# Patient Record
Sex: MALE | Race: WHITE | NOT HISPANIC OR LATINO | Employment: FULL TIME | URBAN - METROPOLITAN AREA
[De-identification: names, ages, dates, MRNs, and addresses within clinical notes are randomized per-mention and may not be internally consistent; named-entity substitution may affect disease eponyms.]

---

## 2017-03-17 ENCOUNTER — HOSPITAL ENCOUNTER (EMERGENCY)
Facility: CLINIC | Age: 42
Discharge: HOME | End: 2017-03-17
Attending: PHYSICIAN ASSISTANT | Admitting: PHYSICIAN ASSISTANT
Payer: MEDICAID

## 2017-03-17 ENCOUNTER — APPOINTMENT (OUTPATIENT)
Dept: GENERAL RADIOLOGY | Facility: CLINIC | Age: 42
End: 2017-03-17
Attending: PHYSICIAN ASSISTANT
Payer: MEDICAID

## 2017-03-17 ENCOUNTER — ALLIED HEALTH/NURSE VISIT (OUTPATIENT)
Dept: FAMILY MEDICINE | Facility: OTHER | Age: 42
End: 2017-03-17

## 2017-03-17 ENCOUNTER — APPOINTMENT (OUTPATIENT)
Dept: GENERAL RADIOLOGY | Facility: CLINIC | Age: 42
End: 2017-03-17
Attending: FAMILY MEDICINE
Payer: MEDICAID

## 2017-03-17 VITALS
TEMPERATURE: 97.6 F | HEART RATE: 78 BPM | DIASTOLIC BLOOD PRESSURE: 105 MMHG | SYSTOLIC BLOOD PRESSURE: 129 MMHG | BODY MASS INDEX: 19.33 KG/M2 | OXYGEN SATURATION: 100 % | HEIGHT: 70 IN | WEIGHT: 135 LBS | RESPIRATION RATE: 16 BRPM

## 2017-03-17 DIAGNOSIS — S90.31XA CONTUSION OF RIGHT FOOT, INITIAL ENCOUNTER: ICD-10-CM

## 2017-03-17 DIAGNOSIS — S93.401A SPRAIN OF RIGHT ANKLE, UNSPECIFIED LIGAMENT, INITIAL ENCOUNTER: ICD-10-CM

## 2017-03-17 DIAGNOSIS — S99.919A ANKLE INJURY: Primary | ICD-10-CM

## 2017-03-17 PROCEDURE — 99284 EMERGENCY DEPT VISIT MOD MDM: CPT

## 2017-03-17 PROCEDURE — 73630 X-RAY EXAM OF FOOT: CPT | Mod: TC,RT

## 2017-03-17 PROCEDURE — 99283 EMERGENCY DEPT VISIT LOW MDM: CPT | Performed by: PHYSICIAN ASSISTANT

## 2017-03-17 PROCEDURE — 99207 ZZC NO CHARGE NURSE ONLY: CPT

## 2017-03-17 PROCEDURE — 73610 X-RAY EXAM OF ANKLE: CPT | Mod: TC,RT

## 2017-03-17 ASSESSMENT — ENCOUNTER SYMPTOMS
COLOR CHANGE: 1
NUMBNESS: 0
WEAKNESS: 0

## 2017-03-17 NOTE — ED AVS SNAPSHOT
[facility] Emergency Department    [address] [place] [address]    Phone:  [phone]    Fax:  [phone]                                       Deshawn Flood   MRN: [id number]    Department:  [facility] Emergency Department   Date of Visit:  3/17/2017           Patient Information     Date Of Birth          1975        Your diagnoses for this visit were:     Sprain of right ankle, unspecified ligament, initial encounter     Contusion of right foot, initial encounter        You were seen by Sophia Clarke PA-C.      Follow-up Information     Follow up with [facility] In 1 week.    Specialty:  Orthopedics    Why:  As needed if symptoms persist    Contact information:    [address] [address] [phone]    Additional information:    From [address]: Exit at [employer] on south side [facility] [address] [facility].  Turn right   on [address].  Turn left at stoplight on [address].  [facility] will be in view two blocks ahead.        Follow up with [facility] Emergency Department.    Specialty:  EMERGENCY MEDICINE    Why:  If symptoms worsen    Contact information:    [address]   [place] [id number] [phone]    Additional information:    From [address]: Exit at [employer] on south [facility]. Turn right on [address]. Turn left at stoplight on [address]. [facility] will be in view two blocks ahead        Discharge Instructions         Ice, elevate, and rest your foot at home. Wear the air cast and use the crutches for support. Use tylenol and/or ibuprofen for pain. If no improvement in a week, follow up in the clinic with one of our orthopedists. Return to the emergency department as discussed for worsening symptoms.    Thank you for choosing [facility]'s Emergency Department. It was a pleasure taking care of you today. If you have any questions, please  "call [phone].    Sophia Clarke PA-C      Discharge References/Attachments     FOOT CONTUSION (ENGLISH)    SPRAIN, ANKLE (ADULT) (ENGLISH)      24 Hour Appointment Hotline       To make an appointment at any [facility] clinic, call [phone] ([phone]). If you don't have a family doctor or clinic, we will help you find one. [facility] clinics are conveniently located to serve the needs of you and your family.          ED Discharge Orders     Aircast           Crutches       Use gait belt during crutch training.                     Review of your medicines      Our records show that you are taking the medicines listed below. If these are incorrect, please call your family doctor or clinic.        Dose / Directions Last dose taken    albuterol 108 (90 BASE) MCG/ACT Inhaler   Quantity:  1   Generic drug:  albuterol        inhale as directed   Refills:  3                Procedures and tests performed during your visit     X-ray rt Foot G/E 3 vws*    XR Ankle Right G/E 3 Views      Orders Needing Specimen Collection     None      Pending Results     No orders found from 3/15/2017 to 3/18/2017.            Pending Culture Results     No orders found from 3/15/2017 to 3/18/2017.            Thank you for choosing [facility]       Thank you for choosing [facility] for your care. Our goal is always to provide you with excellent care. Hearing back from our patients is one way we can continue to improve our services. Please take a few minutes to complete the written survey that you may receive in the mail after you visit with us. Thank you!        [employer] Information     [employer]ZE lets you send messages to your doctor, view your test results, renew your prescriptions, schedule appointments and more. To sign up, go to [email or web address] . Click on \"Log in\" on the left side of the screen, which will take you to the Welcome page. Then click on \"Sign up Now\" on the right side of the page.     You will be asked to enter " the access code listed below, as well as some personal information. Please follow the directions to create your username and password.     Your access code is: [email or web address]  Expires: 6/15/2017 11:25 AM     Your access code will  in 90 days. If you need help or a new code, please call your [facility] clinic or [phone].        Care EveryWhere ID     This is your Care EveryWhere ID. This could be used by other organizations to access your [facility] medical records  [id number]        After Visit Summary       This is your record. Keep this with you and show to your community pharmacist(s) and doctor(s) at your next visit.

## 2017-03-17 NOTE — PROGRESS NOTES
Patient presented to the clinic today with an ankle injury. He said about 10:15 this morning he was walking out of his house when he slipped on a step and somehow hurt his ankle. Patient said he is unsure if there is swelling. He has not taken off his boot yet. Patient said he has a lot of pain and is unable to bear any weight on his foot or ankle. Patient said he can still move his toes but it is severely painful.     RN advised patient since he can bear any weight, he needs to be seen today. No openings at the [facility]. RN advised patient he should be seen in the ER. Patient is here with another male that will drive him to the ER. Patient said he will head to the ER now.     RECOMMENDED DISPOSITION:  To ED, another person to drive - Patient agreed to head to the ER now  Will comply with recommendation: YES   If further questions/concerns or if Sx do not improve, worsen or new Sx develop, call your PCP or [facility] as soon as possible.    NOTES:  Disposition was determined by the first positive assessment question, therefore all previous assessment questions were negative.  Informed to check provider manual or call insurance company to assure coverage.    Guideline used: Ankle Injury  Telephone Triage Protocols for Nurses, Fifth Edition, Bibi Lima RN

## 2017-03-17 NOTE — ED PROVIDER NOTES
"  History     Chief Complaint   Patient presents with     Ankle Pain     HPI  Deshawn Flood is a 41 year old male who presents to the emergency department complaining of right ankle and foot pain.  Earlier this morning he was walking down his outside porch steps when he slipped and hit his foot/ankle on a 4 x 4 post.  He has not been able to bear weight on the foot since then.  He complains of lateral ankle pain and lateral foot pain.  Denies numbness or tingling.  He is able to move his toes.  Denies any other injuries from falling.    I have reviewed the Medications, Allergies, Past Medical and Surgical History, and Social History in the Epic system.    Review of Systems   Musculoskeletal: Positive for gait problem.        Positive for right foot and ankle pain   Skin: Positive for color change (bruising, right ankle and foot).   Neurological: Negative for weakness and numbness.       Physical Exam   BP: (!) 129/105  Pulse: 79  Temp: 97.6  F (36.4  C)  Resp: 16  Height: 177.8 cm (5' 10\")  Weight: 61.2 kg (135 lb)  SpO2: 100 %  Physical Exam   Constitutional: He is oriented to person, place, and time. He appears well-developed and well-nourished. No distress.   HENT:   Head: Normocephalic and atraumatic.   Eyes: Conjunctivae are normal.   Neck: Normal range of motion.   Cardiovascular: Normal rate, regular rhythm and intact distal pulses.    Pulmonary/Chest: Effort normal. No respiratory distress.   Musculoskeletal:        Right ankle: He exhibits decreased range of motion, swelling and ecchymosis (mild). Tenderness. Lateral malleolus tenderness found.        Right foot: There is decreased range of motion, bony tenderness (5th metatarsal), swelling (over 5th metatarsal, ecchymosis) and decreased capillary refill. There is no crepitus and no deformity.   Neurological: He is alert and oriented to person, place, and time.   Skin: Skin is warm and dry. He is not diaphoretic.   Psychiatric: He has a normal mood and " affect.   Nursing note and vitals reviewed.      ED Course     ED Course     Procedures  None     Results for orders placed or performed during the hospital encounter of 03/17/17 (from the past 24 hour(s))   XR Ankle Right G/E 3 Views    Narrative    XR ANKLE RT G/E 3 VW 3/17/2017 12:40 PM    HISTORY: Trauma.    COMPARISON: None.    FINDINGS: Mortise joint is congruent. No acute fracture or  malalignment. Soft tissue swelling about the ankle, lateral greater  than medial.      Impression    IMPRESSION: No acute osseous abnormality.    ÁNGELA FLORES MD   X-ray rt Foot G/E 3 vws*    Narrative    XR FOOT RT G/E 3 VW 3/17/2017 1:25 PM    HISTORY: Trauma.    COMPARISON: None.    FINDINGS: No fracture or malalignment. Osseous structures are within  normal limits.      Impression    IMPRESSION: No acute osseous abnormality.    ÁNGELA FLORES MD             Assessments & Plan (with Medical Decision Making)  Deshawn Flood is a 41 year old male who presented to the emergency department complaining of right ankle and foot pain after a fall.  He has been unable to bear weight on the foot since the fall that occurred earlier this morning, denies any other injuries. Complains of pain in the lateral ankle and lateral foot. Exam notable for swelling and ecchymosis to these areas.  X-rays obtained and I reviewed them.  They showed no acute fracture or dislocation.  I suspect patient sustained an ankle sprain as well as a contusion of his foot.  I recommended rest, ice, and elevating foot when not in use. He can use tylenol or ibuprofen for pain. He was placed in an Aircast and given a set of crutches for mobilization.  If he sees no in improvement in the next week he can follow-up in the clinic with orthopedics.  He is given instructions on to return to the emergency department.  Patient expressed understanding and was agreeable to plan.       I have reviewed the nursing notes.    I have reviewed the findings, diagnosis, plan and  need for follow up with the patient.    Discharge Medication List as of 3/17/2017  1:54 PM          Final diagnoses:   Sprain of right ankle, unspecified ligament, initial encounter   Contusion of right foot, initial encounter       3/17/2017   [facility] EMERGENCY DEPARTMENT     Sophia Clarke PA-C  03/17/17 141

## 2017-03-17 NOTE — DISCHARGE INSTRUCTIONS
Ice, elevate, and rest your foot at home. Wear the air cast and use the crutches for support. Use tylenol and/or ibuprofen for pain. If no improvement in a week, follow up in the clinic with one of our orthopedists. Return to the emergency department as discussed for worsening symptoms.    Thank you for choosing [facility]'s Emergency Department. It was a pleasure taking care of you today. If you have any questions, please call [phone].    Sophia Clarke PA-C

## 2017-03-17 NOTE — MR AVS SNAPSHOT
"              After Visit Summary   3/17/2017    Deshawn Flood    MRN: [id number]           Patient Information     Date Of Birth          1975        Visit Information        Provider Department      3/17/2017 10:30 AM NL RN [facility]        Today's Diagnoses     Ankle injury    -  1       Follow-ups after your visit        Who to contact     If you have questions or need follow up information about today's clinic visit or your schedule please contact [facility] directly at [phone].  Normal or non-critical lab and imaging results will be communicated to you by MyChart, letter or phone within 4 business days after the clinic has received the results. If you do not hear from us within 7 days, please contact the clinic through [employer] or phone. If you have a critical or abnormal lab result, we will notify you by phone as soon as possible.  Submit refill requests through [employer] or call your pharmacy and they will forward the refill request to us. Please allow 3 business days for your refill to be completed.          Additional Information About Your Visit        MyChart Information     [employer] lets you send messages to your doctor, view your test results, renew your prescriptions, schedule appointments and more. To sign up, go to [email or web address] . Click on \"Log in\" on the left side of the screen, which will take you to the Welcome page. Then click on \"Sign up Now\" on the right side of the page.     You will be asked to enter the access code listed below, as well as some personal information. Please follow the directions to create your username and password.     Your access code is: [email or web address]  Expires: 6/15/2017 11:25 AM     Your access code will  in 90 days. If you need help or a new code, please call your [facility] or [phone].        Care EveryWhere ID     This is your Care EveryWhere ID. This could be used by other organizations to access your " [facility] medical records  [id number]         Blood Pressure from Last 3 Encounters:   10/12/12 110/78   08/31/12 110/80   08/17/12 110/70    Weight from Last 3 Encounters:   08/03/12 142 lb 14.4 oz (64.8 kg)   08/02/12 142 lb 14.4 oz (64.8 kg)   07/30/12 145 lb (65.8 kg)              Today, you had the following     No orders found for display       Primary Care Provider Office Phone # Fax #    Kalia Foy PA-C [phone]       [facility] [address] [facility] [address]        Thank you!     Thank you for choosing [facility]  for your care. Our goal is always to provide you with excellent care. Hearing back from our patients is one way we can continue to improve our services. Please take a few minutes to complete the written survey that you may receive in the mail after your visit with us. Thank you!             Your Updated Medication List - Protect others around you: Learn how to safely use, store and throw away your medicines at www.disposemymeds.org.          This list is accurate as of: 3/17/17 11:25 AM.  Always use your most recent med list.                   Brand Name Dispense Instructions for use    albuterol 108 (90 BASE) MCG/ACT Inhaler   Generic drug:  albuterol     1    inhale as directed

## 2017-03-27 ENCOUNTER — OFFICE VISIT (OUTPATIENT)
Dept: PODIATRY | Facility: CLINIC | Age: 42
End: 2017-03-27
Payer: MEDICAID

## 2017-03-27 VITALS — HEIGHT: 70 IN | BODY MASS INDEX: 19.33 KG/M2 | TEMPERATURE: 98 F | WEIGHT: 135 LBS

## 2017-03-27 DIAGNOSIS — S93.602A FOOT SPRAIN, LEFT, INITIAL ENCOUNTER: Primary | ICD-10-CM

## 2017-03-27 DIAGNOSIS — S93.402A SPRAIN OF LEFT ANKLE, UNSPECIFIED LIGAMENT, INITIAL ENCOUNTER: ICD-10-CM

## 2017-03-27 PROCEDURE — 99203 OFFICE O/P NEW LOW 30 MIN: CPT | Performed by: PODIATRIST

## 2017-03-27 ASSESSMENT — PAIN SCALES - GENERAL: PAINLEVEL: MODERATE PAIN (5)

## 2017-03-27 NOTE — LETTER
3/27/2017       RE: Deshawn Flood  [address]  [place] [address]           Dear Colleague,    Thank you for referring your patient, Deshawn Flood, to the [facility]. Please see a copy of my visit note below.    HPI:  Deshawn Flood is a 41 year old male who is seen in consultation at the request of Sophia Clarke PA-C.    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    ROS:  10 point ROS neg other than the symptoms noted above in the HPI.    PAST MEDICAL HISTORY:   Past Medical History:   Diagnosis Date     Other spontaneous pneumothorax      SPONTANEOUS PNEUMOTHORAX NEC 3/25/2005     Unspecified asthma(493.90)         PAST SURGICAL HISTORY:   Past Surgical History:   Procedure Laterality Date     ARTHROSCOPY KNEE  8/3/2012    Procedure: ARTHROSCOPY KNEE;  Left knee Arthroscopy with removal of loose bodies;  Surgeon: Bibi Roberts MD;  Location: [facility] OR     HC REPAIR UMBILICAL GODFREY,<4Y/O,REDUC  1978     TUBE THORACOSTOMY,ABSC/EMPYEMA/HEMO  3/20/2005    Small chest tube with Heimlich valve.        MEDICATIONS:   Current Outpatient Prescriptions:      IBUPROFEN PO, , Disp: , Rfl:      PROAIR  (90 BASE) MCG/ACT IN AERS, inhale as directed, Disp: 1, Rfl: 3     ALLERGIES:    Allergies   Allergen Reactions     Bee Venom      swells up     No Known Drug Allergies         SOCIAL HISTORY:   Social History     Social History     Marital status: Single     Spouse name: N/A     Number of children: N/A     Years of education: N/A     Occupational  "History     Not on file.     Social History Main Topics     Smoking status: Current Some Day Smoker     Packs/day: 0.10     Years: 15.00     Types: Cigarettes     Smokeless tobacco: Current User      Comment: hopes to quit smoking --hasn't had any since lung went down 1/14/05.     Alcohol use 10.5 oz/week     21 Cans of beer per week     Drug use: No     Sexual activity: Not on file     Other Topics Concern     Not on file     Social History Narrative        FAMILY HISTORY: History reviewed. No pertinent family history.     EXAM:Vitals: Temp 98  F (36.7  C) (Temporal)  Ht 5' 10\" (1.778 m)  Wt 135 lb (61.2 kg)  BMI 19.37 kg/m2  BMI= Body mass index is 19.37 kg/(m^2).    General appearance: Patient is alert and fully cooperative with history & exam.  No sign of distress is noted during the visit.     Psychiatric: Affect is pleasant & appropriate.  Patient appears motivated to improve health.      Respiratory: Breathing is regular & unlabored while sitting.     HEENT: Hearing is intact to spoken word.  Speech is clear.  No gross evidence of visual impairment that would impact ambulation.     Vascular: DP & PT pulses are intact & regular bilaterally.  No significant edema or varicosities noted.  CFT and skin temperature is normal to both lower extremities.     Neurologic: Lower extremity sensation is intact to light touch.  No evidence of weakness or contracture in the lower extremities.  No evidence of neuropathy.    Dermatologic: Skin is intact to both lower extremities with adequate texture, turgor and tone about the integument.  No paronychia or evidence of soft tissue infection is noted.     Musculoskeletal: Patient is ambulatory with fracture boot but no crutches. He has exquisite pain throughout any palpation of the dorsal forefoot and plantar metatarsal heads as well as lateral calcaneus and cuboid lateral and medial malleolus. No palpable void within the peroneal tendons posterior tibial or Achilles " tendon.    Radiographs: 3 views of the foot and 3 views of the ankle demonstrate no acute fracture or joint dislocation. Osteophyte noted about the dorsal talus however this appears to be chronic.     ASSESSMENT:       ICD-10-CM    1. Foot sprain, left, initial encounter S93.602A    2. Sprain of left ankle, unspecified ligament, initial encounter S93.402A         PLAN:  Reviewed patient's chart in [facility].      3/27/2017   Interpreted xrays  No obvious fractures or ruptures but pain in many other locations, calc, med lat mall, and forefoot and peroneals.  Work release for light duty work in fracture boot.  Stay in the fracture boot even for sleep to keep the ankle at 90  and reduce tension upon tendons that cross the ankle  Follow up in 2 weeks  If no improvement then consider MR imaging       Red Flores DPM      Again, thank you for allowing me to participate in the care of your patient.        Sincerely,    Red Flores DPM

## 2017-03-27 NOTE — PROGRESS NOTES
HPI:  Deshawn Flood is a 41 year old male who is seen in consultation at the request of Sophia Clarke PA-C.    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    ROS:  10 point ROS neg other than the symptoms noted above in the HPI.    PAST MEDICAL HISTORY:   Past Medical History:   Diagnosis Date     Other spontaneous pneumothorax      SPONTANEOUS PNEUMOTHORAX NEC 3/25/2005     Unspecified asthma(493.90)         PAST SURGICAL HISTORY:   Past Surgical History:   Procedure Laterality Date     ARTHROSCOPY KNEE  8/3/2012    Procedure: ARTHROSCOPY KNEE;  Left knee Arthroscopy with removal of loose bodies;  Surgeon: Bibi Roberts MD;  Location: [facility] OR     HC REPAIR UMBILICAL GODFREY,<4Y/O,REDUC  1978     TUBE THORACOSTOMY,ABSC/EMPYEMA/HEMO  3/20/2005    Small chest tube with Heimlich valve.        MEDICATIONS:   Current Outpatient Prescriptions:      IBUPROFEN PO, , Disp: , Rfl:      PROAIR  (90 BASE) MCG/ACT IN AERS, inhale as directed, Disp: 1, Rfl: 3     ALLERGIES:    Allergies   Allergen Reactions     Bee Venom      swells up     No Known Drug Allergies         SOCIAL HISTORY:   Social History     Social History     Marital status: Single     Spouse name: N/A     Number of children: N/A     Years of education: N/A     Occupational History     Not on file.     Social History Main Topics     Smoking status: Current Some Day Smoker     Packs/day: 0.10     Years: 15.00     Types: Cigarettes     Smokeless tobacco: Current User      Comment: hopes to quit smoking  "--hasn't had any since lung went down 1/14/05.     Alcohol use 10.5 oz/week     21 Cans of beer per week     Drug use: No     Sexual activity: Not on file     Other Topics Concern     Not on file     Social History Narrative        FAMILY HISTORY: History reviewed. No pertinent family history.     EXAM:Vitals: Temp 98  F (36.7  C) (Temporal)  Ht 5' 10\" (1.778 m)  Wt 135 lb (61.2 kg)  BMI 19.37 kg/m2  BMI= Body mass index is 19.37 kg/(m^2).    General appearance: Patient is alert and fully cooperative with history & exam.  No sign of distress is noted during the visit.     Psychiatric: Affect is pleasant & appropriate.  Patient appears motivated to improve health.      Respiratory: Breathing is regular & unlabored while sitting.     HEENT: Hearing is intact to spoken word.  Speech is clear.  No gross evidence of visual impairment that would impact ambulation.     Vascular: DP & PT pulses are intact & regular bilaterally.  No significant edema or varicosities noted.  CFT and skin temperature is normal to both lower extremities.     Neurologic: Lower extremity sensation is intact to light touch.  No evidence of weakness or contracture in the lower extremities.  No evidence of neuropathy.    Dermatologic: Skin is intact to both lower extremities with adequate texture, turgor and tone about the integument.  No paronychia or evidence of soft tissue infection is noted.     Musculoskeletal: Patient is ambulatory with fracture boot but no crutches. He has exquisite pain throughout any palpation of the dorsal forefoot and plantar metatarsal heads as well as lateral calcaneus and cuboid lateral and medial malleolus. No palpable void within the peroneal tendons posterior tibial or Achilles tendon.    Radiographs: 3 views of the foot and 3 views of the ankle demonstrate no acute fracture or joint dislocation. Osteophyte noted about the dorsal talus however this appears to be chronic.     ASSESSMENT:       ICD-10-CM    1. Foot " sprain, left, initial encounter S93.602A    2. Sprain of left ankle, unspecified ligament, initial encounter S93.402A         PLAN:  Reviewed patient's chart in [facility].      3/27/2017   Interpreted xrays  No obvious fractures or ruptures but pain in many other locations, calc, med lat mall, and forefoot and peroneals.  Work release for light duty work in fracture boot.  Stay in the fracture boot even for sleep to keep the ankle at 90  and reduce tension upon tendons that cross the ankle  Follow up in 2 weeks  If no improvement then consider MR imaging       Red Flores DPM

## 2017-03-27 NOTE — NURSING NOTE
"Chief Complaint   Patient presents with     Consult     Right foot sprain, DOI 3/17/2017; new patient       Initial Temp 98  F (36.7  C) (Temporal)  Ht 5' 10\" (1.778 m)  Wt 135 lb (61.2 kg)  BMI 19.37 kg/m2 Estimated body mass index is 19.37 kg/(m^2) as calculated from the following:    Height as of this encounter: 5' 10\" (1.778 m).    Weight as of this encounter: 135 lb (61.2 kg).  BP completed using cuff size: NA (Not Taken)  Medication Reconciliation: complete    Patricia James CMA, March 27, 2017    "

## 2017-03-27 NOTE — LETTER
[address] [address]  [phone]    3/27/2017      RE:  Deshawn Flood  : 1975      To whom it may concern:    This patient must be released to light duty seated work only at this time.  Follow up in two weeks for reevaluation.     Sincerely,          Red Flores DPM

## 2017-03-27 NOTE — PATIENT INSTRUCTIONS
Sustained a fracture boot activities as tolerated begin weightbearing without crutches in the fracture boot  Standard fracture boot even for sleep

## 2017-03-27 NOTE — MR AVS SNAPSHOT
"              After Visit Summary   3/27/2017    Deshawn Flood    MRN: [id number]           Patient Information     Date Of Birth          1975        Visit Information        Provider Department      3/27/2017 1:00 PM Red Flores DPM [facility]        Today's Diagnoses     Foot sprain, left, initial encounter    -  1    Sprain of left ankle, unspecified ligament, initial encounter          Care Instructions    Sustained a fracture boot activities as tolerated begin weightbearing without crutches in the fracture boot  Standard fracture boot even for sleep        Follow-ups after your visit        Your next 10 appointments already scheduled     Apr 10, 2017  3:45 PM CDT   Return Visit with Red Flores DPM   [facility]    [address] [facility] [address] [phone]              Who to contact     If you have questions or need follow up information about today's clinic visit or your schedule please contact [facility] directly at [phone].  Normal or non-critical lab and imaging results will be communicated to you by [employer], letter or phone within 4 business days after the clinic has received the results. If you do not hear from us within 7 days, please contact the clinic through [employer] or phone. If you have a critical or abnormal lab result, we will notify you by phone as soon as possible.  Submit refill requests through [employer] or call your pharmacy and they will forward the refill request to us. Please allow 3 business days for your refill to be completed.          Additional Information About Your Visit        [employer] Information     [employer] lets you send messages to your doctor, view your test results, renew your prescriptions, schedule appointments and more. To sign up, go to [email or web address] . Click on \"Log in\" on the left side of the screen, which will take you to the Welcome page. " "Then click on \"Sign up Now\" on the right side of the page.     You will be asked to enter the access code listed below, as well as some personal information. Please follow the directions to create your username and password.     Your access code is: [email or web address]  Expires: 6/15/2017 11:25 AM     Your access code will  in 90 days. If you need help or a new code, please call your [place] clinic or [phone].        Care EveryWhere ID     This is your Care EveryWhere ID. This could be used by other organizations to access your [place] medical records  [id number]        Your Vitals Were     Temperature Height BMI (Body Mass Index)             98  F (36.7  C) (Temporal) 5' 10\" (1.778 m) 19.37 kg/m2          Blood Pressure from Last 3 Encounters:   17 (!) 129/105   10/12/12 110/78   12 110/80    Weight from Last 3 Encounters:   17 135 lb (61.2 kg)   17 135 lb (61.2 kg)   12 142 lb 14.4 oz (64.8 kg)              Today, you had the following     No orders found for display       Primary Care Provider Office Phone # Fax #    Kalia Foy PA-C [phone]       [facility] [address] [facility] [address]        Thank you!     Thank you for choosing [facility]  for your care. Our goal is always to provide you with excellent care. Hearing back from our patients is one way we can continue to improve our services. Please take a few minutes to complete the written survey that you may receive in the mail after your visit with us. Thank you!             Your Updated Medication List - Protect others around you: Learn how to safely use, store and throw away your medicines at www.disposemymeds.org.          This list is accurate as of: 3/27/17  1:38 PM.  Always use your most recent med list.                   Brand Name Dispense Instructions for use    albuterol 108 (90 BASE) MCG/ACT Inhaler   Generic drug:  albuterol     1    inhale as directed       " IBUPROFEN PO

## 2017-04-10 ENCOUNTER — OFFICE VISIT (OUTPATIENT)
Dept: PODIATRY | Facility: CLINIC | Age: 42
End: 2017-04-10
Payer: MEDICAID

## 2017-04-10 VITALS — TEMPERATURE: 98.2 F | WEIGHT: 135 LBS | HEIGHT: 70 IN | BODY MASS INDEX: 19.33 KG/M2

## 2017-04-10 DIAGNOSIS — S93.601A SPRAIN OF FOOT, RIGHT, INITIAL ENCOUNTER: Primary | ICD-10-CM

## 2017-04-10 DIAGNOSIS — S93.401A SPRAIN OF RIGHT ANKLE, UNSPECIFIED LIGAMENT, INITIAL ENCOUNTER: ICD-10-CM

## 2017-04-10 PROCEDURE — 99213 OFFICE O/P EST LOW 20 MIN: CPT | Performed by: PODIATRIST

## 2017-04-10 ASSESSMENT — PAIN SCALES - GENERAL: PAINLEVEL: NO PAIN (0)

## 2017-04-10 NOTE — NURSING NOTE
"Chief Complaint   Patient presents with     RECHECK     improvement, D/C fx boot for 3 days, stiff - Right foot sprain, DOI 3/17/2017; LOV 3/27/2017       Initial Temp 98.2  F (36.8  C) (Temporal)  Ht 5' 10\" (1.778 m)  Wt 135 lb (61.2 kg)  BMI 19.37 kg/m2 Estimated body mass index is 19.37 kg/(m^2) as calculated from the following:    Height as of this encounter: 5' 10\" (1.778 m).    Weight as of this encounter: 135 lb (61.2 kg).  BP completed using cuff size: NA (Not Taken)  Medication Reconciliation: complete    Patricia James CMA, April 10, 2017    "

## 2017-04-10 NOTE — PATIENT INSTRUCTIONS
Reliable shoe stores: To maximize your experience and provide the best possible fit.  Be sure to show them your foot concerns and tell them Dr. Flores sent you.      Stores listed in bold have only athletic shoes, and stores that are not bold are mostly casual or variety of shoes    [employer]  [address] [place] [address]  [phone]    [employer] [place]  [address] [place] [address]  [phone]     [employer] [place]  [address] [place] [address]  [phone]    [employer]  [address] [facility]  [facility] [address]  [phone]    [employer]  [address] [place], [place] [address] [phone]    [employer]  [address] [place] [address]  [phone]                         [employer] Locations:     [address] [place] [address]   [phone]     [address] [place] [address] [address] [place] [address]   [phone]     [address] [place] [address]   [phone]     [address] [facility] [place] [address]   [phone]     [address] [place] [address]   [phone]     [address] [place] [address]   [phone]     [address] [facility] [address]   [place] [address]   [phone]     [address] [facility]   [place] [address]   [phone]             How to find reasonable shoes          The correct width    Correct Fitting    Correct Length      Foot Distortion    Posture Distortion                          Torsional Rigidity      Grasp behind the heel and underneath the foot and twist      Bad    Excessive torsion/twist in midfoot     Less torsion/twist in midfoot is better                   Heel Counter Rigidity      Grasp just above   midsole and squeeze      Bad    Soft heel counter      Good    Rigid Heel Counter      Flexion Rigidity      Grasp shoe and bend from forefoot to rearfoot

## 2017-04-10 NOTE — MR AVS SNAPSHOT
After Visit Summary   4/10/2017    Deshawn Flood    MRN: [id number]           Patient Information     Date Of Birth          1975        Visit Information        Provider Department      4/10/2017 3:45 PM Red Flores, [facility] Diagnoses     Foot sprain, left, initial encounter    -  1    Sprain of left ankle, unspecified ligament, initial encounter          Care Instructions      Reliable shoe stores: To maximize your experience and provide the best possible fit.  Be sure to show them your foot concerns and tell them Dr. Flores sent you.      Stores listed in bold have only athletic shoes, and stores that are not bold are mostly casual or variety of shoes    [employer]  [address] [place] [address]  [phone]    [employer]  [address] [place] [address]  [phone]    [employer] [facility] [place]  [address] [place] [address]  [phone]    [facility]  [address] [facility] [address]  [phone]    [employer]  [address] [place] [address]  [phone]    [employer]  [address] [place] [address]  [phone]                         [employer] Locations:     [address] [place] [address]   [phone]     [address] [place] [address] [address] [place] [address]   [phone]     [address] [place] [address]   [phone]     [address] [place] [address]   [phone]     [address] [place] [address]   [phone]     [address] [place] [address]   [phone]     [address]  [facility] [address]   [place] [address]   [phone]     [address]   [place], [place] [address] [phone]             How to find reasonable shoes          The correct width    Correct Fitting    Correct Length      Foot Distortion    Posture Distortion                       "    Torsional Rigidity      Grasp behind the heel and underneath the foot and twist      Bad    Excessive torsion/twist in midfoot     Less torsion/twist in midfoot is better                   Heel Counter Rigidity      Grasp just above   midsole and squeeze      Bad    Soft heel counter      Good    Rigid Heel Counter      Flexion Rigidity      Grasp shoe and bend from forefoot to rearfoot                      Follow-ups after your visit        Who to contact     If you have questions or need follow up information about today's clinic visit or your schedule please contact [facility] directly at [phone].  Normal or non-critical lab and imaging results will be communicated to you by [employer], letter or phone within 4 business days after the clinic has received the results. If you do not hear from us within 7 days, please contact the clinic through [employer] or phone. If you have a critical or abnormal lab result, we will notify you by phone as soon as possible.  Submit refill requests through [employer] or call your pharmacy and they will forward the refill request to us. Please allow 3 business days for your refill to be completed.          Additional Information About Your Visit        [employer] Information     [employer] lets you send messages to your doctor, view your test results, renew your prescriptions, schedule appointments and more. To sign up, go to [email or web address] . Click on \"Log in\" on the left side of the screen, which will take you to the Welcome page. Then click on \"Sign up Now\" on the right side of the page.     You will be asked to enter the access code listed below, as well as some personal information. Please follow the directions to create your username and password.     Your access code is: [email or web address]  Expires: 6/15/2017 11:25 AM     Your access code will  in 90 days. If you need help or a new code, please call your [place] clinic or [phone].        Care " "EveryWhere ID     This is your Care EveryWhere ID. This could be used by other organizations to access your [place] medical records  [id number]        Your Vitals Were     Temperature Height BMI (Body Mass Index)             98.2  F (36.8  C) (Temporal) 5' 10\" (1.778 m) 19.37 kg/m2          Blood Pressure from Last 3 Encounters:   03/17/17 (!) 129/105   10/12/12 110/78   08/31/12 110/80    Weight from Last 3 Encounters:   04/10/17 135 lb (61.2 kg)   03/27/17 135 lb (61.2 kg)   03/17/17 135 lb (61.2 kg)              Today, you had the following     No orders found for display       Primary Care Provider Office Phone # Fax #    Kalia Foy PA-C [phone]       [facility] [address] [place] [address]        Thank you!     Thank you for choosing [facility]  for your care. Our goal is always to provide you with excellent care. Hearing back from our patients is one way we can continue to improve our services. Please take a few minutes to complete the written survey that you may receive in the mail after your visit with us. Thank you!             Your Updated Medication List - Protect others around you: Learn how to safely use, store and throw away your medicines at www.disposemymeds.org.          This list is accurate as of: 4/10/17  4:21 PM.  Always use your most recent med list.                   Brand Name Dispense Instructions for use    albuterol 108 (90 BASE) MCG/ACT Inhaler   Generic drug:  albuterol     1    inhale as directed       IBUPROFEN PO            "

## 2017-04-10 NOTE — LETTER
[address] [address]  [phone]    4/10/2017      RE:  Deshawn Flood  : 1975      To whom it may concern:    This patient may return to work without restrictions 4/10/17.     Sincerely,          Red Flores DPM

## 2017-04-10 NOTE — PROGRESS NOTES
"Chief Complaint   Patient presents with     RECHECK     improvement, D/C fx boot for 3 days, stiff - Right foot sprain, DOI 3/17/2017; LOV 3/27/2017     BMI is normal.    HPI:  Deshawn Flood is a 41 year old male who is seen in consultation at the request of Sophia Clarke PA-C.    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    EXAM:Vitals: Temp 98.2  F (36.8  C) (Temporal)  Ht 5' 10\" (1.778 m)  Wt 135 lb (61.2 kg)  BMI 19.37 kg/m2  BMI= Body mass index is 19.37 kg/(m^2).    General appearance: Patient is alert and fully cooperative with history & exam.  No sign of distress is noted during the visit.     Psychiatric: Affect is pleasant & appropriate.  Patient appears motivated to improve health.      Respiratory: Breathing is regular & unlabored while sitting.     HEENT: Hearing is intact to spoken word.  Speech is clear.  No gross evidence of visual impairment that would impact ambulation.     Vascular: DP & PT pulses are intact & regular bilaterally.  No significant edema or varicosities noted.  CFT and skin temperature is normal to both lower extremities.     Neurologic: Lower extremity sensation is intact to light touch.  No evidence of weakness or contracture in the lower extremities.  No evidence of neuropathy.    Dermatologic: Skin is intact to both lower extremities with adequate texture, turgor and tone about the integument.  No paronychia or evidence of soft tissue infection is noted.     Musculoskeletal: Patient is ambulatory with fracture boot but no " "crutches. Minimal induration noted about the lateral fibula and lateral sinus tarsi right ankle. No pain with palpation. He is able to perform a unilateral toe raise on the right foot however balance is slightly more challenged on the right than the left.  No pain with firm palpation about the forefoot.     ASSESSMENT:       ICD-10-CM    1. Sprain of foot, right, initial encounter S93.601A    2. Sprain of right ankle, unspecified ligament, initial encounter S93.401A         PLAN:  Reviewed patient's chart in [facility].      3/27/2017   Interpreted xrays  No obvious fractures or ruptures but pain in many other locations, calc, med lat mall, and forefoot and peroneals.  Work release for light duty work in fracture boot.  Stay in the fracture boot even for sleep to keep the ankle at 90  and reduce tension upon tendons that cross the ankle  Follow up in 2 weeks  If no improvement then consider MR imaging     4/10/2017    Not a work related injury   Has come out of boot on his own and feels \"80% better\"  Letter for work without restrictions.   Follow-up in clinic with any functional limitations weakness or reinjury if he is unable to tolerate moving forward 100% without restrictions.      Red Flores DPM      "

## 2019-02-01 ENCOUNTER — OFFICE VISIT (OUTPATIENT)
Dept: FAMILY MEDICINE | Facility: OTHER | Age: 44
End: 2019-02-01
Payer: COMMERCIAL

## 2019-02-01 VITALS
WEIGHT: 157.7 LBS | HEART RATE: 72 BPM | OXYGEN SATURATION: 99 % | TEMPERATURE: 97.1 F | SYSTOLIC BLOOD PRESSURE: 124 MMHG | BODY MASS INDEX: 23.36 KG/M2 | RESPIRATION RATE: 16 BRPM | HEIGHT: 69 IN | DIASTOLIC BLOOD PRESSURE: 74 MMHG

## 2019-02-01 DIAGNOSIS — D17.0 LIPOMA OF FACE: Primary | ICD-10-CM

## 2019-02-01 PROCEDURE — 99213 OFFICE O/P EST LOW 20 MIN: CPT | Performed by: PHYSICIAN ASSISTANT

## 2019-02-01 ASSESSMENT — MIFFLIN-ST. JEOR: SCORE: 1599.66

## 2019-02-01 ASSESSMENT — PAIN SCALES - GENERAL: PAINLEVEL: NO PAIN (0)

## 2019-02-01 NOTE — PROGRESS NOTES
"  SUBJECTIVE:   Deshawn Flood is a 43 year old male who presents to clinic today for the following health issues:      Concern - check lump on his face  Onset: 1 year    Description:   lump    Intensity: mild    Progression of Symptoms:  same    Accompanying Signs & Symptoms:  none    Previous history of similar problem:   no    Precipitating factors:   Worsened by: nothing    Alleviating factors:  Improved by: nothing    Therapies Tried and outcome: nothing    Patient is a 43 year old male who presents today to discuss persistent lump on his right cheek. He said that the lump has been present for about 1 year and has not changed in size over that time. He is in today as several of his peers have commented on it and he would like it removed. Patient denies excessive sun exposure, history of similar lesions or skin cancer in self or family, constitutional symptoms. He has not tried any treatments for the mass and denies drainage, discharge, pain or redness. Patient has a history of smoking and currently uses chewing tobacco.      Problem list and histories reviewed & adjusted, as indicated.  Additional history: as documented    Reviewed and updated as needed this visit by clinical staff  Tobacco  Allergies  Meds  Med Hx  Surg Hx  Fam Hx  Soc Hx      Reviewed and updated as needed this visit by Provider         ROS:  CONSTITUTIONAL: NEGATIVE for fever, chills, change in weight  INTEGUMENTARY/SKIN: See HPI  ENT/MOUTH: NEGATIVE for ear, nose. Patient reports poor dentition and several missing teeth  RESP: NEGATIVE for significant cough or SOB  CV: NEGATIVE for chest pain, palpitations or peripheral edema    OBJECTIVE:     /74 (BP Location: Right arm, Patient Position: Chair, Cuff Size: Adult Regular)   Pulse 72   Temp 97.1  F (36.2  C) (Oral)   Resp 16   Ht 1.759 m (5' 9.25\")   Wt 71.5 kg (157 lb 11.2 oz)   SpO2 99%   BMI 23.12 kg/m    Body mass index is 23.12 kg/m .  GENERAL: healthy, alert and no " distress  HENT: ear canals and TM's normal, nose and mouth mouth with very poor dentition and several missing teeth  RESP: lungs clear to auscultation - no rales, rhonchi or wheezes  CV: regular rate and rhythm, normal S1 S2, no S3 or S4, no murmur, click or rub, no peripheral edema and peripheral pulses strong  SKIN: [id number] soft, mass over right cheek, small darkened [place] present over inferior portion of mass. Nontender, cool to touch, no central umbilication.       ASSESSMENT/PLAN:     1. Lipoma of face  Discussed with patient suspicion of lipoma, but recommended that he have it removed due to darkened/discoloration. Patient will see general surgery to have mass excised and sent to for pathology.   - GENERAL SURG ADULT REFERRAL    Follow up with clinic pending results or sooner if conditions change, worsen or fail to improve as expected.      Quintin Bhagat PA-C  [facility]

## 2019-02-01 NOTE — PATIENT INSTRUCTIONS
Patient Education     Recognizing Skin Cancer  Doing monthly skin checkups is the best way to find new marks or skin changes. During your skin checkups, be sure to follow the ABCDEs of skin checks. This means checking moles or other growths for Asymmetry, Border, Color, Diameter, and Evolving (changing). Note, too, any new growths, or, if any of your growths bleed, itch, look different, or are painful.  The ABCDEs of skin checks  Check your moles or growths for signs of melanoma using ABCDE:    Asymmetry: the sides of the mole or growth don t match    Border: the edges are ragged, notched, or blurred    Color: the color within the mole or growth varies    Diameter: the mole or growth is larger than 6 mm (size of a pencil eraser)    Evolving: the size, shape, or color of the mole or growth is changing (evolving is not shown below)       In addition to the ABCDEs, other warning signs of skin cancer include:    A spot or mole that looks different from all other marks on your skin    Changes in how an area feels, such as itching, tenderness, or pain    Changes in the skin's surface, such as oozing, bleeding, or scaliness    A sore that does not heal    New swelling or redness beyond the border of a mole  Who s at risk?  Anyone can get skin cancer. But you are at greater risk if you have:    Fair skin, light-colored hair, or light-colored eyes    Many moles or abnormal moles on your skin    A history of sunburns from sunlight or tanning beds    A family history of skin cancer    A history of exposure to radiation or chemicals    A weakened immune system  Also, a personal history of skin cancer puts you at risk for recurring skin cancer.  How to check your skin  Do your monthly skin checkups in front of a full-length mirror. Check all parts of your body, including your:    Head (ears, face, neck, and scalp)    Torso (front, back, and sides)    Arms (tops, undersides, upper, and lower armpits)    Hands (palms, backs, and  fingers, including under the nails)    Buttocks and genitals    Legs (front, back, and sides)    Feet (tops, soles, toes, including under the nails, and between toes)  If you have a lot of moles, take digital photos of them each month. Make sure to take photos both up close and from a distance. These can help you see if any moles change over time.  When to seek medical treatment  Most skin changes are not cancer. But if you see any changes in your skin, call your doctor right away. Only he or she can diagnose a problem. If you have skin cancer, seeing your doctor can be the first step toward getting the treatment that could save your life.   Date Last Reviewed: 1/1/2017 2000-2018 The [employer]. [address] [place] [address], [place] [address]. All rights reserved. This information is not intended as a substitute for professional medical care. Always follow your healthcare professional's instructions.           Patient Education     Understanding a Lipoma    A lipoma is a benign lump under the skin that s made of fat. It s not cancer. It feels soft like rubber when you press it, and in most cases it doesn t hurt. Some people have more than one. A lipoma grows slowly over time and doesn t cause many problems. Lipomas occur most often in adults from ages 40 to 60, and more often in men.  How to say it  [id number]   What causes a lipoma?  The cause is not yet known. Experts are still learning more. It may be partly caused by a problem in a gene. They can run in families. Familial multiple lipomatosis is when 2 or more family members have many lipomas.  Symptoms of a lipoma  The main symptom of a lipoma is a soft lump under the skin that doesn t hurt unless it is pressing on a nerve. It may be small, around 1/4 inch across. Or it may be larger, up to 4 inches across or more.  There are different kinds of lipomas. The most common kind occurs under the skin of the shoulders, chest, back, belly, or under the arms.  In some cases, a lipoma can occur on the legs. In rare cases, one may occur deeper in the body or in a muscle.  Treatment for a lipoma  In most cases, a lipoma doesn t need treatment. Your healthcare provider may look at it during regular checkups to see if it changes.  But if the lipoma is painful or you want it removed for cosmetic reasons, it can be removed with surgery. The surgery is called excision. The lipoma will most likely not grow back after surgery. During surgery, the area around the lipoma is numbed. If you have a deep lipoma, you may need medicine called regional anesthesia to numb a larger area. Or you may need medicine called general anesthesia to put you to sleep during the procedure. Then the doctor makes a cut over the area of the lipoma. He or she removes the lump of fat. The cut is then closed with stitches.  Possible complications of a lipoma  A large lipoma inside the body can press on organs, nerves, or other tissues and cause problems. For example, it can cause problems with breathing or digestion.  Living with a lipoma  Your healthcare provider may look at the lipoma during regular checkups to see if it changes or is causing problems.  When to call your healthcare provider  Call your healthcare provider right away if you have any of these:    Lipoma that grows quickly, causes pain, or feels hard    Growth of new lipomas   Date Last Reviewed: 5/1/2016 2000-2018 [employer]. [address] [place] [id number]. All rights reserved. This information is not intended as a substitute for professional medical care. Always follow your healthcare professional's instructions.

## 2019-02-01 NOTE — NURSING NOTE
Health Maintenance Due   Topic Date Due     PHQ-2 Q1 YR  04/24/1986     HIV SCREEN (SYSTEM ASSIGNED)  04/24/1992     LIPID SCREEN Q5 YR MALE (SYSTEM ASSIGNED)  08/02/2017     INFLUENZA VACCINE (1) 09/01/2018     Rosy ROBLEDO LPN

## 2019-02-07 ENCOUNTER — OFFICE VISIT (OUTPATIENT)
Dept: SURGERY | Facility: OTHER | Age: 44
End: 2019-02-07
Payer: COMMERCIAL

## 2019-02-07 VITALS
TEMPERATURE: 99.5 F | BODY MASS INDEX: 23.25 KG/M2 | WEIGHT: 157 LBS | HEIGHT: 69 IN | SYSTOLIC BLOOD PRESSURE: 110 MMHG | DIASTOLIC BLOOD PRESSURE: 64 MMHG

## 2019-02-07 DIAGNOSIS — L72.0 EPIDERMAL INCLUSION CYST: Primary | ICD-10-CM

## 2019-02-07 PROCEDURE — 11442 EXC FACE-MM B9+MARG 1.1-2 CM: CPT | Mod: 51 | Performed by: SURGERY

## 2019-02-07 PROCEDURE — 99243 OFF/OP CNSLTJ NEW/EST LOW 30: CPT | Mod: 25 | Performed by: SURGERY

## 2019-02-07 PROCEDURE — 12051 INTMD RPR FACE/MM 2.5 CM/<: CPT | Performed by: SURGERY

## 2019-02-07 ASSESSMENT — MIFFLIN-ST. JEOR: SCORE: 1596.49

## 2019-02-07 NOTE — LETTER
2/7/2019         RE: Deshawn Flood  [address]  [facility] [address]        Dear Colleague,    Thank you for referring your patient, Deshawn Flood, to the [facility]. Please see a copy of my visit note below.    Patient seen in consultation for cyst on face by Nima Bhagat    HPI:  Patient is a 43 year old male with several year history of lump in the right side of his face. He states that it is the same size now as it was when he first noticed it after shaving off a heavy beard. He denies other lumps. No Drainage, no redness, no history of incision or drainage. He is interested in having it removed because of the size and location.    Review Of Systems    Skin: as above  Ears/Nose/Throat: negative  Respiratory: No shortness of breath, dyspnea on exertion, cough, or hemoptysis  Cardiovascular: negative  Gastrointestinal: negative  Genitourinary: negative  Musculoskeletal: negative  Neurologic: negative  Hematologic/Lymphatic/Immunologic: negative  Endocrine: negative      Past Medical History:   Diagnosis Date     Other spontaneous pneumothorax      SPONTANEOUS PNEUMOTHORAX NEC 3/25/2005     Unspecified asthma(493.90)        Past Surgical History:   Procedure Laterality Date     ARTHROSCOPY KNEE  8/3/2012    Procedure: ARTHROSCOPY KNEE;  Left knee Arthroscopy with removal of loose bodies;  Surgeon: Bibi Roberts MD;  Location: [facility]     HC REPAIR UMBILICAL GODFREY,<6Y/O,REDUC  1978     TUBE THORACOSTOMY,ABSC/EMPYEMA/HEMO  3/20/2005    Small chest tube with Heimlich valve.       No family history on file.    Social History     Socioeconomic History     Marital status: Single     Spouse name: Not on file     Number of children: Not on file     Years of education: Not on file     Highest education level: Not on file   Social Needs     Financial resource strain: Not on file     Food insecurity - worry: Not on file     Food insecurity - inability: Not on file     Transportation needs - medical:  "Not on file     Transportation needs - non-medical: Not on file   Occupational History     Not on file   Tobacco Use     Smoking status: Former Smoker     Packs/day: 0.10     Years: 15.00     Pack years: 1.50     Types: Cigarettes     Smokeless tobacco: Current User     Types: Chew     Tobacco comment: hopes to quit smoking --hasn't had any since lung went down 1/14/05.   Substance and Sexual Activity     Alcohol use: Yes     Alcohol/week: 10.5 oz     Types: 21 Cans of beer per week     Drug use: No     Sexual activity: Not Currently   Other Topics Concern     Parent/sibling w/ CABG, MI or angioplasty before 65F 55M? Not Asked   Social History Narrative     Not on file       Current Outpatient Medications   Medication Sig Dispense Refill     IBUPROFEN PO        PROAIR  (90 BASE) MCG/ACT IN AERS inhale as directed 1 3       Medications and history reviewed    Physical exam:  Vitals: /64   Temp 99.5  F (37.5  C) (Temporal)   Ht 1.759 m (5' 9.25\")   Wt 71.2 kg (157 lb)   BMI 23.02 kg/m     BMI= Body mass index is 23.02 kg/m .    Constitutional: Healthy, alert, non-distressed  Head: Normo-cephalic, atraumatic, no lesions, masses or tenderness   Cardiovascular: RRR, no new murmurs, +S1, +S2 heart sounds, no clicks, rubs or gallops   Respiratory: CTAB, no rales, rhonchi or wheezing, equal chest rise, good respiratory effort   Gastrointestinal: Soft, non-tender, non distended, no rebound rigidity or guarding, no masses or hernias palpated   : Deferred  Musculoskeletal: Moves all extremities, normal  strength, no deformities noted   Skin: right side of face on zygomatic arch there is a 2cm diameter likely cyst with enlarged pore. Some sebum can be extracted through the pore.  Psychiatric: Mentation appears normal, affect appropriate   Hematologic/Lymphatic/Immunologic: Normal cervical and supraclavicular lymph nodes   Patient able to get up on table without difficulty.    Labs show:  No results found " for this or any previous visit (from the past 24 hour(s)).      Assessment:     ICD-10-CM    1. Epidermal inclusion cyst L72.0      Plan: We can excise this in the office. I described the procedure and the after care and after our discussion he was eager to have it excised here in the office today.    Frederic De Jesus DO     PROCEDURE: Excision of subcutaneous mass of the face   Written consent was obtained    The right cheek area was prepped and appropriately anesthetized with 1% lidocaine with epinephrine. Using the usual technique, elliptical excision of mass was performed. There was sebum and a cyst capsule excised as well as a small amount of overlying skin with the enlarged pore. The total area excised, including lesion and margins was 2 cm diameter.  Closure was accomplished with interrupted 3-0 vicryl for the dermal layer and running subcuticular 4-0 monocryl for the skin. Total length of the incision after closure was 1.5 cm. An appropriate dressing was applied.  The procedure was well tolerated and without complications. Specimen was not sent to Pathology.          Again, thank you for allowing me to participate in the care of your patient.        Sincerely,        Frederic De Jesus DO

## 2019-02-07 NOTE — PROGRESS NOTES
Patient seen in consultation for cyst on face by Nima Bhagat    HPI:  Patient is a 43 year old male with several year history of lump in the right side of his face. He states that it is the same size now as it was when he first noticed it after shaving off a heavy beard. He denies other lumps. No Drainage, no redness, no history of incision or drainage. He is interested in having it removed because of the size and location.    Review Of Systems    Skin: as above  Ears/Nose/Throat: negative  Respiratory: No shortness of breath, dyspnea on exertion, cough, or hemoptysis  Cardiovascular: negative  Gastrointestinal: negative  Genitourinary: negative  Musculoskeletal: negative  Neurologic: negative  Hematologic/Lymphatic/Immunologic: negative  Endocrine: negative      Past Medical History:   Diagnosis Date     Other spontaneous pneumothorax      SPONTANEOUS PNEUMOTHORAX NEC 3/25/2005     Unspecified asthma(493.90)        Past Surgical History:   Procedure Laterality Date     ARTHROSCOPY KNEE  8/3/2012    Procedure: ARTHROSCOPY KNEE;  Left knee Arthroscopy with removal of loose bodies;  Surgeon: Bibi Roberts MD;  Location: [facility]     HC REPAIR UMBILICAL GODFREY,<6Y/O,REDUC  1978     TUBE THORACOSTOMY,ABSC/EMPYEMA/HEMO  3/20/2005    Small chest tube with Heimlich valve.       No family history on file.    Social History     Socioeconomic History     Marital status: Single     Spouse name: Not on file     Number of children: Not on file     Years of education: Not on file     Highest education level: Not on file   Social Needs     Financial resource strain: Not on file     Food insecurity - worry: Not on file     Food insecurity - inability: Not on file     Transportation needs - medical: Not on file     Transportation needs - non-medical: Not on file   Occupational History     Not on file   Tobacco Use     Smoking status: Former Smoker     Packs/day: 0.10     Years: 15.00     Pack years: 1.50     Types: Cigarettes  "    Smokeless tobacco: Current User     Types: Chew     Tobacco comment: hopes to quit smoking --hasn't had any since lung went down 1/14/05.   Substance and Sexual Activity     Alcohol use: Yes     Alcohol/week: 10.5 oz     Types: 21 Cans of beer per week     Drug use: No     Sexual activity: Not Currently   Other Topics Concern     Parent/sibling w/ CABG, MI or angioplasty before 65F 55M? Not Asked   Social History Narrative     Not on file       Current Outpatient Medications   Medication Sig Dispense Refill     IBUPROFEN PO        PROAIR  (90 BASE) MCG/ACT IN AERS inhale as directed 1 3       Medications and history reviewed    Physical exam:  Vitals: /64   Temp 99.5  F (37.5  C) (Temporal)   Ht 1.759 m (5' 9.25\")   Wt 71.2 kg (157 lb)   BMI 23.02 kg/m    BMI= Body mass index is 23.02 kg/m .    Constitutional: Healthy, alert, non-distressed  Head: Normo-cephalic, atraumatic, no lesions, masses or tenderness   Cardiovascular: RRR, no new murmurs, +S1, +S2 heart sounds, no clicks, rubs or gallops   Respiratory: CTAB, no rales, rhonchi or wheezing, equal chest rise, good respiratory effort   Gastrointestinal: Soft, non-tender, non distended, no rebound rigidity or guarding, no masses or hernias palpated   : Deferred  Musculoskeletal: Moves all extremities, normal  strength, no deformities noted   Skin: right side of face on zygomatic arch there is a 2cm diameter likely cyst with enlarged pore. Some sebum can be extracted through the pore.  Psychiatric: Mentation appears normal, affect appropriate   Hematologic/Lymphatic/Immunologic: Normal cervical and supraclavicular lymph nodes   Patient able to get up on table without difficulty.    Labs show:  No results found for this or any previous visit (from the past 24 hour(s)).      Assessment:     ICD-10-CM    1. Epidermal inclusion cyst L72.0      Plan: We can excise this in the office. I described the procedure and the after care and after our " discussion he was eager to have it excised here in the office today.    Frederic De Jesus DO     PROCEDURE: Excision of subcutaneous mass of the face   Written consent was obtained    The right cheek area was prepped and appropriately anesthetized with 1% lidocaine with epinephrine. Using the usual technique, elliptical excision of mass was performed. There was sebum and a cyst capsule excised as well as a small amount of overlying skin with the enlarged pore. The total area excised, including lesion and margins was 2 cm diameter.  Closure was accomplished with interrupted 3-0 vicryl for the dermal layer and running subcuticular 4-0 monocryl for the skin. Total length of the incision after closure was 1.5 cm. An appropriate dressing was applied.  The procedure was well tolerated and without complications. Specimen was not sent to Pathology.

## 2019-03-29 ENCOUNTER — OFFICE VISIT (OUTPATIENT)
Dept: FAMILY MEDICINE | Facility: CLINIC | Age: 44
End: 2019-03-29
Payer: COMMERCIAL

## 2019-03-29 ENCOUNTER — HOSPITAL ENCOUNTER (OUTPATIENT)
Dept: CT IMAGING | Facility: CLINIC | Age: 44
End: 2019-03-29
Attending: NURSE PRACTITIONER
Payer: COMMERCIAL

## 2019-03-29 ENCOUNTER — HOSPITAL ENCOUNTER (EMERGENCY)
Facility: CLINIC | Age: 44
Discharge: HOME | End: 2019-03-29
Attending: FAMILY MEDICINE | Admitting: FAMILY MEDICINE
Payer: COMMERCIAL

## 2019-03-29 ENCOUNTER — HOSPITAL ENCOUNTER (OUTPATIENT)
Dept: GENERAL RADIOLOGY | Facility: CLINIC | Age: 44
Discharge: HOME | End: 2019-03-29
Attending: NURSE PRACTITIONER | Admitting: NURSE PRACTITIONER
Payer: COMMERCIAL

## 2019-03-29 VITALS
BODY MASS INDEX: 25.8 KG/M2 | RESPIRATION RATE: 16 BRPM | SYSTOLIC BLOOD PRESSURE: 140 MMHG | WEIGHT: 176 LBS | HEART RATE: 70 BPM | OXYGEN SATURATION: 99 % | DIASTOLIC BLOOD PRESSURE: 94 MMHG | TEMPERATURE: 97.9 F

## 2019-03-29 DIAGNOSIS — R31.9 HEMATURIA, UNSPECIFIED TYPE: ICD-10-CM

## 2019-03-29 DIAGNOSIS — R10.84 ABDOMINAL PAIN, GENERALIZED: ICD-10-CM

## 2019-03-29 DIAGNOSIS — R80.9 PROTEINURIA, UNSPECIFIED TYPE: ICD-10-CM

## 2019-03-29 DIAGNOSIS — R10.84 ABDOMINAL PAIN, GENERALIZED: Primary | ICD-10-CM

## 2019-03-29 DIAGNOSIS — N04.9 NEPHROTIC SYNDROME: ICD-10-CM

## 2019-03-29 DIAGNOSIS — D58.2 ELEVATED HEMOGLOBIN (H): ICD-10-CM

## 2019-03-29 DIAGNOSIS — N28.9 RENAL INSUFFICIENCY: ICD-10-CM

## 2019-03-29 DIAGNOSIS — E88.09 PROTEINS SERUM PLASMA LOW: ICD-10-CM

## 2019-03-29 DIAGNOSIS — K52.9 INFLAMMATION OF SMALL INTESTINE: ICD-10-CM

## 2019-03-29 LAB
ALBUMIN SERPL-MCNC: 1 G/DL (ref 3.4–5)
ALBUMIN UR-MCNC: >499 MG/DL
ALP SERPL-CCNC: 67 U/L (ref 40–150)
ALT SERPL W P-5'-P-CCNC: 19 U/L (ref 0–70)
AMYLASE SERPL-CCNC: 47 U/L (ref 30–110)
ANION GAP SERPL CALCULATED.3IONS-SCNC: 6 MMOL/L (ref 3–14)
APPEARANCE UR: CLEAR
AST SERPL W P-5'-P-CCNC: 27 U/L (ref 0–45)
BASOPHILS # BLD AUTO: 0.1 10E9/L (ref 0–0.2)
BASOPHILS NFR BLD AUTO: 0.8 %
BILIRUB SERPL-MCNC: 0.3 MG/DL (ref 0.2–1.3)
BILIRUB UR QL STRIP: NEGATIVE
BUN SERPL-MCNC: 15 MG/DL (ref 7–30)
CALCIUM SERPL-MCNC: 7.8 MG/DL (ref 8.5–10.1)
CHLORIDE SERPL-SCNC: 102 MMOL/L (ref 94–109)
CO2 SERPL-SCNC: 30 MMOL/L (ref 20–32)
COLOR UR AUTO: YELLOW
CREAT SERPL-MCNC: 1.29 MG/DL (ref 0.66–1.25)
CREAT UR-MCNC: 201 MG/DL
CRP SERPL-MCNC: <2.9 MG/L (ref 0–8)
DIFFERENTIAL METHOD BLD: NORMAL
EOSINOPHIL NFR BLD AUTO: 2.5 %
ERYTHROCYTE [DISTWIDTH] IN BLOOD BY AUTOMATED COUNT: 12 % (ref 10–15)
ERYTHROCYTE [SEDIMENTATION RATE] IN BLOOD BY WESTERGREN METHOD: 26 MM/H (ref 0–15)
GFR SERPL CREATININE-BSD FRML MDRD: 67 ML/MIN/{1.73_M2}
GLUCOSE SERPL-MCNC: 108 MG/DL (ref 70–99)
GLUCOSE UR STRIP-MCNC: NEGATIVE MG/DL
HCT VFR BLD AUTO: 49 % (ref 40–53)
HGB BLD-MCNC: 17.7 G/DL (ref 13.3–17.7)
HGB UR QL STRIP: ABNORMAL
IMM GRANULOCYTES # BLD: 0 10E9/L (ref 0–0.4)
IMM GRANULOCYTES NFR BLD: 0.2 %
KETONES UR STRIP-MCNC: NEGATIVE MG/DL
LACTATE BLD-SCNC: 1.1 MMOL/L (ref 0.7–2)
LEUKOCYTE ESTERASE UR QL STRIP: NEGATIVE
LIPASE SERPL-CCNC: 90 U/L (ref 73–393)
LYMPHOCYTES # BLD AUTO: 1.1 10E9/L (ref 0.8–5.3)
LYMPHOCYTES NFR BLD AUTO: 17.4 %
MCH RBC QN AUTO: 31.1 PG (ref 26.5–33)
MCHC RBC AUTO-ENTMCNC: 36.1 G/DL (ref 31.5–36.5)
MCV RBC AUTO: 86 FL (ref 78–100)
MONOCYTES # BLD AUTO: 0.5 10E9/L (ref 0–1.3)
MONOCYTES NFR BLD AUTO: 8.4 %
MUCOUS THREADS #/AREA URNS LPF: PRESENT /LPF
NEUTROPHILS # BLD AUTO: 4.6 10E9/L (ref 1.6–8.3)
NEUTROPHILS NFR BLD AUTO: 70.7 %
NITRATE UR QL: NEGATIVE
NRBC # BLD AUTO: 0 10*3/UL
NRBC BLD AUTO-RTO: 0 /100
PH UR STRIP: 6 PH (ref 5–7)
PLATELET # BLD AUTO: 265 10E9/L (ref 150–450)
POTASSIUM SERPL-SCNC: 4.1 MMOL/L (ref 3.4–5.3)
PROT SERPL-MCNC: 4.5 G/DL (ref 6.8–8.8)
PROT UR-MCNC: 15.67 G/L
PROT/CREAT 24H UR: 7.8 G/G CR (ref 0–0.2)
RBC # BLD AUTO: 5.69 10E12/L (ref 4.4–5.9)
RBC #/AREA URNS AUTO: 7 /HPF (ref 0–2)
SODIUM SERPL-SCNC: 138 MMOL/L (ref 133–144)
SOURCE: ABNORMAL
SP GR UR STRIP: >1.035 (ref 1–1.03)
SQUAMOUS #/AREA URNS AUTO: 1 /HPF (ref 0–1)
TSH SERPL DL<=0.005 MIU/L-ACNC: 2.76 MU/L (ref 0.4–4)
UROBILINOGEN UR STRIP-MCNC: 0 MG/DL (ref 0–2)
WBC # BLD AUTO: 6.4 10E9/L (ref 4–11)
WBC #/AREA URNS AUTO: 7 /HPF (ref 0–5)

## 2019-03-29 PROCEDURE — 74177 CT ABD & PELVIS W/CONTRAST: CPT

## 2019-03-29 PROCEDURE — 85025 COMPLETE CBC W/AUTO DIFF WBC: CPT | Performed by: FAMILY MEDICINE

## 2019-03-29 PROCEDURE — 25000125 ZZHC RX 250: Performed by: RADIOLOGY

## 2019-03-29 PROCEDURE — 99215 OFFICE O/P EST HI 40 MIN: CPT | Performed by: NURSE PRACTITIONER

## 2019-03-29 PROCEDURE — 25000128 H RX IP 250 OP 636: Performed by: RADIOLOGY

## 2019-03-29 PROCEDURE — 83690 ASSAY OF LIPASE: CPT | Performed by: FAMILY MEDICINE

## 2019-03-29 PROCEDURE — 83605 ASSAY OF LACTIC ACID: CPT | Performed by: FAMILY MEDICINE

## 2019-03-29 PROCEDURE — 99285 EMERGENCY DEPT VISIT HI MDM: CPT | Mod: 25

## 2019-03-29 PROCEDURE — 87506 IADNA-DNA/RNA PROBE TQ 6-11: CPT | Performed by: FAMILY MEDICINE

## 2019-03-29 PROCEDURE — 84156 ASSAY OF PROTEIN URINE: CPT | Performed by: FAMILY MEDICINE

## 2019-03-29 PROCEDURE — 80053 COMPREHEN METABOLIC PANEL: CPT | Performed by: NURSE PRACTITIONER

## 2019-03-29 PROCEDURE — 82150 ASSAY OF AMYLASE: CPT | Performed by: NURSE PRACTITIONER

## 2019-03-29 PROCEDURE — 81001 URINALYSIS AUTO W/SCOPE: CPT | Performed by: FAMILY MEDICINE

## 2019-03-29 PROCEDURE — 74019 RADEX ABDOMEN 2 VIEWS: CPT | Mod: TC

## 2019-03-29 PROCEDURE — 86140 C-REACTIVE PROTEIN: CPT | Performed by: FAMILY MEDICINE

## 2019-03-29 PROCEDURE — 85652 RBC SED RATE AUTOMATED: CPT | Performed by: FAMILY MEDICINE

## 2019-03-29 PROCEDURE — 36415 COLL VENOUS BLD VENIPUNCTURE: CPT | Performed by: NURSE PRACTITIONER

## 2019-03-29 PROCEDURE — 99284 EMERGENCY DEPT VISIT MOD MDM: CPT | Mod: Z6 | Performed by: FAMILY MEDICINE

## 2019-03-29 PROCEDURE — 84443 ASSAY THYROID STIM HORMONE: CPT | Performed by: FAMILY MEDICINE

## 2019-03-29 PROCEDURE — 99283 EMERGENCY DEPT VISIT LOW MDM: CPT | Performed by: FAMILY MEDICINE

## 2019-03-29 RX ORDER — ALBUTEROL SULFATE 90 UG/1
1-2 AEROSOL, METERED RESPIRATORY (INHALATION) EVERY 6 HOURS PRN
COMMUNITY
End: 2019-04-19

## 2019-03-29 RX ORDER — IOPAMIDOL 755 MG/ML
500 INJECTION, SOLUTION INTRAVASCULAR ONCE
Status: COMPLETED | OUTPATIENT
Start: 2019-03-29 | End: 2019-03-29

## 2019-03-29 RX ADMIN — SODIUM CHLORIDE 60 ML: 9 INJECTION, SOLUTION INTRAVENOUS at 15:42

## 2019-03-29 RX ADMIN — IOPAMIDOL 85 ML: 755 INJECTION, SOLUTION INTRAVENOUS at 15:42

## 2019-03-29 ASSESSMENT — ENCOUNTER SYMPTOMS
SORE THROAT: 0
DIARRHEA: 0
BLOOD IN STOOL: 0
LIGHT-HEADEDNESS: 0
DIFFICULTY URINATING: 0
DIAPHORESIS: 0
EYE PAIN: 0
ADENOPATHY: 0
CHEST TIGHTNESS: 0
NERVOUS/ANXIOUS: 0
SHORTNESS OF BREATH: 0
EYE REDNESS: 0
VOMITING: 0
WEAKNESS: 0
DECREASED CONCENTRATION: 0
POLYDIPSIA: 0
CHILLS: 0
DYSURIA: 0
COUGH: 0
FEVER: 0
SINUS PAIN: 0
HEADACHES: 0
DIZZINESS: 0
NAUSEA: 0
ABDOMINAL PAIN: 0
CONSTIPATION: 0
APPETITE CHANGE: 0

## 2019-03-29 ASSESSMENT — PAIN SCALES - GENERAL: PAINLEVEL: MODERATE PAIN (5)

## 2019-03-29 ASSESSMENT — MIFFLIN-ST. JEOR: SCORE: 1682.45

## 2019-03-29 NOTE — ED AVS SNAPSHOT
[facility]  [address] [place] [address]  Phone:  [phone]  Fax:  [phone]                                    Deshawn Flood   MRN: [id number]    Department:  [facility]   Date of Visit:  3/29/2019           After Visit Summary Signature Page    I have received my discharge instructions, and my questions have been answered. I have discussed any challenges I see with this plan with the nurse or doctor.    ..........................................................................................................................................  Patient/Patient Representative Signature      ..........................................................................................................................................  Patient Representative Print Name and Relationship to Patient    ..................................................               ................................................  Date                                   Time    ..........................................................................................................................................  Reviewed by Signature/Title    ...................................................              ..............................................  Date                                               Time          22EPIC Rev 08/18

## 2019-03-29 NOTE — PROGRESS NOTES
"  SUBJECTIVE:   Deshawn Flood is a 43 year old male who presents to clinic today for the following health issues:      ABDOMINAL   PAIN     Onset: few days    Description:   Character: \"hard to describe\"  Location: vel-umbilical region  Radiation: None    Intensity: moderate    Progression of Symptoms:  same    Accompanying Signs & Symptoms:  Fever/Chills?: no   Gas/Bloating: no   Nausea: no   Vomitting: no   Diarrhea?: no   Constipation:no   Dysuria or Hematuria: no    History:   Trauma: no   Previous similar pain: no    Previous tests done: none    Precipitating factors:   Does the pain change with:     Food: no      BM: no     Urination: no     Alleviating factors:  none    Therapies Tried and outcome: non aspirin    LMP:  not applicable       The patient is seen in clinic today with vague report of discomfort in the abdomen.  He has difficulty describing the pain, but feels it is a significant pressure in his abdomen.  He says he feels hungry as though he wants to eat more but is too full\" would not have room for more food \".  He did eat this morning.  States he had a half a box of cocoa rosa about 5 AM and then about 930 or 10 he had a full breakfast and 2 bottles of chocolate milk.  After eating he had significantly increased abdominal pressure.  He cannot be certain as to just when he started feeling this pressure in the abdomen.  States it was sometime over the weekend and is progressively getting worse.  He did have a normal bowel movement last evening.  He denies having diarrhea or constipation.  Denies passing blood in his stool.  He does not feel nauseated, just full with a lot of pressure.    He has no previous history of intestinal disease.  Has had repair of umbilical hernia as a small child. He takes no chronic medications.  His primary issues have included spontaneous pneumothorax in the distant history and arthroscopy of the left knee   According to his medical record, he does consume a fair " "amount of beer during the week.        Problem list and histories reviewed & adjusted, as indicated.  Additional history: as documented    BP Readings from Last 3 Encounters:   03/29/19 (!) 140/94   03/29/19 (P) 124/86   02/07/19 110/64    Wt Readings from Last 3 Encounters:   03/29/19 79.8 kg (176 lb)   03/29/19 (P) 79 kg (174 lb 3.2 oz)   02/07/19 71.2 kg (157 lb)                    Reviewed and updated as needed this visit by clinical staff       Reviewed and updated as needed this visit by Provider         ROS:  Constitutional, HEENT, cardiovascular, pulmonary, gi and gu systems are negative, except as otherwise noted.    OBJECTIVE:     BP (P) 124/86   Pulse (P) 98   Temp (P) 97.4  F (36.3  C) (Temporal)   Resp (P) 22   Ht (P) 1.772 m (5' 9.75\")   Wt (P) 79 kg (174 lb 3.2 oz)   SpO2 (P) 96%   BMI (P) 25.17 kg/m    Body mass index is 25.17 kg/m  (pended).   GENERAL: Nutrition and hydration status appear adequate.  Unkempt appearance.  He appears to be more uncomfortable than he admits to.  Seems to have increased discomfort with ambulation  NECK: no adenopathy, no asymmetry, masses, or scars and thyroid normal to palpation  RESP: Lungs sound clear, somewhat diminished in the bases  CV: regular rate and rhythm, normal S1 S2, no S3 or S4, no murmur, click or rub, no peripheral edema and peripheral pulses strong  ABDOMEN: Distended but not tympanic.  Soft somewhat uncomfortable with palpation.  Bowel sounds are present.    Diagnostic Test Results:  Results for orders placed or performed in visit on 03/29/19 (from the past 24 hour(s))   Comprehensive metabolic panel   Result Value Ref Range    Sodium 138 133 - 144 mmol/L    Potassium 4.1 3.4 - 5.3 mmol/L    Chloride 102 94 - 109 mmol/L    Carbon Dioxide 30 20 - 32 mmol/L    Anion Gap 6 3 - 14 mmol/L    Glucose 108 (H) 70 - 99 mg/dL    Urea Nitrogen 15 7 - 30 mg/dL    Creatinine 1.29 (H) 0.66 - 1.25 mg/dL    GFR Estimate 67 >60 mL/min/[1.73_m2]    GFR Estimate " If Black 77 >60 mL/min/[1.73_m2]    Calcium 7.8 (L) 8.5 - 10.1 mg/dL    Bilirubin Total 0.3 0.2 - 1.3 mg/dL    Albumin 1.0 (L) 3.4 - 5.0 g/dL    Protein Total 4.5 (L) 6.8 - 8.8 g/dL    Alkaline Phosphatase 67 40 - 150 U/L    ALT 19 0 - 70 U/L    AST 27 0 - 45 U/L   Amylase   Result Value Ref Range    Amylase 47 30 - 110 U/L   ABDOMEN TWO VIEWS  3/29/2019 2:09 PM      HISTORY:  Abdominal pain, generalized.     COMPARISON: None.     FINDINGS:  There is a mildly nonspecific bowel gas pattern with gas  seen both in large and small bowel. No abnormally dilated gas-filled  loops of bowel to suggest bowel obstruction. The stomach is minimally  distended and gas-filled. No free air is seen under the  hemidiaphragms. No renal or ureteral calculus is identified.  Degenerative changes are partially imaged in the lumbar spine with  disc height loss worst at L2-L3. There are mild degenerative changes  in the hips.                                                                      IMPRESSION:  1. Mildly nonspecific bowel gas pattern without evidence for bowel  obstruction or intraperitoneal free air.  2. Subtle density projected over the lung bases could represent small  pleural fluid collections, although this could be due to the artifact  from the angulation of the image.  3. Etiology for patient's symptoms is not definitely seen.     I discussed these findings with Alma Gardner on 3/29/2018 at 2:25 PM.     DUC ZAVALA MD      CT ABDOMEN AND PELVIS WITH CONTRAST  3/29/2019 3:50 PM     HISTORY: Abdominal pain, generalized. Patient has bloating feeling and  feels like he cannot eat anything additional due to bloating.     TECHNIQUE: Scans obtained from the diaphragm through the pelvis with  IV contrast, 85 mL- Isovue 370.   Radiation dose for this scan was reduced using automated exposure  control, adjustment of the mA and/or kV according to patient size, or  iterative reconstruction technique.     COMPARISON:  Abdominal  x-rays dated 3/29/2019.     FINDINGS: There are small bilateral pleural fluid collection with  adjacent compressive atelectasis in the lung bases. Visualized  mediastinal contents are unremarkable. No aggressive osseous lesions  are seen. There are degenerative changes in the spine.       The gallbladder is contracted and has a mildly enhancing wall. There  is pericholecystic fluid which is likely due to ascites and less  likely due to inflammation of the gallbladder. The liver, pancreas,  spleen, bilateral adrenal glands and bilateral kidneys enhance  normally. No hydronephrosis, nephrolithiasis, hydroureter or ureteral  calculus seen. Urinary bladder is normal in appearance.     There is a moderate amount of ascites. This is also seen in the  gallbladder. No adenopathy or free air is identified. Aorta is grossly  normal in appearance.     Prostate gland is unremarkable.     Colon is of normal caliber without pericolonic inflammatory change to  suggest acute diverticulitis. Structure likely representing a normal  appendix extends from the colon.     There is what appears to be enhancement and abnormal thickening of the  wall of the small bowel, worse proximally. This could represent  enteritis of an infectious or inflammatory etiology. There is edema in  the adipose tissues of the mesenteric root and throughout the adipose  tissues in the peritoneal cavity. This appears to mostly spare the  pararenal adipose tissues.                                                                      IMPRESSION:  1. Findings are concerning for diffuse small bowel enteritis likely  infectious or inflammatory in etiology, with associated inflammatory  changes of the mesenteric adipose tissues, moderate ascites, small  bilateral probably reactive pleural effusions and with some edema in  the subcutaneous adipose tissues.  2. No significant atherosclerosis is identified. No obvious arterial  blockage is seen in the mesenteric vessels  to suggest ischemia.  3. Mild degenerative changes of the spine and hips. No acute fracture  or aggressive osseous lesion.     I discussed the diffuse small bowel abnormality, ascites, and  bilateral pleural fluid collections with Alma Gardner on 3/29/2019 at  approximately 4:00 PM.     DUC ZAVALA MD    ASSESSMENT/PLAN:     Problem List Items Addressed This Visit     None      Visit Diagnoses     Abdominal pain, generalized    -  Primary    Relevant Orders    XR Abdomen 2 Views (Completed)    CT Abdomen Pelvis w Contrast (Completed)    Comprehensive metabolic panel (Completed)    **CBC with platelets FUTURE anytime    Amylase (Completed)           Patient's history and clinical presentation along with current findings were discussed with physician from the ED.  Patient was transferred to ED for further evaluation and management    DESI Wade [facility]

## 2019-03-29 NOTE — ED PROVIDER NOTES
"  History     Chief Complaint   Patient presents with     Abdominal Pain     HPI  Deshawn Flood is a 43 year old male who is sent over from the radiology department for further evaluation.  The patient was previously seen in the Clinic by Dr. Alma Bianchi who sent him for a CT scan and refers him to the ED now.  Patient gives a history of approximately 5-6 days of progressive abdominal distention and discomfort.  It is only painful if he bends forward.  He states it feels like \"I swallowed a watermelon\".  He is usually very thin and notices his belly sticks out now.  If he is laying flat he does not have any pain.  No nausea vomiting or diarrhea.  All his bowel movements have been normal lately.  He has never had any intra-abdominal surgeries.  He has no history of bowel obstruction.  He had no illness at the beginning of this.  He has had a good appetite and notices he feels more bloated after eating but does not feel that it causes any pain.  He drinks alcohol \"occasionally now\".  He does have a history of heavy alcohol use 2 years ago but \"cut down\".  He uses no marijuana or other drugs as he has to past a DOT physical.  He is currently laid off as a .  He drinks 20 cans of Mountain Dew a day and has for a long time.  He is unaware of any family history of any bowel problems, colitis, Crohn's disease or ulcerative colitis.  He denies any autoimmune diseases lupus or arthritis.  Denies any history of rash.  Denies weight loss \"I have always been very thin\".  Denies anorexia and states his appetite is good.    Allergies:  Allergies   Allergen Reactions     Bee Venom      swells up     No Known Drug Allergies      Seasonal Allergies        Problem List:    Patient Active Problem List    Diagnosis Date Noted     Loose body of left knee 08/02/2012     Priority: Medium     Left knee pain 08/02/2012     Priority: Medium     Tobacco abuse 08/02/2012     Priority: Medium     History of pneumothorax 08/02/2012 "     Priority: Medium     spontaneous in 2005       CARDIOVASCULAR SCREENING; LDL GOAL LESS THAN 160 08/02/2012     Priority: Medium        Past Medical History:    Past Medical History:   Diagnosis Date     Anemia      Other spontaneous pneumothorax      SPONTANEOUS PNEUMOTHORAX NEC 3/25/2005     Unspecified asthma(493.90)        Past Surgical History:    Past Surgical History:   Procedure Laterality Date     ARTHROSCOPY KNEE  8/3/2012    Procedure: ARTHROSCOPY KNEE;  Left knee Arthroscopy with removal of loose bodies;  Surgeon: Bibi Roberts MD;  Location: [facility] OR     HC REPAIR UMBILICAL GODFREY,<6Y/O,REDUC  1978     TUBE THORACOSTOMY,ABSC/EMPYEMA/HEMO  3/20/2005    Small chest tube with Heimlich valve.       Family History:    No family history on file.    Social History:  Marital Status:  Single [1]  Social History     Tobacco Use     Smoking status: Former Smoker     Packs/day: 0.10     Years: 15.00     Pack years: 1.50     Types: Cigarettes     Smokeless tobacco: Current User     Types: Chew     Tobacco comment: hopes to quit smoking --hasn't had any since lung went down 1/14/05.   Substance Use Topics     Alcohol use: Yes     Alcohol/week: 10.5 oz     Types: 21 Cans of beer per week     Drug use: No        Medications:      albuterol (PROAIR HFA/PROVENTIL HFA/VENTOLIN HFA) 108 (90 Base) MCG/ACT inhaler         Review of Systems   Constitutional: Negative for appetite change, chills, diaphoresis and fever.   HENT: Negative for congestion, sinus pain and sore throat.    Eyes: Negative for pain and redness.   Respiratory: Negative for cough, chest tightness and shortness of breath.    Cardiovascular: Negative for chest pain and leg swelling.   Gastrointestinal: Negative for abdominal pain, blood in stool, constipation, diarrhea, nausea and vomiting.   Endocrine: Negative for polydipsia and polyuria.        He currently urinates but drinks 20 cans of Mountain Dew per day   Genitourinary: Negative for difficulty  urinating and dysuria.   Allergic/Immunologic: Negative for immunocompromised state.   Neurological: Negative for dizziness, weakness, light-headedness and headaches.   Hematological: Negative for adenopathy.   Psychiatric/Behavioral: Negative for decreased concentration. The patient is not nervous/anxious.    All other systems reviewed and are negative.      Physical Exam   BP: (!) 141/101  Pulse: 70  Heart Rate: 69  Temp: 97.9  F (36.6  C)  Resp: 16  Weight: 79.8 kg (176 lb)  SpO2: 100 %      Physical Exam   Constitutional: He is oriented to person, place, and time.   Alert thin smiling 43-year-old male who appears in no distress or discomfort.  He is afebrile.  Slightly hypertensive at 141/101.BP (!) 141/101   Temp 97.9  F (36.6  C) (Oral)   Resp 16   Wt 79.8 kg (176 lb)   SpO2 100%   BMI (P) 25.43 kg/m       HENT:   Head: Normocephalic and atraumatic.   Right Ear: External ear normal.   Left Ear: External ear normal.   Mouth/Throat: Oropharynx is clear and moist.   He is edentulous   Eyes: Conjunctivae and EOM are normal.   Neck: Normal range of motion. Neck supple.   Cardiovascular: Normal rate, regular rhythm, normal heart sounds and intact distal pulses.   Pulmonary/Chest: Effort normal and breath sounds normal.   Lung sounds are clear to auscultation with no wheezes or rhonchi   Abdominal: Soft. Bowel sounds are normal.   Musculoskeletal: Normal range of motion.   Neurological: He is alert and oriented to person, place, and time.   Skin: Skin is warm and dry. Capillary refill takes less than 2 seconds.   Psychiatric: He has a normal mood and affect. His behavior is normal.   Nursing note and vitals reviewed.      ED Course         Results for orders placed or performed during the hospital encounter of 03/29/19   CBC with platelets differential   Result Value Ref Range    WBC 6.4 4.0 - 11.0 10e9/L    RBC Count 5.69 4.4 - 5.9 10e12/L    Hemoglobin 17.7 13.3 - 17.7 g/dL    Hematocrit 49.0 40.0 - 53.0 %     MCV 86 78 - 100 fl    MCH 31.1 26.5 - 33.0 pg    MCHC 36.1 31.5 - 36.5 g/dL    RDW 12.0 10.0 - 15.0 %    Platelet Count 265 150 - 450 10e9/L    Diff Method Automated Method     % Neutrophils 70.7 %    % Lymphocytes 17.4 %    % Monocytes 8.4 %    % Eosinophils 2.5 %    % Basophils 0.8 %    % Immature Granulocytes 0.2 %    Nucleated RBCs 0 0 /100    Absolute Neutrophil 4.6 1.6 - 8.3 10e9/L    Absolute Lymphocytes 1.1 0.8 - 5.3 10e9/L    Absolute Monocytes 0.5 0.0 - 1.3 10e9/L    Absolute Basophils 0.1 0.0 - 0.2 10e9/L    Abs Immature Granulocytes 0.0 0 - 0.4 10e9/L    Absolute Nucleated RBC 0.0    Lipase   Result Value Ref Range    Lipase 90 73 - 393 U/L   Lactic acid whole blood   Result Value Ref Range    Lactic Acid 1.1 0.7 - 2.0 mmol/L   TSH with free T4 reflex   Result Value Ref Range    TSH 2.76 0.40 - 4.00 mU/L   CRP inflammation   Result Value Ref Range    CRP Inflammation <2.9 0.0 - 8.0 mg/L   Erythrocyte sedimentation rate auto   Result Value Ref Range    Sed Rate 26 (H) 0 - 15 mm/h   UA with Microscopic   Result Value Ref Range    Color Urine Yellow     Appearance Urine Clear     Glucose Urine Negative NEG^Negative mg/dL    Bilirubin Urine Negative NEG^Negative    Ketones Urine Negative NEG^Negative mg/dL    Specific Gravity Urine >1.035 (H) 1.003 - 1.035    Blood Urine Moderate (A) NEG^Negative    pH Urine 6.0 5.0 - 7.0 pH    Protein Albumin Urine >499 (A) NEG^Negative mg/dL    Urobilinogen mg/dL 0.0 0.0 - 2.0 mg/dL    Nitrite Urine Negative NEG^Negative    Leukocyte Esterase Urine Negative NEG^Negative    Source Midstream Urine     WBC Urine 7 (H) 0 - 5 /HPF    RBC Urine 7 (H) 0 - 2 /HPF    Squamous Epithelial /HPF Urine 1 0 - 1 /HPF    Mucous Urine Present (A) NEG^Negative /LPF     Exam Date Exam Time Accession # Results    3/29/19  2:30 PM [id number]    Component Value Flag Ref Range Units Status Collected Lab   Sodium 138   133 - 144 mmol/L Final 03/29/2019  2:30 PM [facility]   Potassium 4.1   3.4  - 5.3 mmol/L Final 03/29/2019  2:30 PM [facility]   Chloride 102   94 - 109 mmol/L Final 03/29/2019  2:30 PM [facility]   Carbon Dioxide 30   20 - 32 mmol/L Final 03/29/2019  2:30 PM [facility]   Anion Gap 6   3 - 14 mmol/L Final 03/29/2019  2:30 PM [facility]   Glucose 108 Abnormally high   H  70 - 99 mg/dL Final 03/29/2019  2:30 PM [facility]   Urea Nitrogen 15   7 - 30 mg/dL Final 03/29/2019  2:30 PM [facility]   Creatinine 1.29 Abnormally high   H  0.66 - 1.25 mg/dL Final 03/29/2019  2:30 PM [facility]   GFR Estimate 67   >60 ML/min/   Final 03/29/2019  2:30 PM [facility]   Comment:   Non  GFR Calc   Starting 12/18/2018, serum creatinine based estimated GFR (eGFR) will be   calculated using the Chronic Kidney Disease Epidemiology Collaboration   (CKD-EPI) equation.    GFR Estimate If Black 77   >60  Final 03/29/2019  2:30 PM [facility]   Comment:    GFR Calc   Starting 12/18/2018, serum creatinine based estimated GFR (eGFR) will be   calculated using the Chronic Kidney Disease Epidemiology Collaboration   (CKD-EPI) equation.    Calcium 7.8 Abnormally low   L  8.5 - 10.1 mg/dL Final 03/29/2019  2:30 PM [facility]   Bilirubin Total 0.3   0.2 - 1.3 mg/dL Final 03/29/2019  2:30 PM [facility]   Albumin 1.0 Abnormally low   L  3.4 - 5.0 g/dL Final 03/29/2019  2:30 PM [facility]   Protein Total 4.5 Abnormally low   L  6.8 - 8.8 g/dL Final 03/29/2019  2:30 PM [facility]   Alkaline Phosphatase 67   40 - 150 U/L Final 03/29/2019  2:30 PM [facility]   ALT 19   0 - 70 U/L Final 03/29/2019  2:30 PM [facility]   AST 27   0 - 45 U/L Final 03/29/2019  2:30 PM [facility]       Procedures        CT SCAN --today  IMPRESSION:  1. Findings are concerning for diffuse small bowel enteritis likely  infectious or inflammatory in etiology, with associated inflammatory  changes of the mesenteric adipose tissues, moderate ascites, small  bilateral probably reactive pleural effusions and with some edema in  the subcutaneous adipose tissues.  2. No  significant atherosclerosis is identified. No obvious arterial  blockage is seen in the mesenteric vessels to suggest ischemia.  3. Mild degenerative changes of the spine and hips. No acute fracture  or aggressive osseous lesion.    I discussed the diffuse small bowel abnormality, ascites, and  bilateral pleural fluid collections with Almaopal Gardner on 3/29/2019 at  approximately 4:00 PM.             Critical Care time:  none                   Medications - No data to display    Assessments & Plan (with Medical Decision Making)   MDM--43-year-old male with 4-5-day history of abdominal discomfort and bloating with distention.  Denies fever.  He was sent over from the clinic and from CT scan.  He has a very benign abdominal exam and has some discomfort with forward bending.  He has early satiety when he eats but has been eating and drinking quite well.  He denies weight loss with this.  Despite inflammation seen on the CT scan he has no other evidence of infection or inflammation.  He has never had any diarrhea or blood in his stool.  He has never had any nausea or vomiting.  He has no fever and his white count is normal.  His CRP and sed rate are normal.    However, it was noted that his urine had a large amount of protein in it.  On his labs his creatinine is slightly elevated and his total protein and albumin are extremely low.  This would be consistent with nephrotic syndrome.  However he has no edema to his face hands feet.  Question of the inflammation of his small intestine is related to this.  He also has bilateral pleural effusion and some ascites.  Consulted nephrology at the [facility] --nephrology.  She agrees that this could be all related to nephrotic syndrome or other renal problem.  She does not feel he needs admission at this time but will need clinic follow-up next week.  She took his name and medical record number and their office will contact him Monday or Tuesday and get him  into the clinic next week.  She also advised us to differentiate his proteinuria.  I have reviewed the nursing notes.    I have reviewed the findings, diagnosis, plan and need for follow up with the patient.          Medication List      There are no discharge medications for this visit.         Final diagnoses:   Nephrotic syndrome   Proteinuria, unspecified type   Inflammation of small intestine   Renal insufficiency   Elevated hemoglobin (H)   Proteins serum plasma low   Hematuria, unspecified type       3/29/2019   [facility] EMERGENCY DEPARTMENT     Veronika, Mansoor YUSUF MD  03/29/19 2029

## 2019-03-30 NOTE — PHARMACY-ADMISSION MEDICATION HISTORY
"Admission medication history interview status for the 3/29/2019 admission is complete. See Epic admission navigator for allergy information, pharmacy, prior to admission medications and immunization status.     Medication history interview sources:  Patient    Changes made to PTA medication list (reason)  Deleted:   Ibuprofen po (per patient)  Changed:   Albuterol HFA 1-2 puffs every 6 hours as needed (was \"inhale as directed\") (per patient)    Additional medication history information (including reliability of information, actions taken by pharmacist):    Questionable reliability of information from patient. Patient states he does not take medications at home.       Prior to Admission medications    Medication Sig Last Dose Taking? Auth Provider   albuterol (PROAIR HFA/PROVENTIL HFA/VENTOLIN HFA) 108 (90 Base) MCG/ACT inhaler Inhale 1-2 puffs into the lungs every 6 hours as needed for shortness of breath / dyspnea or wheezing Past Week at Unknown time Yes Unknown, Entered By History         Medication history completed by: Brenna Burrell [facility] Resident      "

## 2019-03-30 NOTE — ED NOTES
Called antonella and let him know that the kidney doctor will be calling him to set up appointment.

## 2019-03-30 NOTE — DISCHARGE INSTRUCTIONS
Your condition needs to be reevaluated at the [facility] next week.  The nephrology clinic should call you on Monday to schedule this.  Return to the ED if you are having any problems.

## 2019-04-01 ENCOUNTER — OFFICE VISIT (OUTPATIENT)
Dept: NEPHROLOGY | Facility: CLINIC | Age: 44
End: 2019-04-01
Payer: COMMERCIAL

## 2019-04-01 VITALS
BODY MASS INDEX: 26.39 KG/M2 | DIASTOLIC BLOOD PRESSURE: 90 MMHG | SYSTOLIC BLOOD PRESSURE: 136 MMHG | TEMPERATURE: 97.2 F | HEART RATE: 58 BPM | OXYGEN SATURATION: 95 % | WEIGHT: 180 LBS

## 2019-04-01 DIAGNOSIS — R80.1 PERSISTENT PROTEINURIA: ICD-10-CM

## 2019-04-01 DIAGNOSIS — K50.00 CROHN'S DISEASE OF SMALL INTESTINE WITHOUT COMPLICATION (H): ICD-10-CM

## 2019-04-01 DIAGNOSIS — R80.1 PERSISTENT PROTEINURIA: Primary | ICD-10-CM

## 2019-04-01 DIAGNOSIS — N04.9 NEPHROTIC SYNDROME: Primary | ICD-10-CM

## 2019-04-01 DIAGNOSIS — N04.9 NEPHROTIC SYNDROME: ICD-10-CM

## 2019-04-01 LAB
ALBUMIN SERPL-MCNC: 1.1 G/DL (ref 3.4–5)
ALBUMIN UR-MCNC: >=300 MG/DL
AMORPH CRY #/AREA URNS HPF: ABNORMAL /HPF
ANION GAP SERPL CALCULATED.3IONS-SCNC: 7 MMOL/L (ref 3–14)
APPEARANCE UR: CLEAR
BILIRUB UR QL STRIP: NEGATIVE
BUN SERPL-MCNC: 15 MG/DL (ref 7–30)
CALCIUM SERPL-MCNC: 8.6 MG/DL (ref 8.5–10.1)
CHLORIDE SERPL-SCNC: 99 MMOL/L (ref 94–109)
CO2 SERPL-SCNC: 29 MMOL/L (ref 20–32)
COLOR UR AUTO: YELLOW
CREAT SERPL-MCNC: 1.15 MG/DL (ref 0.66–1.25)
CREAT UR-MCNC: 124 MG/DL
GFR SERPL CREATININE-BSD FRML MDRD: 76 ML/MIN/{1.73_M2}
GLUCOSE SERPL-MCNC: 79 MG/DL (ref 70–99)
GLUCOSE UR STRIP-MCNC: NEGATIVE MG/DL
HGB UR QL STRIP: ABNORMAL
HYALINE CASTS #/AREA URNS LPF: ABNORMAL /LPF
INR PPP: 0.94 (ref 0.86–1.14)
KETONES UR STRIP-MCNC: NEGATIVE MG/DL
LEUKOCYTE ESTERASE UR QL STRIP: NEGATIVE
MICROALBUMIN UR-MCNC: ABNORMAL MG/L
MICROALBUMIN/CREAT UR: 9758.06 MG/G CR (ref 0–17)
NITRATE UR QL: NEGATIVE
PH UR STRIP: 6 PH (ref 5–7)
PHOSPHATE SERPL-MCNC: 3.8 MG/DL (ref 2.5–4.5)
POTASSIUM SERPL-SCNC: 3.4 MMOL/L (ref 3.4–5.3)
PROT UR-MCNC: 13.49 G/L
PROT/CREAT 24H UR: 11.34 G/G CR (ref 0–0.2)
RBC #/AREA URNS AUTO: ABNORMAL /HPF
SODIUM SERPL-SCNC: 135 MMOL/L (ref 133–144)
SOURCE: ABNORMAL
SP GR UR STRIP: 1.01 (ref 1–1.03)
UROBILINOGEN UR STRIP-ACNC: 0.2 EU/DL (ref 0.2–1)
WBC #/AREA URNS AUTO: ABNORMAL /HPF

## 2019-04-01 PROCEDURE — 82043 UR ALBUMIN QUANTITATIVE: CPT | Performed by: INTERNAL MEDICINE

## 2019-04-01 PROCEDURE — 99000 SPECIMEN HANDLING OFFICE-LAB: CPT | Performed by: INTERNAL MEDICINE

## 2019-04-01 PROCEDURE — 86780 TREPONEMA PALLIDUM: CPT | Performed by: INTERNAL MEDICINE

## 2019-04-01 PROCEDURE — 85610 PROTHROMBIN TIME: CPT | Performed by: INTERNAL MEDICINE

## 2019-04-01 PROCEDURE — 86160 COMPLEMENT ANTIGEN: CPT | Performed by: INTERNAL MEDICINE

## 2019-04-01 PROCEDURE — 86255 FLUORESCENT ANTIBODY SCREEN: CPT | Mod: 90 | Performed by: INTERNAL MEDICINE

## 2019-04-01 PROCEDURE — 84156 ASSAY OF PROTEIN URINE: CPT | Performed by: INTERNAL MEDICINE

## 2019-04-01 PROCEDURE — 86803 HEPATITIS C AB TEST: CPT | Performed by: INTERNAL MEDICINE

## 2019-04-01 PROCEDURE — 36415 COLL VENOUS BLD VENIPUNCTURE: CPT | Performed by: INTERNAL MEDICINE

## 2019-04-01 PROCEDURE — 86255 FLUORESCENT ANTIBODY SCREEN: CPT | Mod: 59 | Performed by: INTERNAL MEDICINE

## 2019-04-01 PROCEDURE — 81001 URINALYSIS AUTO W/SCOPE: CPT | Performed by: INTERNAL MEDICINE

## 2019-04-01 PROCEDURE — 80069 RENAL FUNCTION PANEL: CPT | Performed by: INTERNAL MEDICINE

## 2019-04-01 PROCEDURE — 86704 HEP B CORE ANTIBODY TOTAL: CPT | Performed by: INTERNAL MEDICINE

## 2019-04-01 PROCEDURE — 86038 ANTINUCLEAR ANTIBODIES: CPT | Performed by: INTERNAL MEDICINE

## 2019-04-01 RX ORDER — LISINOPRIL 10 MG/1
10 TABLET ORAL DAILY
Qty: 30 TABLET | Refills: 1 | Status: ON HOLD | OUTPATIENT
Start: 2019-04-01 | End: 2019-04-09

## 2019-04-01 RX ORDER — FUROSEMIDE 20 MG
20 TABLET ORAL DAILY
Qty: 60 TABLET | Refills: 1 | Status: ON HOLD | OUTPATIENT
Start: 2019-04-01 | End: 2019-04-12

## 2019-04-01 ASSESSMENT — PAIN SCALES - GENERAL: PAINLEVEL: MODERATE PAIN (5)

## 2019-04-01 NOTE — LETTER
4/1/2019       RE: Deshawn Flood  [address]  [place] [id number]     Dear Colleague,    Thank you for referring your patient, Deshawn Flood, [facility] Please see a copy of my visit note below.    Assessment and Plan:  43 year old male who presents for evaluation of proteinuria. He presented last week to PCP office and was sent to ER where CT scan noted ascites and nonspecific small bowel enteritis, and UA showed proteinuria. He presents for urgent evaluation of newly noted nephrotic proteinuria, mild hypertension, elevated creatinine and anasarca.   # Renal function- Scr was 1.1 in the past and last week was noted at 1.27 which is mildly elevated. He does not hydrate well (drinks several mountain dew cans a day)  -  Proteinuria estimated at 7 grams and his albumin is 1. Has a few RBCs.  - needs further evaluation of his 'enteritis' and will refer to GI   - will arrange for kidney biopsy in coming days.  - he was instructed to hydrate with water, cut down on soda.  - CT scan shows normal size kidneys  - nephrotic syndrome- suspicious for FSGS, minimal change or membranous nephropathy, though could be ANCA related, IgA or other process.  # Hypertension: mild hypertension, will start low dose lisinopril and lasix for volume overload/ edema.    # Not anemic    # Electrolytes:   normal    # Mineral Bone Disorder:   Check baseline PTH if creatinine remains elevated.     Assessment and plan was discussed with patient and he voiced his understanding and agreement.    Consult:  Deshawn Flood was seen in consultation at the request of Dr. Banda for proteinuria.    Reason for Visit:  Deshawn Flood is a 43 year old male with nephrotic range proteinuria noted recently, who presents for evaluation.    HPI:  He is a 43 year old male recently in normal health who presented to ER after seeing PCP office complaining of bloating. In the ER he had CT abdomen  which showed small bowel enteritis and was noted with proteinuria. The ER physician called [facility] (I was on call) and thus we arranged for him to be seen today.  He has large proteinuria and quantification is 7 grams at time of ER visit.  He states he has been bloated for about a week, and is not able to eat due to feeling 'full'.  He denies weight loss and in fact his weight is up (he usually is closer to 150 but is now up to 180 lbs.  He has edema on exam though he had not noticed it.  His mother accompanied and states he is her 'special child. He has many quirks. He denies being sexually active in the last 10 years (with a person). He drinks alcohol on the weekends and had been a smoker until 2005 when he had a pneumothorax/ empyema due to ?popped bleb requiring chest tube and long hospitallization, and thus quit smoking cigarettes and started chewing tobacco.  He has very poor dentition. He has not seen dentist in a long time.   He was layed off in the fall but is currently hoping to starting working as a . He denies shortness of breath but definitely has abdominal bloating.  He had not noticed edema in his ankles and legs. He wears two layers of clothing all year long.  He eats mostly pizza and drinks mountain dew all day.   He denies family history of renal disease or autoimmune disease.  He denies taking NSAIDs, takes occasional tylenol.     Renal History:   none    ROS:   A comprehensive review of systems was obtained and negative, except as noted in the HPI or PMH.    Active Medical Problems:  Patient Active Problem List   Diagnosis     Loose body of left knee     Left knee pain     Tobacco abuse     History of pneumothorax     CARDIOVASCULAR SCREENING; LDL GOAL LESS THAN 160     PMH:   Medical record was reviewed and PMH was discussed with patient and noted below.  Past Medical History:   Diagnosis Date     Anemia      Other spontaneous pneumothorax      SPONTANEOUS PNEUMOTHORAX NEC  3/25/2005     Unspecified asthma(493.90)      PSH:   Past Surgical History:   Procedure Laterality Date     ARTHROSCOPY KNEE  8/3/2012    Procedure: ARTHROSCOPY KNEE;  Left knee Arthroscopy with removal of loose bodies;  Surgeon: Bibi Roberts MD;  Location: [facility]     HC REPAIR UMBILICAL GODFREY,<6Y/O,REDUC  1978     TUBE THORACOSTOMY,ABSC/EMPYEMA/HEMO  3/20/2005    Small chest tube with Heimlich valve.       Family Hx:   History reviewed. No pertinent family history.  Personal Hx:   Social History     Tobacco Use     Smoking status: Former Smoker     Packs/day: 0.10     Years: 15.00     Pack years: 1.50     Types: Cigarettes     Smokeless tobacco: Current User     Types: Chew     Tobacco comment: hopes to quit smoking --hasn't had any since lung went down 1/14/05.   Substance Use Topics     Alcohol use: Yes     Alcohol/week: 10.5 oz     Types: 21 Cans of beer per week       Allergies:  Allergies   Allergen Reactions     Bee Venom      swells up     No Known Drug Allergies      Seasonal Allergies        Medications:  Current Outpatient Medications   Medication Sig     albuterol (PROAIR HFA/PROVENTIL HFA/VENTOLIN HFA) 108 (90 Base) MCG/ACT inhaler Inhale 1-2 puffs into the lungs every 6 hours as needed for shortness of breath / dyspnea or wheezing     furosemide (LASIX) 20 MG tablet Take 1 tablet (20 mg) by mouth daily     lisinopril (PRINIVIL/ZESTRIL) 10 MG tablet Take 1 tablet (10 mg) by mouth daily     No current facility-administered medications for this visit.       Vitals:  /90   Pulse 58   Temp 97.2  F (36.2  C) (Oral)   Wt 81.6 kg (180 lb)   SpO2 95%   BMI (P) 26.01 kg/m       Exam:  GENERAL APPEARANCE: alert and no distress  HENT: mouth without ulcers or lesions  LYMPHATICS: no cervical or supraclavicular nodes  RESP: lungs clear to auscultation - no rales, rhonchi or wheezes  CV: regular rhythm, normal rate, no rub, no murmur  EDEMA: 2++ LE edema bilaterally  ABDOMEN: soft, nondistended,  nontender, bowel sounds normal  MS: extremities normal - no gross deformities noted, no evidence of inflammation in joints, no muscle tenderness  SKIN: has erythematous maculopapular lesions on chest and back    LABS:   CMP  Recent Labs   Lab Test 04/01/19  1450 03/29/19  1430 08/02/12  1546    138 142   POTASSIUM 3.4 4.1 4.1   CHLORIDE 99 102 104   CO2 29 30 28   ANIONGAP 7 6 10.4   GLC 79 108* 102*   BUN 15 15 11   CR 1.15 1.29* 0.99   GFRESTIMATED 76 67 85   GFRESTBLACK 89 77 >90   BASIA 8.6 7.8* 9.4     Recent Labs   Lab Test 03/29/19  1430   BILITOTAL 0.3   ALKPHOS 67   ALT 19   AST 27     CBC  Recent Labs   Lab Test 03/29/19  1711 08/02/12  1546   HGB 17.7 15.2   WBC 6.4  --    RBC 5.69  --    HCT 49.0  --    MCV 86  --    MCH 31.1  --    MCHC 36.1  --    RDW 12.0  --      --      URINE STUDIES  Recent Labs   Lab Test 04/01/19  1500 03/29/19  1858   COLOR Yellow Yellow   APPEARANCE Clear Clear   URINEGLC Negative Negative   URINEBILI Negative Negative   URINEKETONE Negative Negative   SG 1.015 >1.035*   UBLD Moderate* Moderate*   URINEPH 6.0 6.0   PROTEIN >=300* >499*   UROBILINOGEN 0.2  --    NITRITE Negative Negative   LEUKEST Negative Negative   RBCU 25-50* 7*   WBCU 0 - 5 7*     Recent Labs   Lab Test 04/01/19  1500 03/29/19  1903   UTPG 11.34* 7.80*     PTH  No lab results found.  IRON STUDIES  No lab results found.    IMPRESSION:  1. Findings are concerning for diffuse small bowel enteritis likely  infectious or inflammatory in etiology, with associated inflammatory  changes of the mesenteric adipose tissues, moderate ascites, small  bilateral probably reactive pleural effusions and with some edema in  the subcutaneous adipose tissues.  2. No significant atherosclerosis is identified. No obvious arterial  blockage is seen in the mesenteric vessels to suggest ischemia.  3. Mild degenerative changes of the spine and hips. No acute fracture  or aggressive osseous lesion.  Jenny Scott  MD    Again, thank you for allowing me to participate in the care of your patient.      Sincerely,    Jenny Scott MD

## 2019-04-02 LAB
ANA SER QL IF: NEGATIVE
ANCA AB PATTERN SER IF-IMP: NORMAL
C-ANCA TITR SER IF: NORMAL {TITER}
C3 SERPL-MCNC: 143 MG/DL (ref 76–169)
C4 SERPL-MCNC: 27 MG/DL (ref 15–50)
HBV CORE AB SERPL QL IA: NONREACTIVE
HCV AB SERPL QL IA: NONREACTIVE
MISCELLANEOUS TEST: NORMAL
T PALLIDUM AB SER QL: NONREACTIVE

## 2019-04-02 NOTE — PROGRESS NOTES
Assessment and Plan:  43 year old male who presents for evaluation of proteinuria. He presented last week to PCP office and was sent to ER where CT scan noted ascites and nonspecific small bowel enteritis, and UA showed proteinuria. He presents for urgent evaluation of newly noted nephrotic proteinuria, mild hypertension, elevated creatinine and anasarca.   # Renal function- Scr was 1.1 in the past and last week was noted at 1.27 which is mildly elevated. He does not hydrate well (drinks several mountain dew cans a day)  -  Proteinuria estimated at 7 grams and his albumin is 1. Has a few RBCs.  - needs further evaluation of his 'enteritis' and will refer to GI   - will arrange for kidney biopsy in coming days.  - he was instructed to hydrate with water, cut down on soda.  - CT scan shows normal size kidneys  - nephrotic syndrome- suspicious for FSGS, minimal change or membranous nephropathy, though could be ANCA related, IgA or other process.  # Hypertension: mild hypertension, will start low dose lisinopril and lasix for volume overload/ edema.    # Not anemic    # Electrolytes:   normal    # Mineral Bone Disorder:   Check baseline PTH if creatinine remains elevated.     Assessment and plan was discussed with patient and he voiced his understanding and agreement.    Consult:  Deshawn Flood was seen in consultation at the request of Dr. Banda for proteinuria.    Reason for Visit:  Deshawn Flood is a 43 year old male with nephrotic range proteinuria noted recently, who presents for evaluation.    HPI:  He is a 43 year old male recently in normal health who presented to ER after seeing PCP office complaining of bloating. In the ER he had CT abdomen which showed small bowel enteritis and was noted with proteinuria. The ER physician called [facility] (I was on call) and thus we arranged for him to be seen today.  He has large proteinuria and quantification is 7 grams at time of ER visit.  He states he has  been bloated for about a week, and is not able to eat due to feeling 'full'.  He denies weight loss and in fact his weight is up (he usually is closer to 150 but is now up to 180 lbs.  He has edema on exam though he had not noticed it.  His mother accompanied and states he is her 'special child. He has many quirks. He denies being sexually active in the last 10 years (with a person). He drinks alcohol on the weekends and had been a smoker until 2005 when he had a pneumothorax/ empyema due to ?popped bleb requiring chest tube and long hospitallization, and thus quit smoking cigarettes and started chewing tobacco.  He has very poor dentition. He has not seen dentist in a long time.   He was layed off in the fall but is currently hoping to starting working as a . He denies shortness of breath but definitely has abdominal bloating.  He had not noticed edema in his ankles and legs. He wears two layers of clothing all year long.  He eats mostly pizza and drinks mountain dew all day.   He denies family history of renal disease or autoimmune disease.  He denies taking NSAIDs, takes occasional tylenol.     Renal History:   none    ROS:   A comprehensive review of systems was obtained and negative, except as noted in the HPI or PMH.    Active Medical Problems:  Patient Active Problem List   Diagnosis     Loose body of left knee     Left knee pain     Tobacco abuse     History of pneumothorax     CARDIOVASCULAR SCREENING; LDL GOAL LESS THAN 160     PMH:   Medical record was reviewed and PMH was discussed with patient and noted below.  Past Medical History:   Diagnosis Date     Anemia      Other spontaneous pneumothorax      SPONTANEOUS PNEUMOTHORAX NEC 3/25/2005     Unspecified asthma(493.90)      PSH:   Past Surgical History:   Procedure Laterality Date     ARTHROSCOPY KNEE  8/3/2012    Procedure: ARTHROSCOPY KNEE;  Left knee Arthroscopy with removal of loose bodies;  Surgeon: Bibi Roberts MD;  Location:   OR     HC REPAIR UMBILICAL GODFREY,<4Y/O,REDUC  1978     TUBE THORACOSTOMY,ABSC/EMPYEMA/HEMO  3/20/2005    Small chest tube with Heimlich valve.       Family Hx:   History reviewed. No pertinent family history.  Personal Hx:   Social History     Tobacco Use     Smoking status: Former Smoker     Packs/day: 0.10     Years: 15.00     Pack years: 1.50     Types: Cigarettes     Smokeless tobacco: Current User     Types: Chew     Tobacco comment: hopes to quit smoking --hasn't had any since lung went down 1/14/05.   Substance Use Topics     Alcohol use: Yes     Alcohol/week: 10.5 oz     Types: 21 Cans of beer per week       Allergies:  Allergies   Allergen Reactions     Bee Venom      swells up     No Known Drug Allergies      Seasonal Allergies        Medications:  Current Outpatient Medications   Medication Sig     albuterol (PROAIR HFA/PROVENTIL HFA/VENTOLIN HFA) 108 (90 Base) MCG/ACT inhaler Inhale 1-2 puffs into the lungs every 6 hours as needed for shortness of breath / dyspnea or wheezing     furosemide (LASIX) 20 MG tablet Take 1 tablet (20 mg) by mouth daily     lisinopril (PRINIVIL/ZESTRIL) 10 MG tablet Take 1 tablet (10 mg) by mouth daily     No current facility-administered medications for this visit.       Vitals:  /90   Pulse 58   Temp 97.2  F (36.2  C) (Oral)   Wt 81.6 kg (180 lb)   SpO2 95%   BMI (P) 26.01 kg/m      Exam:  GENERAL APPEARANCE: alert and no distress  HENT: mouth without ulcers or lesions  LYMPHATICS: no cervical or supraclavicular nodes  RESP: lungs clear to auscultation - no rales, rhonchi or wheezes  CV: regular rhythm, normal rate, no rub, no murmur  EDEMA: 2++ LE edema bilaterally  ABDOMEN: soft, nondistended, nontender, bowel sounds normal  MS: extremities normal - no gross deformities noted, no evidence of inflammation in joints, no muscle tenderness  SKIN: has erythematous maculopapular lesions on chest and back    LABS:   CMP  Recent Labs   Lab Test 04/01/19  1450  03/29/19  1430 08/02/12  1546    138 142   POTASSIUM 3.4 4.1 4.1   CHLORIDE 99 102 104   CO2 29 30 28   ANIONGAP 7 6 10.4   GLC 79 108* 102*   BUN 15 15 11   CR 1.15 1.29* 0.99   GFRESTIMATED 76 67 85   GFRESTBLACK 89 77 >90   BASIA 8.6 7.8* 9.4     Recent Labs   Lab Test 03/29/19  1430   BILITOTAL 0.3   ALKPHOS 67   ALT 19   AST 27     CBC  Recent Labs   Lab Test 03/29/19  1711 08/02/12  1546   HGB 17.7 15.2   WBC 6.4  --    RBC 5.69  --    HCT 49.0  --    MCV 86  --    MCH 31.1  --    MCHC 36.1  --    RDW 12.0  --      --      URINE STUDIES  Recent Labs   Lab Test 04/01/19  1500 03/29/19  1858   COLOR Yellow Yellow   APPEARANCE Clear Clear   URINEGLC Negative Negative   URINEBILI Negative Negative   URINEKETONE Negative Negative   SG 1.015 >1.035*   UBLD Moderate* Moderate*   URINEPH 6.0 6.0   PROTEIN >=300* >499*   UROBILINOGEN 0.2  --    NITRITE Negative Negative   LEUKEST Negative Negative   RBCU 25-50* 7*   WBCU 0 - 5 7*     Recent Labs   Lab Test 04/01/19  1500 03/29/19  1903   UTPG 11.34* 7.80*     PTH  No lab results found.  IRON STUDIES  No lab results found.    IMPRESSION:  1. Findings are concerning for diffuse small bowel enteritis likely  infectious or inflammatory in etiology, with associated inflammatory  changes of the mesenteric adipose tissues, moderate ascites, small  bilateral probably reactive pleural effusions and with some edema in  the subcutaneous adipose tissues.  2. No significant atherosclerosis is identified. No obvious arterial  blockage is seen in the mesenteric vessels to suggest ischemia.  3. Mild degenerative changes of the spine and hips. No acute fracture  or aggressive osseous lesion.  Jenny Aram Scott MD

## 2019-04-04 ENCOUNTER — TELEPHONE (OUTPATIENT)
Dept: FAMILY MEDICINE | Facility: CLINIC | Age: 44
End: 2019-04-04

## 2019-04-04 LAB
RESULT: NORMAL
SEND OUTS MISC TEST CODE: NORMAL
SEND OUTS MISC TEST SPECIMEN: NORMAL
TEST NAME: NORMAL

## 2019-04-04 NOTE — TELEPHONE ENCOUNTER
": 1975  PHONE #'s: [phone] (home)     PRESENTING PROBLEM:  Pt called to say that since seeing the Nephrologist on 19, Dr. Jenny Scott, he cannot hardly eat anything without feeling so bloated, or drink more than 2 bottles of water. He will feel so bloated and sick. He was also wondering why no one has called him back about the Kidney Bx she was talking about setting up or the Gastro-Intestinal specialist referral?      NURSING ASSESSMENT  Description:  \" I haven't heard from anybody yet.\"   Onset/duration:  Since 19 and taking Lasix and Lisinopril he has not been feeling the best.   Precip. factors:   Etiology unknown  Assoc. Sx:  Feels bloated and nauseated. Cannot eat or drink much.   Improves/worsens Sx:   Worse since last appt.   Pain scale (1-10)   NA  Sx specific meds:  Lasix and Lisinopril   Last exam/Tx:   19 down in [place], Nephrologist.    RECOMMENDED DISPOSITION:  Home care advice - RN informed pt that he should call the Nephrologist's office back first thing tomorrow AM and tell them that he has NOT heard back about any of the recommended appt's and procedures.  He needs to let the Nephrologist know that since starting meds on Monday, he cannot eat much or drink much without feeling so bloated and sick to his stomach and wondering what she recommends he do? If his abdomen becomes unusually firm or hard, persistent vomiting, severe pain, feels faint or light-headed needs to be seen in the ER.    Will comply with recommendation: YES  If further questions/concerns or if Sx do not improve, worsen or new Sx develop, call your PCP or [facility] as soon as possible.    NOTES:  Disposition was determined by the first positive assessment question, therefore all previous assessment questions were negative.  Informed to check provider manual or call insurance company to assure coverage.    Guideline used: Abdominal Pain Adult  Telephone Triage Protocols for Nurses, Fifth " Edition, Bibi Wells, NICKI  April 4, 2019

## 2019-04-05 DIAGNOSIS — R93.5 ABNORMAL ABDOMINAL CT SCAN: ICD-10-CM

## 2019-04-05 DIAGNOSIS — R80.9 PROTEINURIA: Primary | ICD-10-CM

## 2019-04-05 NOTE — PROGRESS NOTES
"Call back to patient (re:triaged call from last night). Patient reports to be feeling bloated and not energetic. Patient states that the lisinopril nor the lasix are working. Patient has concerns about not eating enough. Stating that \"I need eat more than 1 happy meal a day\". Patient is worried he is not getting enough food in. Per Dr. cSott, recommend scheduling kidney biopsy in IR and GI referral. Dr. Scott will call patient and discuss increasing lasix to 40 mg a day and see how the weekend goes with the bloatedness.   Scheduled biopsy for Tuesday April 9th at 11 check in and 1230 procedure time at the hospital [facility] through IR. Patient has been informed and to expect a call regarding instructions from IR and about GI referral. Patient verbalized understanding.  Kami Simmons LPN  Nephrology  [phone]    "

## 2019-04-08 ENCOUNTER — TELEPHONE (OUTPATIENT)
Dept: NEPHROLOGY | Facility: CLINIC | Age: 44
End: 2019-04-08

## 2019-04-08 ENCOUNTER — TELEPHONE (OUTPATIENT)
Dept: INTERVENTIONAL RADIOLOGY/VASCULAR | Facility: CLINIC | Age: 44
End: 2019-04-08

## 2019-04-08 NOTE — TELEPHONE ENCOUNTER
Spoke to patients mom Marnie, she reports that patient is not feeling better, after 2 days he stopped taking lasix 40 as he was not peeing that much and due to cotton mouth. Marnie reports that patients feet and swollen and puffy and that he is not getting better. Reviewed with Dr. Scott, would continue with scheduled biopsy for tomorrow, will try to stop by and consider a new diuretic. If patient can continue lasix 20 today fine, if he can go up to 40 ideal. Communicated to patient the recommendation, he is frustrated but verbalized understanding.  Kami Simmons LPN  Nephrology  [phone]

## 2019-04-09 ENCOUNTER — HOSPITAL ENCOUNTER (INPATIENT)
Facility: CLINIC | Age: 44
LOS: 3 days | Discharge: HOME | End: 2019-04-12
Attending: INTERNAL MEDICINE | Admitting: INTERNAL MEDICINE
Payer: COMMERCIAL

## 2019-04-09 ENCOUNTER — APPOINTMENT (OUTPATIENT)
Dept: INTERVENTIONAL RADIOLOGY/VASCULAR | Facility: CLINIC | Age: 44
End: 2019-04-09
Attending: INTERNAL MEDICINE
Payer: COMMERCIAL

## 2019-04-09 ENCOUNTER — APPOINTMENT (OUTPATIENT)
Dept: MEDSURG UNIT | Facility: CLINIC | Age: 44
End: 2019-04-09
Attending: INTERNAL MEDICINE
Payer: COMMERCIAL

## 2019-04-09 DIAGNOSIS — N04.9 NEPHROTIC SYNDROME: Primary | ICD-10-CM

## 2019-04-09 DIAGNOSIS — R80.9 PROTEINURIA: ICD-10-CM

## 2019-04-09 PROBLEM — R60.1 ANASARCA: Status: ACTIVE | Noted: 2019-04-09

## 2019-04-09 LAB
ALBUMIN SERPL-MCNC: 0.7 G/DL (ref 3.4–5)
ANION GAP SERPL CALCULATED.3IONS-SCNC: 8 MMOL/L (ref 3–14)
BUN SERPL-MCNC: 34 MG/DL (ref 7–30)
CALCIUM SERPL-MCNC: 7.6 MG/DL (ref 8.5–10.1)
CHLORIDE SERPL-SCNC: 99 MMOL/L (ref 94–109)
CO2 SERPL-SCNC: 27 MMOL/L (ref 20–32)
CREAT SERPL-MCNC: 1.76 MG/DL (ref 0.66–1.25)
GFR SERPL CREATININE-BSD FRML MDRD: 46 ML/MIN/{1.73_M2}
GLUCOSE SERPL-MCNC: 145 MG/DL (ref 70–99)
PHOSPHATE SERPL-MCNC: 4.5 MG/DL (ref 2.5–4.5)
POTASSIUM SERPL-SCNC: 4.4 MMOL/L (ref 3.4–5.3)
SODIUM SERPL-SCNC: 133 MMOL/L (ref 133–144)

## 2019-04-09 PROCEDURE — 25800030 ZZH RX IP 258 OP 636: Performed by: PHYSICIAN ASSISTANT

## 2019-04-09 PROCEDURE — 76942 ECHO GUIDE FOR BIOPSY: CPT

## 2019-04-09 PROCEDURE — 88348 ELECTRON MICROSCOPY DX: CPT | Performed by: RADIOLOGY

## 2019-04-09 PROCEDURE — 25000128 H RX IP 250 OP 636: Performed by: RADIOLOGY

## 2019-04-09 PROCEDURE — 88305 TISSUE EXAM BY PATHOLOGIST: CPT | Performed by: RADIOLOGY

## 2019-04-09 PROCEDURE — 12000001 ZZH R&B MED SURG/OB UMMC

## 2019-04-09 PROCEDURE — 88313 SPECIAL STAINS GROUP 2: CPT | Performed by: RADIOLOGY

## 2019-04-09 PROCEDURE — 99223 1ST HOSP IP/OBS HIGH 75: CPT | Performed by: INTERNAL MEDICINE

## 2019-04-09 PROCEDURE — 40000168 ZZH STATISTIC PP CARE STAGE 3

## 2019-04-09 PROCEDURE — 0TB03ZX EXCISION OF RIGHT KIDNEY, PERCUTANEOUS APPROACH, DIAGNOSTIC: ICD-10-PCS | Performed by: RADIOLOGY

## 2019-04-09 PROCEDURE — 99152 MOD SED SAME PHYS/QHP 5/>YRS: CPT

## 2019-04-09 PROCEDURE — 88350 IMFLUOR EA ADDL 1ANTB STN PX: CPT | Performed by: RADIOLOGY

## 2019-04-09 PROCEDURE — P9047 ALBUMIN (HUMAN), 25%, 50ML: HCPCS | Performed by: NURSE PRACTITIONER

## 2019-04-09 PROCEDURE — 25000128 H RX IP 250 OP 636: Performed by: NURSE PRACTITIONER

## 2019-04-09 PROCEDURE — 88346 IMFLUOR 1ST 1ANTB STAIN PX: CPT | Performed by: RADIOLOGY

## 2019-04-09 PROCEDURE — 99207 ZZC APP CREDIT; MD BILLING SHARED VISIT: CPT | Performed by: NURSE PRACTITIONER

## 2019-04-09 PROCEDURE — 80069 RENAL FUNCTION PANEL: CPT | Performed by: INTERNAL MEDICINE

## 2019-04-09 RX ORDER — ALBUMIN (HUMAN) 12.5 G/50ML
50 SOLUTION INTRAVENOUS ONCE
Status: COMPLETED | OUTPATIENT
Start: 2019-04-09 | End: 2019-04-09

## 2019-04-09 RX ORDER — AMOXICILLIN 250 MG
1 CAPSULE ORAL 2 TIMES DAILY PRN
Status: DISCONTINUED | OUTPATIENT
Start: 2019-04-09 | End: 2019-04-12 | Stop reason: HOSPADM

## 2019-04-09 RX ORDER — AMOXICILLIN 250 MG
2 CAPSULE ORAL 2 TIMES DAILY PRN
Status: DISCONTINUED | OUTPATIENT
Start: 2019-04-09 | End: 2019-04-12 | Stop reason: HOSPADM

## 2019-04-09 RX ORDER — DEXTROSE MONOHYDRATE 25 G/50ML
25-50 INJECTION, SOLUTION INTRAVENOUS
Status: DISCONTINUED | OUTPATIENT
Start: 2019-04-09 | End: 2019-04-09

## 2019-04-09 RX ORDER — FLUMAZENIL 0.1 MG/ML
0.2 INJECTION, SOLUTION INTRAVENOUS
Status: DISCONTINUED | OUTPATIENT
Start: 2019-04-09 | End: 2019-04-09 | Stop reason: HOSPADM

## 2019-04-09 RX ORDER — NICOTINE POLACRILEX 4 MG
15-30 LOZENGE BUCCAL
Status: DISCONTINUED | OUTPATIENT
Start: 2019-04-09 | End: 2019-04-09

## 2019-04-09 RX ORDER — BUMETANIDE 0.25 MG/ML
1 INJECTION INTRAMUSCULAR; INTRAVENOUS ONCE
Status: COMPLETED | OUTPATIENT
Start: 2019-04-09 | End: 2019-04-10

## 2019-04-09 RX ORDER — NALOXONE HYDROCHLORIDE 0.4 MG/ML
.1-.4 INJECTION, SOLUTION INTRAMUSCULAR; INTRAVENOUS; SUBCUTANEOUS
Status: DISCONTINUED | OUTPATIENT
Start: 2019-04-09 | End: 2019-04-12 | Stop reason: HOSPADM

## 2019-04-09 RX ORDER — ALBUTEROL SULFATE 90 UG/1
1-2 AEROSOL, METERED RESPIRATORY (INHALATION) EVERY 6 HOURS PRN
Status: DISCONTINUED | OUTPATIENT
Start: 2019-04-09 | End: 2019-04-12 | Stop reason: HOSPADM

## 2019-04-09 RX ORDER — NICOTINE POLACRILEX 4 MG
15-30 LOZENGE BUCCAL
Status: DISCONTINUED | OUTPATIENT
Start: 2019-04-09 | End: 2019-04-09 | Stop reason: HOSPADM

## 2019-04-09 RX ORDER — ACETAMINOPHEN 325 MG/1
650 TABLET ORAL EVERY 4 HOURS PRN
Status: DISCONTINUED | OUTPATIENT
Start: 2019-04-09 | End: 2019-04-12 | Stop reason: HOSPADM

## 2019-04-09 RX ORDER — HEPARIN SODIUM 5000 [USP'U]/.5ML
5000 INJECTION, SOLUTION INTRAVENOUS; SUBCUTANEOUS EVERY 12 HOURS
Status: DISCONTINUED | OUTPATIENT
Start: 2019-04-10 | End: 2019-04-12 | Stop reason: HOSPADM

## 2019-04-09 RX ORDER — PROCHLORPERAZINE 25 MG
25 SUPPOSITORY, RECTAL RECTAL EVERY 12 HOURS PRN
Status: DISCONTINUED | OUTPATIENT
Start: 2019-04-09 | End: 2019-04-12 | Stop reason: HOSPADM

## 2019-04-09 RX ORDER — PROCHLORPERAZINE MALEATE 10 MG
10 TABLET ORAL EVERY 6 HOURS PRN
Status: DISCONTINUED | OUTPATIENT
Start: 2019-04-09 | End: 2019-04-12 | Stop reason: HOSPADM

## 2019-04-09 RX ORDER — FENTANYL CITRATE 50 UG/ML
25-50 INJECTION, SOLUTION INTRAMUSCULAR; INTRAVENOUS EVERY 5 MIN PRN
Status: DISCONTINUED | OUTPATIENT
Start: 2019-04-09 | End: 2019-04-09 | Stop reason: HOSPADM

## 2019-04-09 RX ORDER — ONDANSETRON 4 MG/1
4 TABLET, ORALLY DISINTEGRATING ORAL EVERY 6 HOURS PRN
Status: DISCONTINUED | OUTPATIENT
Start: 2019-04-09 | End: 2019-04-12 | Stop reason: HOSPADM

## 2019-04-09 RX ORDER — LIDOCAINE 40 MG/G
CREAM TOPICAL
Status: DISCONTINUED | OUTPATIENT
Start: 2019-04-09 | End: 2019-04-09 | Stop reason: HOSPADM

## 2019-04-09 RX ORDER — ONDANSETRON 2 MG/ML
4 INJECTION INTRAMUSCULAR; INTRAVENOUS EVERY 6 HOURS PRN
Status: DISCONTINUED | OUTPATIENT
Start: 2019-04-09 | End: 2019-04-12 | Stop reason: HOSPADM

## 2019-04-09 RX ORDER — NALOXONE HYDROCHLORIDE 0.4 MG/ML
.1-.4 INJECTION, SOLUTION INTRAMUSCULAR; INTRAVENOUS; SUBCUTANEOUS
Status: DISCONTINUED | OUTPATIENT
Start: 2019-04-09 | End: 2019-04-09 | Stop reason: HOSPADM

## 2019-04-09 RX ORDER — DEXTROSE MONOHYDRATE 25 G/50ML
25-50 INJECTION, SOLUTION INTRAVENOUS
Status: DISCONTINUED | OUTPATIENT
Start: 2019-04-09 | End: 2019-04-09 | Stop reason: HOSPADM

## 2019-04-09 RX ORDER — LIDOCAINE 40 MG/G
CREAM TOPICAL
Status: DISCONTINUED | OUTPATIENT
Start: 2019-04-09 | End: 2019-04-12 | Stop reason: HOSPADM

## 2019-04-09 RX ORDER — SODIUM CHLORIDE 9 MG/ML
INJECTION, SOLUTION INTRAVENOUS CONTINUOUS
Status: DISCONTINUED | OUTPATIENT
Start: 2019-04-09 | End: 2019-04-09 | Stop reason: HOSPADM

## 2019-04-09 RX ORDER — BUMETANIDE 2 MG/1
2 TABLET ORAL DAILY
Qty: 30 TABLET | Refills: 3 | Status: SHIPPED | OUTPATIENT
Start: 2019-04-09 | End: 2019-04-12

## 2019-04-09 RX ORDER — HEPARIN SODIUM 5000 [USP'U]/.5ML
5000 INJECTION, SOLUTION INTRAVENOUS; SUBCUTANEOUS EVERY 12 HOURS
Status: DISCONTINUED | OUTPATIENT
Start: 2019-04-09 | End: 2019-04-09

## 2019-04-09 RX ADMIN — ALBUMIN HUMAN 50 G: 0.25 SOLUTION INTRAVENOUS at 21:05

## 2019-04-09 RX ADMIN — MIDAZOLAM 1 MG: 1 INJECTION INTRAMUSCULAR; INTRAVENOUS at 13:10

## 2019-04-09 RX ADMIN — SODIUM CHLORIDE: 9 INJECTION, SOLUTION INTRAVENOUS at 11:48

## 2019-04-09 RX ADMIN — FENTANYL CITRATE 50 MCG: 50 INJECTION INTRAMUSCULAR; INTRAVENOUS at 13:10

## 2019-04-09 ASSESSMENT — MIFFLIN-ST. JEOR
SCORE: 1712.28
SCORE: 1701.39

## 2019-04-09 ASSESSMENT — PAIN DESCRIPTION - DESCRIPTORS
DESCRIPTORS: PRESSURE
DESCRIPTORS: PRESSURE

## 2019-04-09 ASSESSMENT — ACTIVITIES OF DAILY LIVING (ADL): ADLS_ACUITY_SCORE: 12

## 2019-04-09 NOTE — PROGRESS NOTES
Interventional Radiology Pre-Procedure Sedation Assessment   Time of Assessment: 12:01 PM    Expected Level: Moderate Sedation    Indication: Sedation is required for the following type of Procedure: Biopsy    Sedation and procedural consent: Risks, benefits and alternatives were discussed with Patient    PO Intake: Appropriately NPO for procedure    ASA Class: Class 1 - HEALTHY PATIENT    Mallampati: Grade 2:  Soft palate, base of uvula, tonsillar pillars, and portion of posterior pharyngeal wall visible    Lungs: Lungs Clear with good breath sounds bilaterally    Heart: Normal heart sounds and rate    History and physical reviewed and no updates needed. I have reviewed the lab findings, diagnostic data, medications, and the plan for sedation. I have determined this patient to be an appropriate candidate for the planned sedation and procedure and have reassessed the patient IMMEDIATELY PRIOR to sedation and procedure.    Mansoor Hagan MD

## 2019-04-09 NOTE — PROGRESS NOTES
1515  Spoke with Dr. Scott regarding how pt is feeling.  She states to tell pt that she will be ordering a different diuretic, calling this into his pharmacy.  Lab order also received for Renal Panel.  1535  Renal panel drawn now and sent to lab.  Mother & Father remain at bedside.  They are very concerned about Deshawn's condition.  Dressing to Right Flank area remains FDI, with only very slight serous drainage noted under dressing.  Denies pain at procedure site, however continues to c/o lower abd pain and swelling of legs & arm.   Lower extremity edema appears somewhat worse: from 3+ to now 4+.  1645  Dr. Scott paged now.  CTRN  1700  Spoke to Dr. Scott, plan is to admit pt.  Informed pt and parents   The plan is to admit.  CTRN  1740  [facility] team here to see patient.  1815  Pt has not voided since being here on 2A.  Denies need to void.  1835  Report called to 7D and pt transferred there per WC with parents, Marnie and Hollis.  CTRN

## 2019-04-09 NOTE — PROCEDURES
Interventional Radiology Brief Post Procedure Note    Procedure: IR RENAL BIOPSY LEFT    Proceduralist: Abbe Veras MD    Assistant: None    Time Out: Prior to the start of the procedure and with procedural staff participation, I verbally confirmed the patient s identity using two indicators, relevant allergies, that the procedure was appropriate and matched the consent or emergent situation, and that the correct equipment/implants were available. Immediately prior to starting the procedure I conducted the Time Out with the procedural staff and re-confirmed the patient s name, procedure, and site/side. (The Joint Commission universal protocol was followed.)  Yes    Medications   Medication Event Details Admin User Admin Time   midazolam (VERSED) injection 0.5-2 mg Medication Given Dose: 1 mg; Route: Intravenous Allison Obrien RN 4/9/2019  1:10 PM   fentaNYL (PF) (SUBLIMAZE) injection 25-50 mcg Medication Given Dose: 50 mcg; Route: Intravenous Allison Obrien RN 4/9/2019  1:10 PM       Sedation: IR Nurse Monitored Care   Post Procedure Summary:  Prior to the start of the procedure and with procedural staff participation, I verbally confirmed the patient s identity using two indicators, relevant allergies, that the procedure was appropriate and matched the consent or emergent situation, and that the correct equipment/implants were available. Immediately prior to starting the procedure I conducted the Time Out with the procedural staff and re-confirmed the patient s name, procedure, and site/side. (The Joint Commission universal protocol was followed.)  Yes       Sedatives: Fentanyl and Midazolam (Versed)    Vital signs, airway and pulse oximetry were monitored and remained stable throughout the procedure and sedation was maintained until the procedure was complete.  The patient was monitored by staff until sedation discharge criteria were met.    Patient tolerance: Patient tolerated the procedure well  with no immediate complications.    Time of sedation in minutes: 15 Minutes minutes from beginning to end of physician one to one monitoring.          Findings: Four 18 gauge cores right kidney cortex.    Estimated Blood Loss: Minimal    Fluoroscopy Time:  minute(s)    SPECIMENS: Core needle biopsy specimens sent for pathological analysis    Complications: 1. None     Condition: Stable    Plan: Bedrest for four hours.    Comments: See dictated procedure note for full details.    Abbe Veras MD

## 2019-04-09 NOTE — IR NOTE
Patient Name: Deshawn Flood  Medical Record Number: [id number]  Today's Date: 4/9/2019    Procedure: renal biopsy  Proceduralist: Abbe Veras    Sedation start time: 1310     Sedation end time: 1315  Sedation medications administered: 50 mcg fentanyl, 1 mg versed   Total sedation time: 15 minutes     Procedure start time: 1310  Puncture time: 1313  Procedure end time: 1335    Report given to: NICKI Copeland 2A  : n/a    Other Notes: Pt arrived to IR room 6 from . Consent reviewed. Pt denies any questions or concerns regarding procedure. Pt positioned prone and monitored per protocol.     Target tissue identified. (4) tissue samples handed directly to pathology. Dressing applied. Dressing to right lower back clean, dry, flat. Upon departure- alert, oriented, calm. Respirations eupneic on room air.     Pt tolerated procedure without any noted complications. Pt transferred back to  for recovery.

## 2019-04-09 NOTE — PROGRESS NOTES
Pt arrived to floor via cart with RN from IR s/p Kidney Biopsy. Right lower back dressing CDI, no hematoma. VSS. Pt denies pain. Tolerates regular diet.

## 2019-04-09 NOTE — PROGRESS NOTES
1145  Prep is complete for procedure.  Labs are from 3/29/19 & 4/1/19.  H&P is current.  Consent is being done now.  Pt has not been able to eat hardly at all.  He has a feeling of his lower abdomen being very full of fluid.  Also lower extremities have 2 to 3+ pitting edema.  Arms are also swollen. States this has happened over the last 2 weeks.  He has moderate pain in lower abd and also pain in Right chest with turning to Left side.  Parents are here as drivers.  CTRN

## 2019-04-09 NOTE — H&P
[facility], [place]    History and Physical - Hospitalist Service, Gold Night       Date of Admission:  4/9/2019    Assessment & Plan   Deshawn Flood is a 43 year old male admitted on 4/9/2019. He has a history of asthma and came in today for a renal biopsy due to concern for nephrotic syndrome.  The nephrology team has asked us to admit him for albumin and lasix treatment post biopsy.    1) Nephrotic syndrome - Anasarca developed ~3 weeks ago and his diagnosis was confirmed on 4/1 with urine studies.  Started on Lisinopril and Lasix on 4/1 but developed a dry cough and stopped taking his Lasix on 4/7.  He came in 4/9 for renal biopsy and nephrology advised admission given worsening anasarca on oral medications.  Main concerns would be for amyloidosis, SLE, minimal change, FSGS and membranous nephropathy.  - Will stop lisinopril given dry cough.  Could consider losartan but would discuss with nephrology in the am.  - Will treat overnight with albumin and bumex.  Nephrology consulted to assist with management.  - Patient currently without any concerning symptoms but would be at elevated risk for renal vein thrombosis.  Nephrology has suggested renal duplex US if kidney function worsening  - Awaiting biopsy results  - Low salt diet    2) History of asthma  - Continue home albuterol       Diet: Low salt  DVT Prophylaxis: Heparin SQ  Manzo Catheter: not present  Code Status: Full    Disposition Plan   Expected discharge: 2 - 3 days, recommended to prior living arrangement once diagnosis established and anasarca imroving.  Entered: DESI Denton CNP 04/09/2019, 5:04 PM     The patient's care was discussed with the Attending Physician, Dr. Lopes.    DESI Denton CNP  [facility], [place]  Pager: [phone]  Please see sticky note for cross cover information  ______________________________________________________________________    Chief  Complaint   Worsening anasarca despite treatment with oral medications    History of Present Illness   Deshawn Flood is a 43 year old male admitted on 4/9/2019. He has a history of asthma and came in today for a renal biopsy due to concern for nephrotic syndrome.  The nephrology team has asked us to admit him for albumin and lasix treatment post biopsy.    The patient reports he noted abdominal and lower extremity swelling starting about three weeks ago.  He went in to our ED on 3/29 and was diagnosed with nephrotic syndrome and referred to nephrology.  He followed up with them on 4/1 and was started on lisinopril and lasix, the latter of which he stopped taking on 4/7 because he developed a dry cough and believed the lasix was to blame.  He has noted no improvement in his symptoms and is starting to become short of breath with exertion.    He came in today for renal biopsy and the nephrology team was concerned about his lack of improvement.    No chest pain, shortness of breath at rest, nausea, vomiting, diarrhea or constipation.  He notes a sense of fullness that prevents him from eating.    Review of Systems    The 10 point Review of Systems is negative other than noted in the HPI or here.     Past Medical History    I have reviewed this patient's medical history and updated it with pertinent information if needed.   Past Medical History:   Diagnosis Date     Anemia      Other spontaneous pneumothorax      SPONTANEOUS PNEUMOTHORAX NEC 3/25/2005     Unspecified asthma(493.90)        Past Surgical History   I have reviewed this patient's surgical history and updated it with pertinent information if needed.  Past Surgical History:   Procedure Laterality Date     ARTHROSCOPY KNEE  8/3/2012    Procedure: ARTHROSCOPY KNEE;  Left knee Arthroscopy with removal of loose bodies;  Surgeon: Bibi Roberts MD;  Location: [facility] OR     HC REPAIR UMBILICAL GODFREY,<6Y/O,REDUC  1978     TUBE THORACOSTOMY,ABSC/EMPYEMA/HEMO   3/20/2005    Small chest tube with Heimlich valve.       Social History   I have reviewed this patient's social history and updated it with pertinent information if needed.  Social History     Tobacco Use     Smoking status: Former Smoker     Packs/day: 0.10     Years: 15.00     Pack years: 1.50     Types: Cigarettes     Smokeless tobacco: Current User     Types: Chew     Tobacco comment: hopes to quit smoking --hasn't had any since lung went down 1/14/05.   Substance Use Topics     Alcohol use: Yes     Alcohol/week: 10.5 oz     Types: 21 Cans of beer per week     Drug use: No       Family History   I have reviewed this patient's family history and updated it with pertinent information if needed.   Family History   Problem Relation Age of Onset     Autoimmune Disease No family hx of        Prior to Admission Medications   Prior to Admission Medications   Prescriptions Last Dose Informant Patient Reported? Taking?   albuterol (PROAIR HFA/PROVENTIL HFA/VENTOLIN HFA) 108 (90 Base) MCG/ACT inhaler 4/8/2019 at Unknown time Self Yes Yes   Sig: Inhale 1-2 puffs into the lungs every 6 hours as needed for shortness of breath / dyspnea or wheezing   bumetanide (BUMEX) 2 MG tablet   No No   Sig: Take 1 tablet (2 mg) by mouth daily   furosemide (LASIX) 20 MG tablet 4/8/2019 at 1400 Self No Yes   Sig: Take 1 tablet (20 mg) by mouth daily   lisinopril (PRINIVIL/ZESTRIL) 10 MG tablet 4/9/2019 at 0600 Self No Yes   Sig: Take 1 tablet (10 mg) by mouth daily      Facility-Administered Medications: None     Allergies   Allergies   Allergen Reactions     Bee Venom      swells up     No Known Drug Allergies      Seasonal Allergies        Physical Exam   Vital Signs: Temp: 97.4  F (36.3  C) Temp src: Oral BP: 98/65 Pulse: 68 Heart Rate: 84 Resp: 18 SpO2: 95 % O2 Device: None (Room air)    Weight: 181 lbs 0 oz    Physical Exam   Constitutional:   Well nourished, well developed, resting comfortably   Head: Normocephalic and atraumatic.    Eyes: Conjunctivae are normal. Pupils are equal, round, and reactive to light.  Pharynx has no erythema or exudate, mucous membranes are moist  Neck:   No adenopathy, no bony tenderness  Cardiovascular: Regular rate and rhythm without murmurs or gallops  Pulmonary/Chest: Clear to auscultation bilaterally, with no wheezes or retractions. No respiratory distress.  GI: Soft with good bowel sounds.  Non-tender, mildly-distended, with no guarding, no rebound, no peritoneal signs.   Back:  No bony or CVA tenderness   Musculoskeletal:  3+ BLE edema  Skin: Skin is warm and dry. No rash noted.   Neurological: Alert and oriented to person, place, and time. Nonfocal exam  Psychiatric:  Normal mood and affect.      Data   Data reviewed today: I reviewed all medications, new labs and imaging results over the last 24 hours. I personally reviewed his labs over the past week.

## 2019-04-09 NOTE — PROGRESS NOTES
Patient s/p renal biopsy  Repeat renal panel shows increased creatinine , albumin down to 0.7 and he has significant anasarca.   Concerned about patient's labs, thus will admit for monitoring.    - would recommend stop lisinopril  - biopsy pending, but will plan to start therapy as soon as preliminary biopsy is reported   - bumex + albumin, and repeat labs in morning  - if renal function worsens further, would order duplex US kidneys to rule out thrombosis.  - patient education for low salt diet would be helpful    Jenny Aram Scott

## 2019-04-10 LAB
ALBUMIN SERPL-MCNC: 1.3 G/DL (ref 3.4–5)
ALP SERPL-CCNC: 42 U/L (ref 40–150)
ALT SERPL W P-5'-P-CCNC: 9 U/L (ref 0–70)
ANION GAP SERPL CALCULATED.3IONS-SCNC: 5 MMOL/L (ref 3–14)
AST SERPL W P-5'-P-CCNC: 12 U/L (ref 0–45)
BILIRUB DIRECT SERPL-MCNC: 0.1 MG/DL (ref 0–0.2)
BILIRUB SERPL-MCNC: 0.7 MG/DL (ref 0.2–1.3)
BUN SERPL-MCNC: 30 MG/DL (ref 7–30)
CALCIUM SERPL-MCNC: 7.6 MG/DL (ref 8.5–10.1)
CHLORIDE SERPL-SCNC: 102 MMOL/L (ref 94–109)
CO2 SERPL-SCNC: 29 MMOL/L (ref 20–32)
CREAT SERPL-MCNC: 1.41 MG/DL (ref 0.66–1.25)
CRP SERPL-MCNC: 64 MG/L (ref 0–8)
ERYTHROCYTE [DISTWIDTH] IN BLOOD BY AUTOMATED COUNT: 11.3 % (ref 10–15)
ERYTHROCYTE [SEDIMENTATION RATE] IN BLOOD BY WESTERGREN METHOD: 102 MM/H (ref 0–15)
FOLATE SERPL-MCNC: 6.4 NG/ML
GFR SERPL CREATININE-BSD FRML MDRD: 60 ML/MIN/{1.73_M2}
GLUCOSE SERPL-MCNC: 90 MG/DL (ref 70–99)
HBV SURFACE AB SERPL IA-ACNC: 0 M[IU]/ML
HBV SURFACE AG SERPL QL IA: NONREACTIVE
HCT VFR BLD AUTO: 41.3 % (ref 40–53)
HGB BLD-MCNC: 13.9 G/DL (ref 13.3–17.7)
HIV 1+2 AB+HIV1 P24 AG SERPL QL IA: NONREACTIVE
MCH RBC QN AUTO: 30.7 PG (ref 26.5–33)
MCHC RBC AUTO-ENTMCNC: 33.7 G/DL (ref 31.5–36.5)
MCV RBC AUTO: 91 FL (ref 78–100)
PLATELET # BLD AUTO: 220 10E9/L (ref 150–450)
POTASSIUM SERPL-SCNC: 4.1 MMOL/L (ref 3.4–5.3)
PROT SERPL-MCNC: 4.2 G/DL (ref 6.8–8.8)
RBC # BLD AUTO: 4.53 10E12/L (ref 4.4–5.9)
SODIUM SERPL-SCNC: 135 MMOL/L (ref 133–144)
VIT B12 SERPL-MCNC: 246 PG/ML (ref 193–986)
WBC # BLD AUTO: 6.2 10E9/L (ref 4–11)

## 2019-04-10 PROCEDURE — 87389 HIV-1 AG W/HIV-1&-2 AB AG IA: CPT | Performed by: PHYSICIAN ASSISTANT

## 2019-04-10 PROCEDURE — 85027 COMPLETE CBC AUTOMATED: CPT | Performed by: PHYSICIAN ASSISTANT

## 2019-04-10 PROCEDURE — 86706 HEP B SURFACE ANTIBODY: CPT | Performed by: PHYSICIAN ASSISTANT

## 2019-04-10 PROCEDURE — 82607 VITAMIN B-12: CPT | Performed by: NURSE PRACTITIONER

## 2019-04-10 PROCEDURE — 25000132 ZZH RX MED GY IP 250 OP 250 PS 637: Performed by: NURSE PRACTITIONER

## 2019-04-10 PROCEDURE — 25000128 H RX IP 250 OP 636: Performed by: NURSE PRACTITIONER

## 2019-04-10 PROCEDURE — 85652 RBC SED RATE AUTOMATED: CPT | Performed by: PHYSICIAN ASSISTANT

## 2019-04-10 PROCEDURE — 36415 COLL VENOUS BLD VENIPUNCTURE: CPT | Performed by: PHYSICIAN ASSISTANT

## 2019-04-10 PROCEDURE — 99232 SBSQ HOSP IP/OBS MODERATE 35: CPT | Performed by: PHYSICIAN ASSISTANT

## 2019-04-10 PROCEDURE — 12000001 ZZH R&B MED SURG/OB UMMC

## 2019-04-10 PROCEDURE — 80076 HEPATIC FUNCTION PANEL: CPT | Performed by: NURSE PRACTITIONER

## 2019-04-10 PROCEDURE — 86481 TB AG RESPONSE T-CELL SUSP: CPT | Performed by: PHYSICIAN ASSISTANT

## 2019-04-10 PROCEDURE — 86140 C-REACTIVE PROTEIN: CPT | Performed by: NURSE PRACTITIONER

## 2019-04-10 PROCEDURE — 80048 BASIC METABOLIC PNL TOTAL CA: CPT | Performed by: NURSE PRACTITIONER

## 2019-04-10 PROCEDURE — 25000132 ZZH RX MED GY IP 250 OP 250 PS 637: Performed by: PHYSICIAN ASSISTANT

## 2019-04-10 PROCEDURE — 25000131 ZZH RX MED GY IP 250 OP 636 PS 637: Performed by: PHYSICIAN ASSISTANT

## 2019-04-10 PROCEDURE — 82746 ASSAY OF FOLIC ACID SERUM: CPT | Performed by: NURSE PRACTITIONER

## 2019-04-10 PROCEDURE — 99207 ZZC CDG-MDM COMPONENT: MEETS LOW - DOWN CODED: CPT | Performed by: PHYSICIAN ASSISTANT

## 2019-04-10 PROCEDURE — 87340 HEPATITIS B SURFACE AG IA: CPT | Performed by: PHYSICIAN ASSISTANT

## 2019-04-10 PROCEDURE — 36415 COLL VENOUS BLD VENIPUNCTURE: CPT | Performed by: NURSE PRACTITIONER

## 2019-04-10 RX ORDER — BUMETANIDE 1 MG/1
1 TABLET ORAL ONCE
Status: DISCONTINUED | OUTPATIENT
Start: 2019-04-10 | End: 2019-04-10

## 2019-04-10 RX ORDER — BENZONATATE 100 MG/1
100 CAPSULE ORAL 3 TIMES DAILY PRN
Status: DISCONTINUED | OUTPATIENT
Start: 2019-04-10 | End: 2019-04-12 | Stop reason: HOSPADM

## 2019-04-10 RX ORDER — SULFAMETHOXAZOLE/TRIMETHOPRIM 800-160 MG
1 TABLET ORAL
Status: DISCONTINUED | OUTPATIENT
Start: 2019-04-10 | End: 2019-04-12 | Stop reason: HOSPADM

## 2019-04-10 RX ORDER — BUMETANIDE 1 MG/1
1 TABLET ORAL DAILY
Status: DISCONTINUED | OUTPATIENT
Start: 2019-04-10 | End: 2019-04-12

## 2019-04-10 RX ADMIN — BUMETANIDE 1 MG: 1 TABLET ORAL at 15:09

## 2019-04-10 RX ADMIN — HEPARIN SODIUM 5000 UNITS: 5000 INJECTION, SOLUTION INTRAVENOUS; SUBCUTANEOUS at 08:36

## 2019-04-10 RX ADMIN — BUMETANIDE 1 MG: 0.25 INJECTION INTRAMUSCULAR; INTRAVENOUS at 00:07

## 2019-04-10 RX ADMIN — ACETAMINOPHEN 650 MG: 325 TABLET, FILM COATED ORAL at 06:35

## 2019-04-10 RX ADMIN — BENZONATATE 100 MG: 100 CAPSULE ORAL at 22:25

## 2019-04-10 RX ADMIN — HEPARIN SODIUM 5000 UNITS: 5000 INJECTION, SOLUTION INTRAVENOUS; SUBCUTANEOUS at 19:53

## 2019-04-10 RX ADMIN — PREDNISONE 70 MG: 20 TABLET ORAL at 15:09

## 2019-04-10 RX ADMIN — SULFAMETHOXAZOLE AND TRIMETHOPRIM 1 TABLET: 800; 160 TABLET ORAL at 15:08

## 2019-04-10 ASSESSMENT — ACTIVITIES OF DAILY LIVING (ADL)
ADLS_ACUITY_SCORE: 10

## 2019-04-10 ASSESSMENT — MIFFLIN-ST. JEOR: SCORE: 1705.47

## 2019-04-10 ASSESSMENT — PAIN DESCRIPTION - DESCRIPTORS: DESCRIPTORS: PRESSURE

## 2019-04-10 NOTE — PLAN OF CARE
"/65 (BP Location: Right arm)   Pulse 53   Temp 99  F (37.2  C) (Oral)   Resp 16   Ht 1.753 m (5' 9\")   Wt 82.5 kg (181 lb 14.4 oz)   SpO2 94%   BMI 26.86 kg/m        Neuro: A&Ox4.   Cardiac: SR. VSS.   Respiratory: Sating 94% on RA.  GI/: Adequate urine output.   Diet/appetite: No tolerating 2 gram of NA. diet.   Activity:  Independent  up to chair and in halls.  Pain: At acceptable level on current regimen.   Skin: No new deficits noted.  LDA's:PIV.    Plan: Pt was started on Bumex, Nephrology is following. Continue with POC. Notify primary team with changes.  "

## 2019-04-10 NOTE — PLAN OF CARE
Pt admitted from IR at 1930, admission complete. Tmax 100. OVSS. C/o pressure in lower abdomen. Denies nausea. No appetite, good fluid intake. Good UOP. Renal biopsy site c/d/I. 50g Albumin given. Pt requesting to have inhaler on hand, ordered up from pharmacy. Continue to monitor.

## 2019-04-10 NOTE — PROGRESS NOTES
Update:  Preliminary biopsy results :    Minimal change disease.  Please start 70mg prednisone daily oral  Start bactrim prophylaxis   Start omeprazole daily  Will set up followup with me in 2 weeks outpatient  Continue bumex 1mg po daily     Jenny Scott

## 2019-04-10 NOTE — PROGRESS NOTES
[facility], [facility] Progress Note - Hospitalist Service, Gold 9       Date of Admission:  4/9/2019  Assessment & Plan   Deshawn Flood is a 43 year old male with a past medical history of asthma and is admitted 04/09/2019 s/p renal biopsy for failed management of nephrotic syndrome outpatient.      #Nephrotic syndrome: Anasarca developed ~3 weeks ago and his diagnosis was confirmed on 4/1 with urine studies. Following with Dr. Scott. Initiated on Lisinopril and Lasix on 4/1, developed a dry cough and stopped taking Lasix on 4/7. S/P renal biopsy 4/9, nephrology advised admission given worsening renal function and anasarca on oral medications. Creat 1.76-->1.41. BP and vitals stable. DDx includes thrombus, amyloidosis, SLE, minimal change, FSGS and membranous nephropathy. 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  -Nephrology consulted, appreciate recs  -Prednisone 70 mg qday (will initiate after HIV, Hep B, Quant gold drawn)  -Bactrim [facility] for prophylaxis  -Omeprazole qday  -Bumex 1 mg po daily  -Follow final path results  -Consider duplex US of kidneys to r/o thrombus if worsening renal function  -Continue low sodium diet  -Continue to hold lisinopril    #Bloating: CT 03/29 w/ diffuse small bowel enteritis likely infectious or inflammatory in etiology, with associated inflammatory changes of the mesenteric adipose tissues, moderate ascites. Notes abdominal fullness, but no pain. Inflammatory markers negative on 03/29, CRP up to 64 today, patient denies any diarrhea, fevers/chills, hematochezia or melena. DDx includes hypoalbuminemia, infectious vs. Inflammatory enteritis (less likely given absence of diarrhea, abdominal pain, family history).   -Consider GI consult to evaluate  -Folate, B12  -Fecal calprotectin to eval for inflammation  -PPI as above  -GI referral as OP  -Epass and cdiff if any diarrhea  -Tap ascites: Will order Cell count, albumin, total protein,  culture, cytology     #History of asthma: Continue home albuterol PRN    Diet: Combination Diet 2 gm NA Diet    DVT Prophylaxis: Heparin SQ  Manzo Catheter: not present  Code Status: FULL    Disposition Plan   Expected discharge: 2 - 3 days, recommended to prior living arrangement once renal function improved and plan for nephrotic syndrome in place.  Entered: Aleena Hancock PA-C 04/10/2019, 9:54 AM       The patient's care was discussed with the Attending Physician, Dr. Saucedo, Bedside Nurse, Care Coordinator/, Patient and Nephrology Consultant.    Aleena Hancock PA-C  Hospitalist Service, [address], [employer]  Pager: [phone]  Please see sticky note for cross cover information  ______________________________________________________________________    Interval History   The patient notes he continues to have abdominal bloating and fullness. Feels like he cant eat and that is very bothersome. Denies marleni pain, just bothersome fullness. He denies any diarrhea, notes normal for him is 1 formed BM/day, no dark tarry stool or blood in stool, no nausea/vomiting. No abdominal pain. No h/o IBD or family h/o rheum issues.     Data reviewed today: I reviewed all medications, new labs and imaging results over the last 24 hours. I personally reviewed no images or EKG's today.    Physical Exam   Vital Signs: Temp: 98.1  F (36.7  C) Temp src: Oral BP: 116/61 Pulse: 81 Heart Rate: 73 Resp: 16 SpO2: 94 % O2 Device: None (Room air)    Weight: 181 lbs 14.4 oz  GENERAL: Alert and oriented x 3. Lying comfortably in bed.   HEENT: Anicteric sclera. Mucous membranes moist and without lesions.   CV: RRR. S1, S2. No murmurs appreciated.   RESPIRATORY: Effort normal on RA. Lungs with scant crackles in bases, no wheezing, rhonchi.   GI: Abdomen soft and non distended, bowel sounds present. No tenderness, rebound, guarding.   MUSCULOSKELETAL: No joint swelling or tenderness.  Moves all extremities.   NEUROLOGICAL: No focal deficits.   EXTREMITIES: Anasarca, 2+ bilateral LE edema. Intact bilateral pedal pulses.   SKIN: No jaundice. No rashes.       Data   Reviewed bmp, cbc, CRP, hepatic panel

## 2019-04-10 NOTE — PLAN OF CARE
Pt alert and oriented x4. Pt TMAX 100. Pt tachycardic. AOVSS. Pt denied nausea. Pt reporting slight pressure in lower abdomen. Pt given tylenol x1 for headache. Renal biopsy site c/d/I. Pt had fluids running TKO. Pt has generalized edematous. Pt reported lack of appetite. Pt now has inhaler available PRN. Continue to monitor.

## 2019-04-11 LAB
ANION GAP SERPL CALCULATED.3IONS-SCNC: 9 MMOL/L (ref 3–14)
BUN SERPL-MCNC: 34 MG/DL (ref 7–30)
CALCIUM SERPL-MCNC: 7.5 MG/DL (ref 8.5–10.1)
CHLORIDE SERPL-SCNC: 99 MMOL/L (ref 94–109)
CO2 SERPL-SCNC: 24 MMOL/L (ref 20–32)
CREAT SERPL-MCNC: 1.47 MG/DL (ref 0.66–1.25)
GFR SERPL CREATININE-BSD FRML MDRD: 57 ML/MIN/{1.73_M2}
GLUCOSE SERPL-MCNC: 156 MG/DL (ref 70–99)
POTASSIUM SERPL-SCNC: 4.4 MMOL/L (ref 3.4–5.3)
SODIUM SERPL-SCNC: 132 MMOL/L (ref 133–144)

## 2019-04-11 PROCEDURE — 80048 BASIC METABOLIC PNL TOTAL CA: CPT | Performed by: PHYSICIAN ASSISTANT

## 2019-04-11 PROCEDURE — 25000131 ZZH RX MED GY IP 250 OP 636 PS 637: Performed by: PHYSICIAN ASSISTANT

## 2019-04-11 PROCEDURE — 12000001 ZZH R&B MED SURG/OB UMMC

## 2019-04-11 PROCEDURE — 25000128 H RX IP 250 OP 636: Performed by: NURSE PRACTITIONER

## 2019-04-11 PROCEDURE — 25000132 ZZH RX MED GY IP 250 OP 250 PS 637: Performed by: PHYSICIAN ASSISTANT

## 2019-04-11 PROCEDURE — 25000132 ZZH RX MED GY IP 250 OP 250 PS 637: Performed by: NURSE PRACTITIONER

## 2019-04-11 PROCEDURE — 99207 ZZC APP CREDIT; MD BILLING SHARED VISIT: CPT | Performed by: PHYSICIAN ASSISTANT

## 2019-04-11 PROCEDURE — 83921 ORGANIC ACID SINGLE QUANT: CPT | Performed by: PHYSICIAN ASSISTANT

## 2019-04-11 PROCEDURE — 36415 COLL VENOUS BLD VENIPUNCTURE: CPT | Performed by: PHYSICIAN ASSISTANT

## 2019-04-11 PROCEDURE — 99233 SBSQ HOSP IP/OBS HIGH 50: CPT | Mod: 25 | Performed by: INTERNAL MEDICINE

## 2019-04-11 PROCEDURE — 76705 ECHO EXAM OF ABDOMEN: CPT | Mod: 26 | Performed by: INTERNAL MEDICINE

## 2019-04-11 RX ADMIN — BUMETANIDE 1 MG: 1 TABLET ORAL at 08:57

## 2019-04-11 RX ADMIN — PREDNISONE 70 MG: 20 TABLET ORAL at 08:58

## 2019-04-11 RX ADMIN — BENZONATATE 100 MG: 100 CAPSULE ORAL at 08:57

## 2019-04-11 RX ADMIN — HEPARIN SODIUM 5000 UNITS: 5000 INJECTION, SOLUTION INTRAVENOUS; SUBCUTANEOUS at 19:52

## 2019-04-11 RX ADMIN — HEPARIN SODIUM 5000 UNITS: 5000 INJECTION, SOLUTION INTRAVENOUS; SUBCUTANEOUS at 08:57

## 2019-04-11 RX ADMIN — BENZONATATE 100 MG: 100 CAPSULE ORAL at 19:52

## 2019-04-11 RX ADMIN — OMEPRAZOLE 20 MG: 20 CAPSULE, DELAYED RELEASE ORAL at 08:57

## 2019-04-11 ASSESSMENT — ACTIVITIES OF DAILY LIVING (ADL)
ADLS_ACUITY_SCORE: 10

## 2019-04-11 ASSESSMENT — PAIN DESCRIPTION - DESCRIPTORS: DESCRIPTORS: PRESSURE

## 2019-04-11 ASSESSMENT — MIFFLIN-ST. JEOR: SCORE: 1711.37

## 2019-04-11 NOTE — PROGRESS NOTES
[facility], [facility] Progress Note - Hospitalist Service, Gold 9       Date of Admission:  4/9/2019  Assessment & Plan   Deshawn Flood is a 43 year old male with a past medical history of asthma and is admitted 04/09/2019 s/p renal biopsy for failed management of nephrotic syndrome outpatient.      #Nephrotic syndrome: Anasarca developed ~3 weeks ago and his diagnosis was confirmed on 4/1 with urine studies. Following with Dr. Scott. Initiated on Lisinopril and Lasix on 4/1, developed a dry cough and stopped taking Lasix on 4/7. S/P renal biopsy 4/9, nephrology advised admission given worsening renal function and anasarca on oral medications. Creat 1.76-->1.41-->1.47. BP and vitals stable. DDx includes thrombus, amyloidosis, SLE, minimal change, FSGS and membranous nephropathy. 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  -Nephrology consulted, appreciate recs  -Prednisone 70 mg qday (will initiate after HIV, Hep B, Quant gold drawn)  -Bactrim [facility] for prophylaxis  -Omeprazole qday  -Bumex 1 mg po daily  -Follow final path results  -Consider duplex US of kidneys to r/o thrombus if worsening renal function, can hold for now  -Continue low sodium diet  -Continue to hold lisinopril     #Bloating: CT 03/29 w/ diffuse small bowel enteritis likely infectious or inflammatory in etiology, with associated inflammatory changes of the mesenteric adipose tissues, moderate ascites. Notes abdominal fullness, but no pain. Inflammatory markers negative on 03/29, CRP up to 64 today, patient denies any diarrhea, fevers/chills, hematochezia or melena. Folate normal, b12 low limit of normal. DDx includes hypoalbuminemia, infectious vs. Inflammatory enteritis (less likely given absence of diarrhea, abdominal pain, family history).   -Consider GI consult to evaluate  -MMA  -Fecal calprotectin to eval for inflammation, no stools  -PPI as above  -GI referral as OP  -Epass and cdiff if any  diarrhea, no stools  -Unable to tap ascites today due to subcutaneous edema     #History of asthma: Continue home albuterol PRN     Diet: Combination Diet 2 gm NA Diet    DVT Prophylaxis: Heparin SQ  Manzo Catheter: not present  Code Status: FULL    Disposition Plan   Expected discharge: Tomorrow, recommended to prior living arrangement once creatinine and renal function stable, para complete and plan no e/o infection.  Entered: Aleena Hancock PA-C 04/11/2019, 8:24 AM       The patient's care was discussed with the Attending Physician, Dr. Saucedo, Bedside Nurse, Care Coordinator/, Patient and Nephrolgoy Consultant.    Aleena Hancock PA-C  Hospitalist Service, [address], [place]  Pager: [phone]  Please see sticky note for cross cover information  ______________________________________________________________________    Interval History   The patient notes he continues to have abdominal bloating. Would like to get better. Notes he is only able to eat small amounts of food and then feels full. Otherwise no complaints, no urinary issues, breathing okay.     Data reviewed today: I reviewed all medications, new labs and imaging results over the last 24 hours. I personally reviewed no images or EKG's today.    Physical Exam   Vital Signs: Temp: 97.7  F (36.5  C) Temp src: Oral BP: 120/69 Pulse: 55   Resp: 16 SpO2: 92 % O2 Device: None (Room air)    Weight: 181 lbs 14.4 oz  GENERAL: Alert and oriented x 3. Sitting comfortably in bed.   HEENT: Anicteric sclera. Mucous membranes moist and without lesions.   CV: RRR. S1, S2. No murmurs appreciated.   RESPIRATORY: Effort normal on RA. Lungs CTAB with no wheezing, rales, rhonchi.   GI: Abdomen soft and non distended, bowel sounds present. No tenderness, rebound, guarding.   MUSCULOSKELETAL: No joint swelling or tenderness. Moves all extremities.   NEUROLOGICAL: No focal deficits.  EXTREMITIES: Anasarca. Intact  bilateral pedal pulses.   SKIN: No jaundice. No rashes.     Data   Reviewed BMP, glucose

## 2019-04-11 NOTE — PLAN OF CARE
Pt alert and oriented x4. Pt VSS. Pt denied pain and nausea. Pt edematous in extremities. Pt has tessalon available for cough PRN. Pt reports feeling full in abdomen and lack of appetite.

## 2019-04-11 NOTE — PLAN OF CARE
NEURO: Alert and oriented x4.   RESPIRATORY: LS clear, SpO2>90% on RA. Dyspnea with activity, SpO2 remains in low 90s%. Recovers with rest.   CARDIO: VSS. Generalized edema - moderate. BLE 3+ pitting edema.   GI/: BS active, + gas. Abdomen distended. Paracentesis attempted, but was not done as fluid was located in subcutaneous area small pockets. Pt reported feeling full and comfortable very quickly after eating, poor appetite. Encouraged pt to eat small calorie dense meals. Using urinal at bedside.   SKIN: Intact.   ACTIVITY: Up SBA. Ambulated in jonas x1.   PAIN: Reported discomfort in abdomen with eating, refused pain medications.   LINES: IV SL., site WNL.   PLAN:  Continue prednisone and diuretics, watch creatinine level.

## 2019-04-11 NOTE — PROGRESS NOTES
Nephrology Progress Note  04/11/2019         Today Recommendations:  - Preliminary kidney biopsy shows minimal change disease, to follow up with final report.   - Continue with prednisone 70 mg PO daily.   - Continue with PPI, PCP prophylaxis with Bactrim, osteoporosis prophylaxis with Ca and vitamin D supplementation.  - Continue with Bumex 1 mg PO daily.   - Negative HepB Abs and Ags, negative Hep C antibodies, TB: Quantiferon is pending.  - Avoid nephrotoxic agents, such as NSAIDs and contrast.   - Strict I/O, daily weight.  - Sodium restriction 2 g per 24 hours.  - Need lipids profile.        ASSESSMENT AND RECOMMENDATIONS:   Mr. Deshawn Flood is a 43 year old male with no previous kidney disease, Hx of anemia and asthma who started having edema to lower extrs for the past few weeks, patient found to have severe proteinuria when he was sent to our nephrology clinic and decision was for kidney biopsy that was done on 4/9, patient found to have elevated Crea function, edema, serum albumin of 0.7 so patient was admitted. We were consulted for nephrotic syndrome.     # Nephrotic syndrome:  - Crea baseline~1  - Crea on admission: 1.76  - Crea today: 1.47  - Severe proteinuria, UPCR: 11.3 g/g, Hypoalbuminemia: 0.7, edema.  - Lisinopril was discontinued as outpatient due to cough, No need to start with ARB for now.   - Kidney biopsy that was done on 4/9; Preliminary kidney biopsy shows minimal change disease, to follow up with final report.   -  prednisone 70 mg PO daily.   - GI steroid prophylaxis with PPI, PCP prophylaxis with Bactrim, osteoporosis prophylaxis with Ca and vitamin D supplementation.  - Negative HepB Abs and Ags, negative Hep C antibodies, TB: Quantiferon is pending.  - Avoid nephrotoxic agents, such as NSAIDs and contrast.   - Strict I/O, daily weight.  - Sodium restriction 2 g per 24 hours.  - Need lipids profile.   - Crea is improving, so hold off US/renal with doppler for now.       # Blood  "pressure, Volume status:  - Normotensive.   - Patient has edema +2 to lower extrs,   - Continue with Bumex 1 mg PO daily.   - Sodium restriction 2 g per 24 hours.     #Electrolytes abnormalities, Acid-base:  - Na: 132, K: 4.4, Cl: 99, WNL  - BiCarb: 24, no issue.   - Daily monitoring.      # Hemoglobin  - Hgb: 13.9, WNL     #Abdominal discomfort, bloating:  - CT was done in 3/26 showed diffuse small bowel enteritis, infection vs inflammatory but in the setting of absent infection signs and diagnosis of minimal change disease, most likely inflammatory process.   - Expect to improve with steroid and PPI.  - Managed by primary team, may consider GI consult.      Recommendations were communicated to primary team via Note.      Seen and discussed with Dr. Gerber Garcia MD   Nephrology Fellow  [phone]    Interval History :   Nursing and provider notes from last 24 hours reviewed.  In the last 24 hours Deshawn Flood no events overnight. Patient complains of abdominal discomfort and quick satiety, he feels full after eating small amount of food. He denies any chest pain, shortness of breath, nausea, vomiting or fever, no flank pain.     Review of Systems:   I reviewed the following systems:  GI: good appetite but not able to eat well due to feeling full quickly. no nausea or vomiting or diarrhea.   Neuro:  no confusion  Constitutional:  no fever or chills  CV: no dyspnea. positive edema.  no chest pain.    Physical Exam:   I/O last 3 completed shifts:  In: 240 [P.O.:240]  Out: 875 [Urine:875]   /80 (BP Location: Right arm)   Pulse 71   Temp 99.1  F (37.3  C) (Oral)   Resp 16   Ht 1.753 m (5' 9\")   Wt 83.1 kg (183 lb 3.2 oz)   SpO2 94%   BMI 27.05 kg/m       GENERAL APPEARANCE: awake  EYES:  no scleral icterus, pupils equal  HENT: mouth without ulcers or lesions  PULM: lungs no to auscultation bilaterally, equal air movement, no clubbing  CV: regular rhythm, normal rate, no rub     -JVD " not elevated.     -edema +2   GI: soft, no tender, mildly distended, bowel sounds are positive  INTEGUMENT: no cyanosis, no rash  NEURO:  no asterixis   Access peripheral IV line.     Labs:   All labs reviewed by me  Electrolytes/Renal -   Recent Labs   Lab Test 04/11/19  0544 04/10/19  1009 04/09/19  1535 04/01/19  1450   * 135 133 135   POTASSIUM 4.4 4.1 4.4 3.4   CHLORIDE 99 102 99 99   CO2 24 29 27 29   BUN 34* 30 34* 15   CR 1.47* 1.41* 1.76* 1.15   * 90 145* 79   BASIA 7.5* 7.6* 7.6* 8.6   PHOS  --   --  4.5 3.8       CBC -   Recent Labs   Lab Test 04/10/19  1009 03/29/19  1711 08/02/12  1546   WBC 6.2 6.4  --    HGB 13.9 17.7 15.2    265  --        LFTs -   Recent Labs   Lab Test 04/10/19  1009 04/09/19  1535 04/01/19  1450 03/29/19  1430   ALKPHOS 42  --   --  67   BILITOTAL 0.7  --   --  0.3   ALT 9  --   --  19   AST 12  --   --  27   PROTTOTAL 4.2*  --   --  4.5*   ALBUMIN 1.3* 0.7* 1.1* 1.0*       Iron Panel - No lab results found.      Imaging:  All imaging studies reviewed by me.     Current Medications:    bumetanide  1 mg Oral Daily     heparin  5,000 Units Subcutaneous Q12H     omeprazole  20 mg Oral QAM AC     predniSONE  70 mg Oral Daily     sodium chloride (PF)  3 mL Intracatheter Q8H     sulfamethoxazole-trimethoprim  1 tablet Oral Once per day on Mon Wed Fri       Crys Garcia MD   Nephrology Fellow  Pager: [phone]

## 2019-04-11 NOTE — CONSULTS
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   - Start with prednisone 70 mg PO daily.   - Start with GI steroid prophylaxis with PPI, PCP prophylaxis with Bactrim, osteoporosis prophylaxis with Ca and vitamin D supplementation.  - Continue with Bumex 1 mg PO daily.   - To check on HepB, Hep C antibodies and TB.  - Avoid nephrotoxic agents, such as NSAIDs and contrast.   - Strict I/O, daily weight.  - Sodium restriction 2 g per 24 hours.  - Need lipids profile.   - No need to start with ARB for now.   - Crea is improving, so hold off US/renal with doppler for now.      ASSESSMENT AND RECOMMENDATIONS:   Mr. Deshawn Flood is a 43 year old male with no previous kidney disease, Hx of anemia and asthma who started having edema to lower extrs for the past few weeks, patient found to have severe proteinuria when he was sent to our nephrology clinic and decision was for kidney biopsy that was done on 4/9, patient found to have elevated Crea function, edema, serum albumin of 0.7 so patient was admitted. We were consulted for nephrotic syndrome.    # Nephrotic syndrome:  - Crea baseline~1  - Crea on admission: 1.76  - Crea today: 1.41  Severe proteinuria, UPCR: 11.3 g/g, Hypoalbuminemia: 0.7, edema.  - Lisinopril was discontinued as outpatient due to cough, No need to start with ARB for now.   - Kidney biopsy that was done on 4/9; Preliminary kidney biopsy shows minimal change disease, to follow up with final report.   - Start with prednisone 70 mg PO daily.   - Start with GI steroid prophylaxis with PPI, PCP prophylaxis with Bactrim, osteoporosis prophylaxis with Ca and vitamin D supplementation.  - To check on HepB, Hep C  antibodies and TB.  - Avoid nephrotoxic agents, such as NSAIDs and contrast.   - Strict I/O, daily weight.  - Sodium restriction 2 g per 24 hours.  - Need lipids profile.   - Crea is improving, so hold off US/renal with doppler for now.      # Blood pressure, Volume status:  - Blood pressure this mornin/61  - Patient has edema +2 to lower extrs,   - Continue with Bumex 1 mg PO daily.   - Sodium restriction 2 g per 24 hours.    #Electrolytes abnormalities, Acid-base:  - Na: 135, K: 4.1, Cl: 102, WNL  - BiCarb: 29, no issue.   - Daily monitoring.     # Hemoglobin  - Hgb: 13.9, WNL    #Abdominal discomfort, bloating:  - CT was done in 3/26 showed diffuse small bowel enteritis, infection vs inflammatory but in the setting of absent infection signs and diagnosis of minimal change disease, most likely inflammatory process.   - Expect to improve with steroid and PPI.  - Managed by primary team, may consider GI consult.     Recommendations were communicated to primary team via Note.     Seen and discussed with Dr. Gerber Garcia MD   Nephrology Fellow  [phone]    REASON FOR CONSULT: Nephrotic syndrome.     HISTORY OF PRESENT ILLNESS:  Admitting provider and nursing notes reviewed  Mr. Deshawn Flood is a 43 year old male with no previous kidney disease, Hx of anemia and asthma who started having edema to lower extrs for the past few weeks, patient found to have severe proteinuria when he was sent to our nephrology clinic and decision was for kidney biopsy that was done on , patient found to have elevated Crea function, edema, serum albumin of 0.7 so patient was admitted. Patient states that he has never has any kidney problem in his life, no family history for kidney disease, he denies being sick recently. Patient mainly complains of abdomen discomfort and bloating, also he complains of lower extrs edema. Patient denies any chest pain, shortness of breath, nausea, vomiting, flank pain or fever.      PAST MEDICAL HISTORY:  Reviewed with patient on 04/10/2019   At bedside.   Past Medical History:   Diagnosis Date     Anemia      Other spontaneous pneumothorax      SPONTANEOUS PNEUMOTHORAX NEC 3/25/2005     Unspecified asthma(493.90)        Past Surgical History:   Procedure Laterality Date     ARTHROSCOPY KNEE  8/3/2012    Procedure: ARTHROSCOPY KNEE;  Left knee Arthroscopy with removal of loose bodies;  Surgeon: Bibi Roberts MD;  Location: [facility] OR     HC REPAIR UMBILICAL GODFREY,<4Y/O,REDUC  1978     TUBE THORACOSTOMY,ABSC/EMPYEMA/HEMO  3/20/2005    Small chest tube with Heimlich valve.        MEDICATIONS:  PTA Meds  Prior to Admission medications    Medication Sig Last Dose Taking? Auth Provider   albuterol (PROAIR HFA/PROVENTIL HFA/VENTOLIN HFA) 108 (90 Base) MCG/ACT inhaler Inhale 1-2 puffs into the lungs every 6 hours as needed for shortness of breath / dyspnea or wheezing 4/8/2019 at Unknown time Yes Unknown, Entered By History   furosemide (LASIX) 20 MG tablet Take 1 tablet (20 mg) by mouth daily 4/8/2019 at 1400 Yes Jenny Scott MD   bumetanide (BUMEX) 2 MG tablet Take 1 tablet (2 mg) by mouth daily   Jenny Scott MD      Current Meds    bumetanide  1 mg Oral Daily     heparin  5,000 Units Subcutaneous Q12H     [START ON 4/11/2019] omeprazole  20 mg Oral QAM AC     predniSONE  70 mg Oral Daily     sodium chloride (PF)  3 mL Intracatheter Q8H     sulfamethoxazole-trimethoprim  1 tablet Oral Once per day on Mon Wed Fri     Infusion Meds      ALLERGIES:    Allergies   Allergen Reactions     Bee Venom      swells up     No Known Drug Allergies      Seasonal Allergies        REVIEW OF SYSTEMS:  A comprehensive of systems was negative except as noted above.    SOCIAL HISTORY:   Social History     Socioeconomic History     Marital status: Single     Spouse name: Not on file     Number of children: Not on file     Years of education: Not on file     Highest education level: Not on  file   Occupational History     Not on file   Social Needs     Financial resource strain: Not on file     Food insecurity:     Worry: Not on file     Inability: Not on file     Transportation needs:     Medical: Not on file     Non-medical: Not on file   Tobacco Use     Smoking status: Former Smoker     Packs/day: 0.10     Years: 15.00     Pack years: 1.50     Types: Cigarettes     Smokeless tobacco: Current User     Types: Chew     Tobacco comment: hopes to quit smoking --hasn't had any since lung went down 05.   Substance and Sexual Activity     Alcohol use: Yes     Alcohol/week: 10.5 oz     Types: 21 Cans of beer per week     Drug use: No     Sexual activity: Not Currently   Lifestyle     Physical activity:     Days per week: Not on file     Minutes per session: Not on file     Stress: Not on file   Relationships     Social connections:     Talks on phone: Not on file     Gets together: Not on file     Attends Sikhism service: Not on file     Active member of club or organization: Not on file     Attends meetings of clubs or organizations: Not on file     Relationship status: Not on file     Intimate partner violence:     Fear of current or ex partner: Not on file     Emotionally abused: Not on file     Physically abused: Not on file     Forced sexual activity: Not on file   Other Topics Concern     Parent/sibling w/ CABG, MI or angioplasty before 65F 55M? Not Asked   Social History Narrative     Not on file     Reviewed with patient at bedside  No one accompanies Deshawn Flood in hospital room    FAMILY MEDICAL HISTORY:   Family History   Problem Relation Age of Onset     Autoimmune Disease No family hx of      Reviewed with patient at bedside    PHYSICAL EXAM:   Temp  Av.9  F (37.2  C)  Min: 97.4  F (36.3  C)  Max: 100  F (37.8  C)      Pulse  Av.4  Min: 53  Max: 94 Resp  Av.5  Min: 13  Max: 20  SpO2  Av.3 %  Min: 92 %  Max: 98 %       /63 (BP Location: Right arm)   Pulse 65   " Temp 99.9  F (37.7  C) (Oral)   Resp 16   Ht 1.753 m (5' 9\")   Wt 82.5 kg (181 lb 14.4 oz)   SpO2 95%   BMI 26.86 kg/m     Date 04/10/19 0700 - 04/11/19 0659   Shift 1719-6224 4446-7629 0066-0139 24 Hour Total   INTAKE   Shift Total(mL/kg)       OUTPUT   Urine 725   725   Shift Total(mL/kg) 725(8.79)   725(8.79)   Weight (kg) 82.51 82.51 82.51 82.51      Admit Weight: 82.1 kg (181 lb)     GENERAL APPEARANCE: no distress,  awake  EYES: no scleral icterus, pupils equal  Endo: no goiter, no moon facies  Lymphatics: no cervical or supraclavicular LAD  Pulmonary: lungs clear to auscultation with equal breath sounds bilaterally, no clubbing  CV: regular rhythm, normal rate, no rub   - JVD not elevated   - Edema +2  GI: soft, mild tenderness, normal bowel sounds  MS: no evidence of inflammation in joints, no muscle tenderness  : no wang  SKIN: no rash, warm, dry, no cyanosis  NEURO: face symmetric, no asterixis     LABS:     I personally reviewed his labs.     CMP  Recent Labs   Lab 04/10/19  1009 04/09/19  1535    133   POTASSIUM 4.1 4.4   CHLORIDE 102 99   CO2 29 27   ANIONGAP 5 8   GLC 90 145*   BUN 30 34*   CR 1.41* 1.76*   GFRESTIMATED 60* 46*   GFRESTBLACK 69 53*   BASIA 7.6* 7.6*   PHOS  --  4.5   PROTTOTAL 4.2*  --    ALBUMIN 1.3* 0.7*   BILITOTAL 0.7  --    ALKPHOS 42  --    AST 12  --    ALT 9  --      CBC  Recent Labs   Lab 04/10/19  1009   HGB 13.9   WBC 6.2   RBC 4.53   HCT 41.3   MCV 91   MCH 30.7   MCHC 33.7   RDW 11.3        INRNo lab results found in last 7 days.  ABGNo lab results found in last 7 days.   URINE STUDIES  Recent Labs   Lab Test 04/01/19  1500 03/29/19  1858   COLOR Yellow Yellow   APPEARANCE Clear Clear   URINEGLC Negative Negative   URINEBILI Negative Negative   URINEKETONE Negative Negative   SG 1.015 >1.035*   UBLD Moderate* Moderate*   URINEPH 6.0 6.0   PROTEIN >=300* >499*   UROBILINOGEN 0.2  --    NITRITE Negative Negative   LEUKEST Negative Negative   RBCU 25-50* " 7*   WBCU 0 - 5 7*     Recent Labs   Lab Test 04/01/19  1500 03/29/19  1903   UTPG 11.34* 7.80*     PTH  No lab results found.  IRON STUDIES  No lab results found.    IMAGING:  All imaging studies reviewed by me.     Crys Garcia MD  Nephrology Fellow  Pager: [phone]

## 2019-04-11 NOTE — PROCEDURES
Consult and Procedure Service - Procedure Note    Attending: Sheryl  Resident: n/a  Indication: abdominal fullness  Risk Assessment: n/a  Pre-procedure diagnosis: abdominal fullness, nephrotic syndrome  Post-procedure diagnosis: subcutaneous edema  Procedure name: limited abdominal ultrasound to assess for paracentesis    The risks and benefits of the procedure were explained to  who expressed understanding and opted to proceed.  Consent was obtained and placed in the chart.      Limited abdominal ultrasound performed due to lack of ascites. Has up to 4cm of subcutaneous edema. No gas present. Morrisons, vel-vesicular and splenorenal negative for ascites. Images saved in chart.    Thanks for consult.      TAYLER MARIN MD, [facility]  Internal Medicine Hospitalist & Staff Physician  [facility]  Pager: [phone]  [email or web address]    DOS:  April 11, 2019

## 2019-04-11 NOTE — PLAN OF CARE
Afebrile, VSS. Continues to c/o lower abdominal pressure and fullness. Tessalon given x1 for c/o cough. Poor appetite, pt ate small container of Cheerios this evening. Fair fluid intake. Good UOP. Pt took shower this evening. Continue to monitor.

## 2019-04-12 ENCOUNTER — TELEPHONE (OUTPATIENT)
Dept: FAMILY MEDICINE | Facility: OTHER | Age: 44
End: 2019-04-12

## 2019-04-12 VITALS
WEIGHT: 184.2 LBS | SYSTOLIC BLOOD PRESSURE: 114 MMHG | HEIGHT: 69 IN | RESPIRATION RATE: 16 BRPM | TEMPERATURE: 97.6 F | DIASTOLIC BLOOD PRESSURE: 77 MMHG | BODY MASS INDEX: 27.28 KG/M2 | HEART RATE: 66 BPM | OXYGEN SATURATION: 92 %

## 2019-04-12 LAB
ALBUMIN SERPL-MCNC: 0.9 G/DL (ref 3.4–5)
ANION GAP SERPL CALCULATED.3IONS-SCNC: 7 MMOL/L (ref 3–14)
BUN SERPL-MCNC: 46 MG/DL (ref 7–30)
CALCIUM SERPL-MCNC: 7.9 MG/DL (ref 8.5–10.1)
CHLORIDE SERPL-SCNC: 98 MMOL/L (ref 94–109)
CHOLEST SERPL-MCNC: 267 MG/DL
CO2 SERPL-SCNC: 28 MMOL/L (ref 20–32)
COPATH REPORT: NORMAL
CREAT SERPL-MCNC: 1.82 MG/DL (ref 0.66–1.25)
GAMMA INTERFERON BACKGROUND BLD IA-ACNC: 0.09 IU/ML
GFR SERPL CREATININE-BSD FRML MDRD: 44 ML/MIN/{1.73_M2}
GLUCOSE SERPL-MCNC: 117 MG/DL (ref 70–99)
HDLC SERPL-MCNC: 40 MG/DL
LDLC SERPL CALC-MCNC: 192 MG/DL
M TB IFN-G BLD-IMP: NEGATIVE
M TB IFN-G CD4+ BCKGRND COR BLD-ACNC: 8.6 IU/ML
MITOGEN IGNF BCKGRD COR BLD-ACNC: 0 IU/ML
MITOGEN IGNF BCKGRD COR BLD-ACNC: 0.04 IU/ML
NONHDLC SERPL-MCNC: 227 MG/DL
PLATELET # BLD AUTO: 337 10E9/L (ref 150–450)
POTASSIUM SERPL-SCNC: 5 MMOL/L (ref 3.4–5.3)
SODIUM SERPL-SCNC: 133 MMOL/L (ref 133–144)
TRIGL SERPL-MCNC: 175 MG/DL

## 2019-04-12 PROCEDURE — 25000131 ZZH RX MED GY IP 250 OP 636 PS 637: Performed by: PHYSICIAN ASSISTANT

## 2019-04-12 PROCEDURE — 36415 COLL VENOUS BLD VENIPUNCTURE: CPT | Performed by: NURSE PRACTITIONER

## 2019-04-12 PROCEDURE — 25000132 ZZH RX MED GY IP 250 OP 250 PS 637: Performed by: PHYSICIAN ASSISTANT

## 2019-04-12 PROCEDURE — 99207 ZZC APP CREDIT; MD BILLING SHARED VISIT: CPT | Performed by: PHYSICIAN ASSISTANT

## 2019-04-12 PROCEDURE — 80048 BASIC METABOLIC PNL TOTAL CA: CPT | Performed by: PHYSICIAN ASSISTANT

## 2019-04-12 PROCEDURE — 25000132 ZZH RX MED GY IP 250 OP 250 PS 637: Performed by: NURSE PRACTITIONER

## 2019-04-12 PROCEDURE — 85049 AUTOMATED PLATELET COUNT: CPT | Performed by: NURSE PRACTITIONER

## 2019-04-12 PROCEDURE — 36415 COLL VENOUS BLD VENIPUNCTURE: CPT | Performed by: PHYSICIAN ASSISTANT

## 2019-04-12 PROCEDURE — 25000128 H RX IP 250 OP 636: Performed by: NURSE PRACTITIONER

## 2019-04-12 PROCEDURE — 80061 LIPID PANEL: CPT | Performed by: PHYSICIAN ASSISTANT

## 2019-04-12 PROCEDURE — 99239 HOSP IP/OBS DSCHRG MGMT >30: CPT | Performed by: INTERNAL MEDICINE

## 2019-04-12 PROCEDURE — 82040 ASSAY OF SERUM ALBUMIN: CPT | Performed by: PHYSICIAN ASSISTANT

## 2019-04-12 RX ORDER — POLYETHYLENE GLYCOL 3350 17 G/17G
17 POWDER, FOR SOLUTION ORAL DAILY
Status: DISCONTINUED | OUTPATIENT
Start: 2019-04-12 | End: 2019-04-12 | Stop reason: HOSPADM

## 2019-04-12 RX ORDER — BUMETANIDE 1 MG/1
1 TABLET ORAL DAILY
Status: DISCONTINUED | OUTPATIENT
Start: 2019-04-13 | End: 2019-04-12 | Stop reason: HOSPADM

## 2019-04-12 RX ORDER — AMOXICILLIN 250 MG
1 CAPSULE ORAL 2 TIMES DAILY PRN
Qty: 30 TABLET | Refills: 0 | Status: CANCELLED | OUTPATIENT
Start: 2019-04-12

## 2019-04-12 RX ORDER — PREDNISONE 10 MG/1
70 TABLET ORAL DAILY
Qty: 210 TABLET | Refills: 0 | Status: SHIPPED | OUTPATIENT
Start: 2019-04-13 | End: 2019-05-14

## 2019-04-12 RX ORDER — POLYETHYLENE GLYCOL 3350 17 G/17G
17 POWDER, FOR SOLUTION ORAL DAILY
Qty: 72 PACKET | Refills: 0 | Status: CANCELLED | OUTPATIENT
Start: 2019-04-13

## 2019-04-12 RX ORDER — SULFAMETHOXAZOLE/TRIMETHOPRIM 800-160 MG
1 TABLET ORAL
Qty: 24 TABLET | Refills: 0 | Status: SHIPPED | OUTPATIENT
Start: 2019-04-15 | End: 2019-06-03

## 2019-04-12 RX ORDER — BUMETANIDE 1 MG/1
1 TABLET ORAL DAILY
Qty: 60 TABLET | Refills: 0 | Status: SHIPPED | OUTPATIENT
Start: 2019-04-13 | End: 2019-05-20

## 2019-04-12 RX ADMIN — SULFAMETHOXAZOLE AND TRIMETHOPRIM 1 TABLET: 800; 160 TABLET ORAL at 09:06

## 2019-04-12 RX ADMIN — BENZONATATE 100 MG: 100 CAPSULE ORAL at 04:12

## 2019-04-12 RX ADMIN — HEPARIN SODIUM 5000 UNITS: 5000 INJECTION, SOLUTION INTRAVENOUS; SUBCUTANEOUS at 09:05

## 2019-04-12 RX ADMIN — OMEPRAZOLE 20 MG: 20 CAPSULE, DELAYED RELEASE ORAL at 09:07

## 2019-04-12 RX ADMIN — BUMETANIDE 1 MG: 1 TABLET ORAL at 09:06

## 2019-04-12 RX ADMIN — PREDNISONE 70 MG: 20 TABLET ORAL at 09:05

## 2019-04-12 RX ADMIN — POLYETHYLENE GLYCOL 3350 17 G: 17 POWDER, FOR SOLUTION ORAL at 09:05

## 2019-04-12 ASSESSMENT — ACTIVITIES OF DAILY LIVING (ADL)
ADLS_ACUITY_SCORE: 10

## 2019-04-12 ASSESSMENT — MIFFLIN-ST. JEOR: SCORE: 1715.91

## 2019-04-12 NOTE — DISCHARGE SUMMARY
[facility], [place]  Hospitalist Discharge Summary       Date of Admission:  4/9/2019  Date of Discharge:  4/12/2019  Discharging Provider: Aleena Hancock PA-C/ Dr. Saucedo  Discharge Team: Hospitalist Service, Gold 9    Discharge Diagnoses   #Nephrotic syndrome, most c/w minimal change disease w/ MELVA  #Small bowel edema  #Asthma    Follow-ups Needed After Discharge   Follow-up Appointments     Adult [facility] Follow-up and recommended labs and tests      Follow up with primary care provider, Physician No Ref-Primary, within 2   weeks, for hospital follow- up. The following labs/tests are recommended:   BMP.   Follow up with Dr. Scott , at (location with clinic name or city) [facility]   Nephrology, within 2 weeks for continued management of kidney disease.       Appointments on [place] and/or [facility] (with [facility] or [facility]   provider or service). Call [phone] if you haven't heard regarding   these appointments within 7 days of discharge.             Unresulted Labs Ordered in the Past 30 Days of this Admission     Date and Time Order Name Status Description    4/11/2019 1242 Methylmalonic acid In process     4/9/2019 1151 Surgical Path Exam In process       These results will be followed up by Nephrology    Discharge Disposition   Discharged to home  Condition at discharge: Stable    Hospital Course   Deshawn Flood is a 43 year old male with a past medical history of asthma and is admitted 04/09/2019 s/p renal biopsy for failed management of nephrotic syndrome outpatient.      #Nephrotic syndrome, MELVA: Anasarca developed ~3 weeks ago and his diagnosis was confirmed on 4/1 with urine studies. Following with Dr. Scott. Initiated on Lisinopril and Lasix on 4/1, developed a dry cough and stopped taking Lasix on 4/7. S/P renal biopsy 4/9, nephrology advised admission given worsening renal function and anasarca on oral medications. Creat 1.76-->1.41-->1.82. BP and vitals  stable. DDx includes thrombus, amyloidosis, SLE, minimal change, FSGS and membranous nephropathy. 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 Added on lipid profile. On discharge, will continue prednisone 70 mg qday, bactrim MWF for PCP prophylaxis, omeprazole qday, bumex 1 mg qday. Nephrology to schedule f/u in clinic w/ in 2 weeks of discharge for continued management, discussion of final pathology. Nutrition evaluated, assisted with 2G sodium diet on discharge.     #Bloating: CT 03/29 w/ diffuse small bowel enteritis likely infectious or inflammatory in etiology, with associated inflammatory changes of the mesenteric adipose tissues, moderate ascites. Notes abdominal fullness, but no pain. Inflammatory markers negative on 03/29, CRP up to 64 04/09, patient denies any diarrhea, fevers/chills, hematochezia or melena. Folate normal, b12 low limit of normal. DDx includes hypoalbuminemia, infectious vs. Inflammatory enteritis (less likely given absence of diarrhea, abdominal pain, family history). Discussed with GI, felt likely 2/2 edema from hypoalbuminemia given no convincing symptoms to support infx enteritis or IBD. Unable to do para due to significant subcutaneous edema, limited ascites. MMA in process, unable to obtain stool studies as no BMs. Referral to GI as OP placed, has appt in 08/2019.      #History of asthma: Continue home albuterol PRN.    Consultations This Hospital Stay   NEPHROLOGY GENERAL ADULT IP CONSULT  INTERNAL MEDICINE PROCEDURE TEAM ADULT IP CONSULT [facility] - [facility]  NUTRITION SERVICES ADULT IP CONSULT  NUTRITION SERVICES ADULT IP CONSULT    Code Status   Full Code    Time Spent on this Encounter   I, Aleena Hancock, personally saw the patient today and spent greater than 30 minutes discharging this patient.       Aleena Hancock PA-C  [facility], [place]  ______________________________________________________________________    Physical  Exam   Vital Signs: Temp: 97.6  F (36.4  C) Temp src: Oral BP: 114/77 Pulse: 66 Heart Rate: 63 Resp: 16 SpO2: 92 % O2 Device: None (Room air)    Weight: 184 lbs 3.2 oz  GENERAL: Alert and oriented x 3. Sitting comfortably in bed.   HEENT: Anicteric sclera. PERRL. Mucous membranes moist and without lesions.   CV: RRR. S1, S2. No murmurs appreciated.   RESPIRATORY: Effort normal on RA. Lungs CTAB with no wheezing, rales, rhonchi.   GI: Abdomen soft and non distended, bowel sounds present. No tenderness, rebound, guarding.   MUSCULOSKELETAL: No joint swelling or tenderness. Moves all extremities.   NEUROLOGICAL: No focal deficits.   EXTREMITIES: Anasarca.   SKIN: No jaundice. No rashes.          Primary Care Physician   Physician No Ref-Primary    Discharge Orders      Adult [facility] Follow-up and recommended labs and tests    Follow up with primary care provider, Physician No Ref-Primary, within 2 weeks, for hospital follow- up. The following labs/tests are recommended: BMP.   Follow up with Dr. Scott , at (location with clinic name or city) [facility] Nephrology, within 2 weeks for continued management of kidney disease.       Appointments on [place] and/or [facility] (with [facility] or [facility] provider or service). Call [phone] if you haven't heard regarding these appointments within 7 days of discharge.     Activity    Your activity upon discharge: activity as tolerated     When to contact your care team    Call your primary doctor if you have any of the following: temperature greater than 101 or less than 96,  increased shortness of breath, increased swelling, increased pain or inability to tolerate oral intake.    Call your kidney specialist if you have worsening swelling, decreased urine output, or change in urination.     Reason for your hospital stay    Dear Deshawn,    Danuta were hospitalized at [facility] with nephrotic syndrome (kidneys not doing their job of filtering protein  correctly) and treated with steroids, diuretics (bumex). Over your hospitalization your condition improved and today you are ready to be discharged home. You should continue to improve but if you develop fever, shortness of breath, light headedness, chest pain or other medical concerns please seek medical attention.     We are suggesting the following medication changes:  Add prednisone 70 mg daily (this is an anti inflammatory for your kidney disease)  Continue bumex 1 mg daily (reduced from 2 mg/day, this is to help with volume or fluid overload)  Add prilosec daily (this is to prevent ulcers in your GI system in the setting of prednisone/steroid use)  Add bactrim three times weekly (this is to prevent secondary infections in setting of prednisone/steroid use)  Constipation may add to your abdominal bloating. If this continues, please add a bowel regimen (you can take over the counter miralax 17 grams daily and senna colace twice daily) to ensure normal bowel movements.    Please set up an appointment with:  1. Please see your primary care provider in the next 2 weeks to go over the hospitalization, med changes, labs  2. Follow up with Nephrology within 2 weeks for continued management of kidney disease  3. Follow up with GI as OP     It was a pleasure meeting you. Thank you for allowing me and my team the privilege of caring for you today. You are the reason we are here, and I truly hope we provided you with the excellent service you deserve. Please let us know if there is anything else we can do for you so that we can be sure you are leaving completely satisfied with your care experience.     Full Code     Diet    Follow this diet upon discharge: Orders Placed This Encounter      Combination Diet 2 gm NA Diet, ideally high protein, high calorie, low sodium       Significant Results and Procedures   Most Recent 3 CBC's:  Recent Labs   Lab Test 04/12/19  0545 04/10/19  1009 03/29/19  1711 08/02/12  1546   WBC  --   6.2 6.4  --    HGB  --  13.9 17.7 15.2   MCV  --  91 86  --     220 265  --      Most Recent 3 BMP's:  Recent Labs   Lab Test 04/12/19  0545 04/11/19  0544 04/10/19  1009    132* 135   POTASSIUM 5.0 4.4 4.1   CHLORIDE 98 99 102   CO2 28 24 29   BUN 46* 34* 30   CR 1.82* 1.47* 1.41*   ANIONGAP 7 9 5   BASIA 7.9* 7.5* 7.6*   * 156* 90     Most Recent 2 LFT's:  Recent Labs   Lab Test 04/10/19  1009 03/29/19  1430   AST 12 27   ALT 9 19   ALKPHOS 42 67   BILITOTAL 0.7 0.3     Most Recent 3 INR's:  Recent Labs   Lab Test 04/01/19  1450   INR 0.94   ,   Results for orders placed or performed during the hospital encounter of 03/29/19   CT Abdomen Pelvis w Contrast    Narrative    CT ABDOMEN AND PELVIS WITH CONTRAST  3/29/2019 3:50 PM    HISTORY: Abdominal pain, generalized. Patient has bloating feeling and  feels like he cannot eat anything additional due to bloating.    TECHNIQUE: Scans obtained from the diaphragm through the pelvis with  IV contrast, 85 mL- Isovue 370.   Radiation dose for this scan was reduced using automated exposure  control, adjustment of the mA and/or kV according to patient size, or  iterative reconstruction technique.    COMPARISON:  Abdominal x-rays dated 3/29/2019.    FINDINGS: There are small bilateral pleural fluid collection with  adjacent compressive atelectasis in the lung bases. Visualized  mediastinal contents are unremarkable. No aggressive osseous lesions  are seen. There are degenerative changes in the spine.      The gallbladder is contracted and has a mildly enhancing wall. There  is pericholecystic fluid which is likely due to ascites and less  likely due to inflammation of the gallbladder. The liver, pancreas,  spleen, bilateral adrenal glands and bilateral kidneys enhance  normally. No hydronephrosis, nephrolithiasis, hydroureter or ureteral  calculus seen. Urinary bladder is normal in appearance.    There is a moderate amount of ascites. This is also seen in  the  gallbladder. No adenopathy or free air is identified. Aorta is grossly  normal in appearance.    Prostate gland is unremarkable.    Colon is of normal caliber without pericolonic inflammatory change to  suggest acute diverticulitis. Structure likely representing a normal  appendix extends from the colon.    There is what appears to be enhancement and abnormal thickening of the  wall of the small bowel, worse proximally. This could represent  enteritis of an infectious or inflammatory etiology. There is edema in  the adipose tissues of the mesenteric root and throughout the adipose  tissues in the peritoneal cavity. This appears to mostly spare the  pararenal adipose tissues.      Impression    IMPRESSION:  1. Findings are concerning for diffuse small bowel enteritis likely  infectious or inflammatory in etiology, with associated inflammatory  changes of the mesenteric adipose tissues, moderate ascites, small  bilateral probably reactive pleural effusions and with some edema in  the subcutaneous adipose tissues.  2. No significant atherosclerosis is identified. No obvious arterial  blockage is seen in the mesenteric vessels to suggest ischemia.  3. Mild degenerative changes of the spine and hips. No acute fracture  or aggressive osseous lesion.    I discussed the diffuse small bowel abnormality, ascites, and  bilateral pleural fluid collections with Alma Gardner on 3/29/2019 at  approximately 4:00 PM.    DUC ZAVALA MD       Discharge Medications   Discharge Medication List as of 4/12/2019  2:02 PM      START taking these medications    Details   omeprazole (PRILOSEC) 20 MG DR capsule Take 1 capsule (20 mg) by mouth every morning (before breakfast), Disp-60 capsule, R-0, E-Prescribe      predniSONE (DELTASONE) 10 MG tablet Take 70 mg by mouth daily., Disp-210 tablet, R-0, E-PrescribePlease include the quantity of tablets and (strength) per dose in sig.      sulfamethoxazole-trimethoprim (BACTRIM DS/SEPTRA DS)  800-160 MG tablet Take 1 tablet by mouth three times a week Monday, Wednesday, Friday, Disp-24 tablet, R-0, E-Prescribe         CONTINUE these medications which have CHANGED    Details   bumetanide (BUMEX) 1 MG tablet Take 1 tablet (1 mg) by mouth daily, Disp-60 tablet, R-0, E-Prescribe         CONTINUE these medications which have NOT CHANGED    Details   albuterol (PROAIR HFA/PROVENTIL HFA/VENTOLIN HFA) 108 (90 Base) MCG/ACT inhaler Inhale 1-2 puffs into the lungs every 6 hours as needed for shortness of breath / dyspnea or wheezing, Historical         STOP taking these medications       furosemide (LASIX) 20 MG tablet Comments:   Reason for Stopping:             Allergies   Allergies   Allergen Reactions     Bee Venom      swells up     No Known Drug Allergies      Seasonal Allergies        Staff Addendum to Discharge Summary  Date of Service: 4/12/2019  I have seen and examined the patient, reviewed the data and discussed the plan of care with the service team.  I agree with the above documentation.    >30 minutes spent in discharge, including >50% in counseling and coordination of care, medication review and plan of care recommended on follow up. Questions were answered at length with patient including medication counseling and follow-up care.    I saw pt on discharge. 43M with asthma, s/p renal bx for nephrotic syndrome. Continue nutrition, ppi, diuretics. F/u closely with nephrology.     It was our pleasure to care for the patient during this hospitalization. Please do not hesitate to contact me should there be questions regarding the hospital course or discharge plan.      Allan Saucedo MD   of Medicine  [facility]

## 2019-04-12 NOTE — PROGRESS NOTES
Nephrology Progress Note  04/12/2019         Today Recommendations:  - Preliminary kidney biopsy shows minimal change disease, to follow up with final report.   - Continue with prednisone 70 mg PO daily.   - Continue with PPI, PCP prophylaxis with Bactrim, osteoporosis prophylaxis with Ca and vitamin D supplementation.  - Continue with Bumex 1 mg PO daily.   - Negative HepB Abs and Ags, negative Hep C antibodies, TB: Quantiferon is negative.   - Avoid nephrotoxic agents, such as NSAIDs and contrast.   - Strict I/O, daily weight.  - Sodium restriction 2 g per 24 hours.  - Patient to follow up in our clinic next week on Monday.      ASSESSMENT AND RECOMMENDATIONS:   Mr. Deshawn Flood is a 43 year old male with no previous kidney disease, Hx of anemia and asthma who started having edema to lower extrs for the past few weeks, patient found to have severe proteinuria when he was sent to our nephrology clinic and decision was for kidney biopsy that was done on 4/9, patient found to have elevated Crea function, edema, serum albumin of 0.7 so patient was admitted. We were consulted for nephrotic syndrome.     # Nephrotic syndrome:  - Crea baseline~1  - Crea on admission: 1.76  - Crea today: 1.82  - Severe proteinuria, UPCR: 11.3 g/g, Hypoalbuminemia: 0.7, edema.  - Lisinopril was discontinued as outpatient due to cough, No need to start with ARB for now.   - Kidney biopsy that was done on 4/9; Preliminary kidney biopsy shows minimal change disease, to follow up with final report.   -  prednisone 70 mg PO daily.   - GI steroid prophylaxis with PPI, PCP prophylaxis with Bactrim, osteoporosis prophylaxis with Ca and vitamin D supplementation.  - Negative HepB Abs and Ags, negative Hep C antibodies, TB: Quantiferon is negative.   - Avoid nephrotoxic agents, such as NSAIDs and contrast.   - Strict I/O, daily weight.  - Sodium restriction 2 g per 24 hours.     # Blood pressure, Volume status:  - Normotensive.   - Patient has  "edema +2 to lower extrs,   - Continue with Bumex 1 mg PO daily.   - Sodium restriction 2 g per 24 hours.     #Electrolytes abnormalities, Acid-base:  - Na: 133, K: 5.0, Cl: 98, WNL  - BiCarb: 28, no issue.   - Daily monitoring.      # Hemoglobin  - Hgb: 13.9, WNL     #Abdominal discomfort, bloating:  - CT was done in 3/26 showed diffuse small bowel enteritis, infection vs inflammatory but in the setting of absent infection signs and diagnosis of minimal change disease, most likely inflammatory process.   - Expect to improve with steroid and PPI.  - Managed by primary team, may consider GI consult.      Recommendations were communicated to primary team via Note.      Seen and discussed with Dr. Gerber Garcia MD   Nephrology Fellow  [phone]    Interval History :   Nursing and provider notes from last 24 hours reviewed.  In the last 24 hours Deshawn Flood no events overnight. Patient states that his abdominal discomfort is improving and he feels that he is able to eat better. He denies any chest pain, shortness of breath, nausea, vomiting or fever, no flank pain.     Review of Systems:   I reviewed the following systems:  GI: good appetite but not able to eat well due to feeling full quickly. no nausea or vomiting or diarrhea.   Neuro:  no confusion  Constitutional:  no fever or chills  CV: no dyspnea. positive edema.  no chest pain.    Physical Exam:   I/O last 3 completed shifts:  In: 240 [P.O.:240]  Out: 875 [Urine:875]   /80 (BP Location: Right arm)   Pulse 55   Temp 96.1  F (35.6  C) (Oral)   Resp 16   Ht 1.753 m (5' 9\")   Wt 83.6 kg (184 lb 3.2 oz)   SpO2 94%   BMI 27.20 kg/m       GENERAL APPEARANCE: awake  EYES:  no scleral icterus, pupils equal  HENT: mouth without ulcers or lesions  PULM: lungs no to auscultation bilaterally, equal air movement, no clubbing  CV: regular rhythm, normal rate, no rub     -JVD not elevated.     -edema +2   GI: soft, no tender, mildly distended, " bowel sounds are positive  INTEGUMENT: no cyanosis, no rash  NEURO:  no asterixis   Access peripheral IV line.     Labs:   All labs reviewed by me  Electrolytes/Renal -   Recent Labs   Lab Test 04/12/19  0545 04/11/19  0544 04/10/19  1009 04/09/19  1535 04/01/19  1450    132* 135 133 135   POTASSIUM 5.0 4.4 4.1 4.4 3.4   CHLORIDE 98 99 102 99 99   CO2 28 24 29 27 29   BUN 46* 34* 30 34* 15   CR 1.82* 1.47* 1.41* 1.76* 1.15   * 156* 90 145* 79   BASIA 7.9* 7.5* 7.6* 7.6* 8.6   PHOS  --   --   --  4.5 3.8       CBC -   Recent Labs   Lab Test 04/12/19  0545 04/10/19  1009 03/29/19  1711 08/02/12  1546   WBC  --  6.2 6.4  --    HGB  --  13.9 17.7 15.2    220 265  --        LFTs -   Recent Labs   Lab Test 04/12/19  0545 04/10/19  1009 04/09/19  1535  03/29/19  1430   ALKPHOS  --  42  --   --  67   BILITOTAL  --  0.7  --   --  0.3   ALT  --  9  --   --  19   AST  --  12  --   --  27   PROTTOTAL  --  4.2*  --   --  4.5*   ALBUMIN 0.9* 1.3* 0.7*   < > 1.0*    < > = values in this interval not displayed.       Iron Panel - No lab results found.      Imaging:  All imaging studies reviewed by me.     Current Medications:    heparin  5,000 Units Subcutaneous Q12H     omeprazole  20 mg Oral QAM AC     polyethylene glycol  17 g Oral Daily     predniSONE  70 mg Oral Daily     sodium chloride (PF)  3 mL Intracatheter Q8H     sulfamethoxazole-trimethoprim  1 tablet Oral Once per day on Mon Wed Fri       Crys Garcia MD   Nephrology Fellow  Pager: [phone]

## 2019-04-12 NOTE — PLAN OF CARE
Alert and oriented x 4. Vital signs stable. On room air. Denies pain. Denies nausea/emesis. Good oral intake. Voiding, passing gas, had BM. Ambulates independently. I/a Reviewed discharge instruction. Removed iv. Discharged. Pending wheelchair transport to pharmacy and to lobby with plan to meet parents at the hospital main entrance.

## 2019-04-12 NOTE — TELEPHONE ENCOUNTER
Patient called to schedule an appointment for a hospital follow-up or appeared on a report showing that they were recently discharged from the hospital.    [facility]  Discharged date: 4/12/19        Reason for hospital admission:  Nephrotic syndrome  Does patient have future appointment scheduled with provider? Yes CM  Date of future appointment:  4/19/19      This information will be used to help the care team plan for the patients upcoming visit.  The triage RN may determine that a follow up call is necessary and reach out to the patient via the phone number listed in the chart.     Please route this message on routine priority to the Triage RN pool.

## 2019-04-12 NOTE — PLAN OF CARE
0452-2137: VSS, afebrile.Pt denies pain, nausea and SOB. Up independently. Cough controlled by PRN tessalon - given x2. Plan to discharge today. Slept between cares. Continue to monitor w/ POC.

## 2019-04-12 NOTE — PROGRESS NOTES
"CLINICAL NUTRITION SERVICES - ASSESSMENT NOTE     Nutrition Prescription    RECOMMENDATIONS FOR MDs/PROVIDERS TO ORDER:  Recommend arranging RD follow-up at next clinic appointment as able     Malnutrition Status:    Does not meet malnutrition criteria     Recommendations already ordered by Registered Dietitian (RD):  None at this time     Future/Additional Recommendations:  1. Continue 2 gram sodium diet as ordered, and monitor oral intakes. If intakes trend <75% of meals, consider adding ONS to optimize intakes.    2. If expected plan for discharge 4/12 is delayed, monitor for appropriate time to follow-up with diet education during hospitalization.      REASON FOR ASSESSMENT  Deshawn Flood is a/an 43 year old male assessed by the dietitian for Provider Order - \"nephrotic syndrome w/ low serum protein & albumin, assist with improving PO intake, high calorie and high protein foods that are low in sodium\"    NUTRITION HISTORY  Patient is employed as a seasonal truck drive/road  at baseline, and therefore he eats most of his meals outside of the house. He reports that he has a rapid metabolism, and is never able to gain weight. Patient seldom cooks meals for himself at home, but if he does it will be mac and cheese, hamburger helper, or a steak. Pt declines adding salt to foods at baseline, and uses pepper when seasoning foods. See full diet hx below.     Diet History:   Breakfast: 4 egg omelette with 4-6 slices of ham, 4-6 slices of american cheese, chocolate milk with chocolate syrup, 2 slices of toast with butter or peanut butter  Lunch: [employer] sandwich \"double meat grilled footlong sub\" on parmesan bread with extra chavira and Mountain Dew to drink  Dinner: 1 large Pepperoni pizza (usually eats the whole pizza, or saves a slice or two for a bedtime snack), and beer     CURRENT NUTRITION ORDERS  Diet: 2 g Sodium  Intake/Tolerance: Patient reported having a decreased appetite upon admission due " "to having extra fluid built up around his abdominal region, which made him feel full quickly. He reports that this has resolved, and that his appetite is starting to pick back up to his baseline.     LABS  Labs reviewed  Albumin 0.9 (L) --Likely multifactorial with fluid overload, inflammation, and nephrotic syndrome with proteinuria  CRP 64 (H)     MEDICATIONS  Medications reviewed    ANTHROPOMETRICS  Height: 175.3 cm (5' 9\")  Most Recent Weight: 83.6 kg (184 lb 3.2 oz)    IBW: 72.7 kg  BMI: Overweight BMI 25-29.9  Weight History:   -Pt reports UBW is typically around 135-145 lbs. He hasn't weighed himself for 2 years; unsure how much of the weight gain below is fluid vs. True gain.   Wt Readings from Last 10 Encounters:   04/12/19 83.6 kg (184 lb 3.2 oz)   04/01/19 81.6 kg (180 lb)   03/29/19 79.8 kg (176 lb)   03/29/19 (P) 79 kg (174 lb 3.2 oz)   02/07/19 71.2 kg (157 lb)   02/01/19 71.5 kg (157 lb 11.2 oz)   04/10/17 61.2 kg (135 lb)   03/27/17 61.2 kg (135 lb)   03/17/17 61.2 kg (135 lb)   08/03/12 64.8 kg (142 lb 14.4 oz)     Dosing Weight: 75 kg  (adjusted) - based on lowest documented wt so far this adm (82.1 kg on 4/9) and IBW of 72.7 kg     ASSESSED NUTRITION NEEDS  Estimated Energy Needs: 5466-1401  kcals/day (30-35 kcals/kg)  Justification: Increased needs with nephrotic syndrome   Estimated Protein Needs: 75-90 grams protein/day (1 - 1.2 grams of pro/kg)  Justification: Maintenance  Estimated Fluid Needs:  (1 mL/kcal)   Justification: Maintenance and Per provider pending fluid status    PHYSICAL FINDINGS  See malnutrition section below.  3+ moderate LE edema noted    MALNUTRITION  % Intake: Decreased intake does not meet criteria  % Weight Loss: None noted  Subcutaneous Fat Loss: None observed  Muscle Loss: None observed  Fluid Accumulation/Edema: Moderate  Malnutrition Diagnosis: Patient does not meet two of the above criteria necessary for diagnosing malnutrition    NUTRITION DIAGNOSIS  Food- and " nutrition-related knowledge deficit related to recommendations for following a low-sodium diet as evidenced by no previous diet education, and unable to list examples of high sodium foods prior to education.       INTERVENTIONS  Implementation  Nutrition Education: Provided education on recommendations for following a 2,000 mg sodium restriction. Discussed high sodium sources of food within the patient's current diet (ham, cheese, deli meat, pizza), recommended modifications, and lower sodium alternatives. Discussed label reading, grocery shopping tips, and encouraged patient to prepare more fresh meals at home as able. Patient seemed open to discussing recommended modifications, but would benefit from outpatient RD follow-up in future, given that his current diet is so high in sodium. Also discussed nutrition supplements with patient, per PA-C request, due to patient's poor appetite at the beginning of admission.     Goals  1. Patient to consume % of nutritionally adequate meal trays TID, or the equivalent with supplements/snacks.    2. Patient will be able to name 3 examples of high sodium foods if asked.      Monitoring/Evaluation  Progress toward goals will be monitored and evaluated per protocol.    Beatriz Solano, Dietetic Intern    I agree with the above recommendations, goals, and interventions.    Marquise Minaya RD, LD  5C/BMT Dietitian  Pager: [phone]

## 2019-04-13 ENCOUNTER — PATIENT OUTREACH (OUTPATIENT)
Dept: CARE COORDINATION | Facility: CLINIC | Age: 44
End: 2019-04-13

## 2019-04-13 LAB — METHYLMALONATE SERPL-SCNC: 0.21 UMOL/L (ref 0–0.4)

## 2019-04-19 ENCOUNTER — OFFICE VISIT (OUTPATIENT)
Dept: FAMILY MEDICINE | Facility: OTHER | Age: 44
End: 2019-04-19
Payer: COMMERCIAL

## 2019-04-19 VITALS
HEART RATE: 126 BPM | RESPIRATION RATE: 20 BRPM | DIASTOLIC BLOOD PRESSURE: 84 MMHG | WEIGHT: 192.2 LBS | OXYGEN SATURATION: 95 % | SYSTOLIC BLOOD PRESSURE: 118 MMHG | TEMPERATURE: 99 F | BODY MASS INDEX: 28.38 KG/M2

## 2019-04-19 DIAGNOSIS — N04.9 NEPHROTIC SYNDROME: ICD-10-CM

## 2019-04-19 DIAGNOSIS — J45.40 MODERATE PERSISTENT ASTHMA WITHOUT COMPLICATION: Primary | ICD-10-CM

## 2019-04-19 PROCEDURE — 99213 OFFICE O/P EST LOW 20 MIN: CPT | Performed by: INTERNAL MEDICINE

## 2019-04-19 RX ORDER — ALBUTEROL SULFATE 90 UG/1
1-2 AEROSOL, METERED RESPIRATORY (INHALATION) EVERY 6 HOURS PRN
Qty: 8.5 G | Refills: 3 | Status: SHIPPED | OUTPATIENT
Start: 2019-04-19 | End: 2020-03-17

## 2019-04-19 ASSESSMENT — PAIN SCALES - GENERAL: PAINLEVEL: NO PAIN (0)

## 2019-04-19 NOTE — PROGRESS NOTES
SUBJECTIVE:   Deshawn Flood is a 43 year old male who presents to clinic today for the following   health issues:          Hospital Follow-up Visit:    Hospital/Nursing Home/IP Rehab Facility: [facility]  Date of Admission: 4/9/2019  Date of Discharge: 4/12/2019  Reason(s) for Admission: Nephrotic syndrome, most c/w minimal change disease, Small bowel edema              Problems taking medications regularly:  None       Medication changes since discharge: None       Problems adhering to non-medication therapy:  None    Summary of hospitalization:  [facility] discharge summary reviewed  Diagnostic Tests/Treatments reviewed.  Follow up needed: none  Other Healthcare Providers Involved in Patient s Care:         None  Update since discharge: improved.     Post Discharge Medication Reconciliation: discharge medications reconciled, continue medications without change.  Plan of care communicated with patient     Coding guidelines for this visit:  Type of Medical   Decision Making Face-to-Face Visit       within 7 Days of discharge Face-to-Face Visit        within 14 days of discharge   Moderate Complexity [id number]   High Complexity [id number]                  Additional history: as documented    Reviewed  and updated as needed this visit by clinical staff  Tobacco  Allergies  Meds  Med Hx  Surg Hx  Fam Hx  Soc Hx        Reviewed and updated as needed this visit by Provider         Patient Active Problem List   Diagnosis     Loose body of left knee     Left knee pain     Tobacco abuse     History of pneumothorax     CARDIOVASCULAR SCREENING; LDL GOAL LESS THAN 160     Nephrotic syndrome     Anasarca     Past Surgical History:   Procedure Laterality Date     ARTHROSCOPY KNEE  8/3/2012    Procedure: ARTHROSCOPY KNEE;  Left knee Arthroscopy with removal of loose bodies;  Surgeon: Bibi Roberts MD;  Location: [facility] OR     HC REPAIR UMBILICAL GODFREY,<6Y/O,REDUC  1978     IR RENAL  BIOPSY LEFT  4/9/2019     TUBE THORACOSTOMY,ABSC/EMPYEMA/HEMO  3/20/2005    Small chest tube with Heimlich valve.       Social History     Tobacco Use     Smoking status: Former Smoker     Packs/day: 0.10     Years: 15.00     Pack years: 1.50     Types: Cigarettes     Smokeless tobacco: Former User     Types: Chew     Tobacco comment: hopes to quit smoking --hasn't had any since lung went down 1/14/05.   Substance Use Topics     Alcohol use: Yes     Alcohol/week: 10.5 oz     Types: 21 Cans of beer per week     Comment: occ     Family History   Problem Relation Age of Onset     Autoimmune Disease No family hx of          Current Outpatient Medications   Medication Sig Dispense Refill     albuterol (PROAIR HFA/PROVENTIL HFA/VENTOLIN HFA) 108 (90 Base) MCG/ACT inhaler Inhale 1-2 puffs into the lungs every 6 hours as needed for shortness of breath / dyspnea or wheezing 8.5 g 3     bumetanide (BUMEX) 1 MG tablet Take 1 tablet (1 mg) by mouth daily 60 tablet 0     omeprazole (PRILOSEC) 20 MG DR capsule Take 1 capsule (20 mg) by mouth every morning (before breakfast) 60 capsule 0     predniSONE (DELTASONE) 10 MG tablet Take 70 mg by mouth daily. 210 tablet 0     sulfamethoxazole-trimethoprim (BACTRIM DS/SEPTRA DS) 800-160 MG tablet Take 1 tablet by mouth three times a week Monday, Wednesday, Friday 24 tablet 0     Allergies   Allergen Reactions     Bee Venom      swells up     No Known Drug Allergies      Seasonal Allergies      Recent Labs   Lab Test 04/12/19  0545 04/11/19  0544 04/10/19  1009  03/29/19  1711 03/29/19  1430 08/02/12  1546   *  --   --   --   --   --  55   HDL 40  --   --   --   --   --   --    TRIG 175*  --   --   --   --   --   --    ALT  --   --  9  --   --  19  --    CR 1.82* 1.47* 1.41*   < >  --  1.29* 0.99   GFRESTIMATED 44* 57* 60*   < >  --  67 85   GFRESTBLACK 51* 66 69   < >  --  77 >90   POTASSIUM 5.0 4.4 4.1   < >  --  4.1 4.1   TSH  --   --   --   --  2.76  --   --     < > = values in  this interval not displayed.      BP Readings from Last 3 Encounters:   04/19/19 118/84   04/12/19 114/77   04/01/19 136/90    Wt Readings from Last 3 Encounters:   04/19/19 87.2 kg (192 lb 3.2 oz)   04/12/19 83.6 kg (184 lb 3.2 oz)   04/01/19 81.6 kg (180 lb)                    ROS:  CONSTITUTIONAL: NEGATIVE for fever, chills, change in weight  INTEGUMENTARY/SKIN: NEGATIVE for worrisome rashes, moles or lesions  EYES: NEGATIVE for vision changes or irritation  ENT/MOUTH: NEGATIVE for ear, mouth and throat problems  RESP: NEGATIVE for significant cough or SOB  BREAST: NEGATIVE for masses, tenderness or discharge  CV: NEGATIVE for chest pain, palpitations or peripheral edema  GI: NEGATIVE for nausea, abdominal pain, heartburn, or change in bowel habits  : NEGATIVE for frequency, dysuria, or hematuria  MUSCULOSKELETAL: NEGATIVE for significant arthralgias or myalgia  NEURO: NEGATIVE for weakness, dizziness or paresthesias  ENDOCRINE: NEGATIVE for temperature intolerance, skin/hair changes  HEME: NEGATIVE for bleeding problems  PSYCHIATRIC: NEGATIVE for changes in mood or affect    OBJECTIVE:     /84 (BP Location: Right arm, Patient Position: Sitting, Cuff Size: Adult Regular)   Pulse 126   Temp 99  F (37.2  C) (Temporal)   Resp 20   Wt 87.2 kg (192 lb 3.2 oz)   SpO2 95%   BMI 28.38 kg/m    Body mass index is 28.38 kg/m .  GENERAL: healthy, alert and no distress  EYES: Eyes grossly normal to inspection, PERRL and conjunctivae and sclerae normal  HENT: ear canals and TM's normal, nose and mouth without ulcers or lesions  NECK: no adenopathy, no asymmetry, masses, or scars and thyroid normal to palpation  RESP: lungs clear to auscultation - no rales, rhonchi or wheezes  CV: regular rate and rhythm, normal S1 S2, no S3 or S4, no murmur, click or rub, no peripheral edema and peripheral pulses strong  ABDOMEN: soft, nontender, no hepatosplenomegaly, no masses and bowel sounds normal  MS: no gross  musculoskeletal defects noted, no edema  SKIN: no suspicious lesions or rashes  NEURO: Normal strength and tone, mentation intact and speech normal  PSYCH: mentation appears normal, affect normal/bright    Diagnostic Test Results:  none     ASSESSMENT/PLAN:           ICD-10-CM    1. Moderate persistent asthma without complication J45.40 albuterol (PROAIR HFA/PROVENTIL HFA/VENTOLIN HFA) 108 (90 Base) MCG/ACT inhaler   2. Nephrotic syndrome N04.9            Alvarez Yu, [facility]

## 2019-04-22 ENCOUNTER — OFFICE VISIT (OUTPATIENT)
Dept: NEPHROLOGY | Facility: CLINIC | Age: 44
End: 2019-04-22
Payer: COMMERCIAL

## 2019-04-22 VITALS
BODY MASS INDEX: 26.29 KG/M2 | DIASTOLIC BLOOD PRESSURE: 84 MMHG | SYSTOLIC BLOOD PRESSURE: 128 MMHG | WEIGHT: 178 LBS | HEART RATE: 68 BPM

## 2019-04-22 DIAGNOSIS — N05.0 MINIMAL CHANGE DISEASE: ICD-10-CM

## 2019-04-22 DIAGNOSIS — I10 BENIGN ESSENTIAL HYPERTENSION: ICD-10-CM

## 2019-04-22 DIAGNOSIS — N04.9 NEPHROTIC SYNDROME: ICD-10-CM

## 2019-04-22 DIAGNOSIS — E78.5 HYPERLIPIDEMIA LDL GOAL <100: Primary | ICD-10-CM

## 2019-04-22 DIAGNOSIS — N04.9 NEPHROTIC SYNDROME: Primary | ICD-10-CM

## 2019-04-22 DIAGNOSIS — R80.1 PERSISTENT PROTEINURIA: ICD-10-CM

## 2019-04-22 PROCEDURE — 84156 ASSAY OF PROTEIN URINE: CPT | Performed by: INTERNAL MEDICINE

## 2019-04-22 PROCEDURE — 80069 RENAL FUNCTION PANEL: CPT | Performed by: INTERNAL MEDICINE

## 2019-04-22 PROCEDURE — 36415 COLL VENOUS BLD VENIPUNCTURE: CPT | Performed by: INTERNAL MEDICINE

## 2019-04-22 RX ORDER — ATORVASTATIN CALCIUM 20 MG/1
20 TABLET, FILM COATED ORAL DAILY
Qty: 30 TABLET | Refills: 3 | Status: SHIPPED | OUTPATIENT
Start: 2019-04-22 | End: 2019-05-20

## 2019-04-22 NOTE — PROGRESS NOTES
Assessment and Plan:  43 year old male who presents for followup of minimal change disease. He presented for evaluation after presenting to ER where CT scan noted ascites and nonspecific small bowel enteritis, and UA showed proteinuria. He had biopsy which showed minimal change on 4/9/2019    # Renal function- Scr was 1.1 in the past and was1.27 in ER. It was up to 1.6-1.8 at time of biopsy after diuretics were started.  - he was not eating well or hydrating; lisinopril was stopped  - his weight is down and he is feeling better and eating  -  Proteinuria estimated at 7 grams and his albumin is 0.9  -repeat labs today, hope to see improvement in nephrotic syndrome  - on prednisone 70mg daily , will plan for 2-3 months and re-evaluate  - he is following a 2000mg sodium restriction    # Hyperlipidemia- LDL very elevated, start low dose statin-   # Hypertension: mild hypertension, on bumex daily; would like to restart ACE or ARB if Scr stable.    # Not anemic    # Electrolytes:   Check today given bumex    # Mineral Bone Disorder:   Check baseline PTH if creatinine remains elevated.     - OK to work from my perspective    Assessment and plan was discussed with patient and he voiced his understanding and agreement.    Reason for Visit:  Deshawn Flood is a 43 year old male with nephrotic range proteinuria noted and biopsy 4/9/19 shows minimal change disease    HPI:  He is a 43 year old male recently in normal health who presented with nephrotic syndrome. He underwent kidney biopsy 4/9 and it shows minimal change disease. He was admitted to the hospital briefly after biopsy as he was having poor appetite due to bloating, and was diuresed. Also his creatinine was up to 1.7-1.8 which was concerning. Lisinopril was stopped in the hospital.    His weight is usually closer to 150 but was up to 184 lbs. He is now down to 178 lbs.    He has edema on exam   He is trying to eat healthier and is watching sodium intake, restricting  to 2-2.5 grams a day. He has not had pizza since hospitalization. He is not drinking mountain dew, now drinking water. He feels a lot better and is eating better.  He is still on bumex 1 mg  His lipids were done and he has elevated LDL consistent with nephrotic syndrome.    Renal History:   none    ROS:   A comprehensive review of systems was obtained and negative, except as noted in the HPI or PMH.    Active Medical Problems:  Patient Active Problem List   Diagnosis     Loose body of left knee     Left knee pain     Tobacco abuse     History of pneumothorax     CARDIOVASCULAR SCREENING; LDL GOAL LESS THAN 160     Nephrotic syndrome     Anasarca     PMH:   Medical record was reviewed and PMH was discussed with patient and noted below.  Past Medical History:   Diagnosis Date     Anemia      Other spontaneous pneumothorax      SPONTANEOUS PNEUMOTHORAX NEC 3/25/2005     Unspecified asthma(493.90)      PSH:   Past Surgical History:   Procedure Laterality Date     ARTHROSCOPY KNEE  8/3/2012    Procedure: ARTHROSCOPY KNEE;  Left knee Arthroscopy with removal of loose bodies;  Surgeon: Bibi Roberts MD;  Location: [facility]     HC REPAIR UMBILICAL GODFREY,<4Y/O,REDUC  1978     IR RENAL BIOPSY LEFT  4/9/2019     TUBE THORACOSTOMY,ABSC/EMPYEMA/HEMO  3/20/2005    Small chest tube with Heimlich valve.       Family Hx:   Family History   Problem Relation Age of Onset     Autoimmune Disease No family hx of      Personal Hx:   Social History     Tobacco Use     Smoking status: Former Smoker     Packs/day: 0.10     Years: 15.00     Pack years: 1.50     Types: Cigarettes     Smokeless tobacco: Former User     Types: Chew     Tobacco comment: hopes to quit smoking --hasn't had any since lung went down 1/14/05.   Substance Use Topics     Alcohol use: Yes     Alcohol/week: 10.5 oz     Types: 21 Cans of beer per week     Comment: occ       Allergies:  Allergies   Allergen Reactions     Bee Venom      swells up     No Known Drug  Allergies      Seasonal Allergies        Medications:  Current Outpatient Medications   Medication Sig     albuterol (PROAIR HFA/PROVENTIL HFA/VENTOLIN HFA) 108 (90 Base) MCG/ACT inhaler Inhale 1-2 puffs into the lungs every 6 hours as needed for shortness of breath / dyspnea or wheezing     bumetanide (BUMEX) 1 MG tablet Take 1 tablet (1 mg) by mouth daily     omeprazole (PRILOSEC) 20 MG DR capsule Take 1 capsule (20 mg) by mouth every morning (before breakfast)     predniSONE (DELTASONE) 10 MG tablet Take 70 mg by mouth daily.     sulfamethoxazole-trimethoprim (BACTRIM DS/SEPTRA DS) 800-160 MG tablet Take 1 tablet by mouth three times a week Monday, Wednesday, Friday     No current facility-administered medications for this visit.       Vitals:  /84 (BP Location: Right arm)   Pulse 68   Wt 80.7 kg (178 lb)   BMI 26.29 kg/m      Exam:  GENERAL APPEARANCE: alert and no distress, hyperactive  HENT: mouth without ulcers or lesions  LYMPHATICS: no cervical or supraclavicular nodes  RESP: lungs clear to auscultation - no rales, rhonchi or wheezes  CV: regular rhythm, normal rate, no rub, no murmur  EDEMA: 2++ LE edema bilaterally  ABDOMEN: soft, nondistended, nontender, bowel sounds normal  MS: extremities normal - no gross deformities noted, no evidence of inflammation in joints, no muscle tenderness  SKIN: has erythematous maculopapular lesions on chest and back    LABS:   CMP  Recent Labs   Lab Test 04/12/19  0545 04/11/19  0544 04/10/19  1009 04/09/19  1535    132* 135 133   POTASSIUM 5.0 4.4 4.1 4.4   CHLORIDE 98 99 102 99   CO2 28 24 29 27   ANIONGAP 7 9 5 8   * 156* 90 145*   BUN 46* 34* 30 34*   CR 1.82* 1.47* 1.41* 1.76*   GFRESTIMATED 44* 57* 60* 46*   GFRESTBLACK 51* 66 69 53*   BASIA 7.9* 7.5* 7.6* 7.6*     Recent Labs   Lab Test 04/10/19  1009 03/29/19  1430   BILITOTAL 0.7 0.3   ALKPHOS 42 67   ALT 9 19   AST 12 27     CBC  Recent Labs   Lab Test 04/12/19  0545 04/10/19  100  03/29/19  1711 08/02/12  1546   HGB  --  13.9 17.7 15.2   WBC  --  6.2 6.4  --    RBC  --  4.53 5.69  --    HCT  --  41.3 49.0  --    MCV  --  91 86  --    MCH  --  30.7 31.1  --    MCHC  --  33.7 36.1  --    RDW  --  11.3 12.0  --     220 265  --      URINE STUDIES  Recent Labs   Lab Test 04/01/19  1500 03/29/19  1858   COLOR Yellow Yellow   APPEARANCE Clear Clear   URINEGLC Negative Negative   URINEBILI Negative Negative   URINEKETONE Negative Negative   SG 1.015 >1.035*   UBLD Moderate* Moderate*   URINEPH 6.0 6.0   PROTEIN >=300* >499*   UROBILINOGEN 0.2  --    NITRITE Negative Negative   LEUKEST Negative Negative   RBCU 25-50* 7*   WBCU 0 - 5 7*     Recent Labs   Lab Test 04/01/19  1500 03/29/19  1903   UTPG 11.34* 7.80*     PTH  No lab results found.  IRON STUDIES  No lab results found.    IMPRESSION:  1. Findings are concerning for diffuse small bowel enteritis likely  infectious or inflammatory in etiology, with associated inflammatory  changes of the mesenteric adipose tissues, moderate ascites, small  bilateral probably reactive pleural effusions and with some edema in  the subcutaneous adipose tissues.  2. No significant atherosclerosis is identified. No obvious arterial  blockage is seen in the mesenteric vessels to suggest ischemia.  3. Mild degenerative changes of the spine and hips. No acute fracture  or aggressive osseous lesion.  Jenny Scott MD

## 2019-04-22 NOTE — LETTER
[facility]  [address]  [place] [address]  Phone: [phone]  Fax: [phone]    April 22, 2019        Deshawn Flood  [address] [place] [id number]          To whom it may concern:    RE: Deshawn Flood    Patient was seen and treated today at our clinic and missed work.  Patient may return to work  with the following:  No working or lifting restrictions      Please contact me for questions or concerns.      Sincerely,        Jenny Scott MD

## 2019-04-22 NOTE — LETTER
4/22/2019       RE: Deshawn Flood  [address] [address]     Dear Colleague,    Thank you for referring your patient, Deshawn Flood, to the [facility] at [facility]. Please see a copy of my visit note below.    Assessment and Plan:  43 year old male who presents for followup of minimal change disease. He presented for evaluation after presenting to ER where CT scan noted ascites and nonspecific small bowel enteritis, and UA showed proteinuria. He had biopsy which showed minimal change on 4/9/2019    # Renal function- Scr was 1.1 in the past and was1.27 in ER. It was up to 1.6-1.8 at time of biopsy after diuretics were started.  - he was not eating well or hydrating; lisinopril was stopped  - his weight is down and he is feeling better and eating  -  Proteinuria estimated at 7 grams and his albumin is 0.9  -repeat labs today, hope to see improvement in nephrotic syndrome  - on prednisone 70mg daily , will plan for 2-3 months and re-evaluate  - he is following a 2000mg sodium restriction    # Hyperlipidemia- LDL very elevated, start low dose statin-   # Hypertension: mild hypertension, on bumex daily; would like to restart ACE or ARB if Scr stable.    # Not anemic    # Electrolytes:   Check today given bumex    # Mineral Bone Disorder:   Check baseline PTH if creatinine remains elevated.     - OK to work from my perspective    Assessment and plan was discussed with patient and he voiced his understanding and agreement.    Reason for Visit:  Deshawn Flood is a 43 year old male with nephrotic range proteinuria noted and biopsy 4/9/19 shows minimal change disease    HPI:  He is a 43 year old male recently in normal health who presented with nephrotic syndrome. He underwent kidney biopsy 4/9 and it shows minimal change disease. He was admitted to the hospital briefly after biopsy as he was having poor appetite due to bloating, and was diuresed. Also his  creatinine was up to 1.7-1.8 which was concerning. Lisinopril was stopped in the hospital.    His weight is usually closer to 150 but was up to 184 lbs. He is now down to 178 lbs.    He has edema on exam   He is trying to eat healthier and is watching sodium intake, restricting to 2-2.5 grams a day. He has not had pizza since hospitalization. He is not drinking mountain dew, now drinking water. He feels a lot better and is eating better.  He is still on bumex 1 mg  His lipids were done and he has elevated LDL consistent with nephrotic syndrome.    Renal History:   none    ROS:   A comprehensive review of systems was obtained and negative, except as noted in the HPI or PMH.    Active Medical Problems:  Patient Active Problem List   Diagnosis     Loose body of left knee     Left knee pain     Tobacco abuse     History of pneumothorax     CARDIOVASCULAR SCREENING; LDL GOAL LESS THAN 160     Nephrotic syndrome     Anasarca     PMH:   Medical record was reviewed and PMH was discussed with patient and noted below.  Past Medical History:   Diagnosis Date     Anemia      Other spontaneous pneumothorax      SPONTANEOUS PNEUMOTHORAX NEC 3/25/2005     Unspecified asthma(493.90)      PSH:   Past Surgical History:   Procedure Laterality Date     ARTHROSCOPY KNEE  8/3/2012    Procedure: ARTHROSCOPY KNEE;  Left knee Arthroscopy with removal of loose bodies;  Surgeon: Bibi Roberts MD;  Location: [facility] OR     HC REPAIR UMBILICAL GODFREY,<4Y/O,REDUC  1978     IR RENAL BIOPSY LEFT  4/9/2019     TUBE THORACOSTOMY,ABSC/EMPYEMA/HEMO  3/20/2005    Small chest tube with Heimlich valve.       Family Hx:   Family History   Problem Relation Age of Onset     Autoimmune Disease No family hx of      Personal Hx:   Social History     Tobacco Use     Smoking status: Former Smoker     Packs/day: 0.10     Years: 15.00     Pack years: 1.50     Types: Cigarettes     Smokeless tobacco: Former User     Types: Chew     Tobacco comment: hopes to quit  smoking --hasn't had any since lung went down 1/14/05.   Substance Use Topics     Alcohol use: Yes     Alcohol/week: 10.5 oz     Types: 21 Cans of beer per week     Comment: occ       Allergies:  Allergies   Allergen Reactions     Bee Venom      swells up     No Known Drug Allergies      Seasonal Allergies        Medications:  Current Outpatient Medications   Medication Sig     albuterol (PROAIR HFA/PROVENTIL HFA/VENTOLIN HFA) 108 (90 Base) MCG/ACT inhaler Inhale 1-2 puffs into the lungs every 6 hours as needed for shortness of breath / dyspnea or wheezing     bumetanide (BUMEX) 1 MG tablet Take 1 tablet (1 mg) by mouth daily     omeprazole (PRILOSEC) 20 MG DR capsule Take 1 capsule (20 mg) by mouth every morning (before breakfast)     predniSONE (DELTASONE) 10 MG tablet Take 70 mg by mouth daily.     sulfamethoxazole-trimethoprim (BACTRIM DS/SEPTRA DS) 800-160 MG tablet Take 1 tablet by mouth three times a week Monday, Wednesday, Friday     No current facility-administered medications for this visit.       Vitals:  /84 (BP Location: Right arm)   Pulse 68   Wt 80.7 kg (178 lb)   BMI 26.29 kg/m       Exam:  GENERAL APPEARANCE: alert and no distress, hyperactive  HENT: mouth without ulcers or lesions  LYMPHATICS: no cervical or supraclavicular nodes  RESP: lungs clear to auscultation - no rales, rhonchi or wheezes  CV: regular rhythm, normal rate, no rub, no murmur  EDEMA: 2++ LE edema bilaterally  ABDOMEN: soft, nondistended, nontender, bowel sounds normal  MS: extremities normal - no gross deformities noted, no evidence of inflammation in joints, no muscle tenderness  SKIN: has erythematous maculopapular lesions on chest and back    LABS:   CMP  Recent Labs   Lab Test 04/12/19  0545 04/11/19  0544 04/10/19  1009 04/09/19  1535    132* 135 133   POTASSIUM 5.0 4.4 4.1 4.4   CHLORIDE 98 99 102 99   CO2 28 24 29 27   ANIONGAP 7 9 5 8   * 156* 90 145*   BUN 46* 34* 30 34*   CR 1.82* 1.47* 1.41*  1.76*   GFRESTIMATED 44* 57* 60* 46*   GFRESTBLACK 51* 66 69 53*   BASIA 7.9* 7.5* 7.6* 7.6*     Recent Labs   Lab Test 04/10/19  1009 03/29/19  1430   BILITOTAL 0.7 0.3   ALKPHOS 42 67   ALT 9 19   AST 12 27     CBC  Recent Labs   Lab Test 04/12/19  0545 04/10/19  1009 03/29/19  1711 08/02/12  1546   HGB  --  13.9 17.7 15.2   WBC  --  6.2 6.4  --    RBC  --  4.53 5.69  --    HCT  --  41.3 49.0  --    MCV  --  91 86  --    MCH  --  30.7 31.1  --    MCHC  --  33.7 36.1  --    RDW  --  11.3 12.0  --     220 265  --      URINE STUDIES  Recent Labs   Lab Test 04/01/19  1500 03/29/19  1858   COLOR Yellow Yellow   APPEARANCE Clear Clear   URINEGLC Negative Negative   URINEBILI Negative Negative   URINEKETONE Negative Negative   SG 1.015 >1.035*   UBLD Moderate* Moderate*   URINEPH 6.0 6.0   PROTEIN >=300* >499*   UROBILINOGEN 0.2  --    NITRITE Negative Negative   LEUKEST Negative Negative   RBCU 25-50* 7*   WBCU 0 - 5 7*     Recent Labs   Lab Test 04/01/19  1500 03/29/19  1903   UTPG 11.34* 7.80*     PTH  No lab results found.  IRON STUDIES  No lab results found.    IMPRESSION:  1. Findings are concerning for diffuse small bowel enteritis likely  infectious or inflammatory in etiology, with associated inflammatory  changes of the mesenteric adipose tissues, moderate ascites, small  bilateral probably reactive pleural effusions and with some edema in  the subcutaneous adipose tissues.  2. No significant atherosclerosis is identified. No obvious arterial  blockage is seen in the mesenteric vessels to suggest ischemia.  3. Mild degenerative changes of the spine and hips. No acute fracture  or aggressive osseous lesion.  Jenny Scott MD

## 2019-04-23 LAB
ALBUMIN SERPL-MCNC: 1.6 G/DL (ref 3.4–5)
ANION GAP SERPL CALCULATED.3IONS-SCNC: 7 MMOL/L (ref 3–14)
BUN SERPL-MCNC: 32 MG/DL (ref 7–30)
CALCIUM SERPL-MCNC: 7.9 MG/DL (ref 8.5–10.1)
CHLORIDE SERPL-SCNC: 98 MMOL/L (ref 94–109)
CO2 SERPL-SCNC: 27 MMOL/L (ref 20–32)
CREAT SERPL-MCNC: 0.96 MG/DL (ref 0.66–1.25)
CREAT UR-MCNC: 99 MG/DL
GFR SERPL CREATININE-BSD FRML MDRD: >90 ML/MIN/{1.73_M2}
GLUCOSE SERPL-MCNC: 115 MG/DL (ref 70–99)
PHOSPHATE SERPL-MCNC: 3.4 MG/DL (ref 2.5–4.5)
POTASSIUM SERPL-SCNC: 4.1 MMOL/L (ref 3.4–5.3)
PROT UR-MCNC: 6.25 G/L
PROT/CREAT 24H UR: 6.3 G/G CR (ref 0–0.2)
SODIUM SERPL-SCNC: 132 MMOL/L (ref 133–144)

## 2019-04-23 RX ORDER — LISINOPRIL 10 MG/1
10 TABLET ORAL DAILY
Qty: 30 TABLET | Refills: 11 | Status: SHIPPED | OUTPATIENT
Start: 2019-04-23 | End: 2019-05-20

## 2019-04-24 NOTE — TELEPHONE ENCOUNTER
FUTURE VISIT INFORMATION      FUTURE VISIT INFORMATION:    Date: 8/12/19    Time: 8:00am    Location: [facility] [place]  REFERRAL INFORMATION:    Referring provider:  Dr. Jenny Scott    Referring providers clinic:  [facility] [place] [facility]    Reason for visit/diagnosis:  Abnormal abdominal CT scan & abd pain.    NOTES STATUS DETAILS   OFFICE NOTE from referring provider  Internal 4/22/19, 4/1/19   OFFICE NOTE from other specialist   Internal 3/29/19   DISCHARGE SUMMARY FROM HOSPITAL Internal 4/9/19   DISCHARGE REPORT FROM ED Internal 3/29/19   OPERATIVE REPORT  Internal 4/11/19   PFC REPORT N/A    MEDICATION LIST Internal    LABS     FIT/STOOL TESTING N/A    PERTINENT LABS Internal    PATHOLOGY REPORTS RELATED TO DIAGNOSIS Internal 4/9/19   DIAGNOSTIC PROCEDURES     COLONOSCOPY N/A    ENDOSCOPY (EGD) N/A    ERCP N/A    EUS N/A    FLEX SIGMOIDOSCOPY N/A    IMAGING & REPORT      CT, MRI, US, XR Internal PACS

## 2019-05-01 ENCOUNTER — OFFICE VISIT (OUTPATIENT)
Dept: GASTROENTEROLOGY | Facility: CLINIC | Age: 44
End: 2019-05-01
Payer: COMMERCIAL

## 2019-05-01 VITALS
WEIGHT: 148.9 LBS | SYSTOLIC BLOOD PRESSURE: 108 MMHG | HEIGHT: 69 IN | HEART RATE: 73 BPM | DIASTOLIC BLOOD PRESSURE: 65 MMHG | BODY MASS INDEX: 22.05 KG/M2 | OXYGEN SATURATION: 96 %

## 2019-05-01 DIAGNOSIS — N04.9 NEPHROTIC SYNDROME: ICD-10-CM

## 2019-05-01 DIAGNOSIS — K52.9 ENTERITIS: Primary | ICD-10-CM

## 2019-05-01 PROCEDURE — 99213 OFFICE O/P EST LOW 20 MIN: CPT | Performed by: PHYSICIAN ASSISTANT

## 2019-05-01 ASSESSMENT — ENCOUNTER SYMPTOMS
WEAKNESS: 0
NAUSEA: 0
DIARRHEA: 0
PALPITATIONS: 0
SHORTNESS OF BREATH: 0
FEVER: 0
FREQUENCY: 0
DYSURIA: 0
RECTAL PAIN: 0
NERVOUS/ANXIOUS: 0
COUGH: 0
TROUBLE SWALLOWING: 0
ABDOMINAL PAIN: 0
CONSTIPATION: 0
BLOOD IN STOOL: 0

## 2019-05-01 ASSESSMENT — MIFFLIN-ST. JEOR: SCORE: 1554.75

## 2019-05-01 ASSESSMENT — PAIN SCALES - GENERAL: PAINLEVEL: NO PAIN (0)

## 2019-05-01 NOTE — PROGRESS NOTES
GASTROENTEROLOGY NEW PATIENT CLINIC VISIT    CC/REFERRING MD:    Dr. Scott     REASON FOR CONSULTATION:   Abnormal CT scan      HISTORY OF PRESENT ILLNESS:    Deshawn Flood is 44 year old male who presents for evaluation of abnormal CT scan    He was in fairly good health until several weeks ago when he woke up one morning with abdominal pain, swelling bloating and leg swelling.  He recalls trying to eat a bowl of cereal and feeling very full and not able to eat comfortably due to his distended abdomen.  He went to [facility] clinic for further evaluation which consisted of labs, abdominal x-ray and CT of the abdomen pelvis.  He was noted to have small bowel enteritis with associated inflammatory changes of the mesenteric adipose tissues, moderate ascites and small bilateral reactive pleural effusions.  He was sent to the emergency room for immediate evaluation.  He was diagnosed with nephrotic syndrome and acute kidney injury and was treated accordingly.  He is currently under the care of nephrology and is currently on diuretic, prednisone and 2000 mg sodium restriction diet.  When symptoms first developed his weight went up to 193 pounds.  He states his baseline weight for the past 20 to 30 years has been around 135 pounds 245 pounds.  Since being diuresed his weight has returned back down to his baseline.      He was referred to our GI office for concerns of the small bowel enteritis that was noted on initial CT 3/29/2019.  This was thought to be secondary to edema from hypoalbuminemia. Patient has not had any signs of diarrhea, rectal bleeding or black tarry stools.  No rectal pain, tenesmus or nighttime bowel movements.  When symptoms first started he was noting abdominal bloating and abdominal discomfort.  He denies having any abdominal pain.  No nausea or vomiting.  There is no heartburn or reflux symptoms.  Overall he is feeling well at this time.  He is eating well and has a good  appetite.    He denies taking any NSAIDs.  He was previously drinking twenty 12 ounce cans of Mountain Dew daily which she has been doing for years.  He currently drinks about 3 cans of beer a week.  He admits to drinking heavier in the past.  He is a former smoker.  There is no family history of ulcerative colitis or Crohn's disease.         PROBLEM LIST  Patient Active Problem List    Diagnosis Date Noted     Nephrotic syndrome 04/09/2019     Priority: Medium     Anasarca 04/09/2019     Priority: Medium     Loose body of left knee 08/02/2012     Priority: Medium     Left knee pain 08/02/2012     Priority: Medium     Tobacco abuse 08/02/2012     Priority: Medium     History of pneumothorax 08/02/2012     Priority: Medium     spontaneous in 2005       CARDIOVASCULAR SCREENING; LDL GOAL LESS THAN 160 08/02/2012     Priority: Medium       PERTINENT PAST MEDICAL HISTORY:  (I personally reviewed this history with the patient at today's visit)   Past Medical History:   Diagnosis Date     Anemia      Other spontaneous pneumothorax      SPONTANEOUS PNEUMOTHORAX NEC 3/25/2005     Unspecified asthma(493.90)          PREVIOUS SURGERIES: (I personally reviewed this history with the patient at today's visit)   Past Surgical History:   Procedure Laterality Date     ARTHROSCOPY KNEE  8/3/2012    Procedure: ARTHROSCOPY KNEE;  Left knee Arthroscopy with removal of loose bodies;  Surgeon: Bibi Roberts MD;  Location: [facility] OR     HC REPAIR UMBILICAL GODFREY,<4Y/O,REDUC  1978     IR RENAL BIOPSY LEFT  4/9/2019     TUBE THORACOSTOMY,ABSC/EMPYEMA/HEMO  3/20/2005    Small chest tube with Heimlich valve.         ALLERGIES:     Allergies   Allergen Reactions     Bee Venom      swells up     No Known Drug Allergies      Seasonal Allergies        PERTINENT MEDICATIONS:    Current Outpatient Medications:      albuterol (PROAIR HFA/PROVENTIL HFA/VENTOLIN HFA) 108 (90 Base) MCG/ACT inhaler, Inhale 1-2 puffs into the lungs every 6 hours as  needed for shortness of breath / dyspnea or wheezing, Disp: 8.5 g, Rfl: 3     atorvastatin (LIPITOR) 20 MG tablet, Take 1 tablet (20 mg) by mouth daily, Disp: 30 tablet, Rfl: 3     bumetanide (BUMEX) 1 MG tablet, Take 1 tablet (1 mg) by mouth daily, Disp: 60 tablet, Rfl: 0     lisinopril (PRINIVIL/ZESTRIL) 10 MG tablet, Take 1 tablet (10 mg) by mouth daily, Disp: 30 tablet, Rfl: 11     omeprazole (PRILOSEC) 20 MG DR capsule, Take 1 capsule (20 mg) by mouth every morning (before breakfast), Disp: 60 capsule, Rfl: 0     predniSONE (DELTASONE) 10 MG tablet, Take 70 mg by mouth daily., Disp: 210 tablet, Rfl: 0     sulfamethoxazole-trimethoprim (BACTRIM DS/SEPTRA DS) 800-160 MG tablet, Take 1 tablet by mouth three times a week Monday, Wednesday, Friday, Disp: 24 tablet, Rfl: 0    SOCIAL HISTORY:  Social History     Socioeconomic History     Marital status: Single     Spouse name: Not on file     Number of children: Not on file     Years of education: Not on file     Highest education level: Not on file   Occupational History     Not on file   Social Needs     Financial resource strain: Not on file     Food insecurity:     Worry: Not on file     Inability: Not on file     Transportation needs:     Medical: Not on file     Non-medical: Not on file   Tobacco Use     Smoking status: Former Smoker     Packs/day: 0.10     Years: 15.00     Pack years: 1.50     Types: Cigarettes     Smokeless tobacco: Former User     Types: Chew     Tobacco comment: hopes to quit smoking --hasn't had any since lung went down 1/14/05.   Substance and Sexual Activity     Alcohol use: Yes     Alcohol/week: 10.5 oz     Types: 21 Cans of beer per week     Comment: occ     Drug use: No     Sexual activity: Not Currently   Lifestyle     Physical activity:     Days per week: Not on file     Minutes per session: Not on file     Stress: Not on file   Relationships     Social connections:     Talks on phone: Not on file     Gets together: Not on file      "Attends [employer] service: Not on file     Active member of club or organization: Not on file     Attends meetings of clubs or organizations: Not on file     Relationship status: Not on file     Intimate partner violence:     Fear of current or ex partner: Not on file     Emotionally abused: Not on file     Physically abused: Not on file     Forced sexual activity: Not on file   Other Topics Concern     Parent/sibling w/ CABG, MI or angioplasty before 65F 55M? Not Asked   Social History Narrative     Not on file       FAMILY HISTORY: (I personally reviewed this history with the patient at today's visit)  Family History   Problem Relation Age of Onset     Autoimmune Disease No family hx of           Review of Systems   Constitutional: Negative for fever.   HENT: Negative for trouble swallowing.    Respiratory: Negative for cough and shortness of breath.    Cardiovascular: Positive for leg swelling. Negative for chest pain and palpitations.   Gastrointestinal: Negative for abdominal pain, blood in stool, constipation, diarrhea, nausea and rectal pain.   Genitourinary: Negative for dysuria and frequency.   Skin: Negative for pallor and rash.   Neurological: Negative for weakness.   Psychiatric/Behavioral: The patient is not nervous/anxious.        PHYSICAL EXAMINATION:  Constitutional: aaox3, cooperative, pleasant, not dyspneic/diaphoretic, no acute distress  Vitals reviewed: /65 (BP Location: Left arm, Patient Position: Sitting, Cuff Size: Adult Small)   Pulse 73   Ht 1.759 m (5' 9.25\")   Wt 67.5 kg (148 lb 14.4 oz)   SpO2 96%   BMI 21.83 kg/m     Wt:   Wt Readings from Last 2 Encounters:   05/01/19 67.5 kg (148 lb 14.4 oz)   04/22/19 80.7 kg (178 lb)        Physical Exam   Constitutional: He is oriented to person, place, and time. He appears well-developed and well-nourished.   HENT:   Head: Normocephalic and atraumatic.   Mouth/Throat: Abnormal dentition.   Poor dentitian    Eyes: Pupils are equal, round, " and reactive to light. EOM are normal. No scleral icterus.   Cardiovascular: Normal rate, regular rhythm and normal heart sounds.   Pulmonary/Chest: Effort normal and breath sounds normal. No respiratory distress.   Abdominal: Soft. Bowel sounds are normal. He exhibits no mass. There is no tenderness. There is no rebound and no guarding.   Musculoskeletal:        Right lower leg: He exhibits swelling.        Left lower leg: He exhibits swelling.   Neurological: He is alert and oriented to person, place, and time.   Psychiatric: He has a normal mood and affect. His behavior is normal.   Nursing note and vitals reviewed.        PERTINENT STUDIES: (I personally reviewed these laboratory studies today)  Most recent CBC:   Recent Labs   Lab Test 04/12/19  0545 04/10/19  1009 03/29/19  1711   WBC  --  6.2 6.4   HGB  --  13.9 17.7   HCT  --  41.3 49.0    220 265     Most recent hepatic panel:  Recent Labs   Lab Test 04/10/19  1009 03/29/19  1430   ALT 9 19   AST 12 27     Most recent creatinine:  Recent Labs   Lab Test 04/22/19  1622 04/12/19  0545   CR 0.96 1.82*       TSH   Date Value Ref Range Status   03/29/2019 2.76 0.40 - 4.00 mU/L Final         RADIOLOGY:    CT ABDOMEN AND PELVIS WITH CONTRAST  3/29/2019 3:50 PM     HISTORY: Abdominal pain, generalized. Patient has bloating feeling and  feels like he cannot eat anything additional due to bloating.     TECHNIQUE: Scans obtained from the diaphragm through the pelvis with  IV contrast, 85 mL- Isovue 370.   Radiation dose for this scan was reduced using automated exposure  control, adjustment of the mA and/or kV according to patient size, or  iterative reconstruction technique.     COMPARISON:  Abdominal x-rays dated 3/29/2019.     FINDINGS: There are small bilateral pleural fluid collection with  adjacent compressive atelectasis in the lung bases. Visualized  mediastinal contents are unremarkable. No aggressive osseous lesions  are seen. There are degenerative  changes in the spine.       The gallbladder is contracted and has a mildly enhancing wall. There  is pericholecystic fluid which is likely due to ascites and less  likely due to inflammation of the gallbladder. The liver, pancreas,  spleen, bilateral adrenal glands and bilateral kidneys enhance  normally. No hydronephrosis, nephrolithiasis, hydroureter or ureteral  calculus seen. Urinary bladder is normal in appearance.     There is a moderate amount of ascites. This is also seen in the  gallbladder. No adenopathy or free air is identified. Aorta is grossly  normal in appearance.     Prostate gland is unremarkable.     Colon is of normal caliber without pericolonic inflammatory change to  suggest acute diverticulitis. Structure likely representing a normal  appendix extends from the colon.     There is what appears to be enhancement and abnormal thickening of the  wall of the small bowel, worse proximally. This could represent  enteritis of an infectious or inflammatory etiology. There is edema in  the adipose tissues of the mesenteric root and throughout the adipose  tissues in the peritoneal cavity. This appears to mostly spare the  pararenal adipose tissues.                                                                      IMPRESSION:  1. Findings are concerning for diffuse small bowel enteritis likely  infectious or inflammatory in etiology, with associated inflammatory  changes of the mesenteric adipose tissues, moderate ascites, small  bilateral probably reactive pleural effusions and with some edema in  the subcutaneous adipose tissues.  2. No significant atherosclerosis is identified. No obvious arterial  blockage is seen in the mesenteric vessels to suggest ischemia.  3. Mild degenerative changes of the spine and hips. No acute fracture  or aggressive osseous lesion.     I discussed the diffuse small bowel abnormality, ascites, and  bilateral pleural fluid collections with Alma Gardner on 3/29/2019  at  approximately 4:00 PM.     DUC ZAVALA MD      ABDOMEN TWO VIEWS  3/29/2019 2:09 PM      HISTORY:  Abdominal pain, generalized.     COMPARISON: None.     FINDINGS:  There is a mildly nonspecific bowel gas pattern with gas  seen both in large and small bowel. No abnormally dilated gas-filled  loops of bowel to suggest bowel obstruction. The stomach is minimally  distended and gas-filled. No free air is seen under the  hemidiaphragms. No renal or ureteral calculus is identified.  Degenerative changes are partially imaged in the lumbar spine with  disc height loss worst at L2-L3. There are mild degenerative changes  in the hips.                                                                      IMPRESSION:  1. Mildly nonspecific bowel gas pattern without evidence for bowel  obstruction or intraperitoneal free air.  2. Subtle density projected over the lung bases could represent small  pleural fluid collections, although this could be due to the artifact  from the angulation of the image.  3. Etiology for patient's symptoms is not definitely seen.     I discussed these findings with Alma Gardner on 3/29/2018 at 2:25 PM.     DUC ZAVALA MD            ASSESSMENT/PLAN:    Deshawn Flood is a 44 year old male who presents for evaluation of abnormal CT scan.  He was in fairly good health until several weeks ago when he started to have abdominal bloating, swelling and discomfort. He was noted to have small bowel enteritis with associated inflammatory changes of the mesenteric adipose tissues, moderate ascites and small bilateral reactive pleural effusions on CT scan of the abdomen and pelvis.  He was admitted for further work-up and diagnosed with nephrotic syndrome and acute kidney injury and was treated accordingly.  He is currently under the care of nephrology and is currently on diuretic, prednisone and 2000 mg sodium restriction diet.     He was referred to our office for concerns of small bowel enteritis  noted on the CT scan. It was thought that the small bowel enteritis noted on CT scan could be secondary to his hypoalbuminemia. He overall feels well at this time.  He actually denies having any abdominal pain, diarrhea, rectal bleeding, black tarry stools, rectal pain or tenesmus when everything first started several weeks ago.  His findings are not consistent with an underlying inflammatory bowel disease however we can check fecal calprotectin level and repeat CRP once renal issues have improved.      If any new GI symptoms develop we can consider repeat imaging or further imaging with MR enterography. Also need to consider egd/colonoscopy.       Enteritis  - Calprotectin Feces  Nephrotic syndrome    Orders Placed This Encounter   Procedures     Calprotectin Feces         Thank you for this consultation.  It was a pleasure to participate in the care of this patient; please contact us with any further questions.      This note was created with voice recognition software, and while reviewed for accuracy, typos may remain.     Jane Palafox PA-C  Gastroenterology  [facility]

## 2019-05-01 NOTE — Clinical Note
I would be interested to know your thoughts.I think his small bowel enteritis is related to his hypoalbuminemia and nephrotic syndrome. I don't think we have too much to offer unless GI symptoms develop. Not sure if I should proceed with MR enterography anyways or just wait

## 2019-05-01 NOTE — NURSING NOTE
"Deshawn Flood's goals for this visit include:   Chief Complaint   Patient presents with     Consult     Abodminal pain       He requests these members of his care team be copied on today's visit information: Yes    PCP: No Ref-Primary, Physician    Referring Provider:  No referring provider defined for this encounter.    /65 (BP Location: Left arm, Patient Position: Sitting, Cuff Size: Adult Small)   Pulse 73   Ht 1.759 m (5' 9.25\")   Wt 67.5 kg (148 lb 14.4 oz)   SpO2 96%   BMI 21.83 kg/m      Do you need any medication refills at today's visit? No    "

## 2019-05-03 DIAGNOSIS — K52.9 ENTERITIS: ICD-10-CM

## 2019-05-03 PROCEDURE — 83993 ASSAY FOR CALPROTECTIN FECAL: CPT | Performed by: FAMILY MEDICINE

## 2019-05-07 LAB — CALPROTECTIN STL-MCNT: 57 MG/KG (ref 0–49.9)

## 2019-05-14 DIAGNOSIS — N04.9 NEPHROTIC SYNDROME: ICD-10-CM

## 2019-05-14 RX ORDER — PREDNISONE 10 MG/1
70 TABLET ORAL DAILY
Qty: 210 TABLET | Refills: 1 | Status: SHIPPED | OUTPATIENT
Start: 2019-05-14 | End: 2019-05-20

## 2019-05-14 NOTE — TELEPHONE ENCOUNTER
Reviewed with Dr. Scott, patient should continue to take prednisone 70 mg PO daily. Attempted to send in prescription, locally printed. Call to pharmacy for verbal order.  Kami Simmons LPN  Nephrology  [phone]      Call to [facility] pharmacy.spoke to patient regarding prescription.

## 2019-05-20 ENCOUNTER — OFFICE VISIT (OUTPATIENT)
Dept: NEPHROLOGY | Facility: CLINIC | Age: 44
End: 2019-05-20
Payer: COMMERCIAL

## 2019-05-20 VITALS
OXYGEN SATURATION: 97 % | SYSTOLIC BLOOD PRESSURE: 124 MMHG | HEART RATE: 90 BPM | WEIGHT: 154 LBS | DIASTOLIC BLOOD PRESSURE: 78 MMHG | BODY MASS INDEX: 22.58 KG/M2

## 2019-05-20 DIAGNOSIS — E88.09 HYPOALBUMINEMIA: Primary | ICD-10-CM

## 2019-05-20 DIAGNOSIS — N05.0 MINIMAL CHANGE DISEASE: ICD-10-CM

## 2019-05-20 DIAGNOSIS — R80.1 PERSISTENT PROTEINURIA: Primary | ICD-10-CM

## 2019-05-20 DIAGNOSIS — E78.5 HYPERLIPIDEMIA LDL GOAL <100: ICD-10-CM

## 2019-05-20 DIAGNOSIS — N04.9 NEPHROTIC SYNDROME: ICD-10-CM

## 2019-05-20 LAB
ALBUMIN SERPL-MCNC: 3.7 G/DL (ref 3.4–5)
ALP SERPL-CCNC: 61 U/L (ref 40–150)
ALT SERPL W P-5'-P-CCNC: 57 U/L (ref 0–70)
ANION GAP SERPL CALCULATED.3IONS-SCNC: 12 MMOL/L (ref 3–14)
AST SERPL W P-5'-P-CCNC: 19 U/L (ref 0–45)
BILIRUB SERPL-MCNC: 0.4 MG/DL (ref 0.2–1.3)
BUN SERPL-MCNC: 25 MG/DL (ref 7–30)
CALCIUM SERPL-MCNC: 9.6 MG/DL (ref 8.5–10.1)
CHLORIDE SERPL-SCNC: 94 MMOL/L (ref 94–109)
CO2 SERPL-SCNC: 26 MMOL/L (ref 20–32)
CREAT SERPL-MCNC: 1.19 MG/DL (ref 0.66–1.25)
CREAT UR-MCNC: 50 MG/DL
DIFFERENTIAL METHOD BLD: ABNORMAL
ERYTHROCYTE [DISTWIDTH] IN BLOOD BY AUTOMATED COUNT: 12.3 % (ref 10–15)
GFR SERPL CREATININE-BSD FRML MDRD: 73 ML/MIN/{1.73_M2}
GLUCOSE SERPL-MCNC: 162 MG/DL (ref 70–99)
HCT VFR BLD AUTO: 45.1 % (ref 40–53)
HGB BLD-MCNC: 16.1 G/DL (ref 13.3–17.7)
LYMPHOCYTES # BLD AUTO: 0.3 10E9/L (ref 0.8–5.3)
LYMPHOCYTES NFR BLD AUTO: 2 %
MAGNESIUM SERPL-MCNC: 2.3 MG/DL (ref 1.6–2.3)
MCH RBC QN AUTO: 31.8 PG (ref 26.5–33)
MCHC RBC AUTO-ENTMCNC: 35.7 G/DL (ref 31.5–36.5)
MCV RBC AUTO: 89 FL (ref 78–100)
MICROALBUMIN UR-MCNC: <5 MG/L
MICROALBUMIN/CREAT UR: NORMAL MG/G CR (ref 0–17)
NEUTROPHILS # BLD AUTO: 16.4 10E9/L (ref 1.6–8.3)
NEUTROPHILS NFR BLD AUTO: 98 %
PHOSPHATE SERPL-MCNC: 2.6 MG/DL (ref 2.5–4.5)
PLATELET # BLD AUTO: 292 10E9/L (ref 150–450)
PLATELET # BLD EST: ABNORMAL 10*3/UL
POTASSIUM SERPL-SCNC: 4 MMOL/L (ref 3.4–5.3)
PROT SERPL-MCNC: 6.6 G/DL (ref 6.8–8.8)
RBC # BLD AUTO: 5.06 10E12/L (ref 4.4–5.9)
RBC MORPH BLD: NORMAL
SODIUM SERPL-SCNC: 132 MMOL/L (ref 133–144)
URATE SERPL-MCNC: 4.3 MG/DL (ref 3.5–7.2)
WBC # BLD AUTO: 16.7 10E9/L (ref 4–11)

## 2019-05-20 PROCEDURE — 84156 ASSAY OF PROTEIN URINE: CPT | Performed by: INTERNAL MEDICINE

## 2019-05-20 PROCEDURE — 83735 ASSAY OF MAGNESIUM: CPT | Performed by: INTERNAL MEDICINE

## 2019-05-20 PROCEDURE — 84550 ASSAY OF BLOOD/URIC ACID: CPT | Performed by: INTERNAL MEDICINE

## 2019-05-20 PROCEDURE — 85025 COMPLETE CBC W/AUTO DIFF WBC: CPT | Performed by: INTERNAL MEDICINE

## 2019-05-20 PROCEDURE — 84100 ASSAY OF PHOSPHORUS: CPT | Performed by: INTERNAL MEDICINE

## 2019-05-20 PROCEDURE — 82043 UR ALBUMIN QUANTITATIVE: CPT | Performed by: INTERNAL MEDICINE

## 2019-05-20 PROCEDURE — 80053 COMPREHEN METABOLIC PANEL: CPT | Performed by: INTERNAL MEDICINE

## 2019-05-20 PROCEDURE — 36415 COLL VENOUS BLD VENIPUNCTURE: CPT | Performed by: INTERNAL MEDICINE

## 2019-05-20 RX ORDER — BUMETANIDE 0.5 MG/1
0.5 TABLET ORAL DAILY
Qty: 30 TABLET | Refills: 1 | Status: SHIPPED | OUTPATIENT
Start: 2019-05-20 | End: 2019-08-22

## 2019-05-20 RX ORDER — LISINOPRIL 20 MG/1
20 TABLET ORAL DAILY
Qty: 30 TABLET | Refills: 3 | Status: SHIPPED | OUTPATIENT
Start: 2019-05-20 | End: 2019-09-09

## 2019-05-20 RX ORDER — ATORVASTATIN CALCIUM 20 MG/1
20 TABLET, FILM COATED ORAL DAILY
Qty: 30 TABLET | Refills: 3 | Status: SHIPPED | OUTPATIENT
Start: 2019-05-20 | End: 2019-08-14

## 2019-05-20 RX ORDER — PREDNISONE 10 MG/1
70 TABLET ORAL DAILY
Qty: 210 TABLET | Refills: 1 | Status: SHIPPED | OUTPATIENT
Start: 2019-05-20 | End: 2019-08-14

## 2019-05-20 NOTE — LETTER
May 21, 2019       TO: Deshawn Flood  [address] [place] [id number]       Dear Mr. Flood,    We are writing to inform you of your test results. Protein leaking is down to normal, which is great   We changed your medications yesterday, continue the prednisone and see you in a month.      Resulted Orders   Protein  random urine with Creat Ratio   Result Value Ref Range    Protein Random Urine 0.06 g/L    Protein Total Urine g/gr Creatinine 0.13 0 - 0.2 g/g Cr   Albumin Random Urine Quantitative with Creat Ratio   Result Value Ref Range    Creatinine Urine 50 mg/dL    Albumin Urine mg/L <5 mg/L    Albumin Urine mg/g Cr Unable to calculate due to low value 0 - 17 mg/g Cr   Uric acid   Result Value Ref Range    Uric Acid 4.3 3.5 - 7.2 mg/dL   Phosphorus   Result Value Ref Range    Phosphorus 2.6 2.5 - 4.5 mg/dL   Magnesium   Result Value Ref Range    Magnesium 2.3 1.6 - 2.3 mg/dL   Comprehensive metabolic panel (BMP + Alb, Alk Phos, ALT, AST, Total. Bili, TP)   Result Value Ref Range    Sodium 132 (L) 133 - 144 mmol/L    Potassium 4.0 3.4 - 5.3 mmol/L    Chloride 94 94 - 109 mmol/L    Carbon Dioxide 26 20 - 32 mmol/L    Anion Gap 12 3 - 14 mmol/L    Glucose 162 (H) 70 - 99 mg/dL    Urea Nitrogen 25 7 - 30 mg/dL    Creatinine 1.19 0.66 - 1.25 mg/dL    GFR Estimate 73 >60 mL/min/[1.73_m2]      Comment:      Non  GFR Calc  Starting 12/18/2018, serum creatinine based estimated GFR (eGFR) will be   calculated using the Chronic Kidney Disease Epidemiology Collaboration   (CKD-EPI) equation.      GFR Estimate If Black 85 >60 mL/min/[1.73_m2]      Comment:       GFR Calc  Starting 12/18/2018, serum creatinine based estimated GFR (eGFR) will be   calculated using the Chronic Kidney Disease Epidemiology Collaboration   (CKD-EPI) equation.      Calcium 9.6 8.5 - 10.1 mg/dL    Bilirubin Total 0.4 0.2 - 1.3 mg/dL    Albumin 3.7 3.4 - 5.0 g/dL    Protein Total 6.6 (L) 6.8 - 8.8 g/dL     Alkaline Phosphatase 61 40 - 150 U/L    ALT 57 0 - 70 U/L    AST 19 0 - 45 U/L   CBC with platelets and differential   Result Value Ref Range    WBC 16.7 (H) 4.0 - 11.0 10e9/L    RBC Count 5.06 4.4 - 5.9 10e12/L    Hemoglobin 16.1 13.3 - 17.7 g/dL    Hematocrit 45.1 40.0 - 53.0 %    MCV 89 78 - 100 fl    MCH 31.8 26.5 - 33.0 pg    MCHC 35.7 31.5 - 36.5 g/dL    RDW 12.3 10.0 - 15.0 %    Platelet Count 292 150 - 450 10e9/L    % Neutrophils 98.0 %    % Lymphocytes 2.0 %    Absolute Neutrophil 16.4 (H) 1.6 - 8.3 10e9/L    Absolute Lymphocytes 0.3 (L) 0.8 - 5.3 10e9/L    RBC Morphology Normal     Platelet Estimate       Automated count confirmed.  Platelet morphology is normal.    Diff Method Manual Differential                        It was a pleasure to see you at your recent visit. Please contact us at [phone] if you have any questions or concerns. Thank you, we look forward to seeing you at your next visit.       Sincerely,    Jenny Scott MD.   of Medicine  Division of Kidney Disease and Hypertension  [address] [address]

## 2019-05-20 NOTE — PROGRESS NOTES
Assessment and Plan:  44 year old male who presents for followup of minimal change disease. He presented for evaluation after presenting to ER where CT scan noted ascites and nonspecific small bowel enteritis, and UA showed proteinuria. He had biopsy which showed minimal change on 4/9/2019 and now on prednisone 70mg    # Renal function- Scr was 1.1 in the past and was1.27 in ER. It was up to 1.6-1.8 at time of biopsy after diuretics were started.  - he was not eating well or hydrating; lisinopril was stopped  - his weight is down and he is feeling better and eating  -  Proteinuria estimated at 7 grams and his albumin is 0.9  -repeat labs today, hope to see improvement in nephrotic syndrome  - on prednisone 70mg daily , started mid April ;will plan for 2-3 months and re-evaluate  - he is trying to do sodium restriction (really is 3-4000mg restriction typically)    # Hyperlipidemia- LDL very elevated, started low dose statin- repeat in Summer 2019  # Hypertension: mild hypertension, on bumex daily; swelling minimal and his weight is down 30 lbs   - on lisinopril 10mg, will increase to 20mg and lower bumex to 0.5mg daily. He is not monitoring BP at home    # Not anemic    # Electrolytes:   Check today given bumex    # Mineral Bone Disorder:   Check baseline PTH if creatinine remains elevated.     - RTC 1 month    Assessment and plan was discussed with patient and he voiced his understanding and agreement.    Reason for Visit:  Deshawn Flood is a 44 year old male with nephrotic range proteinuria noted and biopsy 4/9/19 shows minimal change disease    HPI:  He is a 44 year old male recently in normal health who presented with nephrotic syndrome. He underwent kidney biopsy 4/9 and it shows minimal change disease. He was admitted to the hospital briefly after biopsy as he was having poor appetite due to bloating, and was diuresed. Also his creatinine was up to 1.7-1.8 which was concerning. Lisinopril was stopped in the  hospital.    His weight is usually closer to 150 but was up to 184 lbs; he was down to 178 lbs.in April at last visit, and today he is at 154 lbs.     He has no edema on exam today  He is trying to eat healthier and is watching sodium intake, restricting to 3-4 grams a day. He is still on bumex 1 mg. He has had some cramps at night  His lipids were done and he has elevated LDL consistent with nephrotic syndrome and he is on lipitor    He is eating very well, back to eating normal for him.  He has good appetite. His swelling is pretty much gone. He is working () and eating on the road.     Renal History:   none    ROS:   A comprehensive review of systems was obtained and negative, except as noted in the HPI or PMH.    Active Medical Problems:  Patient Active Problem List   Diagnosis     Loose body of left knee     Left knee pain     Tobacco abuse     History of pneumothorax     CARDIOVASCULAR SCREENING; LDL GOAL LESS THAN 160     Nephrotic syndrome     Anasarca     PMH:   Medical record was reviewed and PMH was discussed with patient and noted below.  Past Medical History:   Diagnosis Date     Anemia      Other spontaneous pneumothorax      SPONTANEOUS PNEUMOTHORAX NEC 3/25/2005     Unspecified asthma(493.90)      PSH:   Past Surgical History:   Procedure Laterality Date     ARTHROSCOPY KNEE  8/3/2012    Procedure: ARTHROSCOPY KNEE;  Left knee Arthroscopy with removal of loose bodies;  Surgeon: Bibi Roberts MD;  Location: [facility] OR     HC REPAIR UMBILICAL GODFREY,<4Y/O,REDUC  1978     IR RENAL BIOPSY LEFT  4/9/2019     TUBE THORACOSTOMY,ABSC/EMPYEMA/HEMO  3/20/2005    Small chest tube with Heimlich valve.       Family Hx:   Family History   Problem Relation Age of Onset     Autoimmune Disease No family hx of      Personal Hx:   Social History     Tobacco Use     Smoking status: Former Smoker     Packs/day: 0.10     Years: 15.00     Pack years: 1.50     Types: Cigarettes     Smokeless tobacco: Former User      Types: Chew     Tobacco comment: hopes to quit smoking --hasn't had any since lung went down 1/14/05.   Substance Use Topics     Alcohol use: Yes     Alcohol/week: 10.5 oz     Types: 21 Cans of beer per week     Comment: occ       Allergies:  Allergies   Allergen Reactions     Bee Venom      swells up     No Known Drug Allergies      Seasonal Allergies        Medications:  Current Outpatient Medications   Medication Sig     albuterol (PROAIR HFA/PROVENTIL HFA/VENTOLIN HFA) 108 (90 Base) MCG/ACT inhaler Inhale 1-2 puffs into the lungs every 6 hours as needed for shortness of breath / dyspnea or wheezing     atorvastatin (LIPITOR) 20 MG tablet Take 1 tablet (20 mg) by mouth daily     bumetanide (BUMEX) 1 MG tablet Take 1 tablet (1 mg) by mouth daily     lisinopril (PRINIVIL/ZESTRIL) 10 MG tablet Take 1 tablet (10 mg) by mouth daily     omeprazole (PRILOSEC) 20 MG DR capsule Take 1 capsule (20 mg) by mouth every morning (before breakfast)     predniSONE (DELTASONE) 10 MG tablet Take 70 mg by mouth daily.     sulfamethoxazole-trimethoprim (BACTRIM DS/SEPTRA DS) 800-160 MG tablet Take 1 tablet by mouth three times a week Monday, Wednesday, Friday     No current facility-administered medications for this visit.       Vitals:  /78   Pulse 90   Wt 69.9 kg (154 lb)   SpO2 97%   BMI 22.58 kg/m      Exam:  GENERAL APPEARANCE: alert and no distress, hyperactive  HENT: mouth without ulcers or lesions  LYMPHATICS: no cervical or supraclavicular nodes  RESP: lungs clear to auscultation - no rales, rhonchi or wheezes  CV: regular rhythm, normal rate, no rub, no murmur  EDEMA: 2++ LE edema bilaterally  ABDOMEN: soft, nondistended, nontender, bowel sounds normal  MS: extremities normal - no gross deformities noted, no evidence of inflammation in joints, no muscle tenderness  SKIN: has erythematous maculopapular lesions on chest and back    LABS:   CMP  Recent Labs   Lab Test 04/22/19  1622 04/12/19  0545 04/11/19  0544  04/10/19  1009   * 133 132* 135   POTASSIUM 4.1 5.0 4.4 4.1   CHLORIDE 98 98 99 102   CO2 27 28 24 29   ANIONGAP 7 7 9 5   * 117* 156* 90   BUN 32* 46* 34* 30   CR 0.96 1.82* 1.47* 1.41*   GFRESTIMATED >90 44* 57* 60*   GFRESTBLACK >90 51* 66 69   BASIA 7.9* 7.9* 7.5* 7.6*     Recent Labs   Lab Test 04/10/19  1009 03/29/19  1430   BILITOTAL 0.7 0.3   ALKPHOS 42 67   ALT 9 19   AST 12 27     CBC  Recent Labs   Lab Test 04/12/19  0545 04/10/19  1009 03/29/19  1711 08/02/12  1546   HGB  --  13.9 17.7 15.2   WBC  --  6.2 6.4  --    RBC  --  4.53 5.69  --    HCT  --  41.3 49.0  --    MCV  --  91 86  --    MCH  --  30.7 31.1  --    MCHC  --  33.7 36.1  --    RDW  --  11.3 12.0  --     220 265  --      URINE STUDIES  Recent Labs   Lab Test 04/01/19  1500 03/29/19  1858   COLOR Yellow Yellow   APPEARANCE Clear Clear   URINEGLC Negative Negative   URINEBILI Negative Negative   URINEKETONE Negative Negative   SG 1.015 >1.035*   UBLD Moderate* Moderate*   URINEPH 6.0 6.0   PROTEIN >=300* >499*   UROBILINOGEN 0.2  --    NITRITE Negative Negative   LEUKEST Negative Negative   RBCU 25-50* 7*   WBCU 0 - 5 7*     Recent Labs   Lab Test 04/22/19  1622 04/01/19  1500 03/29/19  1903   UTPG 6.30* 11.34* 7.80*     PTH  No lab results found.  IRON STUDIES  No lab results found.    IMPRESSION:  1. Findings are concerning for diffuse small bowel enteritis likely  infectious or inflammatory in etiology, with associated inflammatory  changes of the mesenteric adipose tissues, moderate ascites, small  bilateral probably reactive pleural effusions and with some edema in  the subcutaneous adipose tissues.  2. No significant atherosclerosis is identified. No obvious arterial  blockage is seen in the mesenteric vessels to suggest ischemia.  3. Mild degenerative changes of the spine and hips. No acute fracture  or aggressive osseous lesion.  Jenny Scott MD

## 2019-05-20 NOTE — LETTER
5/20/2019      RE: Deshawn Flood  [address] [id number]       Assessment and Plan:  44 year old male who presents for followup of minimal change disease. He presented for evaluation after presenting to ER where CT scan noted ascites and nonspecific small bowel enteritis, and UA showed proteinuria. He had biopsy which showed minimal change on 4/9/2019 and now on prednisone 70mg    # Renal function- Scr was 1.1 in the past and was1.27 in ER. It was up to 1.6-1.8 at time of biopsy after diuretics were started.  - he was not eating well or hydrating; lisinopril was stopped  - his weight is down and he is feeling better and eating  -  Proteinuria estimated at 7 grams and his albumin is 0.9  -repeat labs today, hope to see improvement in nephrotic syndrome  - on prednisone 70mg daily , started mid April ;will plan for 2-3 months and re-evaluate  - he is trying to do sodium restriction (really is 3-4000mg restriction typically)    # Hyperlipidemia- LDL very elevated, started low dose statin- repeat in Summer 2019  # Hypertension: mild hypertension, on bumex daily; swelling minimal and his weight is down 30 lbs   - on lisinopril 10mg, will increase to 20mg and lower bumex to 0.5mg daily. He is not monitoring BP at home    # Not anemic    # Electrolytes:   Check today given bumex    # Mineral Bone Disorder:   Check baseline PTH if creatinine remains elevated.     - RTC 1 month    Assessment and plan was discussed with patient and he voiced his understanding and agreement.    Reason for Visit:  Dehsawn Flood is a 44 year old male with nephrotic range proteinuria noted and biopsy 4/9/19 shows minimal change disease    HPI:  He is a 44 year old male recently in normal health who presented with nephrotic syndrome. He underwent kidney biopsy 4/9 and it shows minimal change disease. He was admitted to the hospital briefly after biopsy as he was having poor appetite due to bloating, and was diuresed. Also his  creatinine was up to 1.7-1.8 which was concerning. Lisinopril was stopped in the hospital.    His weight is usually closer to 150 but was up to 184 lbs; he was down to 178 lbs.in April at last visit, and today he is at 154 lbs.     He has no edema on exam today  He is trying to eat healthier and is watching sodium intake, restricting to 3-4 grams a day. He is still on bumex 1 mg. He has had some cramps at night  His lipids were done and he has elevated LDL consistent with nephrotic syndrome and he is on lipitor    He is eating very well, back to eating normal for him.  He has good appetite. His swelling is pretty much gone. He is working () and eating on the road.     Renal History:   none    ROS:   A comprehensive review of systems was obtained and negative, except as noted in the HPI or PMH.    Active Medical Problems:  Patient Active Problem List   Diagnosis     Loose body of left knee     Left knee pain     Tobacco abuse     History of pneumothorax     CARDIOVASCULAR SCREENING; LDL GOAL LESS THAN 160     Nephrotic syndrome     Anasarca     PMH:   Medical record was reviewed and PMH was discussed with patient and noted below.  Past Medical History:   Diagnosis Date     Anemia      Other spontaneous pneumothorax      SPONTANEOUS PNEUMOTHORAX NEC 3/25/2005     Unspecified asthma(493.90)      PSH:   Past Surgical History:   Procedure Laterality Date     ARTHROSCOPY KNEE  8/3/2012    Procedure: ARTHROSCOPY KNEE;  Left knee Arthroscopy with removal of loose bodies;  Surgeon: Bibi Roberts MD;  Location: [facility]     HC REPAIR UMBILICAL GODFREY,<6Y/O,REDUC  1978     IR RENAL BIOPSY LEFT  4/9/2019     TUBE THORACOSTOMY,ABSC/EMPYEMA/HEMO  3/20/2005    Small chest tube with Heimlich valve.       Family Hx:   Family History   Problem Relation Age of Onset     Autoimmune Disease No family hx of      Personal Hx:   Social History     Tobacco Use     Smoking status: Former Smoker     Packs/day: 0.10     Years:  15.00     Pack years: 1.50     Types: Cigarettes     Smokeless tobacco: Former User     Types: Chew     Tobacco comment: hopes to quit smoking --hasn't had any since lung went down 1/14/05.   Substance Use Topics     Alcohol use: Yes     Alcohol/week: 10.5 oz     Types: 21 Cans of beer per week     Comment: occ       Allergies:  Allergies   Allergen Reactions     Bee Venom      swells up     No Known Drug Allergies      Seasonal Allergies        Medications:  Current Outpatient Medications   Medication Sig     albuterol (PROAIR HFA/PROVENTIL HFA/VENTOLIN HFA) 108 (90 Base) MCG/ACT inhaler Inhale 1-2 puffs into the lungs every 6 hours as needed for shortness of breath / dyspnea or wheezing     atorvastatin (LIPITOR) 20 MG tablet Take 1 tablet (20 mg) by mouth daily     bumetanide (BUMEX) 1 MG tablet Take 1 tablet (1 mg) by mouth daily     lisinopril (PRINIVIL/ZESTRIL) 10 MG tablet Take 1 tablet (10 mg) by mouth daily     omeprazole (PRILOSEC) 20 MG DR capsule Take 1 capsule (20 mg) by mouth every morning (before breakfast)     predniSONE (DELTASONE) 10 MG tablet Take 70 mg by mouth daily.     sulfamethoxazole-trimethoprim (BACTRIM DS/SEPTRA DS) 800-160 MG tablet Take 1 tablet by mouth three times a week Monday, Wednesday, Friday     No current facility-administered medications for this visit.       Vitals:  /78   Pulse 90   Wt 69.9 kg (154 lb)   SpO2 97%   BMI 22.58 kg/m       Exam:  GENERAL APPEARANCE: alert and no distress, hyperactive  HENT: mouth without ulcers or lesions  LYMPHATICS: no cervical or supraclavicular nodes  RESP: lungs clear to auscultation - no rales, rhonchi or wheezes  CV: regular rhythm, normal rate, no rub, no murmur  EDEMA: 2++ LE edema bilaterally  ABDOMEN: soft, nondistended, nontender, bowel sounds normal  MS: extremities normal - no gross deformities noted, no evidence of inflammation in joints, no muscle tenderness  SKIN: has erythematous maculopapular lesions on chest and  back    LABS:   CMP  Recent Labs   Lab Test 04/22/19  1622 04/12/19  0545 04/11/19  0544 04/10/19  1009   * 133 132* 135   POTASSIUM 4.1 5.0 4.4 4.1   CHLORIDE 98 98 99 102   CO2 27 28 24 29   ANIONGAP 7 7 9 5   * 117* 156* 90   BUN 32* 46* 34* 30   CR 0.96 1.82* 1.47* 1.41*   GFRESTIMATED >90 44* 57* 60*   GFRESTBLACK >90 51* 66 69   BASIA 7.9* 7.9* 7.5* 7.6*     Recent Labs   Lab Test 04/10/19  1009 03/29/19  1430   BILITOTAL 0.7 0.3   ALKPHOS 42 67   ALT 9 19   AST 12 27     CBC  Recent Labs   Lab Test 04/12/19  0545 04/10/19  1009 03/29/19  1711 08/02/12  1546   HGB  --  13.9 17.7 15.2   WBC  --  6.2 6.4  --    RBC  --  4.53 5.69  --    HCT  --  41.3 49.0  --    MCV  --  91 86  --    MCH  --  30.7 31.1  --    MCHC  --  33.7 36.1  --    RDW  --  11.3 12.0  --     220 265  --      URINE STUDIES  Recent Labs   Lab Test 04/01/19  1500 03/29/19  1858   COLOR Yellow Yellow   APPEARANCE Clear Clear   URINEGLC Negative Negative   URINEBILI Negative Negative   URINEKETONE Negative Negative   SG 1.015 >1.035*   UBLD Moderate* Moderate*   URINEPH 6.0 6.0   PROTEIN >=300* >499*   UROBILINOGEN 0.2  --    NITRITE Negative Negative   LEUKEST Negative Negative   RBCU 25-50* 7*   WBCU 0 - 5 7*     Recent Labs   Lab Test 04/22/19  1622 04/01/19  1500 03/29/19  1903   UTPG 6.30* 11.34* 7.80*     PTH  No lab results found.  IRON STUDIES  No lab results found.    IMPRESSION:  1. Findings are concerning for diffuse small bowel enteritis likely  infectious or inflammatory in etiology, with associated inflammatory  changes of the mesenteric adipose tissues, moderate ascites, small  bilateral probably reactive pleural effusions and with some edema in  the subcutaneous adipose tissues.  2. No significant atherosclerosis is identified. No obvious arterial  blockage is seen in the mesenteric vessels to suggest ischemia.  3. Mild degenerative changes of the spine and hips. No acute fracture  or aggressive osseous  lesion.  Jenny Aram Scott MD

## 2019-05-21 LAB
PROT UR-MCNC: 0.06 G/L
PROT/CREAT 24H UR: 0.13 G/G CR (ref 0–0.2)

## 2019-06-03 DIAGNOSIS — N04.9 NEPHROTIC SYNDROME: ICD-10-CM

## 2019-06-03 RX ORDER — SULFAMETHOXAZOLE/TRIMETHOPRIM 800-160 MG
1 TABLET ORAL
Qty: 24 TABLET | Refills: 0 | Status: SHIPPED | OUTPATIENT
Start: 2019-06-03 | End: 2019-08-02

## 2019-07-09 ENCOUNTER — OFFICE VISIT (OUTPATIENT)
Dept: GASTROENTEROLOGY | Facility: CLINIC | Age: 44
End: 2019-07-09
Payer: COMMERCIAL

## 2019-07-09 VITALS
HEIGHT: 69 IN | BODY MASS INDEX: 25.15 KG/M2 | HEART RATE: 109 BPM | WEIGHT: 169.8 LBS | OXYGEN SATURATION: 95 % | SYSTOLIC BLOOD PRESSURE: 117 MMHG | DIASTOLIC BLOOD PRESSURE: 70 MMHG

## 2019-07-09 DIAGNOSIS — K52.9 ENTERITIS: ICD-10-CM

## 2019-07-09 DIAGNOSIS — N04.9 NEPHROTIC SYNDROME: ICD-10-CM

## 2019-07-09 DIAGNOSIS — R93.89 ABNORMAL FINDING ON CT SCAN: Primary | ICD-10-CM

## 2019-07-09 DIAGNOSIS — R19.5 FECES CONTENTS ABNORMAL: ICD-10-CM

## 2019-07-09 PROCEDURE — 99214 OFFICE O/P EST MOD 30 MIN: CPT | Performed by: PHYSICIAN ASSISTANT

## 2019-07-09 ASSESSMENT — ENCOUNTER SYMPTOMS
TROUBLE SWALLOWING: 0
SHORTNESS OF BREATH: 0
FEVER: 0
DIARRHEA: 0
PALPITATIONS: 0
RECTAL PAIN: 0
NAUSEA: 0
BLOOD IN STOOL: 0
DYSURIA: 0
FREQUENCY: 0
ABDOMINAL PAIN: 0
NERVOUS/ANXIOUS: 0
COUGH: 0
CONSTIPATION: 0
WEAKNESS: 0

## 2019-07-09 ASSESSMENT — MIFFLIN-ST. JEOR: SCORE: 1649.55

## 2019-07-09 ASSESSMENT — PAIN SCALES - GENERAL: PAINLEVEL: NO PAIN (0)

## 2019-07-09 NOTE — PROGRESS NOTES
GASTROENTEROLOGY FOLLOW UP CLINIC VISIT    CC/REFERRING MD:    Dr. Scott     REASON FOR CONSULTATION:   Abnormal CT scan      HISTORY OF PRESENT ILLNESS:    Deshawn Flood is 44 year old male who presents for follow up of abnormal CT scan    He was doing well until he woke up one morning in April 2019 with abdominal pain, swelling bloating and leg swelling.  He recalls trying to eat a bowl of cereal and feeling very full and not able to eat comfortably due to his distended abdomen.  He went to [facility] clinic for further evaluation which consisted of labs, abdominal x-ray and CT of the abdomen pelvis.  He was noted to have small bowel enteritis with associated inflammatory changes of the mesenteric adipose tissues, moderate ascites and small bilateral reactive pleural effusions.  He was sent to the emergency room for immediate evaluation.  He was diagnosed with nephrotic syndrome and acute kidney injury and was treated accordingly.  He is currently under the care of nephrology and is currently on diuretic, prednisone and 2000 mg sodium restriction diet.  When symptoms first developed his weight went up to 193 pounds.  He states his baseline weight for the past 20 to 30 years has been around 135 pounds 245 pounds.  Since being diuresed his weight has returned back down to his baseline.    He was referred to our GI office for concerns of the small bowel enteritis that was noted on initial CT 3/29/2019.  This was thought to be secondary to edema from hypoalbuminemia. Today, he does not report any abdominal pain, diarrhea, rectal bleeding or black tarry stools.  He denies any rectal pain, tenesmus or nighttime bowel movements.  He does not have any heartburn or reflux. no nausea or vomiting. He continues to feel well at this time without GI complaints. We checked a fecal calprotectin level at his initial office visit which was borderline elevated. He is currently on prednisone for nephrotic  syndrome. Denies NSAID use. He has cut back on mountain dew even more and is down to only 2 cans a day.       PROBLEM LIST  Patient Active Problem List    Diagnosis Date Noted     Nephrotic syndrome 04/09/2019     Priority: Medium     Anasarca 04/09/2019     Priority: Medium     Loose body of left knee 08/02/2012     Priority: Medium     Left knee pain 08/02/2012     Priority: Medium     Tobacco abuse 08/02/2012     Priority: Medium     History of pneumothorax 08/02/2012     Priority: Medium     spontaneous in 2005       CARDIOVASCULAR SCREENING; LDL GOAL LESS THAN 160 08/02/2012     Priority: Medium       PERTINENT PAST MEDICAL HISTORY:  (I personally reviewed this history with the patient at today's visit)   Past Medical History:   Diagnosis Date     Anemia      Other spontaneous pneumothorax      SPONTANEOUS PNEUMOTHORAX NEC 3/25/2005     Unspecified asthma(493.90)          PREVIOUS SURGERIES: (I personally reviewed this history with the patient at today's visit)   Past Surgical History:   Procedure Laterality Date     ARTHROSCOPY KNEE  8/3/2012    Procedure: ARTHROSCOPY KNEE;  Left knee Arthroscopy with removal of loose bodies;  Surgeon: Bibi Roberts MD;  Location: [facility] OR     HC REPAIR UMBILICAL GODFREY,<4Y/O,REDUC  1978     IR RENAL BIOPSY LEFT  4/9/2019     TUBE THORACOSTOMY,ABSC/EMPYEMA/HEMO  3/20/2005    Small chest tube with Heimlich valve.         ALLERGIES:     Allergies   Allergen Reactions     Bee Venom      swells up     No Known Drug Allergies      Seasonal Allergies        PERTINENT MEDICATIONS:    Current Outpatient Medications:      albuterol (PROAIR HFA/PROVENTIL HFA/VENTOLIN HFA) 108 (90 Base) MCG/ACT inhaler, Inhale 1-2 puffs into the lungs every 6 hours as needed for shortness of breath / dyspnea or wheezing, Disp: 8.5 g, Rfl: 3     atorvastatin (LIPITOR) 20 MG tablet, Take 1 tablet (20 mg) by mouth daily, Disp: 30 tablet, Rfl: 3     bumetanide (BUMEX) 0.5 MG tablet, Take 1 tablet (0.5 mg)  by mouth daily, Disp: 30 tablet, Rfl: 1     lisinopril (PRINIVIL/ZESTRIL) 20 MG tablet, Take 1 tablet (20 mg) by mouth daily, Disp: 30 tablet, Rfl: 3     omeprazole (PRILOSEC) 20 MG DR capsule, Take 1 capsule (20 mg) by mouth every morning (before breakfast), Disp: 60 capsule, Rfl: 0     predniSONE (DELTASONE) 10 MG tablet, Take 70 mg by mouth daily., Disp: 210 tablet, Rfl: 1     sulfamethoxazole-trimethoprim (BACTRIM DS/SEPTRA DS) 800-160 MG tablet, Take 1 tablet by mouth three times a week Monday, Wednesday, Friday, Disp: 24 tablet, Rfl: 0    SOCIAL HISTORY:  Social History     Socioeconomic History     Marital status: Single     Spouse name: Not on file     Number of children: Not on file     Years of education: Not on file     Highest education level: Not on file   Occupational History     Not on file   Social Needs     Financial resource strain: Not on file     Food insecurity:     Worry: Not on file     Inability: Not on file     Transportation needs:     Medical: Not on file     Non-medical: Not on file   Tobacco Use     Smoking status: Current Every Day Smoker     Packs/day: 0.10     Years: 15.00     Pack years: 1.50     Types: Cigarettes     Smokeless tobacco: Former User     Types: Chew     Tobacco comment: hopes to quit smoking --hasn't had any since lung went down 1/14/05.   Substance and Sexual Activity     Alcohol use: Yes     Alcohol/week: 10.5 oz     Types: 21 Cans of beer per week     Comment: occ     Drug use: No     Sexual activity: Not Currently   Lifestyle     Physical activity:     Days per week: Not on file     Minutes per session: Not on file     Stress: Not on file   Relationships     Social connections:     Talks on phone: Not on file     Gets together: Not on file     Attends L[employer] day service: Not on file     Active member of club or organization: Not on file     Attends meetings of clubs or organizations: Not on file     Relationship status: Not on file     Intimate partner violence:      "Fear of current or ex partner: Not on file     Emotionally abused: Not on file     Physically abused: Not on file     Forced sexual activity: Not on file   Other Topics Concern     Parent/sibling w/ CABG, MI or angioplasty before 65F 55M? Not Asked   Social History Narrative     Not on file       FAMILY HISTORY: (I personally reviewed this history with the patient at today's visit)  Family History   Problem Relation Age of Onset     Autoimmune Disease No family hx of           Review of Systems   Constitutional: Negative for fever.   HENT: Negative for trouble swallowing.    Respiratory: Negative for cough and shortness of breath.    Cardiovascular: Negative for chest pain and palpitations.   Gastrointestinal: Negative for abdominal pain, blood in stool, constipation, diarrhea, nausea and rectal pain.   Genitourinary: Negative for dysuria and frequency.   Skin: Negative for pallor and rash.   Neurological: Negative for weakness.   Psychiatric/Behavioral: The patient is not nervous/anxious.        PHYSICAL EXAMINATION:  Constitutional: aaox3, cooperative, pleasant, not dyspneic/diaphoretic, no acute distress  Vitals reviewed: /70 (BP Location: Left arm, Patient Position: Sitting, Cuff Size: Adult Regular)   Pulse 109   Ht 1.759 m (5' 9.25\")   Wt 77 kg (169 lb 12.8 oz)   SpO2 95%   BMI 24.89 kg/m     Wt:   Wt Readings from Last 2 Encounters:   07/09/19 77 kg (169 lb 12.8 oz)   05/20/19 69.9 kg (154 lb)        Physical Exam   Constitutional: He is oriented to person, place, and time. He appears well-developed and well-nourished.   HENT:   Head: Normocephalic and atraumatic.   Mouth/Throat: Abnormal dentition.   Poor dentitian    Eyes: Pupils are equal, round, and reactive to light. EOM are normal. No scleral icterus.   Cardiovascular: Normal rate, regular rhythm and normal heart sounds.   Pulmonary/Chest: Effort normal and breath sounds normal. No respiratory distress.   Abdominal: Soft. Bowel sounds are " normal. He exhibits no mass. There is no tenderness. There is no rebound and no guarding.   Neurological: He is alert and oriented to person, place, and time.   Psychiatric: He has a normal mood and affect. His behavior is normal.   Nursing note and vitals reviewed.        PERTINENT STUDIES: (I personally reviewed these laboratory studies today)  Most recent CBC:   Recent Labs   Lab Test 05/20/19  1410 04/12/19  0545 04/10/19  1009   WBC 16.7*  --  6.2   HGB 16.1  --  13.9   HCT 45.1  --  41.3    337 220     Most recent hepatic panel:  Recent Labs   Lab Test 05/20/19  1350 04/10/19  1009   ALT 57 9   AST 19 12     Most recent creatinine:  Recent Labs   Lab Test 05/20/19  1350 04/22/19  1622   CR 1.19 0.96       TSH   Date Value Ref Range Status   03/29/2019 2.76 0.40 - 4.00 mU/L Final         RADIOLOGY:    CT ABDOMEN AND PELVIS WITH CONTRAST  3/29/2019 3:50 PM     HISTORY: Abdominal pain, generalized. Patient has bloating feeling and  feels like he cannot eat anything additional due to bloating.     TECHNIQUE: Scans obtained from the diaphragm through the pelvis with  IV contrast, 85 mL- Isovue 370.   Radiation dose for this scan was reduced using automated exposure  control, adjustment of the mA and/or kV according to patient size, or  iterative reconstruction technique.     COMPARISON:  Abdominal x-rays dated 3/29/2019.     FINDINGS: There are small bilateral pleural fluid collection with  adjacent compressive atelectasis in the lung bases. Visualized  mediastinal contents are unremarkable. No aggressive osseous lesions  are seen. There are degenerative changes in the spine.       The gallbladder is contracted and has a mildly enhancing wall. There  is pericholecystic fluid which is likely due to ascites and less  likely due to inflammation of the gallbladder. The liver, pancreas,  spleen, bilateral adrenal glands and bilateral kidneys enhance  normally. No hydronephrosis, nephrolithiasis, hydroureter or  ureteral  calculus seen. Urinary bladder is normal in appearance.     There is a moderate amount of ascites. This is also seen in the  gallbladder. No adenopathy or free air is identified. Aorta is grossly  normal in appearance.     Prostate gland is unremarkable.     Colon is of normal caliber without pericolonic inflammatory change to  suggest acute diverticulitis. Structure likely representing a normal  appendix extends from the colon.     There is what appears to be enhancement and abnormal thickening of the  wall of the small bowel, worse proximally. This could represent  enteritis of an infectious or inflammatory etiology. There is edema in  the adipose tissues of the mesenteric root and throughout the adipose  tissues in the peritoneal cavity. This appears to mostly spare the  pararenal adipose tissues.                                                                      IMPRESSION:  1. Findings are concerning for diffuse small bowel enteritis likely  infectious or inflammatory in etiology, with associated inflammatory  changes of the mesenteric adipose tissues, moderate ascites, small  bilateral probably reactive pleural effusions and with some edema in  the subcutaneous adipose tissues.  2. No significant atherosclerosis is identified. No obvious arterial  blockage is seen in the mesenteric vessels to suggest ischemia.  3. Mild degenerative changes of the spine and hips. No acute fracture  or aggressive osseous lesion.     I discussed the diffuse small bowel abnormality, ascites, and  bilateral pleural fluid collections with Alma Gardner on 3/29/2019 at  approximately 4:00 PM.     DUC ZAVALA MD      ABDOMEN TWO VIEWS  3/29/2019 2:09 PM      HISTORY:  Abdominal pain, generalized.     COMPARISON: None.     FINDINGS:  There is a mildly nonspecific bowel gas pattern with gas  seen both in large and small bowel. No abnormally dilated gas-filled  loops of bowel to suggest bowel obstruction. The stomach is  minimally  distended and gas-filled. No free air is seen under the  hemidiaphragms. No renal or ureteral calculus is identified.  Degenerative changes are partially imaged in the lumbar spine with  disc height loss worst at L2-L3. There are mild degenerative changes  in the hips.                                                                      IMPRESSION:  1. Mildly nonspecific bowel gas pattern without evidence for bowel  obstruction or intraperitoneal free air.  2. Subtle density projected over the lung bases could represent small  pleural fluid collections, although this could be due to the artifact  from the angulation of the image.  3. Etiology for patient's symptoms is not definitely seen.     I discussed these findings with Alma Gardner on 3/29/2018 at 2:25 PM.     DUC ZAVALA MD            ASSESSMENT/PLAN:    Deshawn Flood is a 44 year old male who presents for follow up of abnormal CT scan.  He was in fairly good health until April 2019 when he started to have abdominal bloating, swelling and discomfort. He was noted to have small bowel enteritis with associated inflammatory changes of the mesenteric adipose tissues, moderate ascites and small bilateral reactive pleural effusions on CT scan of the abdomen and pelvis.  He was admitted for further work-up and diagnosed with nephrotic syndrome and acute kidney injury and was treated accordingly.  He is currently under the care of nephrology and is currently on diuretic, prednisone and 2000 mg sodium restriction diet.     He was referred to our office for concerns of small bowel enteritis noted on the CT scan. It was thought that the small bowel enteritis noted on CT scan could be secondary to his hypoalbuminemia. We checked a fecal calprotectin level which was borderline elevated. He overall continues to feel well. He continues to deny having any abdominal pain, diarrhea, rectal bleeding, black tarry stools, rectal pain or tenesmus. His findings again  are not consistent with an underlying inflammatory bowel disease however he is on prednisone at this time which could be masking symptoms. He did have a borderline fecal calprotectin level which is of uncertain significance at this time. Again since he is still on prednisone results may be skewed. Recommended we check CT enterography to r/o small bowel inflammation once weaning off of prednisone.     Follow up in 3-6 months.     Abnormal finding on CT scan  Enteritis  Feces contents abnormal  Nephrotic syndrome    Orders Placed This Encounter   Procedures     CT Enterography with Contrast       Thank you for this consultation.  It was a pleasure to participate in the care of this patient; please contact us with any further questions.      This note was created with voice recognition software, and while reviewed for accuracy, typos may remain.     Jane Palafox PA-C  Gastroenterology  [facility]

## 2019-07-09 NOTE — NURSING NOTE
"Deshawn Flood's goals for this visit include:   Chief Complaint   Patient presents with     RECHECK     2 month follow up for elevated calprotectin and retest       He requests these members of his care team be copied on today's visit information: Primary clinic [facility]    PCP: No Ref-Primary, Physician    Referring Provider:  No referring provider defined for this encounter.    /70 (BP Location: Left arm, Patient Position: Sitting, Cuff Size: Adult Regular)   Pulse 109   Ht 1.759 m (5' 9.25\")   Wt 77 kg (169 lb 12.8 oz)   SpO2 95%   BMI 24.89 kg/m      Do you need any medication refills at today's visit? No    Marielle Valencia LPN      "

## 2019-08-02 DIAGNOSIS — N04.9 NEPHROTIC SYNDROME: ICD-10-CM

## 2019-08-02 RX ORDER — SULFAMETHOXAZOLE/TRIMETHOPRIM 800-160 MG
1 TABLET ORAL
Qty: 24 TABLET | Refills: 0 | Status: SHIPPED | OUTPATIENT
Start: 2019-08-02 | End: 2019-10-04

## 2019-08-06 ENCOUNTER — TELEPHONE (OUTPATIENT)
Dept: NEPHROLOGY | Facility: CLINIC | Age: 44
End: 2019-08-06

## 2019-08-06 NOTE — TELEPHONE ENCOUNTER
Attempted to call patient regarding voice message per mom about medications. Unable to eave message.  Kami Simmons LPN  Nephrology  [phone]

## 2019-08-08 ENCOUNTER — TELEPHONE (OUTPATIENT)
Dept: FAMILY MEDICINE | Facility: OTHER | Age: 44
End: 2019-08-08

## 2019-08-08 NOTE — TELEPHONE ENCOUNTER
Panel Management Review      Patient has the following on his problem list: None      Composite cancer screening  Chart review shows that this patient is due/due soon for the following None  Summary:    Patient is due/failing the following:   AAP, ACT and PHYSICAL    Action needed:   Patient needs office visit for Physical.    Type of outreach:    Sent letter. and Copy of ACT mailed to patient, will reach out in 5 days.    Questions for provider review:    None                                                                                                                                    Audrey Castro MA

## 2019-08-08 NOTE — LETTER
[facility]  [address] [facility] [id number] [phone]        Deshawn Flood  [address] [place] [id number]      August 8, 2019      Dear Deshawn,    I care about your health and have reviewed your health plan, including your medical conditions, medication list, and lab results and am making recommendations based on this review, to better manage your health.    You are in particular need of attention regarding:  -Asthma  -Wellness (Physical) Visit   -Tobacco use    I am recommending that you:  -schedule a WELLNESS (Physical) APPOINTMENT with me.   I will check fasting labs the same day - nothing to eat except water and meds for 8-10 hours prior.  -Complete and return the attached ASTHMA CONTROL TEST.  If your total score is 19 or less or you have been to the ER or urgent care for your asthma, then please schedule an asthma followup appointment.    If you've had the preventative screening completed at another facility or feel you're not due for this screening, please call our clinic at the number listed above or send us a My Chart message so we can update our records. We would like to thank you in advance for taking the time to take care of your health.  If you have any questions, please don t hesitate to contact our clinic.    Sincerely,       Your [facility] Team

## 2019-08-12 ENCOUNTER — PRE VISIT (OUTPATIENT)
Dept: GASTROENTEROLOGY | Facility: CLINIC | Age: 44
End: 2019-08-12

## 2019-08-14 ENCOUNTER — TELEPHONE (OUTPATIENT)
Dept: NEPHROLOGY | Facility: CLINIC | Age: 44
End: 2019-08-14

## 2019-08-14 DIAGNOSIS — N04.9 NEPHROTIC SYNDROME: ICD-10-CM

## 2019-08-14 DIAGNOSIS — E78.5 HYPERLIPIDEMIA LDL GOAL <100: ICD-10-CM

## 2019-08-14 RX ORDER — PREDNISONE 10 MG/1
70 TABLET ORAL DAILY
Qty: 210 TABLET | Refills: 1 | Status: SHIPPED | OUTPATIENT
Start: 2019-08-14 | End: 2019-08-19

## 2019-08-14 RX ORDER — ATORVASTATIN CALCIUM 20 MG/1
20 TABLET, FILM COATED ORAL DAILY
Qty: 30 TABLET | Refills: 3 | Status: SHIPPED | OUTPATIENT
Start: 2019-08-14 | End: 2019-12-19

## 2019-08-14 NOTE — TELEPHONE ENCOUNTER
Patients mom Marnie, called with questions regarding medications. Needed refills sent. She reports that patient is back to his old self. No swelling and that he is doing quite well. Scheduled follow up appointment for Monday Sept 9th at 354 with Dr. Scott.  Patient currently taking:  Prednisone 70 mg  Bactrim  Omeprazole 20  Lisinopril 20 (clarifiaction sent to pharmacy)  bumex 0/5  Atorvastatin 20    Kami Simmons LPN  Nephrology  [phone]

## 2019-08-19 RX ORDER — PREDNISONE 10 MG/1
60 TABLET ORAL DAILY
Qty: 180 TABLET | Refills: 1 | COMMUNITY
Start: 2019-08-19 | End: 2019-11-26

## 2019-08-19 NOTE — TELEPHONE ENCOUNTER
Per Dr. Scott, have patient decrease prednisone from 70 to 60 mg tabs until patient is seen. Changed prescription. Communicated to patients mom, Marnie. No other questions or concerns.  Kami Simmons LPN  Nephrology  [phone]

## 2019-08-22 DIAGNOSIS — N04.9 NEPHROTIC SYNDROME: ICD-10-CM

## 2019-08-22 RX ORDER — BUMETANIDE 0.5 MG/1
0.5 TABLET ORAL DAILY
Qty: 30 TABLET | Refills: 1 | Status: SHIPPED | OUTPATIENT
Start: 2019-08-22 | End: 2019-09-09

## 2019-09-06 DIAGNOSIS — N04.9 NEPHROTIC SYNDROME: ICD-10-CM

## 2019-09-06 DIAGNOSIS — N05.0 MINIMAL CHANGE DISEASE: ICD-10-CM

## 2019-09-09 ENCOUNTER — OFFICE VISIT (OUTPATIENT)
Dept: NEPHROLOGY | Facility: CLINIC | Age: 44
End: 2019-09-09
Payer: COMMERCIAL

## 2019-09-09 VITALS
HEART RATE: 100 BPM | WEIGHT: 168 LBS | SYSTOLIC BLOOD PRESSURE: 128 MMHG | BODY MASS INDEX: 24.63 KG/M2 | DIASTOLIC BLOOD PRESSURE: 80 MMHG

## 2019-09-09 DIAGNOSIS — N05.0 MINIMAL CHANGE DISEASE: ICD-10-CM

## 2019-09-09 DIAGNOSIS — Z72.0 TOBACCO ABUSE: ICD-10-CM

## 2019-09-09 DIAGNOSIS — D84.9 IMMUNOSUPPRESSION (H): ICD-10-CM

## 2019-09-09 DIAGNOSIS — N04.9 NEPHROTIC SYNDROME: Primary | ICD-10-CM

## 2019-09-09 DIAGNOSIS — R80.1 PERSISTENT PROTEINURIA: ICD-10-CM

## 2019-09-09 LAB
ALBUMIN SERPL-MCNC: 3.8 G/DL (ref 3.4–5)
ALP SERPL-CCNC: 65 U/L (ref 40–150)
ALT SERPL W P-5'-P-CCNC: 41 U/L (ref 0–70)
ANION GAP SERPL CALCULATED.3IONS-SCNC: 6 MMOL/L (ref 3–14)
AST SERPL W P-5'-P-CCNC: 14 U/L (ref 0–45)
BILIRUB SERPL-MCNC: 0.4 MG/DL (ref 0.2–1.3)
BUN SERPL-MCNC: 15 MG/DL (ref 7–30)
CALCIUM SERPL-MCNC: 9.4 MG/DL (ref 8.5–10.1)
CHLORIDE SERPL-SCNC: 100 MMOL/L (ref 94–109)
CO2 SERPL-SCNC: 30 MMOL/L (ref 20–32)
CREAT SERPL-MCNC: 1.08 MG/DL (ref 0.66–1.25)
ERYTHROCYTE [DISTWIDTH] IN BLOOD BY AUTOMATED COUNT: 12.4 % (ref 10–15)
GFR SERPL CREATININE-BSD FRML MDRD: 82 ML/MIN/{1.73_M2}
GLUCOSE SERPL-MCNC: 160 MG/DL (ref 70–99)
HCT VFR BLD AUTO: 46.2 % (ref 40–53)
HGB BLD-MCNC: 16.2 G/DL (ref 13.3–17.7)
MAGNESIUM SERPL-MCNC: 2.1 MG/DL (ref 1.6–2.3)
MCH RBC QN AUTO: 32.5 PG (ref 26.5–33)
MCHC RBC AUTO-ENTMCNC: 35.1 G/DL (ref 31.5–36.5)
MCV RBC AUTO: 93 FL (ref 78–100)
PHOSPHATE SERPL-MCNC: 2.8 MG/DL (ref 2.5–4.5)
PLATELET # BLD AUTO: 249 10E9/L (ref 150–450)
POTASSIUM SERPL-SCNC: 4.1 MMOL/L (ref 3.4–5.3)
PROT SERPL-MCNC: 7 G/DL (ref 6.8–8.8)
PROT UR-MCNC: 0.16 G/L
PROT/CREAT 24H UR: 0.14 G/G CR (ref 0–0.2)
RBC # BLD AUTO: 4.98 10E12/L (ref 4.4–5.9)
SODIUM SERPL-SCNC: 136 MMOL/L (ref 133–144)
URATE SERPL-MCNC: 2.7 MG/DL (ref 3.5–7.2)
WBC # BLD AUTO: 14.5 10E9/L (ref 4–11)

## 2019-09-09 PROCEDURE — 84550 ASSAY OF BLOOD/URIC ACID: CPT | Performed by: INTERNAL MEDICINE

## 2019-09-09 PROCEDURE — 82043 UR ALBUMIN QUANTITATIVE: CPT | Performed by: INTERNAL MEDICINE

## 2019-09-09 PROCEDURE — 83735 ASSAY OF MAGNESIUM: CPT | Performed by: INTERNAL MEDICINE

## 2019-09-09 PROCEDURE — 80053 COMPREHEN METABOLIC PANEL: CPT | Performed by: INTERNAL MEDICINE

## 2019-09-09 PROCEDURE — 36415 COLL VENOUS BLD VENIPUNCTURE: CPT | Performed by: INTERNAL MEDICINE

## 2019-09-09 PROCEDURE — 84100 ASSAY OF PHOSPHORUS: CPT | Performed by: INTERNAL MEDICINE

## 2019-09-09 PROCEDURE — 84156 ASSAY OF PROTEIN URINE: CPT | Performed by: INTERNAL MEDICINE

## 2019-09-09 PROCEDURE — 85027 COMPLETE CBC AUTOMATED: CPT | Performed by: INTERNAL MEDICINE

## 2019-09-09 RX ORDER — LISINOPRIL 20 MG/1
20 TABLET ORAL DAILY
Qty: 30 TABLET | Refills: 3 | Status: SHIPPED | OUTPATIENT
Start: 2019-09-09 | End: 2020-01-03

## 2019-09-09 NOTE — PROGRESS NOTES
Assessment and Plan:  44 year old male who presents for followup of minimal change disease. He presented for evaluation after presenting to ER where CT scan noted ascites and nonspecific small bowel enteritis, and UA showed proteinuria. He had biopsy which showed minimal change on 4/9/2019 and now on prednisone 70mg    # Renal function- Scr was 1.1 in the past and was1.27 in ER. It was up to 1.6-1.8 at time of biopsy after diuretics were started.  - he was not eating well or hydrating; lisinopril was stopped  - his weight is down and he is feeling better and eating  -  Proteinuria estimated at 7 grams and his albumin is 0.9  -repeat labs today, hope to see improvement in nephrotic syndrome  - on prednisone 60mg daily , started mid April ;planned  for 2-3 months and re-evaluate, following up today  - seems stable, will get labs and plan to start taper if all is stable  - he is trying to do sodium restriction (really is 3-4000mg restriction typically)    Stop bumetanide (diuretic)  If labs are stable, we will start weaning off the prednisone    The taper would be    down to 50mg for 2 weeks                40mg for 2 weeks               30 mg for 2 weeks               20mg for 2 weeks               10mg 2 weeks   can stop bactrim (sulfamethazole) pill                5mg until you see me in clinic  Labs in November (2 months from now)    # Hyperlipidemia- LDL very elevated, started low dose statin- repeat next visit, may be able to stop as long as he is not nephrotic  # Hypertension: mild hypertension, on bumex daily; swelling minimal and his weight is down 30 lbs   - on lisinopril  20mg and will stop bumex and monitor for swelling    # Not anemic    # Electrolytes:   Check today     # Mineral Bone Disorder:   Check baseline PTH if creatinine remains elevated.     - RTC 4 month, labs in 2 months    Assessment and plan was discussed with patient and he voiced his understanding and agreement.    Reason for Visit:  Deshawn ROBLEDO  Remigio is a 44 year old male with nephrotic range proteinuria noted and biopsy 4/9/19 shows minimal change disease    HPI:  He is a 44 year old male recently in normal health who presented with nephrotic syndrome. He underwent kidney biopsy 4/9 and it shows minimal change disease. He was admitted to the hospital briefly after biopsy as he was having poor appetite due to bloating, and was diuresed. Also his creatinine was up to 1.7-1.8 which was concerning. Lisinopril was stopped in the hospital.    His weight is usually closer to 150 but was up to 184 lbs; he was down to 178 lbs.in April at last visit, and today he is at 168lbs He has gained weight, likely due to prednisone, and he is happy about this.     He has no edema on exam today  He is trying to eat healthier and is watching sodium intake, restricting to 4 grams a day. He is still on bumex 0.5 mg. He has had some cramps at night  His lipids were done and he has elevated LDL consistent with nephrotic syndrome and he is on lipitor    He is eating very well, back to eating normal for him.  He has good appetite. His swelling is pretty much gone. He is working () and eating on the road.     Renal History:   None prior to minimal change disease Spring 2019    ROS:   A comprehensive review of systems was obtained and negative, except as noted in the HPI or PMH.    Active Medical Problems:  Patient Active Problem List   Diagnosis     Loose body of left knee     Left knee pain     Tobacco abuse     History of pneumothorax     CARDIOVASCULAR SCREENING; LDL GOAL LESS THAN 160     Nephrotic syndrome     Anasarca     PMH:   Medical record was reviewed and PMH was discussed with patient and noted below.  Past Medical History:   Diagnosis Date     Anemia      Other spontaneous pneumothorax      SPONTANEOUS PNEUMOTHORAX NEC 3/25/2005     Unspecified asthma(493.90)      PSH:   Past Surgical History:   Procedure Laterality Date     ARTHROSCOPY KNEE  8/3/2012     Procedure: ARTHROSCOPY KNEE;  Left knee Arthroscopy with removal of loose bodies;  Surgeon: Bibi Roberts MD;  Location: [facility]     HC REPAIR UMBILICAL GODFREY,<6Y/O,REDUC  1978     IR RENAL BIOPSY LEFT  4/9/2019     TUBE THORACOSTOMY,ABSC/EMPYEMA/HEMO  3/20/2005    Small chest tube with Heimlich valve.       Family Hx:   Family History   Problem Relation Age of Onset     Autoimmune Disease No family hx of      Personal Hx:   Social History     Tobacco Use     Smoking status: Current Every Day Smoker     Packs/day: 0.10     Years: 15.00     Pack years: 1.50     Types: Cigarettes     Smokeless tobacco: Former User     Types: Chew     Tobacco comment: hopes to quit smoking --hasn't had any since lung went down 1/14/05.   Substance Use Topics     Alcohol use: Yes     Alcohol/week: 10.5 oz     Types: 21 Cans of beer per week     Comment: occ       Allergies:  Allergies   Allergen Reactions     Bee Venom      swells up     No Known Drug Allergies      Seasonal Allergies        Medications:  Current Outpatient Medications   Medication Sig     albuterol (PROAIR HFA/PROVENTIL HFA/VENTOLIN HFA) 108 (90 Base) MCG/ACT inhaler Inhale 1-2 puffs into the lungs every 6 hours as needed for shortness of breath / dyspnea or wheezing     atorvastatin (LIPITOR) 20 MG tablet Take 1 tablet (20 mg) by mouth daily     bumetanide (BUMEX) 0.5 MG tablet Take 1 tablet (0.5 mg) by mouth daily     lisinopril (PRINIVIL/ZESTRIL) 20 MG tablet Take 1 tablet (20 mg) by mouth daily     omeprazole (PRILOSEC) 20 MG DR capsule Take 1 capsule (20 mg) by mouth every morning (before breakfast)     predniSONE (DELTASONE) 10 MG tablet Take 6 tablets (60 mg) by mouth daily     sulfamethoxazole-trimethoprim (BACTRIM DS/SEPTRA DS) 800-160 MG tablet Take 1 tablet by mouth three times a week Monday, Wednesday, Friday     No current facility-administered medications for this visit.       Vitals:  /80 (BP Location: Right arm)   Pulse 100   Wt 76.2 kg (168  lb)   BMI 24.63 kg/m      Exam:  GENERAL APPEARANCE: alert and no distress, hyperactive  HENT: mouth without ulcers or lesions  LYMPHATICS: no cervical or supraclavicular nodes  RESP: lungs clear to auscultation - no rales, rhonchi or wheezes  CV: regular rhythm, normal rate, no rub, no murmur  EDEMA: 2++ LE edema bilaterally  ABDOMEN: soft, nondistended, nontender, bowel sounds normal  MS: extremities normal - no gross deformities noted, no evidence of inflammation in joints, no muscle tenderness  SKIN: has erythematous maculopapular lesions on chest and back    LABS:   CMP  Recent Labs   Lab Test 05/20/19  1350 04/22/19  1622 04/12/19  0545 04/11/19  0544   * 132* 133 132*   POTASSIUM 4.0 4.1 5.0 4.4   CHLORIDE 94 98 98 99   CO2 26 27 28 24   ANIONGAP 12 7 7 9   * 115* 117* 156*   BUN 25 32* 46* 34*   CR 1.19 0.96 1.82* 1.47*   GFRESTIMATED 73 >90 44* 57*   GFRESTBLACK 85 >90 51* 66   BASIA 9.6 7.9* 7.9* 7.5*     Recent Labs   Lab Test 05/20/19  1350 04/10/19  1009 03/29/19  1430   BILITOTAL 0.4 0.7 0.3   ALKPHOS 61 42 67   ALT 57 9 19   AST 19 12 27     CBC  Recent Labs   Lab Test 05/20/19  1410 04/12/19  0545 04/10/19  1009 03/29/19  1711 08/02/12  1546   HGB 16.1  --  13.9 17.7 15.2   WBC 16.7*  --  6.2 6.4  --    RBC 5.06  --  4.53 5.69  --    HCT 45.1  --  41.3 49.0  --    MCV 89  --  91 86  --    MCH 31.8  --  30.7 31.1  --    MCHC 35.7  --  33.7 36.1  --    RDW 12.3  --  11.3 12.0  --     337 220 265  --      URINE STUDIES  Recent Labs   Lab Test 04/01/19  1500 03/29/19  1858   COLOR Yellow Yellow   APPEARANCE Clear Clear   URINEGLC Negative Negative   URINEBILI Negative Negative   URINEKETONE Negative Negative   SG 1.015 >1.035*   UBLD Moderate* Moderate*   URINEPH 6.0 6.0   PROTEIN >=300* >499*   UROBILINOGEN 0.2  --    NITRITE Negative Negative   LEUKEST Negative Negative   RBCU 25-50* 7*   WBCU 0 - 5 7*     Recent Labs   Lab Test 05/20/19  1355 04/22/19  1622 04/01/19  1500  03/29/19 1903   UTPG 0.13 6.30* 11.34* 7.80*     PTH  No lab results found.  IRON STUDIES  No lab results found.    IMPRESSION:  1. Findings are concerning for diffuse small bowel enteritis likely  infectious or inflammatory in etiology, with associated inflammatory  changes of the mesenteric adipose tissues, moderate ascites, small  bilateral probably reactive pleural effusions and with some edema in  the subcutaneous adipose tissues.  2. No significant atherosclerosis is identified. No obvious arterial  blockage is seen in the mesenteric vessels to suggest ischemia.  3. Mild degenerative changes of the spine and hips. No acute fracture  or aggressive osseous lesion.  Jenny Scott MD

## 2019-09-09 NOTE — PATIENT INSTRUCTIONS
Stop bumetanide (diuretic)  If labs are stable, we will start weaning off the prednisone    The taper would be    down to 50mg for 2 weeks                40mg for 2 weeks               30 mg for 2 weeks               20mg for 2 weeks                 10mg 2 weeks   can stop bactrim (sulfamethazole) pill                  5mg until you see me in clinic    Labs in November (2 months from now)

## 2019-09-09 NOTE — LETTER
9/9/2019      RE: Deshawn Flood  [address] [address]       Assessment and Plan:  44 year old male who presents for followup of minimal change disease. He presented for evaluation after presenting to ER where CT scan noted ascites and nonspecific small bowel enteritis, and UA showed proteinuria. He had biopsy which showed minimal change on 4/9/2019 and now on prednisone 70mg    # Renal function- Scr was 1.1 in the past and was1.27 in ER. It was up to 1.6-1.8 at time of biopsy after diuretics were started.  - he was not eating well or hydrating; lisinopril was stopped  - his weight is down and he is feeling better and eating  -  Proteinuria estimated at 7 grams and his albumin is 0.9  -repeat labs today, hope to see improvement in nephrotic syndrome  - on prednisone 60mg daily , started mid April ;planned  for 2-3 months and re-evaluate, following up today  - seems stable, will get labs and plan to start taper if all is stable  - he is trying to do sodium restriction (really is 3-4000mg restriction typically)    Stop bumetanide (diuretic)  If labs are stable, we will start weaning off the prednisone    The taper would be    down to 50mg for 2 weeks                40mg for 2 weeks               30 mg for 2 weeks               20mg for 2 weeks               10mg 2 weeks   can stop bactrim (sulfamethazole) pill                5mg until you see me in clinic  Labs in November (2 months from now)    # Hyperlipidemia- LDL very elevated, started low dose statin- repeat next visit, may be able to stop as long as he is not nephrotic  # Hypertension: mild hypertension, on bumex daily; swelling minimal and his weight is down 30 lbs   - on lisinopril  20mg and will stop bumex and monitor for swelling    # Not anemic    # Electrolytes:   Check today     # Mineral Bone Disorder:   Check baseline PTH if creatinine remains elevated.     - RTC 4 month, labs in 2 months    Assessment and plan was discussed with  patient and he voiced his understanding and agreement.    Reason for Visit:  Deshawn Flood is a 44 year old male with nephrotic range proteinuria noted and biopsy 4/9/19 shows minimal change disease    HPI:  He is a 44 year old male recently in normal health who presented with nephrotic syndrome. He underwent kidney biopsy 4/9 and it shows minimal change disease. He was admitted to the hospital briefly after biopsy as he was having poor appetite due to bloating, and was diuresed. Also his creatinine was up to 1.7-1.8 which was concerning. Lisinopril was stopped in the hospital.    His weight is usually closer to 150 but was up to 184 lbs; he was down to 178 lbs.in April at last visit, and today he is at 168lbs He has gained weight, likely due to prednisone, and he is happy about this.     He has no edema on exam today  He is trying to eat healthier and is watching sodium intake, restricting to 4 grams a day. He is still on bumex 0.5 mg. He has had some cramps at night  His lipids were done and he has elevated LDL consistent with nephrotic syndrome and he is on lipitor    He is eating very well, back to eating normal for him.  He has good appetite. His swelling is pretty much gone. He is working () and eating on the road.     Renal History:   None prior to minimal change disease Spring 2019    ROS:   A comprehensive review of systems was obtained and negative, except as noted in the HPI or PMH.    Active Medical Problems:  Patient Active Problem List   Diagnosis     Loose body of left knee     Left knee pain     Tobacco abuse     History of pneumothorax     CARDIOVASCULAR SCREENING; LDL GOAL LESS THAN 160     Nephrotic syndrome     Anasarca     PMH:   Medical record was reviewed and PMH was discussed with patient and noted below.  Past Medical History:   Diagnosis Date     Anemia      Other spontaneous pneumothorax      SPONTANEOUS PNEUMOTHORAX NEC 3/25/2005     Unspecified asthma(493.90)      PSH:    Past Surgical History:   Procedure Laterality Date     ARTHROSCOPY KNEE  8/3/2012    Procedure: ARTHROSCOPY KNEE;  Left knee Arthroscopy with removal of loose bodies;  Surgeon: Bibi Roberts MD;  Location: [facility]     HC REPAIR UMBILICAL GODFREY,<4Y/O,REDUC  1978     IR RENAL BIOPSY LEFT  4/9/2019     TUBE THORACOSTOMY,ABSC/EMPYEMA/HEMO  3/20/2005    Small chest tube with Heimlich valve.       Family Hx:   Family History   Problem Relation Age of Onset     Autoimmune Disease No family hx of      Personal Hx:   Social History     Tobacco Use     Smoking status: Current Every Day Smoker     Packs/day: 0.10     Years: 15.00     Pack years: 1.50     Types: Cigarettes     Smokeless tobacco: Former User     Types: Chew     Tobacco comment: hopes to quit smoking --hasn't had any since lung went down 1/14/05.   Substance Use Topics     Alcohol use: Yes     Alcohol/week: 10.5 oz     Types: 21 Cans of beer per week     Comment: occ       Allergies:  Allergies   Allergen Reactions     Bee Venom      swells up     No Known Drug Allergies      Seasonal Allergies        Medications:  Current Outpatient Medications   Medication Sig     albuterol (PROAIR HFA/PROVENTIL HFA/VENTOLIN HFA) 108 (90 Base) MCG/ACT inhaler Inhale 1-2 puffs into the lungs every 6 hours as needed for shortness of breath / dyspnea or wheezing     atorvastatin (LIPITOR) 20 MG tablet Take 1 tablet (20 mg) by mouth daily     bumetanide (BUMEX) 0.5 MG tablet Take 1 tablet (0.5 mg) by mouth daily     lisinopril (PRINIVIL/ZESTRIL) 20 MG tablet Take 1 tablet (20 mg) by mouth daily     omeprazole (PRILOSEC) 20 MG DR capsule Take 1 capsule (20 mg) by mouth every morning (before breakfast)     predniSONE (DELTASONE) 10 MG tablet Take 6 tablets (60 mg) by mouth daily     sulfamethoxazole-trimethoprim (BACTRIM DS/SEPTRA DS) 800-160 MG tablet Take 1 tablet by mouth three times a week Monday, Wednesday, Friday     No current facility-administered medications for this  visit.       Vitals:  /80 (BP Location: Right arm)   Pulse 100   Wt 76.2 kg (168 lb)   BMI 24.63 kg/m       Exam:  GENERAL APPEARANCE: alert and no distress, hyperactive  HENT: mouth without ulcers or lesions  LYMPHATICS: no cervical or supraclavicular nodes  RESP: lungs clear to auscultation - no rales, rhonchi or wheezes  CV: regular rhythm, normal rate, no rub, no murmur  EDEMA: 2++ LE edema bilaterally  ABDOMEN: soft, nondistended, nontender, bowel sounds normal  MS: extremities normal - no gross deformities noted, no evidence of inflammation in joints, no muscle tenderness  SKIN: has erythematous maculopapular lesions on chest and back    LABS:   CMP  Recent Labs   Lab Test 05/20/19  1350 04/22/19  1622 04/12/19  0545 04/11/19  0544   * 132* 133 132*   POTASSIUM 4.0 4.1 5.0 4.4   CHLORIDE 94 98 98 99   CO2 26 27 28 24   ANIONGAP 12 7 7 9   * 115* 117* 156*   BUN 25 32* 46* 34*   CR 1.19 0.96 1.82* 1.47*   GFRESTIMATED 73 >90 44* 57*   GFRESTBLACK 85 >90 51* 66   BASIA 9.6 7.9* 7.9* 7.5*     Recent Labs   Lab Test 05/20/19  1350 04/10/19  1009 03/29/19  1430   BILITOTAL 0.4 0.7 0.3   ALKPHOS 61 42 67   ALT 57 9 19   AST 19 12 27     CBC  Recent Labs   Lab Test 05/20/19  1410 04/12/19  0545 04/10/19  1009 03/29/19  1711 08/02/12  1546   HGB 16.1  --  13.9 17.7 15.2   WBC 16.7*  --  6.2 6.4  --    RBC 5.06  --  4.53 5.69  --    HCT 45.1  --  41.3 49.0  --    MCV 89  --  91 86  --    MCH 31.8  --  30.7 31.1  --    MCHC 35.7  --  33.7 36.1  --    RDW 12.3  --  11.3 12.0  --     337 220 265  --      URINE STUDIES  Recent Labs   Lab Test 04/01/19  1500 03/29/19  1858   COLOR Yellow Yellow   APPEARANCE Clear Clear   URINEGLC Negative Negative   URINEBILI Negative Negative   URINEKETONE Negative Negative   SG 1.015 >1.035*   UBLD Moderate* Moderate*   URINEPH 6.0 6.0   PROTEIN >=300* >499*   UROBILINOGEN 0.2  --    NITRITE Negative Negative   LEUKEST Negative Negative   RBCU 25-50* 7*   WBCU 0  - 5 7*     Recent Labs   Lab Test 05/20/19  1355 04/22/19  1622 04/01/19  1500 03/29/19  1903   UTPG 0.13 6.30* 11.34* 7.80*     PTH  No lab results found.  IRON STUDIES  No lab results found.    IMPRESSION:  1. Findings are concerning for diffuse small bowel enteritis likely  infectious or inflammatory in etiology, with associated inflammatory  changes of the mesenteric adipose tissues, moderate ascites, small  bilateral probably reactive pleural effusions and with some edema in  the subcutaneous adipose tissues.  2. No significant atherosclerosis is identified. No obvious arterial  blockage is seen in the mesenteric vessels to suggest ischemia.  3. Mild degenerative changes of the spine and hips. No acute fracture  or aggressive osseous lesion.  Jenny Scott MD

## 2019-09-10 LAB
CREAT UR-MCNC: 122 MG/DL
MICROALBUMIN UR-MCNC: 6 MG/L
MICROALBUMIN/CREAT UR: 4.69 MG/G CR (ref 0–17)

## 2019-10-04 DIAGNOSIS — N04.9 NEPHROTIC SYNDROME: ICD-10-CM

## 2019-10-04 RX ORDER — SULFAMETHOXAZOLE/TRIMETHOPRIM 800-160 MG
1 TABLET ORAL
Qty: 24 TABLET | Refills: 0 | Status: SHIPPED | OUTPATIENT
Start: 2019-10-04 | End: 2019-12-06

## 2019-11-21 ENCOUNTER — TELEPHONE (OUTPATIENT)
Dept: FAMILY MEDICINE | Facility: OTHER | Age: 44
End: 2019-11-21

## 2019-11-21 NOTE — LETTER
[facility]  [address] [facility] [address]  [phone]        Deshawn Flood  [address] [place] [id number]      November 21, 2019      Dear Deshawn,    I care about your health and have reviewed your health plan, including your medical conditions, medication list, and lab results and am making recommendations based on this review, to better manage your health.    You are in particular need of attention regarding:  -Asthma    I am recommending that you:  -Complete and return the attached ASTHMA CONTROL TEST.  If your total score is 19 or less or you have been to the ER or urgent care for your asthma, then please schedule an asthma followup appointment.    If you've had the preventative screening completed at another facility or feel you're not due for this screening, please call our clinic at the number listed above or send us a My Chart message so we can update our records. We would like to thank you in advance for taking the time to take care of your health.  If you have any questions, please don t hesitate to contact our clinic.    Sincerely,       Your [facility] Team

## 2019-11-21 NOTE — TELEPHONE ENCOUNTER
Panel Management Review      Patient has the following on his problem list:       Composite cancer screening  Chart review shows that this patient is due/due soon for the following   Summary:    Patient is due/failing the following:   ACT    Action needed:   Patient needs to do ACT.    Type of outreach:    Sent letter.    Questions for provider review:    None                                                                                                                                    Sophia Kee MA     11/21/2019       Chart routed to  .

## 2019-11-26 ENCOUNTER — OFFICE VISIT (OUTPATIENT)
Dept: FAMILY MEDICINE | Facility: OTHER | Age: 44
End: 2019-11-26
Payer: COMMERCIAL

## 2019-11-26 VITALS
WEIGHT: 162 LBS | TEMPERATURE: 98.6 F | RESPIRATION RATE: 18 BRPM | SYSTOLIC BLOOD PRESSURE: 108 MMHG | HEART RATE: 96 BPM | DIASTOLIC BLOOD PRESSURE: 66 MMHG | BODY MASS INDEX: 23.75 KG/M2 | OXYGEN SATURATION: 98 %

## 2019-11-26 DIAGNOSIS — J45.40 MODERATE PERSISTENT ASTHMA WITHOUT COMPLICATION: Primary | ICD-10-CM

## 2019-11-26 DIAGNOSIS — N04.9 NEPHROTIC SYNDROME: ICD-10-CM

## 2019-11-26 DIAGNOSIS — R80.1 PERSISTENT PROTEINURIA: ICD-10-CM

## 2019-11-26 LAB
ALBUMIN SERPL-MCNC: 4.1 G/DL (ref 3.4–5)
ALP SERPL-CCNC: 41 U/L (ref 40–150)
ALT SERPL W P-5'-P-CCNC: 25 U/L (ref 0–70)
ANION GAP SERPL CALCULATED.3IONS-SCNC: 2 MMOL/L (ref 3–14)
AST SERPL W P-5'-P-CCNC: 11 U/L (ref 0–45)
BILIRUB SERPL-MCNC: 0.8 MG/DL (ref 0.2–1.3)
BUN SERPL-MCNC: 9 MG/DL (ref 7–30)
CALCIUM SERPL-MCNC: 9.6 MG/DL (ref 8.5–10.1)
CHLORIDE SERPL-SCNC: 104 MMOL/L (ref 94–109)
CO2 SERPL-SCNC: 31 MMOL/L (ref 20–32)
CREAT SERPL-MCNC: 1.15 MG/DL (ref 0.66–1.25)
CREAT UR-MCNC: 189 MG/DL
ERYTHROCYTE [DISTWIDTH] IN BLOOD BY AUTOMATED COUNT: 12.2 % (ref 10–15)
GFR SERPL CREATININE-BSD FRML MDRD: 76 ML/MIN/{1.73_M2}
GLUCOSE SERPL-MCNC: 136 MG/DL (ref 70–99)
HCT VFR BLD AUTO: 44.6 % (ref 40–53)
HGB BLD-MCNC: 15.6 G/DL (ref 13.3–17.7)
MAGNESIUM SERPL-MCNC: 2.3 MG/DL (ref 1.6–2.3)
MCH RBC QN AUTO: 31.6 PG (ref 26.5–33)
MCHC RBC AUTO-ENTMCNC: 35 G/DL (ref 31.5–36.5)
MCV RBC AUTO: 91 FL (ref 78–100)
MICROALBUMIN UR-MCNC: 5 MG/L
MICROALBUMIN/CREAT UR: 2.89 MG/G CR (ref 0–17)
PHOSPHATE SERPL-MCNC: 2.4 MG/DL (ref 2.5–4.5)
PLATELET # BLD AUTO: 240 10E9/L (ref 150–450)
POTASSIUM SERPL-SCNC: 4.6 MMOL/L (ref 3.4–5.3)
PROT SERPL-MCNC: 6.8 G/DL (ref 6.8–8.8)
PROT UR-MCNC: 0.12 G/L
PROT/CREAT 24H UR: 0.06 G/G CR (ref 0–0.2)
RBC # BLD AUTO: 4.93 10E12/L (ref 4.4–5.9)
SODIUM SERPL-SCNC: 137 MMOL/L (ref 133–144)
URATE SERPL-MCNC: 3.6 MG/DL (ref 3.5–7.2)
WBC # BLD AUTO: 10.3 10E9/L (ref 4–11)

## 2019-11-26 PROCEDURE — 85027 COMPLETE CBC AUTOMATED: CPT | Performed by: INTERNAL MEDICINE

## 2019-11-26 PROCEDURE — 99213 OFFICE O/P EST LOW 20 MIN: CPT | Performed by: INTERNAL MEDICINE

## 2019-11-26 PROCEDURE — 80053 COMPREHEN METABOLIC PANEL: CPT | Performed by: INTERNAL MEDICINE

## 2019-11-26 PROCEDURE — 36415 COLL VENOUS BLD VENIPUNCTURE: CPT | Performed by: INTERNAL MEDICINE

## 2019-11-26 PROCEDURE — 82043 UR ALBUMIN QUANTITATIVE: CPT | Performed by: INTERNAL MEDICINE

## 2019-11-26 PROCEDURE — 84550 ASSAY OF BLOOD/URIC ACID: CPT | Performed by: INTERNAL MEDICINE

## 2019-11-26 PROCEDURE — 83735 ASSAY OF MAGNESIUM: CPT | Performed by: INTERNAL MEDICINE

## 2019-11-26 PROCEDURE — 84156 ASSAY OF PROTEIN URINE: CPT | Performed by: INTERNAL MEDICINE

## 2019-11-26 PROCEDURE — 84100 ASSAY OF PHOSPHORUS: CPT | Performed by: INTERNAL MEDICINE

## 2019-11-26 ASSESSMENT — PAIN SCALES - GENERAL: PAINLEVEL: NO PAIN (0)

## 2019-11-26 NOTE — PROGRESS NOTES
Subjective     Deshawn Flood is a 44 year old male who presents to clinic today for the following health issues:    HPI   Asthma Follow-Up    Was ACT completed today?    Yes    ACT Total Scores 11/26/2019   ACT TOTAL SCORE (Goal Greater than or Equal to 20) 21   In the past 12 months, how many times did you visit the emergency room for your asthma without being admitted to the hospital? 0   In the past 12 months, how many times were you hospitalized overnight because of your asthma? 0       How many days per week do you miss taking your asthma controller medication?  2    Please describe any recent triggers for your asthma: pollens and cold air    Have you had any Emergency Room Visits, Urgent Care Visits, or Hospital Admissions since your last office visit?  No      How many servings of fruits and vegetables do you eat daily?  0-1    On average, how many sweetened beverages do you drink each day (Examples: soda, juice, sweet tea, etc.  Do NOT count diet or artificially sweetened beverages)?   2    How many days per week do you miss taking your medication? 0                        Chief Complaint         The patient is a pleasant 44-year-old gentleman who presents today for follow-up of his asthma.  He notes that he has been using albuterol but notes it does not seem be working as well as it has been in the past.  He has had a history of nephrotic syndrome and as such is on document of prednisone per his nephrologist.  He is now down to 10 mg daily.  Certainly, previous/higher doses were beneficial to his reactive airway disease.  He notes these had no acute episodes but states that when he exerts himself or goes into a very cold environment, he does have some tightness.  No chest pain is associated with this, he has no cardiac symptoms otherwise noted.  Denies any coughing, excess sputum production or the like.                              Review of Systems       LUNGS: Pt denies: cough, excess sputum, hemoptysis,  or shortness of breath.   HEART: Pt denies: chest pain, arrhythmia, syncope, tachy or bradyarrhythmia.   GI: Pt denies: nausea, vomiting, diarrhea, constipation, melena, or hematochezia.   NEURO: Pt denies: seizures, strokes, diplopia, weakness, paraesthesias, or paralysis.   SKIN: Pt denies: itching, rashes, discoloration, or specific lesions of concern. Denies recent hair loss.   PSYCH: The patient denies significant depression, anxiety, mood imbalance. Specifically denies any suicidal ideation.                                     Examination       Constitutional: The patient appears to be in no acute distress. The patient appears to be adequately hydrated. No acute respiratory or hemodynamic distress is noted at this time.   LUNGS: clear with very scant wheezes in the lower peripheral areas bilaterally, airflow is brisk, no intercostal retraction or stridor is noted. No coughing is noted during visit.   HEART:  regular without rubs, clicks, gallops, or murmurs. PMI is nondisplaced. Upstrokes are brisk. S1,S2 are heard.   GI: Abdomen is soft, without rebound, guarding or tenderness. Bowel sounds are appropriate. No renal bruits are heard.   NEURO: Pt is alert and appropriate. No neurologic lateralization is noted. Cranial nerves 2-12 are intact. Peripheral sensory and motor function are grossly normal.    SKIN:  warm and dry. No erythema, or rashes are noted. No specific lesions of concern are noted.    PSYCH: The patient appears grossly appropriate. Maintains good eye contact, does not have any jittery or atypical motion. Displays appropriate affect.                                             Decision Making    1. Nephrotic syndrome  Continue current dose of prednisone and ACE inhibitor per nephrology  - predniSONE (DELTASONE) 10 MG tablet; Take 1 tablet (10 mg) by mouth daily  Dispense: 180 tablet; Refill: 0    2. Moderate persistent asthma without complication  We will obtain pulmonary function studies  including volumes and DLCO to determine extent of asthma as well as pulsed dilator values to determine effectiveness of current medical program.  - General PFT Lab (Please always keep checked); Future                             FOLLOW UP   I have asked the patient to make an appointment for followup with me following the PFT.        I have carefully explained the diagnosis and treatment options to the patient.  The patient has displayed an understanding of the above, and all subsequent questions were answered.      DO CARISSA Jones    Portions of this note were produced using [employer]  Although every attempt at real-time proof reading has been made, occasional grammar/syntax errors may have been missed.           - Pulmonary Function Test; Future

## 2019-11-27 RX ORDER — PREDNISONE 10 MG/1
10 TABLET ORAL DAILY
Qty: 180 TABLET | Refills: 0 | COMMUNITY
Start: 2019-11-26 | End: 2020-02-05

## 2019-11-27 ASSESSMENT — ASTHMA QUESTIONNAIRES: ACT_TOTALSCORE: 21

## 2019-12-04 ENCOUNTER — HOSPITAL ENCOUNTER (OUTPATIENT)
Dept: RESPIRATORY THERAPY | Facility: CLINIC | Age: 44
Discharge: HOME | End: 2019-12-04
Attending: INTERNAL MEDICINE | Admitting: INTERNAL MEDICINE
Payer: COMMERCIAL

## 2019-12-04 DIAGNOSIS — J45.40 MODERATE PERSISTENT ASTHMA WITHOUT COMPLICATION: ICD-10-CM

## 2019-12-04 PROCEDURE — 94726 PLETHYSMOGRAPHY LUNG VOLUMES: CPT

## 2019-12-04 PROCEDURE — 94060 EVALUATION OF WHEEZING: CPT | Mod: 26

## 2019-12-04 PROCEDURE — 94726 PLETHYSMOGRAPHY LUNG VOLUMES: CPT | Mod: 26

## 2019-12-04 PROCEDURE — 25000125 ZZHC RX 250

## 2019-12-04 PROCEDURE — 94729 DIFFUSING CAPACITY: CPT | Mod: 26

## 2019-12-04 PROCEDURE — 94060 EVALUATION OF WHEEZING: CPT

## 2019-12-04 PROCEDURE — 94729 DIFFUSING CAPACITY: CPT

## 2019-12-04 RX ORDER — ALBUTEROL SULFATE 0.83 MG/ML
2.5 SOLUTION RESPIRATORY (INHALATION) ONCE
Status: COMPLETED | OUTPATIENT
Start: 2019-12-04 | End: 2019-12-04

## 2019-12-04 RX ADMIN — ALBUTEROL SULFATE 2.5 MG: 2.5 SOLUTION RESPIRATORY (INHALATION) at 13:24

## 2019-12-04 NOTE — PROGRESS NOTES
Pre-Bronch    Post-Bronch    Actual Pred %Pred  Actual %Pred %Chng  ---- SPIROMETRY ----                FVC (L) 4.73 4.95 95   5.00 100 +5  FEV1 (L) 2.84 3.95 71   3.06 77 +7  FEV1/FVC (%) 60 80     61   +1  FEV1/SVC (%) 59 77            FEV1/FEV6 (%) 61 81     62   +1  FEF Max (L/sec) 5.88 9.81 59   5.65 57 -3  FEF 25-75% (L/sec) 1.70 3.76 45   2.10 55 +23  FIVC (L) 4.16       4.58   +10  FIF Max (L/sec) 2.53 8.03 31   3.65 45 +44  Expiratory Time (sec) 6.80       6.60   -3  ----LUNG MECHANICS                 MVV (L/min)   156            MEP (cmH2O)                MIP (cmH2O)                ---- LUNG VOLUMES ----                SVC (L) 4.82 5.16 93          IC (L) 3.05 3.60 84          ERV (L) 1.77 1.57 113          FRC(Pleth) (L) 3.67 3.42 107          RV (Pleth) (L) 1.90 2.06 92          TLC (Pleth) (L) 6.72 6.92 97          RV/TLC (Pleth) (%) 28 32            ---- DIFFUSION ----                DLCOunc (ml/min/mmHg) 31.36 29.30 107          DLCOcor (ml/min/mmHg) 30.53 29.30 104          DL/VA (ml/min/mmHg/L) 4.71 4.56            VA (L) 6.49 6.43 100          IVC (L) 4.61              Hgb (gm/dL) 15.6 12-18     15.6

## 2019-12-04 NOTE — LETTER
December 10, 2019      Deshawn Flood  [facility] [address]        Dear ,    We are writing to inform you of your test results.    The pulmonary function studies show minimal airflow obstruction.   Feel free to contact me via the office or My Chart if you have any questions regarding the above.     Resulted Orders   General PFT Lab (Please always keep checked)   Result Value Ref Range    FVC-Pred 4.95 L    FVC-Pre 4.73 L    FVC-%Pred-Pre 95 %    FEV1-Pre 2.84 L    FEV1-%Pred-Pre 71 %    FEV1FVC-Pred 80 %    FEV1FVC-Pre 60 %    FEFMax-Pred 9.81 L/sec    FEFMax-Pre 5.88 L/sec    FEFMax-%Pred-Pre 59 %    FEF2575-Pred 3.76 L/sec    FEF2575-Pre 1.70 L/sec    VOO0379-%Pred-Pre 45 %    FEF2575-Post 2.10 L/sec    LXH1314-%Pred-Post 55 %    ExpTime-Pre 6.80 sec    FIFMax-Pre 2.53 L/sec    VC-Pred 5.16 L    VC-Pre 4.82 L    VC-%Pred-Pre 93 %    IC-Pred 3.60 L    IC-Pre 3.05 L    IC-%Pred-Pre 84 %    ERV-Pred 1.57 L    ERV-Pre 1.77 L    ERV-%Pred-Pre 113 %    FEV1FEV6-Pred 81 %    FEV1FEV6-Pre 61 %    FRCPleth-Pred 3.42 L    FRCPleth-Pre 3.67 L    FRCPleth-%Pred-Pre 107 %    RVPleth-Pred 2.06 L    RVPleth-Pre 1.90 L    RVPleth-%Pred-Pre 92 %    TLCPleth-Pred 6.92 L    TLCPleth-Pre 6.72 L    TLCPleth-%Pred-Pre 97 %    Gaw-Pred 1.03 L/s/cmH2O    Gaw-Pre 0.35 L/s/cmH2O    Gaw-%Pred-Pre 33 %    sGaw-Pred 0.08 1/cmH2O*s    sGaw-Pre 0.08 1/cmH2O*s    sGaw-%Pred-Pre 98 %    sRaw-Pred 4.76 cmH2O*s    sRaw-Pre 12.25 cmH2O*s    sRaw-%Pred-Pre 257 %    DLCOunc-Pred 29.30 ml/min/mmHg    DLCOunc-Pre 31.36 ml/min/mmHg    DLCOunc-%Pred-Pre 107 %    DLCOcor-Pre 30.53 ml/min/mmHg    DLCOcor-%Pred-Pre 104 %    VA-Pre 6.49 L    VA-%Pred-Pre 100 %    FEV1SVC-Pred 77 %    FEV1SVC-Pre 59 %    Narrative    IMPRESSION:  Mild Airflow Obstruction   No significant bronchodilator response.  Normal diffusing capacity.  Normal lung volumes.  No previous testing available for comparison.  Jennifer Gallagher,  MD  ____________________________________________M.D.  ELIN WARNER    This interpretation has been electronically signed:  ELIN WARNER 12/05/2019  08:16:09 PM         If you have any questions or concerns, please call the clinic at the number listed above.       Sincerely,        Alvarez Yu

## 2019-12-04 NOTE — DISCHARGE INSTRUCTIONS
Thank you for completing pulmonary function testing today.  All results will be scanned into your epic results for your doctor to review.  Please resume taking all your current prescribed medications and diet as directed by your provider.   If you have not heard from your provider about your testing within two weeks and do not have a follow-up appointment scheduled with them please contact your provider about any questions you have concerning your testing.   Thank you  [facility]monary [facility]

## 2019-12-05 LAB
DLCOCOR-%PRED-PRE: 104 %
DLCOCOR-PRE: 30.53 ML/MIN/MMHG
DLCOUNC-%PRED-PRE: 107 %
DLCOUNC-PRE: 31.36 ML/MIN/MMHG
DLCOUNC-PRED: 29.3 ML/MIN/MMHG
ERV-%PRED-PRE: 113 %
ERV-PRE: 1.77 L
ERV-PRED: 1.57 L
EXPTIME-PRE: 6.8 SEC
FEF2575-%PRED-POST: 55 %
FEF2575-%PRED-PRE: 45 %
FEF2575-POST: 2.1 L/SEC
FEF2575-PRE: 1.7 L/SEC
FEF2575-PRED: 3.76 L/SEC
FEFMAX-%PRED-PRE: 59 %
FEFMAX-PRE: 5.88 L/SEC
FEFMAX-PRED: 9.81 L/SEC
FEV1-%PRED-PRE: 71 %
FEV1-PRE: 2.84 L
FEV1FEV6-PRE: 61 %
FEV1FEV6-PRED: 81 %
FEV1FVC-PRE: 60 %
FEV1FVC-PRED: 80 %
FEV1SVC-PRE: 59 %
FEV1SVC-PRED: 77 %
FIFMAX-PRE: 2.53 L/SEC
FRCPLETH-%PRED-PRE: 107 %
FRCPLETH-PRE: 3.67 L
FRCPLETH-PRED: 3.42 L
FVC-%PRED-PRE: 95 %
FVC-PRE: 4.73 L
FVC-PRED: 4.95 L
GAW-%PRED-PRE: 33 %
GAW-PRE: 0.35 L/S/CMH2O
GAW-PRED: 1.03 L/S/CMH2O
IC-%PRED-PRE: 84 %
IC-PRE: 3.05 L
IC-PRED: 3.6 L
RVPLETH-%PRED-PRE: 92 %
RVPLETH-PRE: 1.9 L
RVPLETH-PRED: 2.06 L
SGAW-%PRED-PRE: 98 %
SGAW-PRE: 0.08 1/CMH2O*S
SGAW-PRED: 0.08 1/CMH2O*S
SRAW-%PRED-PRE: 257 %
SRAW-PRE: 12.25 CMH2O*S
SRAW-PRED: 4.76 CMH2O*S
TLCPLETH-%PRED-PRE: 97 %
TLCPLETH-PRE: 6.72 L
TLCPLETH-PRED: 6.92 L
VA-%PRED-PRE: 100 %
VA-PRE: 6.49 L
VC-%PRED-PRE: 93 %
VC-PRE: 4.82 L
VC-PRED: 5.16 L

## 2019-12-06 DIAGNOSIS — N04.9 NEPHROTIC SYNDROME: ICD-10-CM

## 2019-12-06 RX ORDER — SULFAMETHOXAZOLE/TRIMETHOPRIM 800-160 MG
1 TABLET ORAL
Qty: 24 TABLET | Refills: 0 | Status: SHIPPED | OUTPATIENT
Start: 2019-12-06 | End: 2020-02-05

## 2019-12-10 NOTE — RESULT ENCOUNTER NOTE
Dear Deshawn, your recent test results are attached.    The pulmonary function studies show minimal airflow obstruction.  Feel free to contact me via the office or My Chart if you have any questions regarding the above.

## 2019-12-19 DIAGNOSIS — E78.5 HYPERLIPIDEMIA LDL GOAL <100: ICD-10-CM

## 2019-12-19 DIAGNOSIS — N04.9 NEPHROTIC SYNDROME: ICD-10-CM

## 2019-12-19 RX ORDER — ATORVASTATIN CALCIUM 20 MG/1
20 TABLET, FILM COATED ORAL DAILY
Qty: 30 TABLET | Refills: 11 | Status: SHIPPED | OUTPATIENT
Start: 2019-12-19 | End: 2020-02-12

## 2019-12-19 NOTE — TELEPHONE ENCOUNTER
Last Office Visit with Nephrologist:  9/9/19.  Medication refilled per Nephrology Clinic protocol.     Angela Roa RN

## 2020-01-03 DIAGNOSIS — N04.9 NEPHROTIC SYNDROME: ICD-10-CM

## 2020-01-03 DIAGNOSIS — N05.0 MINIMAL CHANGE DISEASE: ICD-10-CM

## 2020-01-03 RX ORDER — LISINOPRIL 20 MG/1
20 TABLET ORAL DAILY
Qty: 30 TABLET | Refills: 3 | Status: SHIPPED | OUTPATIENT
Start: 2020-01-03 | End: 2020-05-15

## 2020-01-06 ENCOUNTER — OFFICE VISIT (OUTPATIENT)
Dept: GASTROENTEROLOGY | Facility: CLINIC | Age: 45
End: 2020-01-06
Payer: COMMERCIAL

## 2020-01-06 VITALS
DIASTOLIC BLOOD PRESSURE: 70 MMHG | HEIGHT: 69 IN | SYSTOLIC BLOOD PRESSURE: 117 MMHG | WEIGHT: 168.2 LBS | OXYGEN SATURATION: 98 % | BODY MASS INDEX: 24.91 KG/M2 | HEART RATE: 62 BPM

## 2020-01-06 DIAGNOSIS — Z09 FOLLOW UP: Primary | ICD-10-CM

## 2020-01-06 PROCEDURE — 99213 OFFICE O/P EST LOW 20 MIN: CPT | Performed by: PHYSICIAN ASSISTANT

## 2020-01-06 ASSESSMENT — ENCOUNTER SYMPTOMS
SHORTNESS OF BREATH: 0
DIARRHEA: 0
FEVER: 0
NERVOUS/ANXIOUS: 0
NAUSEA: 0
TROUBLE SWALLOWING: 0
PALPITATIONS: 0
ANAL BLEEDING: 0
DYSURIA: 0
UNEXPECTED WEIGHT CHANGE: 0
RECTAL PAIN: 0
BLOOD IN STOOL: 0
ABDOMINAL PAIN: 0
CONSTIPATION: 0
ABDOMINAL DISTENTION: 0
FREQUENCY: 0
WEAKNESS: 0
COUGH: 0

## 2020-01-06 ASSESSMENT — MIFFLIN-ST. JEOR: SCORE: 1642.29

## 2020-01-06 ASSESSMENT — PAIN SCALES - GENERAL: PAINLEVEL: NO PAIN (0)

## 2020-01-06 NOTE — PATIENT INSTRUCTIONS
Thank you for visiting our Gastroenterology office today.     Follow up as needed. Please call [phone] to notify us if you develop any abdominal, nausea, vomiting, diarrhea and/or rectal bleeding.

## 2020-01-06 NOTE — PROGRESS NOTES
GASTROENTEROLOGY FOLLOW UP CLINIC VISIT    CC/REFERRING MD:    Dr. Scott     REASON FOR CONSULTATION: Follow up    HISTORY OF PRESENT ILLNESS:    Deshawn Flood is 44 year old male who presents for follow up. He was last seen in our office in July 2019.     He was doing well until he woke up one morning in April 2019 with abdominal pain, swelling bloating and leg swelling.  He recalls trying to eat a bowl of cereal and feeling very full and not able to eat comfortably due to his distended abdomen.  He went to [facility] clinic for further evaluation which consisted of labs, abdominal x-ray and CT of the abdomen pelvis.  He was noted to have small bowel enteritis with associated inflammatory changes of the mesenteric adipose tissues, moderate ascites and small bilateral reactive pleural effusions.  He was sent to the emergency room for immediate evaluation.  He was diagnosed with nephrotic syndrome and acute kidney injury and was treated accordingly.  He is currently under the care of nephrology and was taking diuretic, prednisone and following sodium restriction diet.  He has now been taken off of diuretics and is being tapered down off of prednisone.     He continues to deny any GI symptoms. There is no abdominal pain, nausea, vomiting, acid reflux, diarrhea, rectal bleeding or black colored stools.      PROBLEM LIST  Patient Active Problem List    Diagnosis Date Noted     Nephrotic syndrome 04/09/2019     Priority: Medium     Anasarca 04/09/2019     Priority: Medium     Loose body of left knee 08/02/2012     Priority: Medium     Left knee pain 08/02/2012     Priority: Medium     Tobacco abuse 08/02/2012     Priority: Medium     History of pneumothorax 08/02/2012     Priority: Medium     spontaneous in 2005       CARDIOVASCULAR SCREENING; LDL GOAL LESS THAN 160 08/02/2012     Priority: Medium       PERTINENT PAST MEDICAL HISTORY:  (I personally reviewed this history with the patient at  today's visit)   Past Medical History:   Diagnosis Date     Anemia      Other spontaneous pneumothorax      SPONTANEOUS PNEUMOTHORAX NEC 3/25/2005     Unspecified asthma(493.90)          PREVIOUS SURGERIES: (I personally reviewed this history with the patient at today's visit)   Past Surgical History:   Procedure Laterality Date     ARTHROSCOPY KNEE  8/3/2012    Procedure: ARTHROSCOPY KNEE;  Left knee Arthroscopy with removal of loose bodies;  Surgeon: Bibi Roberts MD;  Location: [facility] OR     HC REPAIR UMBILICAL GODFREY,<4Y/O,REDUC  1978     IR RENAL BIOPSY LEFT  4/9/2019     TUBE THORACOSTOMY,ABSC/EMPYEMA/HEMO  3/20/2005    Small chest tube with Heimlich valve.         ALLERGIES:     Allergies   Allergen Reactions     Bee Venom      swells up     No Known Drug Allergies      Seasonal Allergies        PERTINENT MEDICATIONS:    Current Outpatient Medications:      albuterol (PROAIR HFA/PROVENTIL HFA/VENTOLIN HFA) 108 (90 Base) MCG/ACT inhaler, Inhale 1-2 puffs into the lungs every 6 hours as needed for shortness of breath / dyspnea or wheezing, Disp: 8.5 g, Rfl: 3     atorvastatin (LIPITOR) 20 MG tablet, Take 1 tablet (20 mg) by mouth daily, Disp: 30 tablet, Rfl: 11     lisinopril (PRINIVIL/ZESTRIL) 20 MG tablet, Take 1 tablet (20 mg) by mouth daily, Disp: 30 tablet, Rfl: 3     omeprazole (PRILOSEC) 20 MG DR capsule, Take 1 capsule (20 mg) by mouth every morning (before breakfast), Disp: 60 capsule, Rfl: 11     predniSONE (DELTASONE) 10 MG tablet, Take 1 tablet (10 mg) by mouth daily, Disp: 180 tablet, Rfl: 0     sulfamethoxazole-trimethoprim (BACTRIM DS/SEPTRA DS) 800-160 MG tablet, Take 1 tablet by mouth three times a week Monday, Wednesday, Friday, Disp: 24 tablet, Rfl: 0    SOCIAL HISTORY:  Social History     Socioeconomic History     Marital status: Single     Spouse name: Not on file     Number of children: Not on file     Years of education: Not on file     Highest education level: Not on file   Occupational  History     Not on file   Social Needs     Financial resource strain: Not on file     Food insecurity:     Worry: Not on file     Inability: Not on file     Transportation needs:     Medical: Not on file     Non-medical: Not on file   Tobacco Use     Smoking status: Current Some Day Smoker     Packs/day: 0.10     Years: 15.00     Pack years: 1.50     Types: Cigarettes     Smokeless tobacco: Former User     Types: Chew     Tobacco comment: hopes to quit smoking --hasn't had any since lung went down 1/14/05.   Substance and Sexual Activity     Alcohol use: Yes     Alcohol/week: 17.5 standard drinks     Types: 21 Cans of beer per week     Comment: occ     Drug use: No     Sexual activity: Not Currently   Lifestyle     Physical activity:     Days per week: Not on file     Minutes per session: Not on file     Stress: Not on file   Relationships     Social connections:     Talks on phone: Not on file     Gets together: Not on file     Attends Alevism service: Not on file     Active member of club or organization: Not on file     Attends meetings of clubs or organizations: Not on file     Relationship status: Not on file     Intimate partner violence:     Fear of current or ex partner: Not on file     Emotionally abused: Not on file     Physically abused: Not on file     Forced sexual activity: Not on file   Other Topics Concern     Parent/sibling w/ CABG, MI or angioplasty before 65F 55M? Not Asked   Social History Narrative     Not on file       FAMILY HISTORY: (I personally reviewed this history with the patient at today's visit)  Family History   Problem Relation Age of Onset     Autoimmune Disease No family hx of           Review of Systems   Constitutional: Negative for fever and unexpected weight change.   HENT: Negative for trouble swallowing.    Respiratory: Negative for cough and shortness of breath.    Cardiovascular: Negative for chest pain and palpitations.   Gastrointestinal: Negative for abdominal  "distention, abdominal pain, anal bleeding, blood in stool, constipation, diarrhea, nausea and rectal pain.   Genitourinary: Negative for dysuria and frequency.   Skin: Negative for pallor and rash.   Neurological: Negative for weakness.   Psychiatric/Behavioral: The patient is not nervous/anxious.        PHYSICAL EXAMINATION:  Constitutional: aaox3, cooperative, pleasant, not dyspneic/diaphoretic, no acute distress  Vitals reviewed: /70 (BP Location: Left arm, Patient Position: Sitting, Cuff Size: Adult Regular)   Pulse 62   Ht 1.759 m (5' 9.25\")   Wt 76.3 kg (168 lb 3.2 oz)   SpO2 98%   BMI 24.66 kg/m     Wt:   Wt Readings from Last 2 Encounters:   01/06/20 76.3 kg (168 lb 3.2 oz)   11/26/19 73.5 kg (162 lb)        Physical Exam  Vitals signs and nursing note reviewed.   Constitutional:       Appearance: Normal appearance. He is well-developed.   HENT:      Head: Normocephalic and atraumatic.      Mouth/Throat:      Comments: Poor dentition   Eyes:      General: No scleral icterus.     Pupils: Pupils are equal, round, and reactive to light.   Cardiovascular:      Rate and Rhythm: Normal rate and regular rhythm.      Heart sounds: Normal heart sounds.   Pulmonary:      Effort: Pulmonary effort is normal. No respiratory distress.      Breath sounds: Normal breath sounds.   Abdominal:      General: Bowel sounds are normal. There is no distension.      Palpations: Abdomen is soft. There is no mass.      Tenderness: There is no abdominal tenderness. There is no guarding or rebound.   Neurological:      Mental Status: He is alert and oriented to person, place, and time.   Psychiatric:         Behavior: Behavior normal.           PERTINENT STUDIES: (I personally reviewed these laboratory studies today)  Most recent CBC:   Recent Labs   Lab Test 11/26/19  1426 09/09/19  1506   WBC 10.3 14.5*   HGB 15.6 16.2   HCT 44.6 46.2    249     Most recent hepatic panel:  Recent Labs   Lab Test 11/26/19  1426 " 09/09/19  1506   ALT 25 41   AST 11 14     Most recent creatinine:  Recent Labs   Lab Test 11/26/19  1426 09/09/19  1506   CR 1.15 1.08       TSH   Date Value Ref Range Status   03/29/2019 2.76 0.40 - 4.00 mU/L Final         RADIOLOGY:    CT ABDOMEN AND PELVIS WITH CONTRAST  3/29/2019 3:50 PM     HISTORY: Abdominal pain, generalized. Patient has bloating feeling and  feels like he cannot eat anything additional due to bloating.     TECHNIQUE: Scans obtained from the diaphragm through the pelvis with  IV contrast, 85 mL- Isovue 370.   Radiation dose for this scan was reduced using automated exposure  control, adjustment of the mA and/or kV according to patient size, or  iterative reconstruction technique.     COMPARISON:  Abdominal x-rays dated 3/29/2019.     FINDINGS: There are small bilateral pleural fluid collection with  adjacent compressive atelectasis in the lung bases. Visualized  mediastinal contents are unremarkable. No aggressive osseous lesions  are seen. There are degenerative changes in the spine.       The gallbladder is contracted and has a mildly enhancing wall. There  is pericholecystic fluid which is likely due to ascites and less  likely due to inflammation of the gallbladder. The liver, pancreas,  spleen, bilateral adrenal glands and bilateral kidneys enhance  normally. No hydronephrosis, nephrolithiasis, hydroureter or ureteral  calculus seen. Urinary bladder is normal in appearance.     There is a moderate amount of ascites. This is also seen in the  gallbladder. No adenopathy or free air is identified. Aorta is grossly  normal in appearance.     Prostate gland is unremarkable.     Colon is of normal caliber without pericolonic inflammatory change to  suggest acute diverticulitis. Structure likely representing a normal  appendix extends from the colon.     There is what appears to be enhancement and abnormal thickening of the  wall of the small bowel, worse proximally. This could  represent  enteritis of an infectious or inflammatory etiology. There is edema in  the adipose tissues of the mesenteric root and throughout the adipose  tissues in the peritoneal cavity. This appears to mostly spare the  pararenal adipose tissues.                                                                      IMPRESSION:  1. Findings are concerning for diffuse small bowel enteritis likely  infectious or inflammatory in etiology, with associated inflammatory  changes of the mesenteric adipose tissues, moderate ascites, small  bilateral probably reactive pleural effusions and with some edema in  the subcutaneous adipose tissues.  2. No significant atherosclerosis is identified. No obvious arterial  blockage is seen in the mesenteric vessels to suggest ischemia.  3. Mild degenerative changes of the spine and hips. No acute fracture  or aggressive osseous lesion.     I discussed the diffuse small bowel abnormality, ascites, and  bilateral pleural fluid collections with Alma Gardner on 3/29/2019 at  approximately 4:00 PM.     DUC ZAVALA MD      ABDOMEN TWO VIEWS  3/29/2019 2:09 PM      HISTORY:  Abdominal pain, generalized.     COMPARISON: None.     FINDINGS:  There is a mildly nonspecific bowel gas pattern with gas  seen both in large and small bowel. No abnormally dilated gas-filled  loops of bowel to suggest bowel obstruction. The stomach is minimally  distended and gas-filled. No free air is seen under the  hemidiaphragms. No renal or ureteral calculus is identified.  Degenerative changes are partially imaged in the lumbar spine with  disc height loss worst at L2-L3. There are mild degenerative changes  in the hips.                                                                      IMPRESSION:  1. Mildly nonspecific bowel gas pattern without evidence for bowel  obstruction or intraperitoneal free air.  2. Subtle density projected over the lung bases could represent small  pleural fluid collections,  although this could be due to the artifact  from the angulation of the image.  3. Etiology for patient's symptoms is not definitely seen.     I discussed these findings with Alma Gardner on 3/29/2018 at 2:25 PM.     DUC ZAVALA MD            ASSESSMENT/PLAN:    Deshawn Flood is a 44 year old male who presents for follow up.    He was in fairly good health until April 2019 when he started to have abdominal bloating, swelling and discomfort. He was noted to have small bowel enteritis with associated inflammatory changes of the mesenteric adipose tissues, moderate ascites and small bilateral reactive pleural effusions on CT scan of the abdomen and pelvis.  He was admitted for further work-up and diagnosed with nephrotic syndrome and acute kidney injury and was treated accordingly.  He is currently under the care of nephrology and was previously on diuretic, prednisone and sodium restriction diet. Diuretics have been discontinued by nephrologist and he has tapered down off of prednisone and is currently on 10mg daily.     He is overall doing well from a GI standpoint. He is without any abdominal pain, diarrhea, rectal bleeding, rectal pain or tenesmus. He has a good appetite as well and denies any weight loss. We reviewed his previous CT findings from March 2019 which revealed concerns for small bowel enteritis. Likely related to hypoalbuminemia at that time. We did check a fecal calpro level early on which was borderline and at our last visit we discussed further evaluating with a CT enterography however this has not been completed. He is now tapering off of prednisone and continues to be without GI symptoms. We discussed options of repeating fecal calpro and obtaining CT enterography at this time vs monitoring clinical symptoms. Since he overall is feeling well and is without any GI concerns, he agrees and elects to proceed with clinic monitoring as it is likely his previous enteritis was related to  hypoalbuminemia and appears to have resolved. Patient is to notify us if he develops any concerning symptoms such as abd pain, diarrhea, rectal bleeding, rectal pain,etc.     Follow up as needed.       Follow up        Thank you for this consultation.  It was a pleasure to participate in the care of this patient; please contact us with any further questions.      This note was created with voice recognition software, and while reviewed for accuracy, typos may remain.     Jane Palafox PA-C  Gastroenterology  [facility]

## 2020-01-06 NOTE — NURSING NOTE
"Deshawn Flood's goals for this visit include:   Chief Complaint   Patient presents with     RECHECK     6 month follow up; Patient reports symptoms have improved        He requests these members of his care team be copied on today's visit information: PCP    PCP: Alvarez Yu    Referring Provider:  No referring provider defined for this encounter.    /70 (BP Location: Left arm, Patient Position: Sitting, Cuff Size: Adult Regular)   Pulse 62   Ht 1.759 m (5' 9.25\")   Wt 76.3 kg (168 lb 3.2 oz)   SpO2 98%   BMI 24.66 kg/m      Do you need any medication refills at today's visit? No    Marielle Valencia LPN      "

## 2020-02-05 DIAGNOSIS — N04.9 NEPHROTIC SYNDROME: ICD-10-CM

## 2020-02-05 RX ORDER — SULFAMETHOXAZOLE/TRIMETHOPRIM 800-160 MG
1 TABLET ORAL
Qty: 24 TABLET | Refills: 0 | Status: SHIPPED | OUTPATIENT
Start: 2020-02-05 | End: 2020-02-12

## 2020-02-05 RX ORDER — PREDNISONE 10 MG/1
10 TABLET ORAL DAILY
Qty: 180 TABLET | Refills: 0 | Status: SHIPPED | OUTPATIENT
Start: 2020-02-05 | End: 2020-02-12

## 2020-02-10 ENCOUNTER — TELEPHONE (OUTPATIENT)
Dept: NEPHROLOGY | Facility: CLINIC | Age: 45
End: 2020-02-10

## 2020-02-10 DIAGNOSIS — E78.5 HYPERLIPIDEMIA LDL GOAL <100: ICD-10-CM

## 2020-02-10 DIAGNOSIS — N04.9 NEPHROTIC SYNDROME: ICD-10-CM

## 2020-02-10 NOTE — TELEPHONE ENCOUNTER
Patient called to report that he stopped taking all of his medications Feb 1st because he doesn't want to be taking all these pills and states that he doesn't know what reason he needs to be. Will send to Dr. Scott for review.  Kami Simmons LPN  Nephrology  [phone]

## 2020-02-12 RX ORDER — ATORVASTATIN CALCIUM 20 MG/1
20 TABLET, FILM COATED ORAL DAILY
Qty: 30 TABLET | Refills: 11 | Status: SHIPPED | OUTPATIENT
Start: 2020-02-12 | End: 2020-04-23

## 2020-02-12 RX ORDER — SULFAMETHOXAZOLE/TRIMETHOPRIM 800-160 MG
1 TABLET ORAL
Qty: 24 TABLET | Refills: 0 | COMMUNITY
Start: 2020-02-12 | End: 2020-02-29

## 2020-02-12 RX ORDER — PREDNISONE 10 MG/1
10 TABLET ORAL DAILY
Qty: 180 TABLET | Refills: 0 | COMMUNITY
Start: 2020-02-12 | End: 2020-05-18 | Stop reason: DRUGHIGH

## 2020-02-12 NOTE — TELEPHONE ENCOUNTER
We weaned off prednisone and bactrim, and I am ok without the statin now that he is in remission, so the only medication he should probably stay on long term is the lisinopril, he should followup up in clinic in next couple months and we can discuss monitoring/ symptoms of recurrence     Thanks       Call to patient to inform him of recommendations per Dr. Scott above. Will update medication list. Message sent to schedulers to schedule this patient for follow up.  Kami Simmons LPN  Nephrology  [phone]

## 2020-02-27 ENCOUNTER — TELEPHONE (OUTPATIENT)
Dept: NEPHROLOGY | Facility: CLINIC | Age: 45
End: 2020-02-27

## 2020-02-27 DIAGNOSIS — N04.9 NEPHROTIC SYNDROME: Primary | ICD-10-CM

## 2020-02-27 NOTE — TELEPHONE ENCOUNTER
Patient called in and reported that he started gaining weight again. Mom worried (she left a few a messages).  Patient states that the weight is all over. Patient does not know if BP or weight, no rash, no changes with his urine, no blood in his urine. Current medications patient is taking now.  Prednisone 10 mg yesterday and today   Lisinopril 20 mg daily.    Will route to Dr. Scott and follow up .  Kami Simmons LPN  Nephrology  [phone]

## 2020-02-28 DIAGNOSIS — N04.9 NEPHROTIC SYNDROME: ICD-10-CM

## 2020-02-28 LAB
ALBUMIN SERPL-MCNC: 1 G/DL (ref 3.4–5)
ALBUMIN UR-MCNC: >=300 MG/DL
ANION GAP SERPL CALCULATED.3IONS-SCNC: 5 MMOL/L (ref 3–14)
APPEARANCE UR: CLEAR
BILIRUB UR QL STRIP: NEGATIVE
BUN SERPL-MCNC: 14 MG/DL (ref 7–30)
CALCIUM SERPL-MCNC: 7.9 MG/DL (ref 8.5–10.1)
CASTS #/AREA URNS LPF: NORMAL /LPF (ref 0–2)
CHLORIDE SERPL-SCNC: 102 MMOL/L (ref 94–109)
CO2 SERPL-SCNC: 30 MMOL/L (ref 20–32)
COLOR UR AUTO: YELLOW
CREAT SERPL-MCNC: 1.19 MG/DL (ref 0.66–1.25)
CREAT UR-MCNC: 351 MG/DL
ERYTHROCYTE [DISTWIDTH] IN BLOOD BY AUTOMATED COUNT: 11.8 % (ref 10–15)
GFR SERPL CREATININE-BSD FRML MDRD: 73 ML/MIN/{1.73_M2}
GLUCOSE SERPL-MCNC: 91 MG/DL (ref 70–99)
GLUCOSE UR STRIP-MCNC: NEGATIVE MG/DL
HCT VFR BLD AUTO: 50.4 % (ref 40–53)
HGB BLD-MCNC: 18.1 G/DL (ref 13.3–17.7)
HGB UR QL STRIP: ABNORMAL
KETONES UR STRIP-MCNC: NEGATIVE MG/DL
LEUKOCYTE ESTERASE UR QL STRIP: NEGATIVE
MCH RBC QN AUTO: 31 PG (ref 26.5–33)
MCHC RBC AUTO-ENTMCNC: 35.9 G/DL (ref 31.5–36.5)
MCV RBC AUTO: 86 FL (ref 78–100)
NITRATE UR QL: NEGATIVE
PH UR STRIP: 6 PH (ref 5–7)
PHOSPHATE SERPL-MCNC: 3.7 MG/DL (ref 2.5–4.5)
PLATELET # BLD AUTO: 263 10E9/L (ref 150–450)
POTASSIUM SERPL-SCNC: 4.1 MMOL/L (ref 3.4–5.3)
PROT UR-MCNC: 21.76 G/L
PROT/CREAT 24H UR: 6.2 G/G CR (ref 0–0.2)
RBC # BLD AUTO: 5.84 10E12/L (ref 4.4–5.9)
RBC #/AREA URNS AUTO: NORMAL /HPF
SODIUM SERPL-SCNC: 137 MMOL/L (ref 133–144)
SOURCE: ABNORMAL
SP GR UR STRIP: >1.03 (ref 1–1.03)
UROBILINOGEN UR STRIP-ACNC: 0.2 EU/DL (ref 0.2–1)
WBC # BLD AUTO: 6.4 10E9/L (ref 4–11)
WBC #/AREA URNS AUTO: NORMAL /HPF

## 2020-02-28 PROCEDURE — 80069 RENAL FUNCTION PANEL: CPT | Performed by: INTERNAL MEDICINE

## 2020-02-28 PROCEDURE — 81001 URINALYSIS AUTO W/SCOPE: CPT | Performed by: INTERNAL MEDICINE

## 2020-02-28 PROCEDURE — 85027 COMPLETE CBC AUTOMATED: CPT | Performed by: INTERNAL MEDICINE

## 2020-02-28 PROCEDURE — 84156 ASSAY OF PROTEIN URINE: CPT | Performed by: INTERNAL MEDICINE

## 2020-02-28 PROCEDURE — 36415 COLL VENOUS BLD VENIPUNCTURE: CPT | Performed by: INTERNAL MEDICINE

## 2020-02-28 RX ORDER — BUMETANIDE 0.5 MG/1
0.5 TABLET ORAL DAILY
Qty: 15 TABLET | Refills: 1 | Status: SHIPPED | OUTPATIENT
Start: 2020-02-28 | End: 2020-05-18

## 2020-02-29 DIAGNOSIS — N05.0 MINIMAL CHANGE DISEASE: Primary | ICD-10-CM

## 2020-02-29 DIAGNOSIS — N04.9 NEPHROTIC SYNDROME: ICD-10-CM

## 2020-02-29 DIAGNOSIS — Z79.52 LONG TERM (CURRENT) USE OF SYSTEMIC STEROIDS: Primary | ICD-10-CM

## 2020-02-29 RX ORDER — PREDNISONE 20 MG/1
60 TABLET ORAL DAILY
Qty: 90 TABLET | Refills: 3 | Status: SHIPPED | OUTPATIENT
Start: 2020-02-29 | End: 2020-06-24

## 2020-02-29 RX ORDER — SULFAMETHOXAZOLE/TRIMETHOPRIM 800-160 MG
1 TABLET ORAL
Qty: 24 TABLET | Refills: 0 | Status: SHIPPED | OUTPATIENT
Start: 2020-03-02 | End: 2020-05-18

## 2020-03-02 ENCOUNTER — OFFICE VISIT (OUTPATIENT)
Dept: NEPHROLOGY | Facility: CLINIC | Age: 45
End: 2020-03-02
Payer: COMMERCIAL

## 2020-03-02 VITALS
WEIGHT: 188 LBS | HEART RATE: 66 BPM | SYSTOLIC BLOOD PRESSURE: 114 MMHG | DIASTOLIC BLOOD PRESSURE: 72 MMHG | BODY MASS INDEX: 27.56 KG/M2

## 2020-03-02 DIAGNOSIS — N04.9 NEPHROTIC SYNDROME: ICD-10-CM

## 2020-03-02 DIAGNOSIS — N05.0 MINIMAL CHANGE DISEASE: Primary | ICD-10-CM

## 2020-03-02 DIAGNOSIS — R60.1 ANASARCA: ICD-10-CM

## 2020-03-02 NOTE — LETTER
3/2/2020      RE: Deshawn Flood  [address] [address]       Assessment and Plan:  44 year old male who presents for followup of minimal change disease. He presented for evaluation after presenting to ER where CT scan noted ascites and nonspecific small bowel enteritis, and UA showed proteinuria. He had biopsy which showed minimal change on 4/9/2019 and treated with prednisone 70mg. He was in remission and down to prednsione 10mg then stopped it, and now with relapse.  His mother noted swelling last week.  He is up 20-25 lbs and proteinuria up to 6 grams/g cr    # Renal function- Scr was 1.1 in the past and was1.27 in ER. It was up to 1.6-1.8 at time of biopsy after diuretics were started.  - scr back to 1 or so  - proteinuria significant  - prednisone restarted 60mg  - he is trying to do sodium restriction (really is 3-4000mg restriction typically)  - labs monthly  - RTC 2 months  - slower wean with next taper?  - if relapses again, will consider CNI for long term therapy    # Hyperlipidemia- LDL very elevated,was on low dose statin but stopped;  Restart given nephrotic  - recheck lipids once not nephrotic    # Hypertension: normotensive   - will start lisinopril 20mg and bumex 0.5mg daily for swelling/  nephrotic    # Not anemic- hgb 18 suspect very volume depleted, he is drinking more water    # Electrolytes: normal      # Mineral Bone Disorder:   Check baseline PTH if creatinine remains elevated.     - RTC 2 month, labs monthly for now    Assessment and plan was discussed with patient and he voiced his understanding and agreement.    Reason for Visit:  Deshawn Flood is a 44 year old male with nephrotic range proteinuria noted and biopsy 4/9/19 shows minimal change disease    HPI:  He is a 44 year old male recently in normal health who presented with nephrotic syndrome. He underwent kidney biopsy 4/9 and it shows minimal change disease. He was admitted to the hospital briefly after biopsy as he  was having poor appetite due to bloating, and was diuresed. Also his creatinine was up to 1.7-1.8 which was concerning. Lisinopril was stopped in the hospital.    His weight is usually closer to 150-160  but was up to 184 lbs with last episode, was down to 160s with no proteinuria, but now back up to 188 lbs.  He thinks he gained weight, likely due to prednisone, and he is happy about this.     He is trying to eat healthier and is watching sodium intake, restricting to 4 grams a day.  He is not working but will be back to work soon. They will work with him for appointments etc    Last week his mom noted that he is more swollen. Labs done showed proteinuria back up to 6g/g cr, which was normal range in remission a few months back.  We discussed for a while that this disease can relapse and he will need long term followup and care. He hates taking medications but this is necessary unfortunately.    Renal History:   None prior to minimal change disease Spring 2019    ROS:   A comprehensive review of systems was obtained and negative, except as noted in the HPI or PMH.    Active Medical Problems:  Patient Active Problem List   Diagnosis     Loose body of left knee     Left knee pain     Tobacco abuse     History of pneumothorax     CARDIOVASCULAR SCREENING; LDL GOAL LESS THAN 160     Nephrotic syndrome     Anasarca     PMH:   Medical record was reviewed and PMH was discussed with patient and noted below.  Past Medical History:   Diagnosis Date     Anemia      Other spontaneous pneumothorax      SPONTANEOUS PNEUMOTHORAX NEC 3/25/2005     Unspecified asthma(493.90)      PSH:   Past Surgical History:   Procedure Laterality Date     ARTHROSCOPY KNEE  8/3/2012    Procedure: ARTHROSCOPY KNEE;  Left knee Arthroscopy with removal of loose bodies;  Surgeon: Bibi Roberts MD;  Location: [facility] OR     HC REPAIR UMBILICAL GODFREY,<4Y/O,REDUC  1978     IR RENAL BIOPSY LEFT  4/9/2019     TUBE THORACOSTOMY,ABSC/EMPYEMA/HEMO  3/20/2005     Small chest tube with Heimlich valve.       Family Hx:   Family History   Problem Relation Age of Onset     Autoimmune Disease No family hx of      Personal Hx:   Social History     Tobacco Use     Smoking status: Current Some Day Smoker     Packs/day: 0.10     Years: 15.00     Pack years: 1.50     Types: Cigarettes     Smokeless tobacco: Former User     Types: Chew     Tobacco comment: hopes to quit smoking --hasn't had any since lung went down 1/14/05.   Substance Use Topics     Alcohol use: Yes     Alcohol/week: 17.5 standard drinks     Types: 21 Cans of beer per week     Comment: occ       Allergies:  Allergies   Allergen Reactions     Bee Venom      swells up     No Known Drug Allergies      Seasonal Allergies        Medications:  Current Outpatient Medications   Medication Sig     albuterol (PROAIR HFA/PROVENTIL HFA/VENTOLIN HFA) 108 (90 Base) MCG/ACT inhaler Inhale 1-2 puffs into the lungs every 6 hours as needed for shortness of breath / dyspnea or wheezing     atorvastatin (LIPITOR) 20 MG tablet Take 1 tablet (20 mg) by mouth daily     lisinopril (PRINIVIL/ZESTRIL) 20 MG tablet Take 1 tablet (20 mg) by mouth daily     bumetanide (BUMEX) 0.5 MG tablet Take 1 tablet (0.5 mg) by mouth daily     omeprazole (PRILOSEC) 20 MG DR capsule Take 1 capsule (20 mg) by mouth every morning (before breakfast)     predniSONE (DELTASONE) 10 MG tablet Take 1 tablet (10 mg) by mouth daily     predniSONE (DELTASONE) 20 MG tablet Take 3 tablets (60 mg) by mouth daily     sulfamethoxazole-trimethoprim (BACTRIM DS) 800-160 MG tablet Take 1 tablet by mouth three times a week Monday, Wednesday, Friday     No current facility-administered medications for this visit.       Vitals:  /72 (BP Location: Left arm)   Pulse 66   Wt 85.3 kg (188 lb)   BMI 27.56 kg/m       Exam:  GENERAL APPEARANCE: alert and no distress, hyperactive  HENT: mouth without ulcers or lesions  LYMPHATICS: no cervical or supraclavicular nodes  RESP:  lungs clear to auscultation - no rales, rhonchi or wheezes  CV: regular rhythm, normal rate, no rub, no murmur  EDEMA: 2++ LE edema bilaterally  ABDOMEN: soft, nondistended, nontender, bowel sounds normal  MS: extremities normal - no gross deformities noted, no evidence of inflammation in joints, no muscle tenderness  SKIN: has erythematous maculopapular lesions on chest and back    LABS:   CMP  Recent Labs   Lab Test 02/28/20  1610 11/26/19  1426 09/09/19  1506 05/20/19  1350    137 136 132*   POTASSIUM 4.1 4.6 4.1 4.0   CHLORIDE 102 104 100 94   CO2 30 31 30 26   ANIONGAP 5 2* 6 12   GLC 91 136* 160* 162*   BUN 14 9 15 25   CR 1.19 1.15 1.08 1.19   GFRESTIMATED 73 76 82 73   GFRESTBLACK 84 88 >90 85   BASIA 7.9* 9.6 9.4 9.6     Recent Labs   Lab Test 11/26/19  1426 09/09/19  1506 05/20/19  1350 04/10/19  1009   BILITOTAL 0.8 0.4 0.4 0.7   ALKPHOS 41 65 61 42   ALT 25 41 57 9   AST 11 14 19 12     CBC  Recent Labs   Lab Test 02/28/20  1610 11/26/19  1426 09/09/19  1506 05/20/19  1410   HGB 18.1* 15.6 16.2 16.1   WBC 6.4 10.3 14.5* 16.7*   RBC 5.84 4.93 4.98 5.06   HCT 50.4 44.6 46.2 45.1   MCV 86 91 93 89   MCH 31.0 31.6 32.5 31.8   MCHC 35.9 35.0 35.1 35.7   RDW 11.8 12.2 12.4 12.3    240 249 292     URINE STUDIES  Recent Labs   Lab Test 02/28/20  1620 04/01/19  1500 03/29/19  1858   COLOR Yellow Yellow Yellow   APPEARANCE Clear Clear Clear   URINEGLC Negative Negative Negative   URINEBILI Negative Negative Negative   URINEKETONE Negative Negative Negative   SG >1.030 1.015 >1.035*   UBLD Moderate* Moderate* Moderate*   URINEPH 6.0 6.0 6.0   PROTEIN >=300* >=300* >499*   UROBILINOGEN 0.2 0.2  --    NITRITE Negative Negative Negative   LEUKEST Negative Negative Negative   RBCU O - 2 25-50* 7*   WBCU 0 - 5 0 - 5 7*     Recent Labs   Lab Test 02/28/20  1619 11/26/19  1435 09/09/19  1510 05/20/19  1355   UTPG 6.20* 0.06 0.14 0.13     PTH  No lab results found.  IRON STUDIES  No lab results  found.    IMPRESSION:  1. Findings are concerning for diffuse small bowel enteritis likely  infectious or inflammatory in etiology, with associated inflammatory  changes of the mesenteric adipose tissues, moderate ascites, small  bilateral probably reactive pleural effusions and with some edema in  the subcutaneous adipose tissues.  2. No significant atherosclerosis is identified. No obvious arterial  blockage is seen in the mesenteric vessels to suggest ischemia.  3. Mild degenerative changes of the spine and hips. No acute fracture  or aggressive osseous lesion.  MD Jenny Levy MD

## 2020-03-02 NOTE — PROGRESS NOTES
Assessment and Plan:  44 year old male who presents for followup of minimal change disease. He presented for evaluation after presenting to ER where CT scan noted ascites and nonspecific small bowel enteritis, and UA showed proteinuria. He had biopsy which showed minimal change on 4/9/2019 and treated with prednisone 70mg. He was in remission and down to prednsione 10mg then stopped it, and now with relapse.  His mother noted swelling last week.  He is up 20-25 lbs and proteinuria up to 6 grams/g cr    # Renal function- Scr was 1.1 in the past and was1.27 in ER. It was up to 1.6-1.8 at time of biopsy after diuretics were started.  - scr back to 1 or so  - proteinuria significant  - prednisone restarted 60mg  - he is trying to do sodium restriction (really is 3-4000mg restriction typically)  - labs monthly  - RTC 2 months  - slower wean with next taper?  - if relapses again, will consider CNI for long term therapy    # Hyperlipidemia- LDL very elevated,was on low dose statin but stopped;  Restart given nephrotic  - recheck lipids once not nephrotic    # Hypertension: normotensive   - will start lisinopril 20mg and bumex 0.5mg daily for swelling/  nephrotic    # Not anemic- hgb 18 suspect very volume depleted, he is drinking more water    # Electrolytes: normal      # Mineral Bone Disorder:   Check baseline PTH if creatinine remains elevated.     - RTC 2 month, labs monthly for now    Assessment and plan was discussed with patient and he voiced his understanding and agreement.    Reason for Visit:  Deshawn Flood is a 44 year old male with nephrotic range proteinuria noted and biopsy 4/9/19 shows minimal change disease    HPI:  He is a 44 year old male recently in normal health who presented with nephrotic syndrome. He underwent kidney biopsy 4/9 and it shows minimal change disease. He was admitted to the hospital briefly after biopsy as he was having poor appetite due to bloating, and was diuresed. Also his  creatinine was up to 1.7-1.8 which was concerning. Lisinopril was stopped in the hospital.    His weight is usually closer to 150-160  but was up to 184 lbs with last episode, was down to 160s with no proteinuria, but now back up to 188 lbs.  He thinks he gained weight, likely due to prednisone, and he is happy about this.     He is trying to eat healthier and is watching sodium intake, restricting to 4 grams a day.  He is not working but will be back to work soon. They will work with him for appointments etc    Last week his mom noted that he is more swollen. Labs done showed proteinuria back up to 6g/g cr, which was normal range in remission a few months back.  We discussed for a while that this disease can relapse and he will need long term followup and care. He hates taking medications but this is necessary unfortunately.    Renal History:   None prior to minimal change disease Spring 2019    ROS:   A comprehensive review of systems was obtained and negative, except as noted in the HPI or PMH.    Active Medical Problems:  Patient Active Problem List   Diagnosis     Loose body of left knee     Left knee pain     Tobacco abuse     History of pneumothorax     CARDIOVASCULAR SCREENING; LDL GOAL LESS THAN 160     Nephrotic syndrome     Anasarca     PMH:   Medical record was reviewed and PMH was discussed with patient and noted below.  Past Medical History:   Diagnosis Date     Anemia      Other spontaneous pneumothorax      SPONTANEOUS PNEUMOTHORAX NEC 3/25/2005     Unspecified asthma(493.90)      PSH:   Past Surgical History:   Procedure Laterality Date     ARTHROSCOPY KNEE  8/3/2012    Procedure: ARTHROSCOPY KNEE;  Left knee Arthroscopy with removal of loose bodies;  Surgeon: Bibi Roberts MD;  Location: [facility]     HC REPAIR UMBILICAL GODFREY,<4Y/O,REDUC  1978     IR RENAL BIOPSY LEFT  4/9/2019     TUBE THORACOSTOMY,ABSC/EMPYEMA/HEMO  3/20/2005    Small chest tube with Heimlich valve.       Family Hx:   Family  History   Problem Relation Age of Onset     Autoimmune Disease No family hx of      Personal Hx:   Social History     Tobacco Use     Smoking status: Current Some Day Smoker     Packs/day: 0.10     Years: 15.00     Pack years: 1.50     Types: Cigarettes     Smokeless tobacco: Former User     Types: Chew     Tobacco comment: hopes to quit smoking --hasn't had any since lung went down 1/14/05.   Substance Use Topics     Alcohol use: Yes     Alcohol/week: 17.5 standard drinks     Types: 21 Cans of beer per week     Comment: occ       Allergies:  Allergies   Allergen Reactions     Bee Venom      swells up     No Known Drug Allergies      Seasonal Allergies        Medications:  Current Outpatient Medications   Medication Sig     albuterol (PROAIR HFA/PROVENTIL HFA/VENTOLIN HFA) 108 (90 Base) MCG/ACT inhaler Inhale 1-2 puffs into the lungs every 6 hours as needed for shortness of breath / dyspnea or wheezing     atorvastatin (LIPITOR) 20 MG tablet Take 1 tablet (20 mg) by mouth daily     lisinopril (PRINIVIL/ZESTRIL) 20 MG tablet Take 1 tablet (20 mg) by mouth daily     bumetanide (BUMEX) 0.5 MG tablet Take 1 tablet (0.5 mg) by mouth daily     omeprazole (PRILOSEC) 20 MG DR capsule Take 1 capsule (20 mg) by mouth every morning (before breakfast)     predniSONE (DELTASONE) 10 MG tablet Take 1 tablet (10 mg) by mouth daily     predniSONE (DELTASONE) 20 MG tablet Take 3 tablets (60 mg) by mouth daily     sulfamethoxazole-trimethoprim (BACTRIM DS) 800-160 MG tablet Take 1 tablet by mouth three times a week Monday, Wednesday, Friday     No current facility-administered medications for this visit.       Vitals:  /72 (BP Location: Left arm)   Pulse 66   Wt 85.3 kg (188 lb)   BMI 27.56 kg/m      Exam:  GENERAL APPEARANCE: alert and no distress, hyperactive  HENT: mouth without ulcers or lesions  LYMPHATICS: no cervical or supraclavicular nodes  RESP: lungs clear to auscultation - no rales, rhonchi or wheezes  CV: regular  rhythm, normal rate, no rub, no murmur  EDEMA: 2++ LE edema bilaterally  ABDOMEN: soft, nondistended, nontender, bowel sounds normal  MS: extremities normal - no gross deformities noted, no evidence of inflammation in joints, no muscle tenderness  SKIN: has erythematous maculopapular lesions on chest and back    LABS:   CMP  Recent Labs   Lab Test 02/28/20  1610 11/26/19  1426 09/09/19  1506 05/20/19  1350    137 136 132*   POTASSIUM 4.1 4.6 4.1 4.0   CHLORIDE 102 104 100 94   CO2 30 31 30 26   ANIONGAP 5 2* 6 12   GLC 91 136* 160* 162*   BUN 14 9 15 25   CR 1.19 1.15 1.08 1.19   GFRESTIMATED 73 76 82 73   GFRESTBLACK 84 88 >90 85   BASIA 7.9* 9.6 9.4 9.6     Recent Labs   Lab Test 11/26/19  1426 09/09/19  1506 05/20/19  1350 04/10/19  1009   BILITOTAL 0.8 0.4 0.4 0.7   ALKPHOS 41 65 61 42   ALT 25 41 57 9   AST 11 14 19 12     CBC  Recent Labs   Lab Test 02/28/20  1610 11/26/19  1426 09/09/19  1506 05/20/19  1410   HGB 18.1* 15.6 16.2 16.1   WBC 6.4 10.3 14.5* 16.7*   RBC 5.84 4.93 4.98 5.06   HCT 50.4 44.6 46.2 45.1   MCV 86 91 93 89   MCH 31.0 31.6 32.5 31.8   MCHC 35.9 35.0 35.1 35.7   RDW 11.8 12.2 12.4 12.3    240 249 292     URINE STUDIES  Recent Labs   Lab Test 02/28/20  1620 04/01/19  1500 03/29/19  1858   COLOR Yellow Yellow Yellow   APPEARANCE Clear Clear Clear   URINEGLC Negative Negative Negative   URINEBILI Negative Negative Negative   URINEKETONE Negative Negative Negative   SG >1.030 1.015 >1.035*   UBLD Moderate* Moderate* Moderate*   URINEPH 6.0 6.0 6.0   PROTEIN >=300* >=300* >499*   UROBILINOGEN 0.2 0.2  --    NITRITE Negative Negative Negative   LEUKEST Negative Negative Negative   RBCU O - 2 25-50* 7*   WBCU 0 - 5 0 - 5 7*     Recent Labs   Lab Test 02/28/20  1619 11/26/19  1435 09/09/19  1510 05/20/19  1355   UTPG 6.20* 0.06 0.14 0.13     PTH  No lab results found.  IRON STUDIES  No lab results found.    IMPRESSION:  1. Findings are concerning for diffuse small bowel enteritis  likely  infectious or inflammatory in etiology, with associated inflammatory  changes of the mesenteric adipose tissues, moderate ascites, small  bilateral probably reactive pleural effusions and with some edema in  the subcutaneous adipose tissues.  2. No significant atherosclerosis is identified. No obvious arterial  blockage is seen in the mesenteric vessels to suggest ischemia.  3. Mild degenerative changes of the spine and hips. No acute fracture  or aggressive osseous lesion.  Jenny Scott MD

## 2020-03-02 NOTE — PATIENT INSTRUCTIONS
You will take  Prednisone 60mg every day until we start weaning in 2-3 months, I will let you know    Take lisinopril 20mg every day  Take bumex 0.5 mg (this is a diuretic - fluid retention)- can stop this once swelling is better- or take every other day    Take bactrim once a day   Take omeprazole once a day  Take atorvastatin once a day    You will do labs every month for now

## 2020-03-16 DIAGNOSIS — J45.40 MODERATE PERSISTENT ASTHMA WITHOUT COMPLICATION: ICD-10-CM

## 2020-03-17 NOTE — TELEPHONE ENCOUNTER
"Requested Prescriptions   Pending Prescriptions Disp Refills     albuterol (PROAIR HFA/PROVENTIL HFA/VENTOLIN HFA) 108 (90 Base) MCG/ACT inhaler 8.5 g 3     Sig: Inhale 1-2 puffs into the lungs every 6 hours as needed for shortness of breath / dyspnea or wheezing   Last Written Prescription Date:  04/19/2019  Last Fill Quantity: 8.5g,  # refills: 3   Last office visit: 11/26/2019 with prescribing provider:  11/26/2019   Future Office Visit:        Asthma Maintenance Inhalers - Anticholinergics Passed - 3/16/2020 12:17 PM        Passed - Patient is age 12 years or older        Passed - Asthma control assessment score within normal limits in last 6 months     Please review ACT score.   ACT Total Scores 11/26/2019   ACT TOTAL SCORE (Goal Greater than or Equal to 20) 21   In the past 12 months, how many times did you visit the emergency room for your asthma without being admitted to the hospital? 0   In the past 12 months, how many times were you hospitalized overnight because of your asthma? 0             Passed - Medication is active on med list        Passed - Recent (6 mo) or future (30 days) visit within the authorizing provider's specialty     Patient had office visit in the last 6 months or has a visit in the next 30 days with authorizing provider or within the authorizing provider's specialty.  See \"Patient Info\" tab in inbasket, or \"Choose Columns\" in Meds & Orders section of the refill encounter.           Short-Acting Beta Agonist Inhalers Protocol  Passed - 3/16/2020 12:17 PM        Passed - Patient is age 12 or older        Passed - Asthma control assessment score within normal limits in last 6 months     Please review ACT score.           Passed - Medication is active on med list        Passed - Recent (6 mo) or future (30 days) visit within the authorizing provider's specialty     Patient had office visit in the last 6 months or has a visit in the next 30 days with authorizing provider or within the " "authorizing provider's specialty.  See \"Patient Info\" tab in inbasket, or \"Choose Columns\" in Meds & Orders section of the refill encounter.                 "

## 2020-03-17 NOTE — TELEPHONE ENCOUNTER
Last Written Prescription Date:  4/19/19  Last Fill Quantity: 8.5g,  # refills: 3   Last office visit: 11/26/2019 with prescribing provider:     Future Office Visit:      Routing to PCP for further advice.  Patient was to follow up in 12/19.    Melida Collins RN

## 2020-03-18 RX ORDER — ALBUTEROL SULFATE 90 UG/1
1-2 AEROSOL, METERED RESPIRATORY (INHALATION) EVERY 6 HOURS PRN
Qty: 8.5 G | Refills: 1 | Status: SHIPPED | OUTPATIENT
Start: 2020-03-18 | End: 2020-05-05

## 2020-04-03 DIAGNOSIS — N05.0 MINIMAL CHANGE DISEASE: ICD-10-CM

## 2020-04-03 LAB
ALBUMIN SERPL-MCNC: 2.7 G/DL (ref 3.4–5)
ANION GAP SERPL CALCULATED.3IONS-SCNC: 5 MMOL/L (ref 3–14)
BUN SERPL-MCNC: 14 MG/DL (ref 7–30)
CALCIUM SERPL-MCNC: 8.7 MG/DL (ref 8.5–10.1)
CHLORIDE SERPL-SCNC: 105 MMOL/L (ref 94–109)
CO2 SERPL-SCNC: 28 MMOL/L (ref 20–32)
CREAT SERPL-MCNC: 0.86 MG/DL (ref 0.66–1.25)
CREAT UR-MCNC: 73 MG/DL
ERYTHROCYTE [DISTWIDTH] IN BLOOD BY AUTOMATED COUNT: 12 % (ref 10–15)
GFR SERPL CREATININE-BSD FRML MDRD: >90 ML/MIN/{1.73_M2}
GLUCOSE SERPL-MCNC: 108 MG/DL (ref 70–99)
HCT VFR BLD AUTO: 44.4 % (ref 40–53)
HGB BLD-MCNC: 15.4 G/DL (ref 13.3–17.7)
MCH RBC QN AUTO: 31 PG (ref 26.5–33)
MCHC RBC AUTO-ENTMCNC: 34.7 G/DL (ref 31.5–36.5)
MCV RBC AUTO: 90 FL (ref 78–100)
MICROALBUMIN UR-MCNC: <5 MG/L
MICROALBUMIN/CREAT UR: NORMAL MG/G CR (ref 0–17)
PHOSPHATE SERPL-MCNC: 2 MG/DL (ref 2.5–4.5)
PLATELET # BLD AUTO: 220 10E9/L (ref 150–450)
POTASSIUM SERPL-SCNC: 3.8 MMOL/L (ref 3.4–5.3)
PROT UR-MCNC: 0.13 G/L
PROT/CREAT 24H UR: 0.18 G/G CR (ref 0–0.2)
RBC # BLD AUTO: 4.96 10E12/L (ref 4.4–5.9)
SODIUM SERPL-SCNC: 138 MMOL/L (ref 133–144)
WBC # BLD AUTO: 11.3 10E9/L (ref 4–11)

## 2020-04-03 PROCEDURE — 84156 ASSAY OF PROTEIN URINE: CPT | Performed by: INTERNAL MEDICINE

## 2020-04-03 PROCEDURE — 85027 COMPLETE CBC AUTOMATED: CPT | Performed by: INTERNAL MEDICINE

## 2020-04-03 PROCEDURE — 36415 COLL VENOUS BLD VENIPUNCTURE: CPT | Performed by: INTERNAL MEDICINE

## 2020-04-03 PROCEDURE — 80069 RENAL FUNCTION PANEL: CPT | Performed by: INTERNAL MEDICINE

## 2020-04-03 PROCEDURE — 82043 UR ALBUMIN QUANTITATIVE: CPT | Performed by: INTERNAL MEDICINE

## 2020-04-06 ENCOUNTER — TELEPHONE (OUTPATIENT)
Dept: NEPHROLOGY | Facility: CLINIC | Age: 45
End: 2020-04-06

## 2020-04-06 NOTE — TELEPHONE ENCOUNTER
Protein leaking is down, which is great.   Continue same medication for now, call with questions     Informed patient of recommendations above per Dr. Scott.  Kami Simmons LPN  Nephrology  [phone]

## 2020-04-22 DIAGNOSIS — N04.9 NEPHROTIC SYNDROME: ICD-10-CM

## 2020-04-22 DIAGNOSIS — E78.5 HYPERLIPIDEMIA LDL GOAL <100: ICD-10-CM

## 2020-04-22 RX ORDER — ATORVASTATIN CALCIUM 20 MG/1
20 TABLET, FILM COATED ORAL DAILY
Qty: 30 TABLET | Refills: 11 | Status: CANCELLED | OUTPATIENT
Start: 2020-04-22

## 2020-04-23 DIAGNOSIS — E78.5 HYPERLIPIDEMIA LDL GOAL <100: ICD-10-CM

## 2020-04-23 DIAGNOSIS — N04.9 NEPHROTIC SYNDROME: ICD-10-CM

## 2020-04-23 RX ORDER — ATORVASTATIN CALCIUM 20 MG/1
20 TABLET, FILM COATED ORAL DAILY
Qty: 30 TABLET | Refills: 11 | Status: SHIPPED | OUTPATIENT
Start: 2020-04-23 | End: 2020-05-18

## 2020-05-02 DIAGNOSIS — N05.0 MINIMAL CHANGE DISEASE: ICD-10-CM

## 2020-05-02 LAB
ALBUMIN SERPL-MCNC: 3.8 G/DL (ref 3.4–5)
ANION GAP SERPL CALCULATED.3IONS-SCNC: 3 MMOL/L (ref 3–14)
BUN SERPL-MCNC: 12 MG/DL (ref 7–30)
CALCIUM SERPL-MCNC: 8.5 MG/DL (ref 8.5–10.1)
CHLORIDE SERPL-SCNC: 102 MMOL/L (ref 94–109)
CO2 SERPL-SCNC: 32 MMOL/L (ref 20–32)
CREAT SERPL-MCNC: 0.94 MG/DL (ref 0.66–1.25)
CREAT UR-MCNC: 175 MG/DL
ERYTHROCYTE [DISTWIDTH] IN BLOOD BY AUTOMATED COUNT: 12.8 % (ref 10–15)
GFR SERPL CREATININE-BSD FRML MDRD: >90 ML/MIN/{1.73_M2}
GLUCOSE SERPL-MCNC: 103 MG/DL (ref 70–99)
HCT VFR BLD AUTO: 47.3 % (ref 40–53)
HGB BLD-MCNC: 16.2 G/DL (ref 13.3–17.7)
MCH RBC QN AUTO: 31.8 PG (ref 26.5–33)
MCHC RBC AUTO-ENTMCNC: 34.2 G/DL (ref 31.5–36.5)
MCV RBC AUTO: 93 FL (ref 78–100)
MICROALBUMIN UR-MCNC: 7 MG/L
MICROALBUMIN/CREAT UR: 4.22 MG/G CR (ref 0–17)
PHOSPHATE SERPL-MCNC: 2.9 MG/DL (ref 2.5–4.5)
PLATELET # BLD AUTO: 207 10E9/L (ref 150–450)
POTASSIUM SERPL-SCNC: 3.7 MMOL/L (ref 3.4–5.3)
RBC # BLD AUTO: 5.09 10E12/L (ref 4.4–5.9)
SODIUM SERPL-SCNC: 137 MMOL/L (ref 133–144)
WBC # BLD AUTO: 12 10E9/L (ref 4–11)

## 2020-05-02 PROCEDURE — 82043 UR ALBUMIN QUANTITATIVE: CPT | Performed by: INTERNAL MEDICINE

## 2020-05-02 PROCEDURE — 80069 RENAL FUNCTION PANEL: CPT | Performed by: INTERNAL MEDICINE

## 2020-05-02 PROCEDURE — 36415 COLL VENOUS BLD VENIPUNCTURE: CPT | Performed by: INTERNAL MEDICINE

## 2020-05-02 PROCEDURE — 85027 COMPLETE CBC AUTOMATED: CPT | Performed by: INTERNAL MEDICINE

## 2020-05-04 DIAGNOSIS — J45.40 MODERATE PERSISTENT ASTHMA WITHOUT COMPLICATION: ICD-10-CM

## 2020-05-05 RX ORDER — ALBUTEROL SULFATE 90 UG/1
1-2 AEROSOL, METERED RESPIRATORY (INHALATION) EVERY 6 HOURS PRN
Qty: 8.5 G | Refills: 0 | Status: SHIPPED | OUTPATIENT
Start: 2020-05-05 | End: 2020-05-27

## 2020-05-05 NOTE — TELEPHONE ENCOUNTER
Prescription approved per [facility] Refill Protocol.  Can Moyer, RN, BSN

## 2020-05-14 DIAGNOSIS — N04.9 NEPHROTIC SYNDROME: ICD-10-CM

## 2020-05-14 DIAGNOSIS — N05.0 MINIMAL CHANGE DISEASE: ICD-10-CM

## 2020-05-15 RX ORDER — LISINOPRIL 20 MG/1
20 TABLET ORAL DAILY
Qty: 90 TABLET | Refills: 1 | Status: SHIPPED | OUTPATIENT
Start: 2020-05-15 | End: 2020-11-04

## 2020-05-18 ENCOUNTER — VIRTUAL VISIT (OUTPATIENT)
Dept: NEPHROLOGY | Facility: CLINIC | Age: 45
End: 2020-05-18
Payer: COMMERCIAL

## 2020-05-18 VITALS — WEIGHT: 159 LBS | BODY MASS INDEX: 23.31 KG/M2

## 2020-05-18 DIAGNOSIS — N04.9 NEPHROTIC SYNDROME: ICD-10-CM

## 2020-05-18 DIAGNOSIS — Z79.52 LONG TERM (CURRENT) USE OF SYSTEMIC STEROIDS: ICD-10-CM

## 2020-05-18 DIAGNOSIS — E78.5 HYPERLIPIDEMIA LDL GOAL <100: ICD-10-CM

## 2020-05-18 DIAGNOSIS — R73.9 ELEVATED RANDOM BLOOD GLUCOSE LEVEL: ICD-10-CM

## 2020-05-18 DIAGNOSIS — N05.0 MINIMAL CHANGE DISEASE: Primary | ICD-10-CM

## 2020-05-18 RX ORDER — ATORVASTATIN CALCIUM 20 MG/1
20 TABLET, FILM COATED ORAL DAILY
Qty: 90 TABLET | Refills: 3 | Status: SHIPPED | OUTPATIENT
Start: 2020-05-18 | End: 2021-02-15

## 2020-05-18 RX ORDER — SULFAMETHOXAZOLE/TRIMETHOPRIM 800-160 MG
1 TABLET ORAL
Qty: 45 TABLET | Refills: 1 | Status: SHIPPED | OUTPATIENT
Start: 2020-05-18 | End: 2020-11-23

## 2020-05-18 NOTE — PROGRESS NOTES
"Deshawn Flood is a 45 year old male who is being evaluated via a billable telephone visit.      The patient has been notified of following:     \"This telephone visit will be conducted via a call between you and your physician/provider. We have found that certain health care needs can be provided without the need for a physical exam.  This service lets us provide the care you need with a short phone conversation.  If a prescription is necessary we can send it directly to your pharmacy.  If lab work is needed we can place an order for that and you can then stop by our lab to have the test done at a later time.    Telephone visits are billed at different rates depending on your insurance coverage. During this emergency period, for some insurers they may be billed the same as an in-person visit.  Please reach out to your insurance provider with any questions.    If during the course of the call the physician/provider feels a telephone visit is not appropriate, you will not be charged for this service.\"    Patient has given verbal consent for Telephone visit?  Yes    What phone number would you like to be contacted at? [phone]    How would you like to obtain your AVS? Mail a copy    Phone call duration: 12 minutes    Jenny Aram Scott MD    Assessment and Plan:  44 year old male who presents for followup of minimal change disease. He presented for evaluation after presenting to ER where CT scan noted ascites and nonspecific small bowel enteritis, and UA showed proteinuria. He had biopsy which showed minimal change on 4/9/2019 and treated with prednisone 70mg. He was in remission and down to prednsione 10mg then stopped it, and now with relapse.  His mother noted swelling last week.  He is up 20-25 lbs and proteinuria up to 6 grams/g cr    # Renal function- Scr was 1.1 in the past and was1.27 in ER. It was up to 1.6-1.8 at time of biopsy after diuretics were started.  - scr back to 1 or so  - proteinuria " significant  - prednisone restarted 60mg  - he is trying to do sodium restriction (really is 3-4000mg restriction typically)  - labs monthly  - RTC 2 months  - slower wean with this taper  - if relapses again, will consider CNI for long term therapy- discussed with him today that if relapses again, will need a different maintenance long term regimen  - discussed with him importance of close monitoring and long term care.     # Hyperlipidemia- LDL very elevated,was on low dose statin but stopped;  Restart given nephrotic  - recheck lipids     # Hypertension: normotensive   - will start lisinopril 20mg and bumex 0.5mg daily for swelling/  nephrotic    # Not anemic- hgb 18 now down to 16- stable    # Electrolytes: normal      # Mineral Bone Disorder:   Check baseline PTH if creatinine remains elevated.     - RTC 2 month, labs monthly for now    Assessment and plan was discussed with patient and he voiced his understanding and agreement.    Reason for Visit:  Deshawn Flood is a 45 year old male with nephrotic range proteinuria noted and biopsy 4/9/19 shows minimal change disease    HPI:  He is a 45 year old male recently in normal health who presented with nephrotic syndrome. He underwent kidney biopsy 4/9 and it shows minimal change disease. He was admitted to the hospital briefly after biopsy as he was having poor appetite due to bloating, and was diuresed. Also his creatinine was up to 1.7-1.8 which was concerning. Lisinopril was stopped in the hospital.    His weight is usually closer to 150-160  but was up to 184 lbs with last episode, was down to 160s with no proteinuria, but was back up to 188 lbs. Now he is down to 159lbs. He likes to stay at 160lbs  He thinks he gained weight, likely due to prednisone, and he is happy about this.     He is trying to eat healthier and is watching sodium intake, restricting to 4 grams a day.  He is working at this time, trying to drink water but drinks more mountain dew  probably    We discussed for a while that this disease can relapse and he will need long term followup and care; we discussed using a different agent to avoid long term steroids  His blood glucose has been a little low      Renal History:   None prior to minimal change disease Spring 2019    ROS:   A comprehensive review of systems was obtained and negative, except as noted in the HPI or PMH.    Active Medical Problems:  Patient Active Problem List   Diagnosis     Loose body of left knee     Left knee pain     Tobacco abuse     History of pneumothorax     CARDIOVASCULAR SCREENING; LDL GOAL LESS THAN 160     Nephrotic syndrome     Anasarca     PMH:   Medical record was reviewed and PMH was discussed with patient and noted below.  Past Medical History:   Diagnosis Date     Anemia      Other spontaneous pneumothorax      SPONTANEOUS PNEUMOTHORAX NEC 3/25/2005     Unspecified asthma(493.90)      PSH:   Past Surgical History:   Procedure Laterality Date     ARTHROSCOPY KNEE  8/3/2012    Procedure: ARTHROSCOPY KNEE;  Left knee Arthroscopy with removal of loose bodies;  Surgeon: Bibi Roberts MD;  Location: [facility]     HC REPAIR UMBILICAL GODFREY,<4Y/O,REDUC  1978     IR RENAL BIOPSY LEFT  4/9/2019     TUBE THORACOSTOMY,ABSC/EMPYEMA/HEMO  3/20/2005    Small chest tube with Heimlich valve.       Family Hx:   Family History   Problem Relation Age of Onset     Autoimmune Disease No family hx of      Personal Hx:   Social History     Tobacco Use     Smoking status: Current Some Day Smoker     Packs/day: 0.10     Years: 15.00     Pack years: 1.50     Types: Cigarettes     Smokeless tobacco: Former User     Types: Chew     Tobacco comment: hopes to quit smoking --hasn't had any since lung went down 1/14/05.   Substance Use Topics     Alcohol use: Yes     Alcohol/week: 17.5 standard drinks     Types: 21 Cans of beer per week     Comment: occ       Allergies:  Allergies   Allergen Reactions     Bee Venom      swells up     No  Known Drug Allergies      Seasonal Allergies        Medications:  Current Outpatient Medications   Medication Sig     albuterol (PROAIR HFA/PROVENTIL HFA/VENTOLIN HFA) 108 (90 Base) MCG/ACT inhaler Inhale 1-2 puffs into the lungs every 6 hours as needed for shortness of breath / dyspnea or wheezing Appointment needed for refills.     atorvastatin (LIPITOR) 20 MG tablet Take 1 tablet (20 mg) by mouth daily     lisinopril (ZESTRIL) 20 MG tablet Take 1 tablet (20 mg) by mouth daily     omeprazole (PRILOSEC) 20 MG DR capsule Take 1 capsule (20 mg) by mouth every morning (before breakfast)     predniSONE (DELTASONE) 20 MG tablet Take 3 tablets (60 mg) by mouth daily     sulfamethoxazole-trimethoprim (BACTRIM DS) 800-160 MG tablet Take 1 tablet by mouth three times a week Monday, Wednesday, Friday     No current facility-administered medications for this visit.       Vitals:  Wt 72.1 kg (159 lb)   BMI 23.31 kg/m      Exam:  GENERAL : alert and no distress  Speaks in full sentences without dyspnea  Normal speech and thought pattern    LABS:   CMP  Recent Labs   Lab Test 05/02/20  0755 04/03/20  0839 02/28/20  1610 11/26/19  1426    138 137 137   POTASSIUM 3.7 3.8 4.1 4.6   CHLORIDE 102 105 102 104   CO2 32 28 30 31   ANIONGAP 3 5 5 2*   * 108* 91 136*   BUN 12 14 14 9   CR 0.94 0.86 1.19 1.15   GFRESTIMATED >90 >90 73 76   GFRESTBLACK >90 >90 84 88   BASIA 8.5 8.7 7.9* 9.6     Recent Labs   Lab Test 11/26/19  1426 09/09/19  1506 05/20/19  1350 04/10/19  1009   BILITOTAL 0.8 0.4 0.4 0.7   ALKPHOS 41 65 61 42   ALT 25 41 57 9   AST 11 14 19 12     CBC  Recent Labs   Lab Test 05/02/20  0755 04/03/20  0839 02/28/20  1610 11/26/19  1426   HGB 16.2 15.4 18.1* 15.6   WBC 12.0* 11.3* 6.4 10.3   RBC 5.09 4.96 5.84 4.93   HCT 47.3 44.4 50.4 44.6   MCV 93 90 86 91   MCH 31.8 31.0 31.0 31.6   MCHC 34.2 34.7 35.9 35.0   RDW 12.8 12.0 11.8 12.2    220 263 240     URINE STUDIES  Recent Labs   Lab Test 02/28/20  3354  04/01/19  1500 03/29/19  1858   COLOR Yellow Yellow Yellow   APPEARANCE Clear Clear Clear   URINEGLC Negative Negative Negative   URINEBILI Negative Negative Negative   URINEKETONE Negative Negative Negative   SG >1.030 1.015 >1.035*   UBLD Moderate* Moderate* Moderate*   URINEPH 6.0 6.0 6.0   PROTEIN >=300* >=300* >499*   UROBILINOGEN 0.2 0.2  --    NITRITE Negative Negative Negative   LEUKEST Negative Negative Negative   RBCU O - 2 25-50* 7*   WBCU 0 - 5 0 - 5 7*     Recent Labs   Lab Test 04/03/20  0900 02/28/20  1619 11/26/19  1435 09/09/19  1510   UTPG 0.18 6.20* 0.06 0.14     PTH  No lab results found.  IRON STUDIES  No lab results found.    IMPRESSION:  1. Findings are concerning for diffuse small bowel enteritis likely  infectious or inflammatory in etiology, with associated inflammatory  changes of the mesenteric adipose tissues, moderate ascites, small  bilateral probably reactive pleural effusions and with some edema in  the subcutaneous adipose tissues.  2. No significant atherosclerosis is identified. No obvious arterial  blockage is seen in the mesenteric vessels to suggest ischemia.  3. Mild degenerative changes of the spine and hips. No acute fracture  or aggressive osseous lesion.  Jenny Scott MD

## 2020-05-26 DIAGNOSIS — J45.40 MODERATE PERSISTENT ASTHMA WITHOUT COMPLICATION: ICD-10-CM

## 2020-05-27 RX ORDER — ALBUTEROL SULFATE 90 UG/1
1-2 AEROSOL, METERED RESPIRATORY (INHALATION) EVERY 6 HOURS PRN
Qty: 8.5 G | Refills: 0 | Status: SHIPPED | OUTPATIENT
Start: 2020-05-27 | End: 2020-06-17

## 2020-05-27 NOTE — TELEPHONE ENCOUNTER
Routing refill request to provider for review/approval because:  ACT overdue    HAZEL ClaytonN, RN  [facility]

## 2020-06-06 DIAGNOSIS — N05.0 MINIMAL CHANGE DISEASE: ICD-10-CM

## 2020-06-06 LAB
ALBUMIN SERPL-MCNC: 3.9 G/DL (ref 3.4–5)
ANION GAP SERPL CALCULATED.3IONS-SCNC: 3 MMOL/L (ref 3–14)
BASOPHILS # BLD AUTO: 0 10E9/L (ref 0–0.2)
BASOPHILS NFR BLD AUTO: 0.4 %
BUN SERPL-MCNC: 24 MG/DL (ref 7–30)
CALCIUM SERPL-MCNC: 9 MG/DL (ref 8.5–10.1)
CHLORIDE SERPL-SCNC: 104 MMOL/L (ref 94–109)
CO2 SERPL-SCNC: 32 MMOL/L (ref 20–32)
CREAT SERPL-MCNC: 1.2 MG/DL (ref 0.66–1.25)
CREAT UR-MCNC: 208 MG/DL
DIFFERENTIAL METHOD BLD: NORMAL
EOSINOPHIL NFR BLD AUTO: 0.5 %
ERYTHROCYTE [DISTWIDTH] IN BLOOD BY AUTOMATED COUNT: 12.6 % (ref 10–15)
GFR SERPL CREATININE-BSD FRML MDRD: 72 ML/MIN/{1.73_M2}
GLUCOSE SERPL-MCNC: 109 MG/DL (ref 70–99)
HCT VFR BLD AUTO: 47.5 % (ref 40–53)
HGB BLD-MCNC: 16.4 G/DL (ref 13.3–17.7)
IMM GRANULOCYTES # BLD: 0.1 10E9/L (ref 0–0.4)
IMM GRANULOCYTES NFR BLD: 1.5 %
LYMPHOCYTES # BLD AUTO: 2.7 10E9/L (ref 0.8–5.3)
LYMPHOCYTES NFR BLD AUTO: 28.7 %
MCH RBC QN AUTO: 31.8 PG (ref 26.5–33)
MCHC RBC AUTO-ENTMCNC: 34.5 G/DL (ref 31.5–36.5)
MCV RBC AUTO: 92 FL (ref 78–100)
MICROALBUMIN UR-MCNC: 8 MG/L
MICROALBUMIN/CREAT UR: 3.64 MG/G CR (ref 0–17)
MONOCYTES # BLD AUTO: 0.6 10E9/L (ref 0–1.3)
MONOCYTES NFR BLD AUTO: 5.9 %
NEUTROPHILS # BLD AUTO: 6 10E9/L (ref 1.6–8.3)
NEUTROPHILS NFR BLD AUTO: 63 %
NRBC # BLD AUTO: 0 10*3/UL
NRBC BLD AUTO-RTO: 0 /100
PHOSPHATE SERPL-MCNC: 2.8 MG/DL (ref 2.5–4.5)
PLATELET # BLD AUTO: 186 10E9/L (ref 150–450)
POTASSIUM SERPL-SCNC: 3.6 MMOL/L (ref 3.4–5.3)
RBC # BLD AUTO: 5.15 10E12/L (ref 4.4–5.9)
SODIUM SERPL-SCNC: 139 MMOL/L (ref 133–144)
WBC # BLD AUTO: 9.6 10E9/L (ref 4–11)

## 2020-06-06 PROCEDURE — 85025 COMPLETE CBC W/AUTO DIFF WBC: CPT | Performed by: INTERNAL MEDICINE

## 2020-06-06 PROCEDURE — 82043 UR ALBUMIN QUANTITATIVE: CPT | Performed by: INTERNAL MEDICINE

## 2020-06-06 PROCEDURE — 36415 COLL VENOUS BLD VENIPUNCTURE: CPT | Performed by: INTERNAL MEDICINE

## 2020-06-06 PROCEDURE — 80069 RENAL FUNCTION PANEL: CPT | Performed by: INTERNAL MEDICINE

## 2020-06-06 NOTE — LETTER
June 8, 2020      Deshawn Flood  [address] [place] [id number]        Dear ,       Here is a copy of your lab results. Labs look stable, the creatinine (kidney function) fluctuates a lot.  Hydrate well (4-6 glass of water ) a day   Labs next month, thanks.    Resulted Orders   Renal panel (Alb, BUN, Ca, Cl, CO2, Creat, Gluc, Phos, K, Na)   Result Value Ref Range    Sodium 139 133 - 144 mmol/L    Potassium 3.6 3.4 - 5.3 mmol/L    Chloride 104 94 - 109 mmol/L    Carbon Dioxide 32 20 - 32 mmol/L    Anion Gap 3 3 - 14 mmol/L    Glucose 109 (H) 70 - 99 mg/dL    Urea Nitrogen 24 7 - 30 mg/dL    Creatinine 1.20 0.66 - 1.25 mg/dL    GFR Estimate 72 >60 mL/min/[1.73_m2]      Comment:      Non  GFR Calc  Starting 12/18/2018, serum creatinine based estimated GFR (eGFR) will be   calculated using the Chronic Kidney Disease Epidemiology Collaboration   (CKD-EPI) equation.      GFR Estimate If Black 83 >60 mL/min/[1.73_m2]      Comment:       GFR Calc  Starting 12/18/2018, serum creatinine based estimated GFR (eGFR) will be   calculated using the Chronic Kidney Disease Epidemiology Collaboration   (CKD-EPI) equation.      Calcium 9.0 8.5 - 10.1 mg/dL    Phosphorus 2.8 2.5 - 4.5 mg/dL    Albumin 3.9 3.4 - 5.0 g/dL   Albumin Random Urine Quantitative with Creat Ratio   Result Value Ref Range    Creatinine Urine 208 mg/dL    Albumin Urine mg/L 8 mg/L    Albumin Urine mg/g Cr 3.64 0 - 17 mg/g Cr   CBC with platelets and differential   Result Value Ref Range    WBC 9.6 4.0 - 11.0 10e9/L    RBC Count 5.15 4.4 - 5.9 10e12/L    Hemoglobin 16.4 13.3 - 17.7 g/dL    Hematocrit 47.5 40.0 - 53.0 %    MCV 92 78 - 100 fl    MCH 31.8 26.5 - 33.0 pg    MCHC 34.5 31.5 - 36.5 g/dL    RDW 12.6 10.0 - 15.0 %    Platelet Count 186 150 - 450 10e9/L    Diff Method Automated Method     % Neutrophils 63.0 %    % Lymphocytes 28.7 %    % Monocytes 5.9 %    % Eosinophils 0.5 %    % Basophils 0.4 %    %  Immature Granulocytes 1.5 %    Nucleated RBCs 0 0 /100    Absolute Neutrophil 6.0 1.6 - 8.3 10e9/L    Absolute Lymphocytes 2.7 0.8 - 5.3 10e9/L    Absolute Monocytes 0.6 0.0 - 1.3 10e9/L    Absolute Basophils 0.0 0.0 - 0.2 10e9/L    Abs Immature Granulocytes 0.1 0 - 0.4 10e9/L    Absolute Nucleated RBC 0.0      Please contact us at [phone] if you have any questions or concerns. Thank you, we look forward to seeing you at your next visit.       Sincerely,    Jenny Scott MD.   of Medicine  Division of Kidney Disease and Hypertension  [address] [address]

## 2020-06-16 DIAGNOSIS — J45.40 MODERATE PERSISTENT ASTHMA WITHOUT COMPLICATION: ICD-10-CM

## 2020-06-16 NOTE — TELEPHONE ENCOUNTER
Routing refill request to provider for review/approval because:  ACT overdue    HAZEL ClaytonN, RN  [facility]

## 2020-06-17 RX ORDER — ALBUTEROL SULFATE 90 UG/1
1-2 AEROSOL, METERED RESPIRATORY (INHALATION) EVERY 6 HOURS PRN
Qty: 8.5 G | Refills: 0 | Status: SHIPPED | OUTPATIENT
Start: 2020-06-17 | End: 2020-07-13

## 2020-06-22 DIAGNOSIS — N05.0 MINIMAL CHANGE DISEASE: ICD-10-CM

## 2020-06-23 NOTE — TELEPHONE ENCOUNTER
Pending Prescriptions:                       Disp   Refills    predniSONE (DELTASONE) 20 MG tablet        90 tab*3        Sig: Take 3 tablets (60 mg) by mouth daily      Routing refill request to provider for review/approval because:  Drug not on the G refill protocol     Odette Grijalva, MSN, RN

## 2020-06-24 RX ORDER — PREDNISONE 20 MG/1
60 TABLET ORAL DAILY
Qty: 90 TABLET | Refills: 3 | Status: SHIPPED | OUTPATIENT
Start: 2020-06-24 | End: 2020-07-20

## 2020-07-11 DIAGNOSIS — N05.0 MINIMAL CHANGE DISEASE: ICD-10-CM

## 2020-07-11 LAB
ERYTHROCYTE [DISTWIDTH] IN BLOOD BY AUTOMATED COUNT: 11.9 % (ref 10–15)
HCT VFR BLD AUTO: 43.9 % (ref 40–53)
HGB BLD-MCNC: 15.5 G/DL (ref 13.3–17.7)
MCH RBC QN AUTO: 32.3 PG (ref 26.5–33)
MCHC RBC AUTO-ENTMCNC: 35.3 G/DL (ref 31.5–36.5)
MCV RBC AUTO: 92 FL (ref 78–100)
PLATELET # BLD AUTO: 210 10E9/L (ref 150–450)
RBC # BLD AUTO: 4.8 10E12/L (ref 4.4–5.9)
WBC # BLD AUTO: 9.2 10E9/L (ref 4–11)

## 2020-07-11 PROCEDURE — 85027 COMPLETE CBC AUTOMATED: CPT | Performed by: INTERNAL MEDICINE

## 2020-07-11 PROCEDURE — 36415 COLL VENOUS BLD VENIPUNCTURE: CPT | Performed by: INTERNAL MEDICINE

## 2020-07-13 DIAGNOSIS — J45.40 MODERATE PERSISTENT ASTHMA WITHOUT COMPLICATION: ICD-10-CM

## 2020-07-13 RX ORDER — ALBUTEROL SULFATE 90 UG/1
1-2 AEROSOL, METERED RESPIRATORY (INHALATION) EVERY 6 HOURS PRN
Qty: 8.5 G | Refills: 0 | Status: SHIPPED | OUTPATIENT
Start: 2020-07-13 | End: 2020-08-05

## 2020-07-13 NOTE — TELEPHONE ENCOUNTER
Routing refill request to provider for review/approval because:  ACT overdue    HAZEL ClaytonN, RN  [facility]

## 2020-07-20 ENCOUNTER — VIRTUAL VISIT (OUTPATIENT)
Dept: NEPHROLOGY | Facility: CLINIC | Age: 45
End: 2020-07-20
Payer: COMMERCIAL

## 2020-07-20 DIAGNOSIS — N04.9 NEPHROTIC SYNDROME: Primary | ICD-10-CM

## 2020-07-20 DIAGNOSIS — R80.1 PERSISTENT PROTEINURIA: ICD-10-CM

## 2020-07-20 DIAGNOSIS — N05.0 MINIMAL CHANGE DISEASE: ICD-10-CM

## 2020-07-20 DIAGNOSIS — Z79.52 LONG TERM CURRENT USE OF SYSTEMIC STEROIDS: ICD-10-CM

## 2020-07-20 RX ORDER — PREDNISONE 20 MG/1
50 TABLET ORAL DAILY
Qty: 60 TABLET | Refills: 3 | Status: SHIPPED | OUTPATIENT
Start: 2020-07-20 | End: 2020-08-24

## 2020-07-20 NOTE — PATIENT INSTRUCTIONS
SMOKING CESSATION  What's in cigarette smoke? - Cigarette smoke contains over 4,000 chemicals. Nicotine is one of the main ingredients which is an insecticide/herbicide. It is poisonous to our nervous system, increases blood clotting risk, and decreases the body's defenses to fight off infection. Another chemical is Carbon Monoxide is an asphyxiating gas that permanently binds to blood cells and blocks the transport of oxygen. This leads to tissue death and decreases your metabolism. Tar is a chemical that coats your lungs and trachea which impairs new oxygen coming in and carbon dioxide getting out of your body.   How does smoking impact surgery? - Smoking is particularly hazardous with regards to surgery. Surgery puts stress on the body and a smoker's body is already under strain from these chemicals. Putting the two together, especially for an elective surgery, could be a recipe for disaster. Smoking before and after surgery increases your risk of heart problems, slow wound healing, delayed bone healing, blood clots, wound infection and anesthesia complications.   What are the benefits of quitting? - In 20 minutes your blood pressure will drop back down to normal. In 8 hours the carbon monoxide (a toxic gas) levels in your blood stream will drop by half, and oxygen levels will return to normal. In 48 hours your chance of having a heart attack will have decreased. All nicotine will have left your body. Your sense of taste and smell will return to a normal level. In 72 hours your bronchial tubes will relax, and your energy levels will increase. In 2 weeks your circulation will increase, and it will continue to improve for the next 10 weeks.    Recommendations for elective surgery - Ideally, patients should quit smoking 8 weeks before and at least 2 weeks after elective surgery in order to avoid complications. Simply cutting back on the amount of cigarettes smoked per day does not offer any benefit or decrease the  risk of poor wound healing, heart problems, and infection. Smokers should also start taking Vitamin C and B for two weeks before surgery and two weeks after surgery.    Ways to Stop Smokin. Nicotine patches, lozenges, or gum  2. Support Groups  3. Medications (see below)    List of Medications:  1. Varenicline Tartrate (CHANTIX)   2. Bupropion HCL (WELLBUTRIN, ZYBAN) - note: make sure Wellbutrin is for smoking cessation and not other issues   3. Nicotine Patch (NICODERM)   4. Nicotine Inhaler (NICOTROL)   5. Nicotine Gum Nicotine Polacrilex   6. Nicotine Lozenge: Nicotine Polacrilex (COMMIT)   * [place] offers a smoking support group as well!  Please visit: [email or web address]  If you are interested in these, ask about getting a prescription or talk to your primary care doctor about what may be the best way for you to quit.

## 2020-07-20 NOTE — LETTER
7/20/2020       RE: Deshawn Flood  [facility] [id number]     Dear Colleague,    Thank you for referring your patient, Deshawn Flood, to the [facility]. Please see a copy of my visit note below.    Deshawn Flood is a 45 year old male who is being evaluated via a billable telephone visit.        Phone call duration: 7 minutes    Jenny Aram Scott MD    Assessment and Plan:  45 year old male who presents for followup of minimal change disease. He presented for evaluation after presenting to ER where CT scan noted ascites and nonspecific small bowel enteritis, and UA showed proteinuria. He had biopsy which showed minimal change on 4/9/2019 and treated with prednisone 70mg. He was in remission and down to prednsione 10mg then stopped it, and had relapse within a couple months.  His mother noted swelling and he was up 20-25 lbs and proteinuria up to 6 grams/g cr.   - Prednisone round #2 started March 2020   - Taper starting today, 10mg every 4 weeks until 30mg, then consider slowing taper   July 20- 50mg  August 17- 40mg  Sept 15- 30mg  - will continue to see him monthly for now    # Renal function- Scr was 1.1 in the past and was1.27 in ER. It was up to 1.6-1.8 at time of biopsy after diuretics were started.  - scr back to 1-1.2  - only CBC drawn last week, will have him do labs in next couple weeks  - RTC 4 weeks    - proteinuria improved very quickly down to normal once prednisone restarted   - he is trying to do sodium restriction (really is 3-4000mg restriction typically) and if he notices swelling in face, he is a little more careful.    - labs monthly  - slower wean with this taper  - if relapses again, will consider CNI for long term therapy- discussed with him today that if relapses again, will need a different maintenance long term regimen  - discussed with him importance of close monitoring and long term care.     #  Hyperlipidemia- LDL very elevated,was on low dose statin but stopped;  Restarted given nephrotic  - recheck lipids     # Hypertension: normotensive   - continue lisinopril    # Not anemic- hgb 18 now down to 16- stable    # Electrolytes: normal low potassium      # Mineral Bone Disorder:   Check baseline PTH if creatinine remains elevated.     - RTC and abs monthly for now    Assessment and plan was discussed with patient and he voiced his understanding and agreement.    Reason for Visit:  Deshawn Flood is a 45 year old male with nephrotic range proteinuria noted and biopsy 4/9/19 shows minimal change disease    HPI:  He is a 45 year old male recently in normal health who presented with nephrotic syndrome. He underwent kidney biopsy 4/9 and it shows minimal change disease. He was admitted to the hospital briefly after biopsy as he was having poor appetite due to bloating, and elevated creatinine.    Since then he went into remission, we tapered down on prednisone and unfortunately he relapsed within a couple months (6g/g cr February 2020),  thus we restarted prednisone in March 2020 and by early April he was down to normal range proteinuria and he was feeling better.    His weight is usually closer to 150-160  but was up to 184 lbs with last episode, was down to 160s with no proteinuria, but was back up to 188 lbs and once back on prednisone, his weight went back down to 158- 159lbs. He likes to stay at 160-165lbs but can't seem to keep weight on    He is trying to eat healthier and is watching sodium intake, restricting to 4 grams a day most days.  He is working and staying busy.  If he notes more swelling in his face (or his friends comment), he watches salt intake more and this helps.      Renal History:   None prior to minimal change disease Spring 2019    ROS:   A comprehensive review of systems was obtained and negative, except as noted in the HPI or PMH.    Active Medical Problems:  Patient Active Problem  List   Diagnosis     Loose body of left knee     Left knee pain     Tobacco abuse     History of pneumothorax     CARDIOVASCULAR SCREENING; LDL GOAL LESS THAN 160     Nephrotic syndrome     Anasarca     PMH:   Medical record was reviewed and PMH was discussed with patient and noted below.  Past Medical History:   Diagnosis Date     Anemia      Other spontaneous pneumothorax      SPONTANEOUS PNEUMOTHORAX NEC 3/25/2005     Unspecified asthma(493.90)      PSH:   Past Surgical History:   Procedure Laterality Date     ARTHROSCOPY KNEE  8/3/2012    Procedure: ARTHROSCOPY KNEE;  Left knee Arthroscopy with removal of loose bodies;  Surgeon: Bibi Roberts MD;  Location: [facility]     HC REPAIR UMBILICAL GODFREY,<4Y/O,REDUC  1978     IR RENAL BIOPSY LEFT  4/9/2019     TUBE THORACOSTOMY,ABSC/EMPYEMA/HEMO  3/20/2005    Small chest tube with Heimlich valve.       Family Hx:   Family History   Problem Relation Age of Onset     Autoimmune Disease No family hx of      Personal Hx:   Social History     Tobacco Use     Smoking status: Current Some Day Smoker     Packs/day: 0.10     Years: 15.00     Pack years: 1.50     Types: Cigarettes     Smokeless tobacco: Former User     Types: Chew     Tobacco comment: hopes to quit smoking --hasn't had any since lung went down 1/14/05.   Substance Use Topics     Alcohol use: Yes     Alcohol/week: 17.5 standard drinks     Types: 21 Cans of beer per week     Comment: occ       Allergies:  Allergies   Allergen Reactions     Bee Venom      swells up     No Known Drug Allergies      Seasonal Allergies        Medications:  Current Outpatient Medications   Medication Sig     albuterol (PROAIR HFA/PROVENTIL HFA/VENTOLIN HFA) 108 (90 Base) MCG/ACT inhaler Inhale 1-2 puffs into the lungs every 6 hours as needed for shortness of breath / dyspnea or wheezing Appointment needed for refills.     atorvastatin (LIPITOR) 20 MG tablet Take 1 tablet (20 mg) by mouth daily     lisinopril (ZESTRIL) 20 MG tablet Take  1 tablet (20 mg) by mouth daily     omeprazole (PRILOSEC) 20 MG DR capsule Take 1 capsule (20 mg) by mouth every morning (before breakfast)     predniSONE (DELTASONE) 20 MG tablet Take 3 tablets (60 mg) by mouth daily     sulfamethoxazole-trimethoprim (BACTRIM DS) 800-160 MG tablet Take 1 tablet by mouth three times a week Monday, Wednesday, Friday     No current facility-administered medications for this visit.       Vitals:  There were no vitals taken for this visit.  158 lbs  Exam:  GENERAL : alert and no distress  Speaks in full sentences without dyspnea  Normal speech and thought pattern    LABS:   CMP  Recent Labs   Lab Test 06/06/20  0802 05/02/20  0755 04/03/20  0839 02/28/20  1610    137 138 137   POTASSIUM 3.6 3.7 3.8 4.1   CHLORIDE 104 102 105 102   CO2 32 32 28 30   ANIONGAP 3 3 5 5   * 103* 108* 91   BUN 24 12 14 14   CR 1.20 0.94 0.86 1.19   GFRESTIMATED 72 >90 >90 73   GFRESTBLACK 83 >90 >90 84   BASIA 9.0 8.5 8.7 7.9*     Recent Labs   Lab Test 11/26/19  1426 09/09/19  1506 05/20/19  1350 04/10/19  1009   BILITOTAL 0.8 0.4 0.4 0.7   ALKPHOS 41 65 61 42   ALT 25 41 57 9   AST 11 14 19 12     CBC  Recent Labs   Lab Test 07/11/20  0812 06/06/20  0802 05/02/20  0755 04/03/20  0839   HGB 15.5 16.4 16.2 15.4   WBC 9.2 9.6 12.0* 11.3*   RBC 4.80 5.15 5.09 4.96   HCT 43.9 47.5 47.3 44.4   MCV 92 92 93 90   MCH 32.3 31.8 31.8 31.0   MCHC 35.3 34.5 34.2 34.7   RDW 11.9 12.6 12.8 12.0    186 207 220     URINE STUDIES  Recent Labs   Lab Test 02/28/20  1620 04/01/19  1500 03/29/19  1858   COLOR Yellow Yellow Yellow   APPEARANCE Clear Clear Clear   URINEGLC Negative Negative Negative   URINEBILI Negative Negative Negative   URINEKETONE Negative Negative Negative   SG >1.030 1.015 >1.035*   UBLD Moderate* Moderate* Moderate*   URINEPH 6.0 6.0 6.0   PROTEIN >=300* >=300* >499*   UROBILINOGEN 0.2 0.2  --    NITRITE Negative Negative Negative   LEUKEST Negative Negative Negative   RBCU O - 2 25-50*  7*   WBCU 0 - 5 0 - 5 7*     Recent Labs   Lab Test 04/03/20  0900 02/28/20  1619 11/26/19  1435 09/09/19  1510   UTPG 0.18 6.20* 0.06 0.14     PTH  No lab results found.  IRON STUDIES  No lab results found.    IMPRESSION:  1. Findings are concerning for diffuse small bowel enteritis likely  infectious or inflammatory in etiology, with associated inflammatory  changes of the mesenteric adipose tissues, moderate ascites, small  bilateral probably reactive pleural effusions and with some edema in  the subcutaneous adipose tissues.  2. No significant atherosclerosis is identified. No obvious arterial  blockage is seen in the mesenteric vessels to suggest ischemia.  3. Mild degenerative changes of the spine and hips. No acute fracture  or aggressive osseous lesion.  Jenny Scott MD    Again, thank you for allowing me to participate in the care of your patient.      Sincerely,    Jenny Scott MD

## 2020-07-20 NOTE — PROGRESS NOTES
"Deshawn Flood is a 45 year old male who is being evaluated via a billable telephone visit.      The patient has been notified of following:     \"This telephone visit will be conducted via a call between you and your physician/provider. We have found that certain health care needs can be provided without the need for a physical exam.  This service lets us provide the care you need with a short phone conversation.  If a prescription is necessary we can send it directly to your pharmacy.  If lab work is needed we can place an order for that and you can then stop by our lab to have the test done at a later time.    Telephone visits are billed at different rates depending on your insurance coverage. During this emergency period, for some insurers they may be billed the same as an in-person visit.  Please reach out to your insurance provider with any questions.    If during the course of the call the physician/provider feels a telephone visit is not appropriate, you will not be charged for this service.\"    Patient has given verbal consent for Telephone visit?  Yes    What phone number would you like to be contacted at? [phone]    How would you like to obtain your AVS? Mail a copy    Phone call duration: 7 minutes    Jenny Aram Scott MD    Assessment and Plan:  45 year old male who presents for followup of minimal change disease. He presented for evaluation after presenting to ER where CT scan noted ascites and nonspecific small bowel enteritis, and UA showed proteinuria. He had biopsy which showed minimal change on 4/9/2019 and treated with prednisone 70mg. He was in remission and down to prednsione 10mg then stopped it, and had relapse within a couple months.  His mother noted swelling and he was up 20-25 lbs and proteinuria up to 6 grams/g cr.   - Prednisone round #2 started March 2020   - Taper starting today, 10mg every 4 weeks until 30mg, then consider slowing taper   July 20- 50mg  August 17- " 40mg  Sept 15- 30mg  - will continue to see him monthly for now    # Renal function- Scr was 1.1 in the past and was1.27 in ER. It was up to 1.6-1.8 at time of biopsy after diuretics were started.  - scr back to 1-1.2  - only CBC drawn last week, will have him do labs in next couple weeks  - RTC 4 weeks    - proteinuria improved very quickly down to normal once prednisone restarted   - he is trying to do sodium restriction (really is 3-4000mg restriction typically) and if he notices swelling in face, he is a little more careful.    - labs monthly  - slower wean with this taper  - if relapses again, will consider CNI for long term therapy- discussed with him today that if relapses again, will need a different maintenance long term regimen  - discussed with him importance of close monitoring and long term care.     # Hyperlipidemia- LDL very elevated,was on low dose statin but stopped;  Restarted given nephrotic  - recheck lipids     # Hypertension: normotensive   - continue lisinopril    # Not anemic- hgb 18 now down to 16- stable    # Electrolytes: normal low potassium      # Mineral Bone Disorder:   Check baseline PTH if creatinine remains elevated.     - RTC and abs monthly for now    Assessment and plan was discussed with patient and he voiced his understanding and agreement.    Reason for Visit:  Deshawn Flood is a 45 year old male with nephrotic range proteinuria noted and biopsy 4/9/19 shows minimal change disease    HPI:  He is a 45 year old male recently in normal health who presented with nephrotic syndrome. He underwent kidney biopsy 4/9 and it shows minimal change disease. He was admitted to the hospital briefly after biopsy as he was having poor appetite due to bloating, and elevated creatinine.    Since then he went into remission, we tapered down on prednisone and unfortunately he relapsed within a couple months (6g/g cr February 2020),  thus we restarted prednisone in March 2020 and by early April he  was down to normal range proteinuria and he was feeling better.    His weight is usually closer to 150-160  but was up to 184 lbs with last episode, was down to 160s with no proteinuria, but was back up to 188 lbs and once back on prednisone, his weight went back down to 158- 159lbs. He likes to stay at 160-165lbs but can't seem to keep weight on    He is trying to eat healthier and is watching sodium intake, restricting to 4 grams a day most days.  He is working and staying busy.  If he notes more swelling in his face (or his friends comment), he watches salt intake more and this helps.      Renal History:   None prior to minimal change disease Spring 2019    ROS:   A comprehensive review of systems was obtained and negative, except as noted in the HPI or PMH.    Active Medical Problems:  Patient Active Problem List   Diagnosis     Loose body of left knee     Left knee pain     Tobacco abuse     History of pneumothorax     CARDIOVASCULAR SCREENING; LDL GOAL LESS THAN 160     Nephrotic syndrome     Anasarca     PMH:   Medical record was reviewed and PMH was discussed with patient and noted below.  Past Medical History:   Diagnosis Date     Anemia      Other spontaneous pneumothorax      SPONTANEOUS PNEUMOTHORAX NEC 3/25/2005     Unspecified asthma(493.90)      PSH:   Past Surgical History:   Procedure Laterality Date     ARTHROSCOPY KNEE  8/3/2012    Procedure: ARTHROSCOPY KNEE;  Left knee Arthroscopy with removal of loose bodies;  Surgeon: Bibi Roberts MD;  Location: [facility]     HC REPAIR UMBILICAL GODFREY,<4Y/O,REDUC  1978     IR RENAL BIOPSY LEFT  4/9/2019     TUBE THORACOSTOMY,ABSC/EMPYEMA/HEMO  3/20/2005    Small chest tube with Heimlich valve.       Family Hx:   Family History   Problem Relation Age of Onset     Autoimmune Disease No family hx of      Personal Hx:   Social History     Tobacco Use     Smoking status: Current Some Day Smoker     Packs/day: 0.10     Years: 15.00     Pack years: 1.50     Types:  Cigarettes     Smokeless tobacco: Former User     Types: Chew     Tobacco comment: hopes to quit smoking --hasn't had any since lung went down 1/14/05.   Substance Use Topics     Alcohol use: Yes     Alcohol/week: 17.5 standard drinks     Types: 21 Cans of beer per week     Comment: occ       Allergies:  Allergies   Allergen Reactions     Bee Venom      swells up     No Known Drug Allergies      Seasonal Allergies        Medications:  Current Outpatient Medications   Medication Sig     albuterol (PROAIR HFA/PROVENTIL HFA/VENTOLIN HFA) 108 (90 Base) MCG/ACT inhaler Inhale 1-2 puffs into the lungs every 6 hours as needed for shortness of breath / dyspnea or wheezing Appointment needed for refills.     atorvastatin (LIPITOR) 20 MG tablet Take 1 tablet (20 mg) by mouth daily     lisinopril (ZESTRIL) 20 MG tablet Take 1 tablet (20 mg) by mouth daily     omeprazole (PRILOSEC) 20 MG DR capsule Take 1 capsule (20 mg) by mouth every morning (before breakfast)     predniSONE (DELTASONE) 20 MG tablet Take 3 tablets (60 mg) by mouth daily     sulfamethoxazole-trimethoprim (BACTRIM DS) 800-160 MG tablet Take 1 tablet by mouth three times a week Monday, Wednesday, Friday     No current facility-administered medications for this visit.       Vitals:  There were no vitals taken for this visit.  158 lbs  Exam:  GENERAL : alert and no distress  Speaks in full sentences without dyspnea  Normal speech and thought pattern    LABS:   CMP  Recent Labs   Lab Test 06/06/20  0802 05/02/20  0755 04/03/20  0839 02/28/20  1610    137 138 137   POTASSIUM 3.6 3.7 3.8 4.1   CHLORIDE 104 102 105 102   CO2 32 32 28 30   ANIONGAP 3 3 5 5   * 103* 108* 91   BUN 24 12 14 14   CR 1.20 0.94 0.86 1.19   GFRESTIMATED 72 >90 >90 73   GFRESTBLACK 83 >90 >90 84   BASIA 9.0 8.5 8.7 7.9*     Recent Labs   Lab Test 11/26/19  1426 09/09/19  1506 05/20/19  1350 04/10/19  1009   BILITOTAL 0.8 0.4 0.4 0.7   ALKPHOS 41 65 61 42   ALT 25 41 57 9   AST 11 14  19 12     CBC  Recent Labs   Lab Test 07/11/20  0812 06/06/20  0802 05/02/20  0755 04/03/20  0839   HGB 15.5 16.4 16.2 15.4   WBC 9.2 9.6 12.0* 11.3*   RBC 4.80 5.15 5.09 4.96   HCT 43.9 47.5 47.3 44.4   MCV 92 92 93 90   MCH 32.3 31.8 31.8 31.0   MCHC 35.3 34.5 34.2 34.7   RDW 11.9 12.6 12.8 12.0    186 207 220     URINE STUDIES  Recent Labs   Lab Test 02/28/20  1620 04/01/19  1500 03/29/19  1858   COLOR Yellow Yellow Yellow   APPEARANCE Clear Clear Clear   URINEGLC Negative Negative Negative   URINEBILI Negative Negative Negative   URINEKETONE Negative Negative Negative   SG >1.030 1.015 >1.035*   UBLD Moderate* Moderate* Moderate*   URINEPH 6.0 6.0 6.0   PROTEIN >=300* >=300* >499*   UROBILINOGEN 0.2 0.2  --    NITRITE Negative Negative Negative   LEUKEST Negative Negative Negative   RBCU O - 2 25-50* 7*   WBCU 0 - 5 0 - 5 7*     Recent Labs   Lab Test 04/03/20  0900 02/28/20  1619 11/26/19  1435 09/09/19  1510   UTPG 0.18 6.20* 0.06 0.14     PTH  No lab results found.  IRON STUDIES  No lab results found.    IMPRESSION:  1. Findings are concerning for diffuse small bowel enteritis likely  infectious or inflammatory in etiology, with associated inflammatory  changes of the mesenteric adipose tissues, moderate ascites, small  bilateral probably reactive pleural effusions and with some edema in  the subcutaneous adipose tissues.  2. No significant atherosclerosis is identified. No obvious arterial  blockage is seen in the mesenteric vessels to suggest ischemia.  3. Mild degenerative changes of the spine and hips. No acute fracture  or aggressive osseous lesion.  Jenny Scott MD

## 2020-07-21 DIAGNOSIS — N04.9 NEPHROTIC SYNDROME: Primary | ICD-10-CM

## 2020-08-04 DIAGNOSIS — J45.40 MODERATE PERSISTENT ASTHMA WITHOUT COMPLICATION: ICD-10-CM

## 2020-08-05 RX ORDER — ALBUTEROL SULFATE 90 UG/1
1-2 AEROSOL, METERED RESPIRATORY (INHALATION) EVERY 6 HOURS PRN
Qty: 8.5 G | Refills: 0 | Status: SHIPPED | OUTPATIENT
Start: 2020-08-05 | End: 2020-08-26

## 2020-08-05 NOTE — TELEPHONE ENCOUNTER
Routing refill request to provider for review/approval because:  ACT overdue    HAZEL ClaytonN, RN  [facility]

## 2020-08-08 DIAGNOSIS — N05.0 MINIMAL CHANGE DISEASE: ICD-10-CM

## 2020-08-08 DIAGNOSIS — R80.1 PERSISTENT PROTEINURIA: ICD-10-CM

## 2020-08-08 DIAGNOSIS — N04.9 NEPHROTIC SYNDROME: ICD-10-CM

## 2020-08-08 DIAGNOSIS — Z79.52 LONG TERM CURRENT USE OF SYSTEMIC STEROIDS: ICD-10-CM

## 2020-08-08 LAB
ALBUMIN SERPL-MCNC: 3.6 G/DL (ref 3.4–5)
ANION GAP SERPL CALCULATED.3IONS-SCNC: 6 MMOL/L (ref 3–14)
BUN SERPL-MCNC: 19 MG/DL (ref 7–30)
CALCIUM SERPL-MCNC: 9.3 MG/DL (ref 8.5–10.1)
CHLORIDE SERPL-SCNC: 105 MMOL/L (ref 94–109)
CO2 SERPL-SCNC: 29 MMOL/L (ref 20–32)
CREAT SERPL-MCNC: 1.32 MG/DL (ref 0.66–1.25)
CREAT UR-MCNC: 217 MG/DL
ERYTHROCYTE [DISTWIDTH] IN BLOOD BY AUTOMATED COUNT: 11.9 % (ref 10–15)
GFR SERPL CREATININE-BSD FRML MDRD: 64 ML/MIN/{1.73_M2}
GLUCOSE SERPL-MCNC: 95 MG/DL (ref 70–99)
HCT VFR BLD AUTO: 44.7 % (ref 40–53)
HGB BLD-MCNC: 15.6 G/DL (ref 13.3–17.7)
MCH RBC QN AUTO: 32.5 PG (ref 26.5–33)
MCHC RBC AUTO-ENTMCNC: 34.9 G/DL (ref 31.5–36.5)
MCV RBC AUTO: 93 FL (ref 78–100)
MICROALBUMIN UR-MCNC: 5 MG/L
MICROALBUMIN/CREAT UR: 2.35 MG/G CR (ref 0–17)
PHOSPHATE SERPL-MCNC: 2.8 MG/DL (ref 2.5–4.5)
PLATELET # BLD AUTO: 192 10E9/L (ref 150–450)
POTASSIUM SERPL-SCNC: 3.9 MMOL/L (ref 3.4–5.3)
RBC # BLD AUTO: 4.8 10E12/L (ref 4.4–5.9)
SODIUM SERPL-SCNC: 140 MMOL/L (ref 133–144)
WBC # BLD AUTO: 9.9 10E9/L (ref 4–11)

## 2020-08-08 PROCEDURE — 80069 RENAL FUNCTION PANEL: CPT | Performed by: INTERNAL MEDICINE

## 2020-08-08 PROCEDURE — 85027 COMPLETE CBC AUTOMATED: CPT | Performed by: INTERNAL MEDICINE

## 2020-08-08 PROCEDURE — 36415 COLL VENOUS BLD VENIPUNCTURE: CPT | Performed by: INTERNAL MEDICINE

## 2020-08-08 PROCEDURE — 82043 UR ALBUMIN QUANTITATIVE: CPT | Performed by: INTERNAL MEDICINE

## 2020-08-24 ENCOUNTER — VIRTUAL VISIT (OUTPATIENT)
Dept: NEPHROLOGY | Facility: CLINIC | Age: 45
End: 2020-08-24
Payer: COMMERCIAL

## 2020-08-24 DIAGNOSIS — N04.9 NEPHROTIC SYNDROME: Primary | ICD-10-CM

## 2020-08-24 DIAGNOSIS — N05.0 MINIMAL CHANGE DISEASE: ICD-10-CM

## 2020-08-24 RX ORDER — PREDNISONE 20 MG/1
40 TABLET ORAL DAILY
Qty: 60 TABLET | Refills: 3 | Status: SHIPPED | OUTPATIENT
Start: 2020-08-24 | End: 2020-12-17

## 2020-08-24 NOTE — PROGRESS NOTES
"Deshawn Flood is a 45 year old male who is being evaluated via a billable telephone visit.      The patient has been notified of following:     \"This telephone visit will be conducted via a call between you and your physician/provider. We have found that certain health care needs can be provided without the need for a physical exam.  This service lets us provide the care you need with a short phone conversation.  If a prescription is necessary we can send it directly to your pharmacy.  If lab work is needed we can place an order for that and you can then stop by our lab to have the test done at a later time.    Telephone visits are billed at different rates depending on your insurance coverage. During this emergency period, for some insurers they may be billed the same as an in-person visit.  Please reach out to your insurance provider with any questions.    If during the course of the call the physician/provider feels a telephone visit is not appropriate, you will not be charged for this service.\"    Patient has given verbal consent for Telephone visit?  Yes    What phone number would you like to be contacted at? [phone]    How would you like to obtain your AVS? Mail a copy    Phone call duration:  12minutes    Jenny Aram Scott MD    Assessment and Plan:  45 year old male who presents for followup of minimal change disease. He presented for evaluation after presenting to ER where CT scan noted ascites and nonspecific small bowel enteritis, and UA showed proteinuria. He had biopsy which showed minimal change on 4/9/2019 and treated with prednisone 70mg. He was in remission and down to prednsione 10mg then stopped it, and had relapse within a couple months.  His mother noted swelling and he was up 20-25 lbs and proteinuria up to 6 grams/g cr.   - Prednisone round #2 started March 2020   - Taper starting today, 10mg every 4 weeks until 30mg, then consider slowing taper   July 20- 50mg  August 24- " 40mg  Sept 21- 30mg  - will continue to see him monthly for now    # Renal function- Scr was 1.1 in the past and was1.27 in ER. It was up to 1.6-1.8 at time of biopsy after diuretics were started.  - scr back to 1-1.2 range, a little higher, ? Volume related- asked him to drink more water, at least 1.5-2 liters a day (he drinks 3-4 mountain dew a day)  - RTC 4-6 weeks    - proteinuria improved very quickly down to normal once prednisone restarted   - he is trying to do sodium restriction (really is 3-4000mg restriction typically) and if he notices swelling in face, he is a little more careful. He is getting to 5000mg some days as he eats hot dogs and other foods, cheese.    - labs monthly  - slower wean with this taper  - if relapses again, will consider CNI for long term therapy- discussed with him today that if relapses again, will need a different maintenance long term regimen  - discussed with him importance of close monitoring and long term care.     # Hyperlipidemia- LDL very elevated,was on low dose statin but stopped;  Restarted given nephrotic  - recheck lipids     # Hypertension: normotensive   - continue lisinopril    # Not anemic- hgb 18 now down to 16- stable    # Electrolytes: normal low potassium      # Mineral Bone Disorder:   Check baseline PTH if creatinine remains elevated.     - RTC and abs monthly for now    Assessment and plan was discussed with patient and he voiced his understanding and agreement.    Reason for Visit:  Deshawn Flood is a 45 year old male with nephrotic range proteinuria noted and biopsy 4/9/19 shows minimal change disease    HPI:  He is a 45 year old male recently in normal health who presented with nephrotic syndrome. He underwent kidney biopsy 4/9 and it shows minimal change disease. He was admitted to the hospital briefly after biopsy as he was having poor appetite due to bloating, and elevated creatinine.    Since then he went into remission, we tapered down on  prednisone and unfortunately he relapsed within a couple months (6g/g cr February 2020),  thus we restarted prednisone in March 2020 and by early April he was down to normal range proteinuria and he was feeling better.    His weight is usually closer to 150-160  but was up to 184 lbs with last episode, was down to 160s with no proteinuria, but was back up to 188 lbs and once back on prednisone, his weight went back down to 158- 159lbs. He likes to stay at 160-165lbs but can't seem to keep weight on    He is trying to eat healthier and is watching sodium intake, restricting to 4 grams a day or so..  He is working and staying busy.  If he notes more swelling in his face (or his friends comment), he watches salt intake more and this helps.  He is still on 50mg and we will plan to decrease to 40mg  He is tolerating well and feels well  His weight is down to 154-155 lbs  He is not taking any diuretics  His last creatinine was a little higher, and we discussed increasing water intake.    Renal History:   None prior to minimal change disease Spring 2019    ROS:   A comprehensive review of systems was obtained and negative, except as noted in the HPI or PMH.    Active Medical Problems:  Patient Active Problem List   Diagnosis     Loose body of left knee     Left knee pain     Tobacco abuse     History of pneumothorax     CARDIOVASCULAR SCREENING; LDL GOAL LESS THAN 160     Nephrotic syndrome     Anasarca     Minimal change disease     PMH:   Medical record was reviewed and PMH was discussed with patient and noted below.  Past Medical History:   Diagnosis Date     Anemia      Other spontaneous pneumothorax      SPONTANEOUS PNEUMOTHORAX NEC 3/25/2005     Unspecified asthma(493.90)      PSH:   Past Surgical History:   Procedure Laterality Date     ARTHROSCOPY KNEE  8/3/2012    Procedure: ARTHROSCOPY KNEE;  Left knee Arthroscopy with removal of loose bodies;  Surgeon: Bibi Roberts MD;  Location: [facility]      REPAIR  UMBILICAL GODFREY,<4Y/O,REDUC  1978     IR RENAL BIOPSY LEFT  4/9/2019     TUBE THORACOSTOMY,ABSC/EMPYEMA/HEMO  3/20/2005    Small chest tube with Heimlich valve.       Family Hx:   Family History   Problem Relation Age of Onset     Autoimmune Disease No family hx of      Personal Hx:   Social History     Tobacco Use     Smoking status: Current Some Day Smoker     Packs/day: 0.10     Years: 15.00     Pack years: 1.50     Types: Cigarettes     Smokeless tobacco: Former User     Types: Chew   Substance Use Topics     Alcohol use: Yes     Alcohol/week: 17.5 standard drinks     Types: 21 Cans of beer per week     Comment: occ       Allergies:  Allergies   Allergen Reactions     Bee Venom      swells up     No Known Drug Allergies      Seasonal Allergies        Medications:  Current Outpatient Medications   Medication Sig     albuterol (PROAIR HFA/PROVENTIL HFA/VENTOLIN HFA) 108 (90 Base) MCG/ACT inhaler Inhale 1-2 puffs into the lungs every 6 hours as needed for shortness of breath / dyspnea or wheezing Appointment needed for refills.     atorvastatin (LIPITOR) 20 MG tablet Take 1 tablet (20 mg) by mouth daily     lisinopril (ZESTRIL) 20 MG tablet Take 1 tablet (20 mg) by mouth daily     omeprazole (PRILOSEC) 20 MG DR capsule Take 1 capsule (20 mg) by mouth every morning (before breakfast)     predniSONE (DELTASONE) 20 MG tablet Take 2.5 tablets (50 mg) by mouth daily Take 50mg for 4 weeks, then 40mg for 4 weeks , then 30mg for 4 weeks     sulfamethoxazole-trimethoprim (BACTRIM DS) 800-160 MG tablet Take 1 tablet by mouth three times a week Monday, Wednesday, Friday     No current facility-administered medications for this visit.       Vitals:  There were no vitals taken for this visit.  154-155 lbs  Exam:  GENERAL : alert and no distress  Speaks in full sentences without dyspnea  Normal speech and thought pattern    LABS:   CMP  Recent Labs   Lab Test 08/08/20  0803 06/06/20  0802 05/02/20  0755 04/03/20  0839     139 137 138   POTASSIUM 3.9 3.6 3.7 3.8   CHLORIDE 105 104 102 105   CO2 29 32 32 28   ANIONGAP 6 3 3 5   GLC 95 109* 103* 108*   BUN 19 24 12 14   CR 1.32* 1.20 0.94 0.86   GFRESTIMATED 64 72 >90 >90   GFRESTBLACK 74 83 >90 >90   BASIA 9.3 9.0 8.5 8.7     Recent Labs   Lab Test 11/26/19  1426 09/09/19  1506 05/20/19  1350 04/10/19  1009   BILITOTAL 0.8 0.4 0.4 0.7   ALKPHOS 41 65 61 42   ALT 25 41 57 9   AST 11 14 19 12     CBC  Recent Labs   Lab Test 08/08/20  0803 07/11/20  0812 06/06/20  0802 05/02/20  0755   HGB 15.6 15.5 16.4 16.2   WBC 9.9 9.2 9.6 12.0*   RBC 4.80 4.80 5.15 5.09   HCT 44.7 43.9 47.5 47.3   MCV 93 92 92 93   MCH 32.5 32.3 31.8 31.8   MCHC 34.9 35.3 34.5 34.2   RDW 11.9 11.9 12.6 12.8    210 186 207     URINE STUDIES  Recent Labs   Lab Test 02/28/20  1620 04/01/19  1500 03/29/19  1858   COLOR Yellow Yellow Yellow   APPEARANCE Clear Clear Clear   URINEGLC Negative Negative Negative   URINEBILI Negative Negative Negative   URINEKETONE Negative Negative Negative   SG >1.030 1.015 >1.035*   UBLD Moderate* Moderate* Moderate*   URINEPH 6.0 6.0 6.0   PROTEIN >=300* >=300* >499*   UROBILINOGEN 0.2 0.2  --    NITRITE Negative Negative Negative   LEUKEST Negative Negative Negative   RBCU O - 2 25-50* 7*   WBCU 0 - 5 0 - 5 7*     Recent Labs   Lab Test 04/03/20  0900 02/28/20  1619 11/26/19  1435 09/09/19  1510   UTPG 0.18 6.20* 0.06 0.14     PTH  No lab results found.  IRON STUDIES  No lab results found.    IMPRESSION:  1. Findings are concerning for diffuse small bowel enteritis likely  infectious or inflammatory in etiology, with associated inflammatory  changes of the mesenteric adipose tissues, moderate ascites, small  bilateral probably reactive pleural effusions and with some edema in  the subcutaneous adipose tissues.  2. No significant atherosclerosis is identified. No obvious arterial  blockage is seen in the mesenteric vessels to suggest ischemia.  3. Mild degenerative changes of the  spine and hips. No acute fracture  or aggressive osseous lesion.  Jenny Scott MD

## 2020-08-24 NOTE — LETTER
"8/24/2020       RE: Deshawn Flood  [facility] [address]     Dear Colleague,    Thank you for referring your patient, Deshawn Flood, to the [facility] at [facility]. Please see a copy of my visit note below.    Deshawn Flood is a 45 year old male who is being evaluated via a billable telephone visit.      The patient has been notified of following:     \"This telephone visit will be conducted via a call between you and your physician/provider. We have found that certain health care needs can be provided without the need for a physical exam.  This service lets us provide the care you need with a short phone conversation.  If a prescription is necessary we can send it directly to your pharmacy.  If lab work is needed we can place an order for that and you can then stop by our lab to have the test done at a later time.    Telephone visits are billed at different rates depending on your insurance coverage. During this emergency period, for some insurers they may be billed the same as an in-person visit.  Please reach out to your insurance provider with any questions.    If during the course of the call the physician/provider feels a telephone visit is not appropriate, you will not be charged for this service.\"    Patient has given verbal consent for Telephone visit?  Yes    What phone number would you like to be contacted at? [phone]    How would you like to obtain your AVS? Mail a copy    Phone call duration:  12minutes    Jenny Aram Scott MD    Assessment and Plan:  45 year old male who presents for followup of minimal change disease. He presented for evaluation after presenting to ER where CT scan noted ascites and nonspecific small bowel enteritis, and UA showed proteinuria. He had biopsy which showed minimal change on 4/9/2019 and treated with prednisone 70mg. He was in remission and down to prednsione 10mg then stopped it, and had relapse " within a couple months.  His mother noted swelling and he was up 20-25 lbs and proteinuria up to 6 grams/g cr.   - Prednisone round #2 started March 2020   - Taper starting today, 10mg every 4 weeks until 30mg, then consider slowing taper   July 20- 50mg  August 24- 40mg  Sept 21- 30mg  - will continue to see him monthly for now    # Renal function- Scr was 1.1 in the past and was1.27 in ER. It was up to 1.6-1.8 at time of biopsy after diuretics were started.  - scr back to 1-1.2 range, a little higher, ? Volume related- asked him to drink more water, at least 1.5-2 liters a day (he drinks 3-4 mountain dew a day)  - RTC 4-6 weeks    - proteinuria improved very quickly down to normal once prednisone restarted   - he is trying to do sodium restriction (really is 3-4000mg restriction typically) and if he notices swelling in face, he is a little more careful. He is getting to 5000mg some days as he eats hot dogs and other foods, cheese.    - labs monthly  - slower wean with this taper  - if relapses again, will consider CNI for long term therapy- discussed with him today that if relapses again, will need a different maintenance long term regimen  - discussed with him importance of close monitoring and long term care.     # Hyperlipidemia- LDL very elevated,was on low dose statin but stopped;  Restarted given nephrotic  - recheck lipids     # Hypertension: normotensive   - continue lisinopril    # Not anemic- hgb 18 now down to 16- stable    # Electrolytes: normal low potassium      # Mineral Bone Disorder:   Check baseline PTH if creatinine remains elevated.     - RTC and abs monthly for now    Assessment and plan was discussed with patient and he voiced his understanding and agreement.    Reason for Visit:  Deshawn Flood is a 45 year old male with nephrotic range proteinuria noted and biopsy 4/9/19 shows minimal change disease    HPI:  He is a 45 year old male recently in normal health who presented with nephrotic  syndrome. He underwent kidney biopsy 4/9 and it shows minimal change disease. He was admitted to the hospital briefly after biopsy as he was having poor appetite due to bloating, and elevated creatinine.    Since then he went into remission, we tapered down on prednisone and unfortunately he relapsed within a couple months (6g/g cr February 2020),  thus we restarted prednisone in March 2020 and by early April he was down to normal range proteinuria and he was feeling better.    His weight is usually closer to 150-160  but was up to 184 lbs with last episode, was down to 160s with no proteinuria, but was back up to 188 lbs and once back on prednisone, his weight went back down to 158- 159lbs. He likes to stay at 160-165lbs but can't seem to keep weight on    He is trying to eat healthier and is watching sodium intake, restricting to 4 grams a day or so..  He is working and staying busy.  If he notes more swelling in his face (or his friends comment), he watches salt intake more and this helps.  He is still on 50mg and we will plan to decrease to 40mg  He is tolerating well and feels well  His weight is down to 154-155 lbs  He is not taking any diuretics  His last creatinine was a little higher, and we discussed increasing water intake.    Renal History:   None prior to minimal change disease Spring 2019    ROS:   A comprehensive review of systems was obtained and negative, except as noted in the HPI or PMH.    Active Medical Problems:  Patient Active Problem List   Diagnosis     Loose body of left knee     Left knee pain     Tobacco abuse     History of pneumothorax     CARDIOVASCULAR SCREENING; LDL GOAL LESS THAN 160     Nephrotic syndrome     Anasarca     Minimal change disease     PMH:   Medical record was reviewed and PMH was discussed with patient and noted below.  Past Medical History:   Diagnosis Date     Anemia      Other spontaneous pneumothorax      SPONTANEOUS PNEUMOTHORAX NEC 3/25/2005     Unspecified  asthma(493.90)      PSH:   Past Surgical History:   Procedure Laterality Date     ARTHROSCOPY KNEE  8/3/2012    Procedure: ARTHROSCOPY KNEE;  Left knee Arthroscopy with removal of loose bodies;  Surgeon: Bibi Roberts MD;  Location: [facility]     HC REPAIR UMBILICAL GODFREY,<4Y/O,REDUC  1978     IR RENAL BIOPSY LEFT  4/9/2019     TUBE THORACOSTOMY,ABSC/EMPYEMA/HEMO  3/20/2005    Small chest tube with Heimlich valve.       Family Hx:   Family History   Problem Relation Age of Onset     Autoimmune Disease No family hx of      Personal Hx:   Social History     Tobacco Use     Smoking status: Current Some Day Smoker     Packs/day: 0.10     Years: 15.00     Pack years: 1.50     Types: Cigarettes     Smokeless tobacco: Former User     Types: Chew   Substance Use Topics     Alcohol use: Yes     Alcohol/week: 17.5 standard drinks     Types: 21 Cans of beer per week     Comment: occ       Allergies:  Allergies   Allergen Reactions     Bee Venom      swells up     No Known Drug Allergies      Seasonal Allergies        Medications:  Current Outpatient Medications   Medication Sig     albuterol (PROAIR HFA/PROVENTIL HFA/VENTOLIN HFA) 108 (90 Base) MCG/ACT inhaler Inhale 1-2 puffs into the lungs every 6 hours as needed for shortness of breath / dyspnea or wheezing Appointment needed for refills.     atorvastatin (LIPITOR) 20 MG tablet Take 1 tablet (20 mg) by mouth daily     lisinopril (ZESTRIL) 20 MG tablet Take 1 tablet (20 mg) by mouth daily     omeprazole (PRILOSEC) 20 MG DR capsule Take 1 capsule (20 mg) by mouth every morning (before breakfast)     predniSONE (DELTASONE) 20 MG tablet Take 2.5 tablets (50 mg) by mouth daily Take 50mg for 4 weeks, then 40mg for 4 weeks , then 30mg for 4 weeks     sulfamethoxazole-trimethoprim (BACTRIM DS) 800-160 MG tablet Take 1 tablet by mouth three times a week Monday, Wednesday, Friday     No current facility-administered medications for this visit.       Vitals:  There were no vitals  taken for this visit.  154-155 lbs  Exam:  GENERAL : alert and no distress  Speaks in full sentences without dyspnea  Normal speech and thought pattern    LABS:   CMP  Recent Labs   Lab Test 08/08/20  0803 06/06/20  0802 05/02/20  0755 04/03/20  0839    139 137 138   POTASSIUM 3.9 3.6 3.7 3.8   CHLORIDE 105 104 102 105   CO2 29 32 32 28   ANIONGAP 6 3 3 5   GLC 95 109* 103* 108*   BUN 19 24 12 14   CR 1.32* 1.20 0.94 0.86   GFRESTIMATED 64 72 >90 >90   GFRESTBLACK 74 83 >90 >90   BASIA 9.3 9.0 8.5 8.7     Recent Labs   Lab Test 11/26/19  1426 09/09/19  1506 05/20/19  1350 04/10/19  1009   BILITOTAL 0.8 0.4 0.4 0.7   ALKPHOS 41 65 61 42   ALT 25 41 57 9   AST 11 14 19 12     CBC  Recent Labs   Lab Test 08/08/20  0803 07/11/20  0812 06/06/20  0802 05/02/20  0755   HGB 15.6 15.5 16.4 16.2   WBC 9.9 9.2 9.6 12.0*   RBC 4.80 4.80 5.15 5.09   HCT 44.7 43.9 47.5 47.3   MCV 93 92 92 93   MCH 32.5 32.3 31.8 31.8   MCHC 34.9 35.3 34.5 34.2   RDW 11.9 11.9 12.6 12.8    210 186 207     URINE STUDIES  Recent Labs   Lab Test 02/28/20  1620 04/01/19  1500 03/29/19  1858   COLOR Yellow Yellow Yellow   APPEARANCE Clear Clear Clear   URINEGLC Negative Negative Negative   URINEBILI Negative Negative Negative   URINEKETONE Negative Negative Negative   SG >1.030 1.015 >1.035*   UBLD Moderate* Moderate* Moderate*   URINEPH 6.0 6.0 6.0   PROTEIN >=300* >=300* >499*   UROBILINOGEN 0.2 0.2  --    NITRITE Negative Negative Negative   LEUKEST Negative Negative Negative   RBCU O - 2 25-50* 7*   WBCU 0 - 5 0 - 5 7*     Recent Labs   Lab Test 04/03/20  0900 02/28/20  1619 11/26/19  1435 09/09/19  1510   UTPG 0.18 6.20* 0.06 0.14     PTH  No lab results found.  IRON STUDIES  No lab results found.    IMPRESSION:  1. Findings are concerning for diffuse small bowel enteritis likely  infectious or inflammatory in etiology, with associated inflammatory  changes of the mesenteric adipose tissues, moderate ascites, small  bilateral probably  reactive pleural effusions and with some edema in  the subcutaneous adipose tissues.  2. No significant atherosclerosis is identified. No obvious arterial  blockage is seen in the mesenteric vessels to suggest ischemia.  3. Mild degenerative changes of the spine and hips. No acute fracture  or aggressive osseous lesion.  Jenny Scott MD

## 2020-08-26 DIAGNOSIS — J45.40 MODERATE PERSISTENT ASTHMA WITHOUT COMPLICATION: ICD-10-CM

## 2020-08-26 RX ORDER — ALBUTEROL SULFATE 90 UG/1
1-2 AEROSOL, METERED RESPIRATORY (INHALATION) EVERY 6 HOURS PRN
Qty: 8.5 G | Refills: 1 | Status: SHIPPED | OUTPATIENT
Start: 2020-08-26 | End: 2020-10-13

## 2020-08-26 NOTE — LETTER
[facility]  [address] [facility] [address]  Phone: [phone]        September 2, 2020      Deshawn Flood                                                                                                                                [address] [place] [id number]            Dear Angeli Flood,    We are concerned about your health care.  We recently provided you with a medication refill.  Many medications require routine follow-up with your Doctor.      At this time we ask that: You schedule a routine office visit with your physician to follow your asthma    Your prescription: Has been refilled for 1 month so you may have time for the above noted follow-up.      Thank you,    Alvarez Yu MD

## 2020-08-26 NOTE — TELEPHONE ENCOUNTER
Albuterol Inhaler Prescription approved per [facility] Refill Protocol. Pt is due for follow-up asthma visit. Will forward to schedules to call and schedule with PCP.  NICKI Ireland

## 2020-08-31 DIAGNOSIS — Z79.52 LONG TERM CURRENT USE OF SYSTEMIC STEROIDS: ICD-10-CM

## 2020-08-31 DIAGNOSIS — N04.9 NEPHROTIC SYNDROME: Primary | ICD-10-CM

## 2020-09-02 NOTE — TELEPHONE ENCOUNTER
Second attempt to call and schedule. Unable to leave voicemail mailbox is not set up. Routing back to team to send a letter to patient.

## 2020-09-12 DIAGNOSIS — N05.0 MINIMAL CHANGE DISEASE: ICD-10-CM

## 2020-09-12 DIAGNOSIS — R80.1 PERSISTENT PROTEINURIA: ICD-10-CM

## 2020-09-12 DIAGNOSIS — Z79.52 LONG TERM CURRENT USE OF SYSTEMIC STEROIDS: ICD-10-CM

## 2020-09-12 DIAGNOSIS — N04.9 NEPHROTIC SYNDROME: ICD-10-CM

## 2020-09-12 LAB
ALBUMIN SERPL-MCNC: 3.6 G/DL (ref 3.4–5)
ALBUMIN UR-MCNC: NEGATIVE MG/DL
ANION GAP SERPL CALCULATED.3IONS-SCNC: 4 MMOL/L (ref 3–14)
APPEARANCE UR: CLEAR
BILIRUB UR QL STRIP: NEGATIVE
BUN SERPL-MCNC: 13 MG/DL (ref 7–30)
CALCIUM SERPL-MCNC: 9.8 MG/DL (ref 8.5–10.1)
CHLORIDE SERPL-SCNC: 104 MMOL/L (ref 94–109)
CO2 SERPL-SCNC: 31 MMOL/L (ref 20–32)
COLOR UR AUTO: YELLOW
CREAT SERPL-MCNC: 1.31 MG/DL (ref 0.66–1.25)
CREAT UR-MCNC: 239 MG/DL
ERYTHROCYTE [DISTWIDTH] IN BLOOD BY AUTOMATED COUNT: 12.1 % (ref 10–15)
GFR SERPL CREATININE-BSD FRML MDRD: 65 ML/MIN/{1.73_M2}
GLUCOSE SERPL-MCNC: 100 MG/DL (ref 70–99)
GLUCOSE UR STRIP-MCNC: NEGATIVE MG/DL
HCT VFR BLD AUTO: 46 % (ref 40–53)
HGB BLD-MCNC: 15.9 G/DL (ref 13.3–17.7)
HGB UR QL STRIP: NEGATIVE
KETONES UR STRIP-MCNC: NEGATIVE MG/DL
LEUKOCYTE ESTERASE UR QL STRIP: NEGATIVE
MCH RBC QN AUTO: 31.8 PG (ref 26.5–33)
MCHC RBC AUTO-ENTMCNC: 34.6 G/DL (ref 31.5–36.5)
MCV RBC AUTO: 92 FL (ref 78–100)
MICROALBUMIN UR-MCNC: 7 MG/L
MICROALBUMIN/CREAT UR: 3.13 MG/G CR (ref 0–17)
NITRATE UR QL: NEGATIVE
PH UR STRIP: 5 PH (ref 5–7)
PHOSPHATE SERPL-MCNC: 3.8 MG/DL (ref 2.5–4.5)
PLATELET # BLD AUTO: 191 10E9/L (ref 150–450)
POTASSIUM SERPL-SCNC: 3.8 MMOL/L (ref 3.4–5.3)
RBC # BLD AUTO: 5 10E12/L (ref 4.4–5.9)
SODIUM SERPL-SCNC: 139 MMOL/L (ref 133–144)
SOURCE: NORMAL
SP GR UR STRIP: 1.02 (ref 1–1.03)
UROBILINOGEN UR STRIP-MCNC: 0 MG/DL (ref 0–2)
WBC # BLD AUTO: 10.1 10E9/L (ref 4–11)

## 2020-09-12 PROCEDURE — 82043 UR ALBUMIN QUANTITATIVE: CPT | Performed by: INTERNAL MEDICINE

## 2020-09-12 PROCEDURE — 36415 COLL VENOUS BLD VENIPUNCTURE: CPT | Performed by: INTERNAL MEDICINE

## 2020-09-12 PROCEDURE — 85027 COMPLETE CBC AUTOMATED: CPT | Performed by: INTERNAL MEDICINE

## 2020-09-12 PROCEDURE — 80069 RENAL FUNCTION PANEL: CPT | Performed by: INTERNAL MEDICINE

## 2020-09-12 PROCEDURE — 81003 URINALYSIS AUTO W/O SCOPE: CPT | Performed by: INTERNAL MEDICINE

## 2020-10-03 DIAGNOSIS — N04.9 NEPHROTIC SYNDROME: ICD-10-CM

## 2020-10-03 DIAGNOSIS — Z79.52 LONG TERM CURRENT USE OF SYSTEMIC STEROIDS: ICD-10-CM

## 2020-10-03 DIAGNOSIS — R80.1 PERSISTENT PROTEINURIA: ICD-10-CM

## 2020-10-03 LAB
ALBUMIN SERPL-MCNC: 3.9 G/DL (ref 3.4–5)
ANION GAP SERPL CALCULATED.3IONS-SCNC: 5 MMOL/L (ref 3–14)
BUN SERPL-MCNC: 11 MG/DL (ref 7–30)
CALCIUM SERPL-MCNC: 9.6 MG/DL (ref 8.5–10.1)
CHLORIDE SERPL-SCNC: 105 MMOL/L (ref 94–109)
CHOLEST SERPL-MCNC: 161 MG/DL
CO2 SERPL-SCNC: 31 MMOL/L (ref 20–32)
CREAT SERPL-MCNC: 1.34 MG/DL (ref 0.66–1.25)
CREAT UR-MCNC: 151 MG/DL
ERYTHROCYTE [DISTWIDTH] IN BLOOD BY AUTOMATED COUNT: 12.2 % (ref 10–15)
GFR SERPL CREATININE-BSD FRML MDRD: 63 ML/MIN/{1.73_M2}
GLUCOSE SERPL-MCNC: 94 MG/DL (ref 70–99)
HCT VFR BLD AUTO: 46.6 % (ref 40–53)
HDLC SERPL-MCNC: 81 MG/DL
HGB BLD-MCNC: 16 G/DL (ref 13.3–17.7)
LDLC SERPL CALC-MCNC: 58 MG/DL
MCH RBC QN AUTO: 31.8 PG (ref 26.5–33)
MCHC RBC AUTO-ENTMCNC: 34.3 G/DL (ref 31.5–36.5)
MCV RBC AUTO: 93 FL (ref 78–100)
MICROALBUMIN UR-MCNC: <5 MG/L
MICROALBUMIN/CREAT UR: NORMAL MG/G CR (ref 0–17)
NONHDLC SERPL-MCNC: 80 MG/DL
PHOSPHATE SERPL-MCNC: 2.6 MG/DL (ref 2.5–4.5)
PLATELET # BLD AUTO: 200 10E9/L (ref 150–450)
POTASSIUM SERPL-SCNC: 3.6 MMOL/L (ref 3.4–5.3)
RBC # BLD AUTO: 5.03 10E12/L (ref 4.4–5.9)
SODIUM SERPL-SCNC: 141 MMOL/L (ref 133–144)
TRIGL SERPL-MCNC: 111 MG/DL
WBC # BLD AUTO: 13.5 10E9/L (ref 4–11)

## 2020-10-03 PROCEDURE — 82043 UR ALBUMIN QUANTITATIVE: CPT | Performed by: INTERNAL MEDICINE

## 2020-10-03 PROCEDURE — 36415 COLL VENOUS BLD VENIPUNCTURE: CPT | Performed by: INTERNAL MEDICINE

## 2020-10-03 PROCEDURE — 80069 RENAL FUNCTION PANEL: CPT | Performed by: INTERNAL MEDICINE

## 2020-10-03 PROCEDURE — 80061 LIPID PANEL: CPT | Performed by: INTERNAL MEDICINE

## 2020-10-03 PROCEDURE — 85027 COMPLETE CBC AUTOMATED: CPT | Performed by: INTERNAL MEDICINE

## 2020-10-12 ENCOUNTER — VIRTUAL VISIT (OUTPATIENT)
Dept: NEPHROLOGY | Facility: CLINIC | Age: 45
End: 2020-10-12
Payer: COMMERCIAL

## 2020-10-12 DIAGNOSIS — N05.0 MINIMAL CHANGE DISEASE: Primary | ICD-10-CM

## 2020-10-12 DIAGNOSIS — N04.9 NEPHROTIC SYNDROME: ICD-10-CM

## 2020-10-12 DIAGNOSIS — J45.40 MODERATE PERSISTENT ASTHMA WITHOUT COMPLICATION: ICD-10-CM

## 2020-10-12 PROCEDURE — 99212 OFFICE O/P EST SF 10 MIN: CPT | Mod: 95 | Performed by: INTERNAL MEDICINE

## 2020-10-12 RX ORDER — PREDNISONE 20 MG/1
20 TABLET ORAL DAILY
Qty: 30 TABLET | Refills: 3 | Status: SHIPPED | OUTPATIENT
Start: 2020-10-18 | End: 2020-12-17

## 2020-10-12 NOTE — PROGRESS NOTES
"Deshawn Flood is a 45 year old male who is being evaluated via a billable telephone visit.      The patient has been notified of following:     \"This telephone visit will be conducted via a call between you and your physician/provider. We have found that certain health care needs can be provided without the need for a physical exam.  This service lets us provide the care you need with a short phone conversation.  If a prescription is necessary we can send it directly to your pharmacy.  If lab work is needed we can place an order for that and you can then stop by our lab to have the test done at a later time.    Telephone visits are billed at different rates depending on your insurance coverage. During this emergency period, for some insurers they may be billed the same as an in-person visit.  Please reach out to your insurance provider with any questions.    If during the course of the call the physician/provider feels a telephone visit is not appropriate, you will not be charged for this service.\"    Patient has given verbal consent for Telephone visit?  Yes    What phone number would you like to be contacted at? [phone] (home)       How would you like to obtain your AVS? Visonyshart    Phone call duration: 13 minutes    Jenny Aram Scott MD      Assessment and Plan:  45 year old male who presents for followup of minimal change disease. He presented for evaluation after presenting to ER where CT scan noted ascites and nonspecific small bowel enteritis, and UA showed proteinuria. He had biopsy which showed minimal change on 4/9/2019 and treated with prednisone 70mg. He was in remission and down to prednsione 10mg then stopped it, and had relapse within a couple months.  His mother noted swelling and he was up 20-25 lbs and proteinuria up to 6 grams/g cr.   - Prednisone round #2 started March 2020 July 20- 50mg  August 24- 40mg  Sept 21- 30mg  October 18- 20mg    - will continue to see him monthly for " now    # Renal function- Scr was 1.1 in the past and was1.27 in ER. It was up to 1.6-1.8 at time of biopsy after diuretics were started.  - scr back to 1-1.2 range, a little higher, ? Volume related- asked him to drink more water, at least 1.5-2 liters a day (he drinks 3-4 mountain dew a day)  - RTC 4-6 weeks in person    - proteinuria improved very quickly down to normal once prednisone restarted   - he is trying to do sodium restriction (really is 3-4000mg restriction typically) and if he notices swelling in face, he is a little more careful. He is getting to 5000mg some days as he eats hot dogs and other foods, cheese.    - labs monthly  - slower wean with this taper  - if relapses again, will consider CNI for long term therapy- discussed with him today that if relapses again, will need a different maintenance long term regimen  - discussed with him importance of close monitoring and long term care.     # Hyperlipidemia- LDL very elevated,was on low dose statin but stopped;  Restarted given nephrotic  - recheck lipids     # Hypertension: normotensive   - continue lisinopril    # Not anemic- hgb 18 now down to 16- stable    # Electrolytes: normal low potassium      # Mineral Bone Disorder:   Check baseline PTH if creatinine remains elevated.     - RTC and abs monthly for now    Assessment and plan was discussed with patient and he voiced his understanding and agreement.    Reason for Visit:  Deshawn Flood is a 45 year old male with nephrotic range proteinuria noted and biopsy 4/9/19 shows minimal change disease    HPI:  He is a 45 year old male recently in normal health who presented with nephrotic syndrome. He underwent kidney biopsy 4/9 and it shows minimal change disease. He was admitted to the hospital briefly after biopsy as he was having poor appetite due to bloating, and elevated creatinine.    Since then he went into remission, we tapered down on prednisone and unfortunately he relapsed within a couple  months (6g/g cr February 2020),  thus we restarted prednisone in March 2020 and by early April he was down to normal range proteinuria and he was feeling better.    His weight is usually closer to 150-160  but was up to 184 lbs with last episode, was down to 160s with no proteinuria, but was back up to 188 lbs and once back on prednisone, his weight went back down to 158- 159lbs. He likes to stay at 160-165lbs but can't seem to keep weight on    He is trying to eat healthier and is watching sodium intake, restricting to 4 grams a day or so..  He is working and staying busy.  If he notes more swelling in his face (or his friends comment), he watches salt intake more and this helps.  He is still on 50mg and we will plan to decrease to 40mg  He is tolerating well and feels well  His weight is near 160lbs   He is not taking any diuretics  His last creatinine was a little higher, and we discussed increasing water intake, recent labs stable elevated.  He denies dizziness/ lightheadedness; he does not know what his blood pressure is      Renal History:   None prior to minimal change disease Spring 2019    ROS:   A comprehensive review of systems was obtained and negative, except as noted in the HPI or PMH.    Active Medical Problems:  Patient Active Problem List   Diagnosis     Loose body of left knee     Left knee pain     Tobacco abuse     History of pneumothorax     CARDIOVASCULAR SCREENING; LDL GOAL LESS THAN 160     Nephrotic syndrome     Anasarca     Minimal change disease     PMH:   Medical record was reviewed and PMH was discussed with patient and noted below.  Past Medical History:   Diagnosis Date     Anemia      Other spontaneous pneumothorax      SPONTANEOUS PNEUMOTHORAX NEC 3/25/2005     Unspecified asthma(493.90)      PSH:   Past Surgical History:   Procedure Laterality Date     ARTHROSCOPY KNEE  8/3/2012    Procedure: ARTHROSCOPY KNEE;  Left knee Arthroscopy with removal of loose bodies;  Surgeon: Armando  Bibi Lima MD;  Location: [facility]     HC REPAIR UMBILICAL GODFREY,<4Y/O,REDUC  1978     IR RENAL BIOPSY LEFT  4/9/2019     TUBE THORACOSTOMY,ABSC/EMPYEMA/HEMO  3/20/2005    Small chest tube with Heimlich valve.       Family Hx:   Family History   Problem Relation Age of Onset     Autoimmune Disease No family hx of      Personal Hx:   Social History     Tobacco Use     Smoking status: Current Some Day Smoker     Packs/day: 0.10     Years: 15.00     Pack years: 1.50     Types: Cigarettes     Smokeless tobacco: Former User     Types: Chew   Substance Use Topics     Alcohol use: Yes     Alcohol/week: 17.5 standard drinks     Types: 21 Cans of beer per week     Comment: occ       Allergies:  Allergies   Allergen Reactions     Bee Venom      swells up     No Known Drug Allergies      Seasonal Allergies        Medications:  Current Outpatient Medications   Medication Sig     albuterol (PROAIR HFA/PROVENTIL HFA/VENTOLIN HFA) 108 (90 Base) MCG/ACT inhaler Inhale 1-2 puffs into the lungs every 6 hours as needed for shortness of breath / dyspnea or wheezing Appointment needed for refills.     atorvastatin (LIPITOR) 20 MG tablet Take 1 tablet (20 mg) by mouth daily     lisinopril (ZESTRIL) 20 MG tablet Take 1 tablet (20 mg) by mouth daily     omeprazole (PRILOSEC) 20 MG DR capsule Take 1 capsule (20 mg) by mouth every morning (before breakfast)     predniSONE (DELTASONE) 20 MG tablet Take 2 tablets (40 mg) by mouth daily Take 40mg for 4 weeks , then 30mg for 4 weeks     sulfamethoxazole-trimethoprim (BACTRIM DS) 800-160 MG tablet Take 1 tablet by mouth three times a week Monday, Wednesday, Friday     No current facility-administered medications for this visit.       Vitals:  There were no vitals taken for this visit.  154-155 lbs  Exam:  GENERAL : alert and no distress  Speaks in full sentences without dyspnea  Normal speech and thought pattern    LABS:   CMP  Recent Labs   Lab Test 10/03/20  0834 09/12/20  0840 08/08/20  0803  06/06/20  0802    139 140 139   POTASSIUM 3.6 3.8 3.9 3.6   CHLORIDE 105 104 105 104   CO2 31 31 29 32   ANIONGAP 5 4 6 3   GLC 94 100* 95 109*   BUN 11 13 19 24   CR 1.34* 1.31* 1.32* 1.20   GFRESTIMATED 63 65 64 72   GFRESTBLACK 73 75 74 83   BASIA 9.6 9.8 9.3 9.0     Recent Labs   Lab Test 11/26/19  1426 09/09/19  1506 05/20/19  1350 04/10/19  1009   BILITOTAL 0.8 0.4 0.4 0.7   ALKPHOS 41 65 61 42   ALT 25 41 57 9   AST 11 14 19 12     CBC  Recent Labs   Lab Test 10/03/20  0834 09/12/20  0840 08/08/20  0803 07/11/20  0812   HGB 16.0 15.9 15.6 15.5   WBC 13.5* 10.1 9.9 9.2   RBC 5.03 5.00 4.80 4.80   HCT 46.6 46.0 44.7 43.9   MCV 93 92 93 92   MCH 31.8 31.8 32.5 32.3   MCHC 34.3 34.6 34.9 35.3   RDW 12.2 12.1 11.9 11.9    191 192 210     URINE STUDIES  Recent Labs   Lab Test 09/12/20  0841 02/28/20  1620 04/01/19  1500 03/29/19  1858   COLOR Yellow Yellow Yellow Yellow   APPEARANCE Clear Clear Clear Clear   URINEGLC Negative Negative Negative Negative   URINEBILI Negative Negative Negative Negative   URINEKETONE Negative Negative Negative Negative   SG 1.021 >1.030 1.015 >1.035*   UBLD Negative Moderate* Moderate* Moderate*   URINEPH 5.0 6.0 6.0 6.0   PROTEIN Negative >=300* >=300* >499*   UROBILINOGEN  --  0.2 0.2  --    NITRITE Negative Negative Negative Negative   LEUKEST Negative Negative Negative Negative   RBCU  --  O - 2 25-50* 7*   WBCU  --  0 - 5 0 - 5 7*     Recent Labs   Lab Test 04/03/20  0900 02/28/20  1619 11/26/19  1435 09/09/19  1510   UTPG 0.18 6.20* 0.06 0.14     PTH  No lab results found.  IRON STUDIES  No lab results found.    IMPRESSION:  1. Findings are concerning for diffuse small bowel enteritis likely  infectious or inflammatory in etiology, with associated inflammatory  changes of the mesenteric adipose tissues, moderate ascites, small  bilateral probably reactive pleural effusions and with some edema in  the subcutaneous adipose tissues.  2. No significant atherosclerosis is  identified. No obvious arterial  blockage is seen in the mesenteric vessels to suggest ischemia.  3. Mild degenerative changes of the spine and hips. No acute fracture  or aggressive osseous lesion.  Jenny Scott MD

## 2020-10-12 NOTE — LETTER
"10/12/2020       RE: Deshawn Flood  [facility] [address] [address]     Dear Colleague,    Thank you for referring your patient, Deshawn Flood, to the [facility]. Please see a copy of my visit note below.    Deshawn Flood is a 45 year old male who is being evaluated via a billable telephone visit.      The patient has been notified of following:     \"This telephone visit will be conducted via a call between you and your physician/provider. We have found that certain health care needs can be provided without the need for a physical exam.  This service lets us provide the care you need with a short phone conversation.  If a prescription is necessary we can send it directly to your pharmacy.  If lab work is needed we can place an order for that and you can then stop by our lab to have the test done at a later time.    Telephone visits are billed at different rates depending on your insurance coverage. During this emergency period, for some insurers they may be billed the same as an in-person visit.  Please reach out to your insurance provider with any questions.    If during the course of the call the physician/provider feels a telephone visit is not appropriate, you will not be charged for this service.\"    Patient has given verbal consent for Telephone visit?  Yes    What phone number would you like to be contacted at? [phone] (home)       How would you like to obtain your AVS? [employer]    Phone call duration: 13 minutes    Jenny Aram Scott MD      Assessment and Plan:  45 year old male who presents for followup of minimal change disease. He presented for evaluation after presenting to ER where CT scan noted ascites and nonspecific small bowel enteritis, and UA showed proteinuria. He had biopsy which showed minimal change on 4/9/2019 and treated with prednisone 70mg. He was in remission and down to prednsione 10mg then " stopped it, and had relapse within a couple months.  His mother noted swelling and he was up 20-25 lbs and proteinuria up to 6 grams/g cr.   - Prednisone round #2 started March 2020 July 20- 50mg  August 24- 40mg  Sept 21- 30mg  October 18- 20mg    - will continue to see him monthly for now    # Renal function- Scr was 1.1 in the past and was1.27 in ER. It was up to 1.6-1.8 at time of biopsy after diuretics were started.  - scr back to 1-1.2 range, a little higher, ? Volume related- asked him to drink more water, at least 1.5-2 liters a day (he drinks 3-4 mountain dew a day)  - RTC 4-6 weeks in person    - proteinuria improved very quickly down to normal once prednisone restarted   - he is trying to do sodium restriction (really is 3-4000mg restriction typically) and if he notices swelling in face, he is a little more careful. He is getting to 5000mg some days as he eats hot dogs and other foods, cheese.    - labs monthly  - slower wean with this taper  - if relapses again, will consider CNI for long term therapy- discussed with him today that if relapses again, will need a different maintenance long term regimen  - discussed with him importance of close monitoring and long term care.     # Hyperlipidemia- LDL very elevated,was on low dose statin but stopped;  Restarted given nephrotic  - recheck lipids     # Hypertension: normotensive   - continue lisinopril    # Not anemic- hgb 18 now down to 16- stable    # Electrolytes: normal low potassium      # Mineral Bone Disorder:   Check baseline PTH if creatinine remains elevated.     - RTC and abs monthly for now    Assessment and plan was discussed with patient and he voiced his understanding and agreement.    Reason for Visit:  Deshawn Flood is a 45 year old male with nephrotic range proteinuria noted and biopsy 4/9/19 shows minimal change disease    HPI:  He is a 45 year old male recently in normal health who presented with nephrotic syndrome. He underwent kidney  biopsy 4/9 and it shows minimal change disease. He was admitted to the hospital briefly after biopsy as he was having poor appetite due to bloating, and elevated creatinine.    Since then he went into remission, we tapered down on prednisone and unfortunately he relapsed within a couple months (6g/g cr February 2020),  thus we restarted prednisone in March 2020 and by early April he was down to normal range proteinuria and he was feeling better.    His weight is usually closer to 150-160  but was up to 184 lbs with last episode, was down to 160s with no proteinuria, but was back up to 188 lbs and once back on prednisone, his weight went back down to 158- 159lbs. He likes to stay at 160-165lbs but can't seem to keep weight on    He is trying to eat healthier and is watching sodium intake, restricting to 4 grams a day or so..  He is working and staying busy.  If he notes more swelling in his face (or his friends comment), he watches salt intake more and this helps.  He is still on 50mg and we will plan to decrease to 40mg  He is tolerating well and feels well  His weight is near 160lbs   He is not taking any diuretics  His last creatinine was a little higher, and we discussed increasing water intake, recent labs stable elevated.  He denies dizziness/ lightheadedness; he does not know what his blood pressure is      Renal History:   None prior to minimal change disease Spring 2019    ROS:   A comprehensive review of systems was obtained and negative, except as noted in the HPI or PMH.    Active Medical Problems:  Patient Active Problem List   Diagnosis     Loose body of left knee     Left knee pain     Tobacco abuse     History of pneumothorax     CARDIOVASCULAR SCREENING; LDL GOAL LESS THAN 160     Nephrotic syndrome     Anasarca     Minimal change disease     PMH:   Medical record was reviewed and PMH was discussed with patient and noted below.  Past Medical History:   Diagnosis Date     Anemia      Other spontaneous  pneumothorax      SPONTANEOUS PNEUMOTHORAX NEC 3/25/2005     Unspecified asthma(493.90)      PSH:   Past Surgical History:   Procedure Laterality Date     ARTHROSCOPY KNEE  8/3/2012    Procedure: ARTHROSCOPY KNEE;  Left knee Arthroscopy with removal of loose bodies;  Surgeon: Bibi Roberts MD;  Location: [facility] OR     HC REPAIR UMBILICAL GODFREY,<6Y/O,REDUC  1978     IR RENAL BIOPSY LEFT  4/9/2019     TUBE THORACOSTOMY,ABSC/EMPYEMA/HEMO  3/20/2005    Small chest tube with Heimlich valve.       Family Hx:   Family History   Problem Relation Age of Onset     Autoimmune Disease No family hx of      Personal Hx:   Social History     Tobacco Use     Smoking status: Current Some Day Smoker     Packs/day: 0.10     Years: 15.00     Pack years: 1.50     Types: Cigarettes     Smokeless tobacco: Former User     Types: Chew   Substance Use Topics     Alcohol use: Yes     Alcohol/week: 17.5 standard drinks     Types: 21 Cans of beer per week     Comment: occ       Allergies:  Allergies   Allergen Reactions     Bee Venom      swells up     No Known Drug Allergies      Seasonal Allergies        Medications:  Current Outpatient Medications   Medication Sig     albuterol (PROAIR HFA/PROVENTIL HFA/VENTOLIN HFA) 108 (90 Base) MCG/ACT inhaler Inhale 1-2 puffs into the lungs every 6 hours as needed for shortness of breath / dyspnea or wheezing Appointment needed for refills.     atorvastatin (LIPITOR) 20 MG tablet Take 1 tablet (20 mg) by mouth daily     lisinopril (ZESTRIL) 20 MG tablet Take 1 tablet (20 mg) by mouth daily     omeprazole (PRILOSEC) 20 MG DR capsule Take 1 capsule (20 mg) by mouth every morning (before breakfast)     predniSONE (DELTASONE) 20 MG tablet Take 2 tablets (40 mg) by mouth daily Take 40mg for 4 weeks , then 30mg for 4 weeks     sulfamethoxazole-trimethoprim (BACTRIM DS) 800-160 MG tablet Take 1 tablet by mouth three times a week Monday, Wednesday, Friday     No current facility-administered medications for  this visit.       Vitals:  There were no vitals taken for this visit.  154-155 lbs  Exam:  GENERAL : alert and no distress  Speaks in full sentences without dyspnea  Normal speech and thought pattern    LABS:   CMP  Recent Labs   Lab Test 10/03/20  0834 09/12/20  0840 08/08/20  0803 06/06/20  0802    139 140 139   POTASSIUM 3.6 3.8 3.9 3.6   CHLORIDE 105 104 105 104   CO2 31 31 29 32   ANIONGAP 5 4 6 3   GLC 94 100* 95 109*   BUN 11 13 19 24   CR 1.34* 1.31* 1.32* 1.20   GFRESTIMATED 63 65 64 72   GFRESTBLACK 73 75 74 83   BASIA 9.6 9.8 9.3 9.0     Recent Labs   Lab Test 11/26/19  1426 09/09/19  1506 05/20/19  1350 04/10/19  1009   BILITOTAL 0.8 0.4 0.4 0.7   ALKPHOS 41 65 61 42   ALT 25 41 57 9   AST 11 14 19 12     CBC  Recent Labs   Lab Test 10/03/20  0834 09/12/20  0840 08/08/20  0803 07/11/20  0812   HGB 16.0 15.9 15.6 15.5   WBC 13.5* 10.1 9.9 9.2   RBC 5.03 5.00 4.80 4.80   HCT 46.6 46.0 44.7 43.9   MCV 93 92 93 92   MCH 31.8 31.8 32.5 32.3   MCHC 34.3 34.6 34.9 35.3   RDW 12.2 12.1 11.9 11.9    191 192 210     URINE STUDIES  Recent Labs   Lab Test 09/12/20  0841 02/28/20  1620 04/01/19  1500 03/29/19  1858   COLOR Yellow Yellow Yellow Yellow   APPEARANCE Clear Clear Clear Clear   URINEGLC Negative Negative Negative Negative   URINEBILI Negative Negative Negative Negative   URINEKETONE Negative Negative Negative Negative   SG 1.021 >1.030 1.015 >1.035*   UBLD Negative Moderate* Moderate* Moderate*   URINEPH 5.0 6.0 6.0 6.0   PROTEIN Negative >=300* >=300* >499*   UROBILINOGEN  --  0.2 0.2  --    NITRITE Negative Negative Negative Negative   LEUKEST Negative Negative Negative Negative   RBCU  --  O - 2 25-50* 7*   WBCU  --  0 - 5 0 - 5 7*     Recent Labs   Lab Test 04/03/20  0900 02/28/20  1619 11/26/19  1435 09/09/19  1510   UTPG 0.18 6.20* 0.06 0.14     PTH  No lab results found.  IRON STUDIES  No lab results found.    IMPRESSION:  1. Findings are concerning for diffuse small bowel enteritis  likely  infectious or inflammatory in etiology, with associated inflammatory  changes of the mesenteric adipose tissues, moderate ascites, small  bilateral probably reactive pleural effusions and with some edema in  the subcutaneous adipose tissues.  2. No significant atherosclerosis is identified. No obvious arterial  blockage is seen in the mesenteric vessels to suggest ischemia.  3. Mild degenerative changes of the spine and hips. No acute fracture  or aggressive osseous lesion.  Jenny Scott MD

## 2020-10-13 RX ORDER — ALBUTEROL SULFATE 90 UG/1
1-2 AEROSOL, METERED RESPIRATORY (INHALATION) EVERY 6 HOURS PRN
Qty: 8.5 G | Refills: 1 | Status: SHIPPED | OUTPATIENT
Start: 2020-10-13 | End: 2020-12-01

## 2020-10-13 NOTE — TELEPHONE ENCOUNTER
Routing refill request to provider for review/approval because:  ACT overdue    HAZEL ClaytonN, RN  [facility]

## 2020-11-03 DIAGNOSIS — N04.9 NEPHROTIC SYNDROME: ICD-10-CM

## 2020-11-03 DIAGNOSIS — N05.0 MINIMAL CHANGE DISEASE: ICD-10-CM

## 2020-11-04 RX ORDER — LISINOPRIL 20 MG/1
TABLET ORAL
Qty: 90 TABLET | Refills: 0 | Status: SHIPPED | OUTPATIENT
Start: 2020-11-04 | End: 2021-02-04

## 2020-11-04 NOTE — TELEPHONE ENCOUNTER
Routing refill request to provider for review/approval because:  Labs out of range:  Creatinine    HAZEL ClaytonN, RN  [facility]

## 2020-11-07 DIAGNOSIS — N05.0 MINIMAL CHANGE DISEASE: ICD-10-CM

## 2020-11-07 DIAGNOSIS — N04.9 NEPHROTIC SYNDROME: ICD-10-CM

## 2020-11-07 DIAGNOSIS — Z79.52 LONG TERM CURRENT USE OF SYSTEMIC STEROIDS: ICD-10-CM

## 2020-11-07 DIAGNOSIS — R80.1 PERSISTENT PROTEINURIA: ICD-10-CM

## 2020-11-07 LAB
ALBUMIN SERPL-MCNC: 3.8 G/DL (ref 3.4–5)
ANION GAP SERPL CALCULATED.3IONS-SCNC: 5 MMOL/L (ref 3–14)
BUN SERPL-MCNC: 9 MG/DL (ref 7–30)
CALCIUM SERPL-MCNC: 9.1 MG/DL (ref 8.5–10.1)
CHLORIDE SERPL-SCNC: 106 MMOL/L (ref 94–109)
CO2 SERPL-SCNC: 27 MMOL/L (ref 20–32)
CREAT SERPL-MCNC: 1.28 MG/DL (ref 0.66–1.25)
CREAT UR-MCNC: 254 MG/DL
ERYTHROCYTE [DISTWIDTH] IN BLOOD BY AUTOMATED COUNT: 11.9 % (ref 10–15)
GFR SERPL CREATININE-BSD FRML MDRD: 66 ML/MIN/{1.73_M2}
GLUCOSE SERPL-MCNC: 99 MG/DL (ref 70–99)
HCT VFR BLD AUTO: 43 % (ref 40–53)
HGB BLD-MCNC: 14.9 G/DL (ref 13.3–17.7)
MCH RBC QN AUTO: 31.5 PG (ref 26.5–33)
MCHC RBC AUTO-ENTMCNC: 34.7 G/DL (ref 31.5–36.5)
MCV RBC AUTO: 91 FL (ref 78–100)
MICROALBUMIN UR-MCNC: 7 MG/L
MICROALBUMIN/CREAT UR: 2.57 MG/G CR (ref 0–17)
PHOSPHATE SERPL-MCNC: 1.8 MG/DL (ref 2.5–4.5)
PLATELET # BLD AUTO: 200 10E9/L (ref 150–450)
POTASSIUM SERPL-SCNC: 4.1 MMOL/L (ref 3.4–5.3)
RBC # BLD AUTO: 4.73 10E12/L (ref 4.4–5.9)
SODIUM SERPL-SCNC: 138 MMOL/L (ref 133–144)
SODIUM UR-SCNC: 118 MMOL/L
WBC # BLD AUTO: 11.4 10E9/L (ref 4–11)

## 2020-11-07 PROCEDURE — 85027 COMPLETE CBC AUTOMATED: CPT | Performed by: INTERNAL MEDICINE

## 2020-11-07 PROCEDURE — 36415 COLL VENOUS BLD VENIPUNCTURE: CPT | Performed by: INTERNAL MEDICINE

## 2020-11-07 PROCEDURE — 84300 ASSAY OF URINE SODIUM: CPT | Performed by: INTERNAL MEDICINE

## 2020-11-07 PROCEDURE — 80069 RENAL FUNCTION PANEL: CPT | Performed by: INTERNAL MEDICINE

## 2020-11-07 PROCEDURE — 82043 UR ALBUMIN QUANTITATIVE: CPT | Performed by: INTERNAL MEDICINE

## 2020-11-23 DIAGNOSIS — N04.9 NEPHROTIC SYNDROME: ICD-10-CM

## 2020-11-23 DIAGNOSIS — Z79.52 LONG TERM (CURRENT) USE OF SYSTEMIC STEROIDS: ICD-10-CM

## 2020-11-23 RX ORDER — SULFAMETHOXAZOLE/TRIMETHOPRIM 800-160 MG
1 TABLET ORAL
Qty: 45 TABLET | Refills: 1 | Status: SHIPPED | OUTPATIENT
Start: 2020-11-23 | End: 2021-02-23

## 2020-11-29 DIAGNOSIS — J45.40 MODERATE PERSISTENT ASTHMA WITHOUT COMPLICATION: ICD-10-CM

## 2020-11-30 NOTE — TELEPHONE ENCOUNTER
"Routing refill request to provider for review/approval because:  Kayleen given x1 and patient did not follow up, please advise  Labs not current:  ACT  Patient needs to be seen because it has been more than 1 year since last office visit.    ventolin  Last Written Prescription Date:  10/13/2020  Last Fill Quantity: 8.5g,  # refills: 1   Last office visit: 11/26/2019 with prescribing provider:  Gigi   Future Office Visit:      Requested Prescriptions   Pending Prescriptions Disp Refills     VENTOLIN  (90 Base) MCG/ACT inhaler [Pharmacy Med Name: VENTOLIN HFA 108MCG/ACT AERS]  1     Sig: INHALE 1-2 PUFFS INTO THE LUNGS EVERY 6 HOURS AS NEEDED FOR SHORTNESS OF BREATH/DYSPNEA OR WHEEZING -- (NEED TO BE SEEN IN CLINIC FOR FURTHER REFILLS)       Asthma Maintenance Inhalers - Anticholinergics Failed - 11/29/2020  5:02 AM        Failed - Asthma control assessment score within normal limits in last 6 months     Please review ACT score.           Passed - Patient is age 12 years or older        Passed - Medication is active on med list        Passed - Recent (6 mo) or future (30 days) visit within the authorizing provider's specialty     Patient had office visit in the last 6 months or has a visit in the next 30 days with authorizing provider or within the authorizing provider's specialty.  See \"Patient Info\" tab in inbasket, or \"Choose Columns\" in Meds & Orders section of the refill encounter.           Short-Acting Beta Agonist Inhalers Protocol  Failed - 11/29/2020  5:02 AM        Failed - Asthma control assessment score within normal limits in last 6 months     Please review ACT score.           Passed - Patient is age 12 or older        Passed - Medication is active on med list        Passed - Recent (6 mo) or future (30 days) visit within the authorizing provider's specialty     Patient had office visit in the last 6 months or has a visit in the next 30 days with authorizing provider or within the authorizing " "provider's specialty.  See \"Patient Info\" tab in inbasket, or \"Choose Columns\" in Meds & Orders section of the refill encounter.               Jina Rosales RN on 11/30/2020 at 3:10 PM    "

## 2020-12-01 RX ORDER — ALBUTEROL SULFATE 90 UG/1
AEROSOL, METERED RESPIRATORY (INHALATION)
Qty: 1 INHALER | Refills: 0 | Status: SHIPPED | OUTPATIENT
Start: 2020-12-01 | End: 2020-12-29

## 2020-12-04 DIAGNOSIS — N04.9 NEPHROTIC SYNDROME: ICD-10-CM

## 2020-12-04 DIAGNOSIS — R80.1 PERSISTENT PROTEINURIA: ICD-10-CM

## 2020-12-04 DIAGNOSIS — Z79.52 LONG TERM CURRENT USE OF SYSTEMIC STEROIDS: ICD-10-CM

## 2020-12-04 LAB
ALBUMIN SERPL-MCNC: 4 G/DL (ref 3.4–5)
ANION GAP SERPL CALCULATED.3IONS-SCNC: 4 MMOL/L (ref 3–14)
BUN SERPL-MCNC: 14 MG/DL (ref 7–30)
CALCIUM SERPL-MCNC: 9.8 MG/DL (ref 8.5–10.1)
CHLORIDE SERPL-SCNC: 104 MMOL/L (ref 94–109)
CO2 SERPL-SCNC: 28 MMOL/L (ref 20–32)
CREAT SERPL-MCNC: 1.18 MG/DL (ref 0.66–1.25)
CREAT UR-MCNC: 95 MG/DL
ERYTHROCYTE [DISTWIDTH] IN BLOOD BY AUTOMATED COUNT: 11.9 % (ref 10–15)
GFR SERPL CREATININE-BSD FRML MDRD: 73 ML/MIN/{1.73_M2}
GLUCOSE SERPL-MCNC: 128 MG/DL (ref 70–99)
HCT VFR BLD AUTO: 45.3 % (ref 40–53)
HGB BLD-MCNC: 15.7 G/DL (ref 13.3–17.7)
MCH RBC QN AUTO: 31.1 PG (ref 26.5–33)
MCHC RBC AUTO-ENTMCNC: 34.7 G/DL (ref 31.5–36.5)
MCV RBC AUTO: 90 FL (ref 78–100)
MICROALBUMIN UR-MCNC: <5 MG/L
MICROALBUMIN/CREAT UR: NORMAL MG/G CR (ref 0–17)
PHOSPHATE SERPL-MCNC: 2.6 MG/DL (ref 2.5–4.5)
PLATELET # BLD AUTO: 206 10E9/L (ref 150–450)
POTASSIUM SERPL-SCNC: 4.3 MMOL/L (ref 3.4–5.3)
RBC # BLD AUTO: 5.05 10E12/L (ref 4.4–5.9)
SODIUM SERPL-SCNC: 136 MMOL/L (ref 133–144)
WBC # BLD AUTO: 11.7 10E9/L (ref 4–11)

## 2020-12-04 PROCEDURE — 80069 RENAL FUNCTION PANEL: CPT | Performed by: INTERNAL MEDICINE

## 2020-12-04 PROCEDURE — 85027 COMPLETE CBC AUTOMATED: CPT | Performed by: INTERNAL MEDICINE

## 2020-12-04 PROCEDURE — 82043 UR ALBUMIN QUANTITATIVE: CPT | Performed by: INTERNAL MEDICINE

## 2020-12-04 PROCEDURE — 36415 COLL VENOUS BLD VENIPUNCTURE: CPT | Performed by: INTERNAL MEDICINE

## 2020-12-13 ENCOUNTER — NURSE TRIAGE (OUTPATIENT)
Dept: NURSING | Facility: CLINIC | Age: 45
End: 2020-12-13

## 2020-12-13 ENCOUNTER — HOSPITAL ENCOUNTER (EMERGENCY)
Facility: CLINIC | Age: 45
Discharge: HOME | End: 2020-12-14
Attending: FAMILY MEDICINE | Admitting: FAMILY MEDICINE
Payer: COMMERCIAL

## 2020-12-13 DIAGNOSIS — M54.41 ACUTE RIGHT-SIDED LOW BACK PAIN WITH RIGHT-SIDED SCIATICA: ICD-10-CM

## 2020-12-13 LAB
ANION GAP SERPL CALCULATED.3IONS-SCNC: 4 MMOL/L (ref 3–14)
BASOPHILS # BLD AUTO: 0 10E9/L (ref 0–0.2)
BASOPHILS NFR BLD AUTO: 0.4 %
BUN SERPL-MCNC: 12 MG/DL (ref 7–30)
CALCIUM SERPL-MCNC: 9.5 MG/DL (ref 8.5–10.1)
CHLORIDE SERPL-SCNC: 105 MMOL/L (ref 94–109)
CO2 SERPL-SCNC: 29 MMOL/L (ref 20–32)
CREAT SERPL-MCNC: 1.16 MG/DL (ref 0.66–1.25)
CRP SERPL-MCNC: <2.9 MG/L (ref 0–8)
DIFFERENTIAL METHOD BLD: NORMAL
EOSINOPHIL NFR BLD AUTO: 0.4 %
ERYTHROCYTE [DISTWIDTH] IN BLOOD BY AUTOMATED COUNT: 11.9 % (ref 10–15)
GFR SERPL CREATININE-BSD FRML MDRD: 75 ML/MIN/{1.73_M2}
GLUCOSE SERPL-MCNC: 116 MG/DL (ref 70–99)
HCT VFR BLD AUTO: 47.9 % (ref 40–53)
HGB BLD-MCNC: 16.6 G/DL (ref 13.3–17.7)
IMM GRANULOCYTES # BLD: 0 10E9/L (ref 0–0.4)
IMM GRANULOCYTES NFR BLD: 0.4 %
LYMPHOCYTES # BLD AUTO: 2.3 10E9/L (ref 0.8–5.3)
LYMPHOCYTES NFR BLD AUTO: 23.9 %
MCH RBC QN AUTO: 31.2 PG (ref 26.5–33)
MCHC RBC AUTO-ENTMCNC: 34.7 G/DL (ref 31.5–36.5)
MCV RBC AUTO: 90 FL (ref 78–100)
MONOCYTES # BLD AUTO: 0.7 10E9/L (ref 0–1.3)
MONOCYTES NFR BLD AUTO: 7 %
NEUTROPHILS # BLD AUTO: 6.6 10E9/L (ref 1.6–8.3)
NEUTROPHILS NFR BLD AUTO: 67.9 %
NRBC # BLD AUTO: 0 10*3/UL
NRBC BLD AUTO-RTO: 0 /100
PLATELET # BLD AUTO: 264 10E9/L (ref 150–450)
POTASSIUM SERPL-SCNC: 3.8 MMOL/L (ref 3.4–5.3)
RBC # BLD AUTO: 5.32 10E12/L (ref 4.4–5.9)
SODIUM SERPL-SCNC: 138 MMOL/L (ref 133–144)
WBC # BLD AUTO: 9.6 10E9/L (ref 4–11)

## 2020-12-13 PROCEDURE — 99285 EMERGENCY DEPT VISIT HI MDM: CPT | Performed by: FAMILY MEDICINE

## 2020-12-13 PROCEDURE — 85025 COMPLETE CBC W/AUTO DIFF WBC: CPT | Performed by: FAMILY MEDICINE

## 2020-12-13 PROCEDURE — 250N000011 HC RX IP 250 OP 636: Performed by: FAMILY MEDICINE

## 2020-12-13 PROCEDURE — 96375 TX/PRO/DX INJ NEW DRUG ADDON: CPT | Performed by: FAMILY MEDICINE

## 2020-12-13 PROCEDURE — 96374 THER/PROPH/DIAG INJ IV PUSH: CPT | Performed by: FAMILY MEDICINE

## 2020-12-13 PROCEDURE — 86140 C-REACTIVE PROTEIN: CPT | Performed by: FAMILY MEDICINE

## 2020-12-13 PROCEDURE — 80048 BASIC METABOLIC PNL TOTAL CA: CPT | Performed by: FAMILY MEDICINE

## 2020-12-13 PROCEDURE — 250N000013 HC RX MED GY IP 250 OP 250 PS 637: Performed by: FAMILY MEDICINE

## 2020-12-13 RX ORDER — LIDOCAINE 4 G/G
1 PATCH TOPICAL ONCE
Status: DISCONTINUED | OUTPATIENT
Start: 2020-12-13 | End: 2020-12-14 | Stop reason: HOSPADM

## 2020-12-13 RX ORDER — HYDROMORPHONE HYDROCHLORIDE 1 MG/ML
0.5 INJECTION, SOLUTION INTRAMUSCULAR; INTRAVENOUS; SUBCUTANEOUS
Status: DISCONTINUED | OUTPATIENT
Start: 2020-12-13 | End: 2020-12-14 | Stop reason: HOSPADM

## 2020-12-13 RX ORDER — LORAZEPAM 2 MG/ML
1 INJECTION INTRAMUSCULAR ONCE
Status: COMPLETED | OUTPATIENT
Start: 2020-12-13 | End: 2020-12-13

## 2020-12-13 RX ORDER — OXYCODONE HYDROCHLORIDE 5 MG/1
10 TABLET ORAL
Status: DISCONTINUED | OUTPATIENT
Start: 2020-12-13 | End: 2020-12-14 | Stop reason: HOSPADM

## 2020-12-13 RX ADMIN — LIDOCAINE 1 PATCH: 560 PATCH PERCUTANEOUS; TOPICAL; TRANSDERMAL at 22:35

## 2020-12-13 RX ADMIN — HYDROMORPHONE HYDROCHLORIDE 0.5 MG: 1 INJECTION, SOLUTION INTRAMUSCULAR; INTRAVENOUS; SUBCUTANEOUS at 22:35

## 2020-12-13 RX ADMIN — LORAZEPAM 1 MG: 2 INJECTION INTRAMUSCULAR; INTRAVENOUS at 22:35

## 2020-12-13 NOTE — ED AVS SNAPSHOT
[facility]  [address] [place] [address]  Phone: [phone]  Fax: [phone]                                    Deshawn Flood   MRN: [id number]    Department: [facility]   Date of Visit: 12/13/2020           After Visit Summary Signature Page    I have received my discharge instructions, and my questions have been answered. I have discussed any challenges I see with this plan with the nurse or doctor.    ..........................................................................................................................................  Patient/Patient Representative Signature      ..........................................................................................................................................  Patient Representative Print Name and Relationship to Patient    ..................................................               ................................................  Date                                   Time    ..........................................................................................................................................  Reviewed by Signature/Title    ...................................................              ..............................................  Date                                               Time          22EPIC Rev 08/18

## 2020-12-14 VITALS
SYSTOLIC BLOOD PRESSURE: 111 MMHG | DIASTOLIC BLOOD PRESSURE: 77 MMHG | OXYGEN SATURATION: 97 % | RESPIRATION RATE: 16 BRPM | HEART RATE: 48 BPM | TEMPERATURE: 98 F

## 2020-12-14 LAB
ALBUMIN UR-MCNC: NEGATIVE MG/DL
APPEARANCE UR: CLEAR
BILIRUB UR QL STRIP: NEGATIVE
COLOR UR AUTO: YELLOW
GLUCOSE UR STRIP-MCNC: NEGATIVE MG/DL
HGB UR QL STRIP: NEGATIVE
KETONES UR STRIP-MCNC: NEGATIVE MG/DL
LEUKOCYTE ESTERASE UR QL STRIP: NEGATIVE
NITRATE UR QL: NEGATIVE
PH UR STRIP: 5 PH (ref 5–7)
SOURCE: NORMAL
SP GR UR STRIP: 1.02 (ref 1–1.03)
UROBILINOGEN UR STRIP-MCNC: 0 MG/DL (ref 0–2)

## 2020-12-14 PROCEDURE — 81003 URINALYSIS AUTO W/O SCOPE: CPT | Performed by: FAMILY MEDICINE

## 2020-12-14 RX ORDER — OXYCODONE HYDROCHLORIDE 5 MG/1
5-10 TABLET ORAL EVERY 6 HOURS PRN
Qty: 2 TABLET | Refills: 0 | Status: SHIPPED | OUTPATIENT
Start: 2020-12-14 | End: 2020-12-17

## 2020-12-14 RX ORDER — LIDOCAINE 4 G/G
1 PATCH TOPICAL EVERY 12 HOURS PRN
Refills: 0 | COMMUNITY
Start: 2020-12-14 | End: 2020-12-17

## 2020-12-14 RX ORDER — OXYCODONE HYDROCHLORIDE 5 MG/1
5-10 TABLET ORAL EVERY 6 HOURS PRN
Status: DISCONTINUED | OUTPATIENT
Start: 2020-12-14 | End: 2020-12-14 | Stop reason: HOSPADM

## 2020-12-14 RX ORDER — OXYCODONE HYDROCHLORIDE 5 MG/1
5-10 TABLET ORAL EVERY 6 HOURS PRN
Qty: 10 TABLET | Refills: 0 | Status: SHIPPED | OUTPATIENT
Start: 2020-12-14 | End: 2020-12-17

## 2020-12-14 ASSESSMENT — ENCOUNTER SYMPTOMS
CHILLS: 0
PSYCHIATRIC NEGATIVE: 1
FEVER: 0
GASTROINTESTINAL NEGATIVE: 1
BACK PAIN: 1
EYES NEGATIVE: 1
NUMBNESS: 0
WEAKNESS: 0
ACTIVITY CHANGE: 1
CARDIOVASCULAR NEGATIVE: 1
RESPIRATORY NEGATIVE: 1

## 2020-12-14 NOTE — ED PROVIDER NOTES
"  History     Chief Complaint   Patient presents with     Back Pain     HPI  Deshawn Flood is a 45 year old male who presented to the emergency room today secondary to concerns of severe right-sided lumbar back pain with radiation into his right lateral thigh to the point of his right knee but not below his knee.  He states that he woke up with the symptoms this morning.  He states he went to bed last night fine.  He denies any injury that he is aware of denies any increase lifting more than usual.  He denies any fever or chills symptoms.  He denies any incontinence of bowel or bladder.  He denies any weakness into the right leg.  He states it is painful to use to the point where he is unable to lay down and has been standing all day but is not noticed any actual muscle weakness.  He denies any numbness sensation into the leg.  He denies any prior similar symptoms.  He does have a history for nephrotic syndrome.       I reviewed the following clinic phone nurse triage note:  Mima Arreola RN         12/13/20 9:02 PM  Note     \"I woke up this morning on my right side lower back just above the right buttock I have a sharp pain that wraps around to the side and down the side of the right leg just about to the knee. It is about the same as this morning, it doesn't go away. I can't walk it off. Pain currently =\"hard to sit down, if I try to lie down it is almost impossible to go to sleep and get out of bed again. It hurts to walk, but it hurts more if I am still. He states the pain is severe. He hasn't had this before. He states that a long time ago he fell off the ladder and squashed a disc. This pain is worse, different from that pain. No recent injury. He moved his bowels this am, but it was very difficult due to the pain. He has been able to urinate today, without a problem. It hurts so much to walk, his leg feels weak because it hurts to pick it up. He took one Hydrocodone acetaminophen 5-325 ~3pm, he states it " helped him sleep for about 10 min. It didn't help the pain. This is an old RX of this patient.     PCP [facility].      Triaged to a disposition of Go to ED now, which he intends to do. He states his mother will drive him.     Tegan Arreola RN Triage Nurse Advisor 9:01 PM 12/13/2020            Component      Latest Ref Rng & Units 11/7/2020 12/4/2020   Sodium      133 - 144 mmol/L 138 136   Potassium      3.4 - 5.3 mmol/L 4.1 4.3   Chloride      94 - 109 mmol/L 106 104   Carbon Dioxide      20 - 32 mmol/L 27 28   Anion Gap      3 - 14 mmol/L 5 4   Glucose      70 - 99 mg/dL 99 128 (H)   Urea Nitrogen      7 - 30 mg/dL 9 14   Creatinine      0.66 - 1.25 mg/dL 1.28 (H) 1.18   GFR Estimate      >60 mL/min/1.73:m2 66 73   GFR Estimate If Black      >60 mL/min/1.73:m2 77 85   Calcium      8.5 - 10.1 mg/dL 9.1 9.8   Phosphorus      2.5 - 4.5 mg/dL 1.8 (L) 2.6   Albumin      3.4 - 5.0 g/dL 3.8 4.0   WBC      4.0 - 11.0 10e9/L  11.7 (H)   RBC Count      4.4 - 5.9 10e12/L  5.05   Hemoglobin      13.3 - 17.7 g/dL  15.7   Hematocrit      40.0 - 53.0 %  45.3   MCV      78 - 100 fl  90   MCH      26.5 - 33.0 pg  31.1   MCHC      31.5 - 36.5 g/dL  34.7   RDW      10.0 - 15.0 %  11.9   Platelet Count      150 - 450 10e9/L  206   Creatinine Urine      mg/dL  95   Albumin Urine mg/L      mg/L  <5   Albumin Urine mg/g Cr      0 - 17 mg/g Cr  Unable to calculate due to low value       Allergies:  Allergies   Allergen Reactions     Bee Venom      swells up     No Known Drug Allergies      Seasonal Allergies        Problem List:    Patient Active Problem List    Diagnosis Date Noted     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     Priority: Medium     Nephrotic syndrome 04/09/2019     Priority: Medium     Anasarca 04/09/2019     Priority: Medium     Loose body of left knee 08/02/2012     Priority: Medium     Left knee pain 08/02/2012     Priority: Medium     Tobacco abuse 08/02/2012     Priority: Medium     History of pneumothorax  08/02/2012     Priority: Medium     spontaneous in 2005       CARDIOVASCULAR SCREENING; LDL GOAL LESS THAN 160 08/02/2012     Priority: Medium        Past Medical History:    Past Medical History:   Diagnosis Date     Anemia      Other spontaneous pneumothorax      SPONTANEOUS PNEUMOTHORAX NEC 3/25/2005     Unspecified asthma(493.90)        Past Surgical History:    Past Surgical History:   Procedure Laterality Date     ARTHROSCOPY KNEE  8/3/2012    Procedure: ARTHROSCOPY KNEE;  Left knee Arthroscopy with removal of loose bodies;  Surgeon: Bibi Roberts MD;  Location: [facility] OR     HC REPAIR UMBILICAL GODFREY,<4Y/O,REDUC  1978     IR RENAL BIOPSY LEFT  4/9/2019     TUBE THORACOSTOMY,ABSC/EMPYEMA/HEMO  3/20/2005    Small chest tube with Heimlich valve.       Family History:    Family History   Problem Relation Age of Onset     Autoimmune Disease No family hx of        Social History:  Marital Status:  Single [1]  Social History     Tobacco Use     Smoking status: Current Some Day Smoker     Packs/day: 0.10     Years: 15.00     Pack years: 1.50     Types: Cigarettes     Smokeless tobacco: Former User     Types: Chew   Substance Use Topics     Alcohol use: Yes     Alcohol/week: 17.5 standard drinks     Types: 21 Cans of beer per week     Comment: occ     Drug use: No        Medications:         Lidocaine (LIDOCARE) 4 % Patch       oxyCODONE (ROXICODONE) 5 MG tablet       oxyCODONE (ROXICODONE) 5 MG tablet       tiZANidine (ZANAFLEX) 4 MG tablet       atorvastatin (LIPITOR) 20 MG tablet       lisinopril (ZESTRIL) 20 MG tablet       omeprazole (PRILOSEC) 20 MG DR capsule       predniSONE (DELTASONE) 20 MG tablet       predniSONE (DELTASONE) 20 MG tablet       sulfamethoxazole-trimethoprim (BACTRIM DS) 800-160 MG tablet       VENTOLIN  (90 Base) MCG/ACT inhaler          Review of Systems   Constitutional: Positive for activity change. Negative for chills and fever.   HENT: Negative.    Eyes: Negative.     Respiratory: Negative.    Cardiovascular: Negative.    Gastrointestinal: Negative.    Genitourinary: Negative.    Musculoskeletal: Positive for back pain (Right lumbar region.) and gait problem.   Skin: Negative.  Negative for rash.   Neurological: Negative for weakness and numbness.   Psychiatric/Behavioral: Negative.    All other systems reviewed and are negative.      Physical Exam   BP: (!) 146/114  Pulse: 100  Temp: 98.3  F (36.8  C)  Resp: 18  SpO2: 97 %      Physical Exam  Vitals signs and nursing note reviewed.   Constitutional:       General: He is in acute distress.      Appearance: He is diaphoretic. He is not ill-appearing or toxic-appearing.   HENT:      Head: Normocephalic and atraumatic.   Eyes:      Extraocular Movements: Extraocular movements intact.      Conjunctiva/sclera: Conjunctivae normal.      Pupils: Pupils are equal, round, and reactive to light.   Neck:      Musculoskeletal: Normal range of motion and neck supple. No muscular tenderness.   Cardiovascular:      Rate and Rhythm: Normal rate.      Pulses: Normal pulses.   Pulmonary:      Effort: Pulmonary effort is normal. No respiratory distress.   Abdominal:      Palpations: There is no mass.      Tenderness: There is no abdominal tenderness. There is no right CVA tenderness or left CVA tenderness.   Musculoskeletal:         General: Tenderness present.      Lumbar back: He exhibits tenderness, pain and spasm. He exhibits no bony tenderness, no deformity, no laceration and normal pulse.        Back:    Neurological:      Mental Status: He is alert.         ED Course     ED Course as of Dec 14 0107   Sun Dec 13, 2020   9216 I rechecked on the patient he is now laying on the exam cart in the supine position and sleeping.  I did awaken him and he states he is feeling much improved.  We will continue to allow him to sleep for a period of time and then reexamine him and see if we can switch him to oral medication for pain control.  His exam  findings are reassuring so plan to hold on imaging at this time.        Procedures               Critical Care time:  none               Results for orders placed or performed during the hospital encounter of 12/13/20 (from the past 24 hour(s))   CBC with platelets differential   Result Value Ref Range    WBC 9.6 4.0 - 11.0 10e9/L    RBC Count 5.32 4.4 - 5.9 10e12/L    Hemoglobin 16.6 13.3 - 17.7 g/dL    Hematocrit 47.9 40.0 - 53.0 %    MCV 90 78 - 100 fl    MCH 31.2 26.5 - 33.0 pg    MCHC 34.7 31.5 - 36.5 g/dL    RDW 11.9 10.0 - 15.0 %    Platelet Count 264 150 - 450 10e9/L    Diff Method Automated Method     % Neutrophils 67.9 %    % Lymphocytes 23.9 %    % Monocytes 7.0 %    % Eosinophils 0.4 %    % Basophils 0.4 %    % Immature Granulocytes 0.4 %    Nucleated RBCs 0 0 /100    Absolute Neutrophil 6.6 1.6 - 8.3 10e9/L    Absolute Lymphocytes 2.3 0.8 - 5.3 10e9/L    Absolute Monocytes 0.7 0.0 - 1.3 10e9/L    Absolute Basophils 0.0 0.0 - 0.2 10e9/L    Abs Immature Granulocytes 0.0 0 - 0.4 10e9/L    Absolute Nucleated RBC 0.0    Basic metabolic panel   Result Value Ref Range    Sodium 138 133 - 144 mmol/L    Potassium 3.8 3.4 - 5.3 mmol/L    Chloride 105 94 - 109 mmol/L    Carbon Dioxide 29 20 - 32 mmol/L    Anion Gap 4 3 - 14 mmol/L    Glucose 116 (H) 70 - 99 mg/dL    Urea Nitrogen 12 7 - 30 mg/dL    Creatinine 1.16 0.66 - 1.25 mg/dL    GFR Estimate 75 >60 mL/min/[1.73_m2]    GFR Estimate If Black 87 >60 mL/min/[1.73_m2]    Calcium 9.5 8.5 - 10.1 mg/dL   CRP inflammation   Result Value Ref Range    CRP Inflammation <2.9 0.0 - 8.0 mg/L   UA reflex to Microscopic   Result Value Ref Range    Color Urine Yellow     Appearance Urine Clear     Glucose Urine Negative NEG^Negative mg/dL    Bilirubin Urine Negative NEG^Negative    Ketones Urine Negative NEG^Negative mg/dL    Specific Gravity Urine 1.019 1.003 - 1.035    Blood Urine Negative NEG^Negative    pH Urine 5.0 5.0 - 7.0 pH    Protein Albumin Urine Negative  NEG^Negative mg/dL    Urobilinogen mg/dL 0.0 0.0 - 2.0 mg/dL    Nitrite Urine Negative NEG^Negative    Leukocyte Esterase Urine Negative NEG^Negative    Source Midstream Urine        Medications   HYDROmorphone (PF) (DILAUDID) injection 0.5 mg ( Intravenous Held by provider 12/13/20 2325)   Lidocaine (LIDOCARE) 4 % Patch 1 patch (1 patch Transdermal Patch/Med Applied 12/13/20 2235)   oxyCODONE (ROXICODONE) tablet 10 mg (has no administration in time range)   LORazepam (ATIVAN) injection 1 mg (1 mg Intravenous Given 12/13/20 2235)       Assessments & Plan (with Medical Decision Making)  Patient with exam findings consistent with acute lumbar area muscle spasm with resultant irritation to the sciatic nerve.  His pain extends down to his knee but not below.  He is also without evidence of concerning features to his back pain, and he has no risk factors to suggest a concerning etiology for his back pain.  He is completely neurologically intact in upper and lower extremities. There is no evidence or risk factors for epidural abscess, epidural hematoma, pathologic fracture, cauda equina syndrome, etc. I advised that the patient follow up with his primary care physician in one week or so if his symptoms have not improved.  Patient medication prescribed as listed below.  He was also encouraged to keep some activity but to avoid lifting and twisting motion until this syndrome improves.  Additional handouts sent home with him discussing things he can do to improve his symptoms.  He was discharged into the care of his father.       I have reviewed the nursing notes.    I have reviewed the findings, diagnosis, plan and need for follow up with the patient.       New Prescriptions    LIDOCAINE (LIDOCARE) 4 % PATCH    Place 1 patch onto the skin every 12 hours as needed for moderate pain (apply the patch over the area of pain) Salon Pas is the brand name of the patch and can be purchased without a prescription.    OXYCODONE  (ROXICODONE) 5 MG TABLET    Take 1-2 tablets (5-10 mg) by mouth every 6 hours as needed for pain    OXYCODONE (ROXICODONE) 5 MG TABLET    Take 1-2 tablets (5-10 mg) by mouth every 6 hours as needed for pain    TIZANIDINE (ZANAFLEX) 4 MG TABLET    Take 1 tablet (4 mg) by mouth 3 times daily as needed for muscle spasms (MUSCLE SPASM)            I verbally discussed the findings of the evaluation today in the ER. I have verbally discussed with Deshawn the suggested treatment(s) as described in the discharge instructions and handouts. I have prescribed the above listed medications and instructed him on appropriate use of these medications.      I have verbally suggested he follow-up in his clinic or return to the ER for increased symptoms. See the follow-up recommendations documented  in the after visit summary in this visit's EPIC chart.    Final diagnoses:   Acute right-sided low back pain with right-sided sciatica       12/13/2020   [facility] EMERGENCY DEPT     Patricio Clinton DO  12/14/20 0109

## 2020-12-14 NOTE — TELEPHONE ENCOUNTER
"\"I woke up this morning on my right side lower back just above the right buttock I have a sharp pain that wraps around to the side and down the side of the right leg just about to the knee. It is about the same as this morning, it doesn't go away. I can't walk it off. Pain currently =\"hard to sit down, if I try to lie down it is almost impossible to go to sleep and get out of bed again. It hurts to walk, but it hurts more if I am still. He states the pain is severe. He hasn't had this before. He states that a long time ago he fell off the ladder and squashed a disc. This pain is worse, different from that pain. No recent injury. He moved his bowels this am, but it was very difficult due to the pain. He has been able to urinate today, without a problem. It hurts so much to walk, his leg feels weak because it hurts to pick it up. He took one Hydrocodone acetaminophen 5-325 ~3pm, he states it helped him sleep for about 10 min. It didn't help the pain. This is an old RX of this patient.    PCP [facility].     Triaged to a disposition of Go to ED now, which he intends to do. He states his mother will drive him.    Tegan Arreola RN Triage Nurse Advisor 9:01 PM 12/13/2020    Reason for Disposition    Weakness of a leg or foot (e.g., unable to bear weight, dragging foot)    Additional Information    Negative: Passed out (i.e., lost consciousness, collapsed and was not responding)    Negative: Shock suspected (e.g., cold/pale/clammy skin, too weak to stand, low BP, rapid pulse)    Negative: Sounds like a life-threatening emergency to the triager    Negative: Major injury to the back (e.g., MVA, fall > 10 feet or 3 meters, penetrating injury, etc.)    Negative: Followed a tailbone injury    Negative: [1] Pain in the upper back over the ribs (rib cage) AND [2] radiates (travels, goes) into chest    Negative: [1] Pain in the upper back over the ribs (rib cage) AND [2] worsened by coughing (or clearly increases with " breathing)    Negative: Back pain during pregnancy    Negative: Pain mainly in flank (i.e., in the side, over the lower ribs or just below the ribs)    Negative: [1] SEVERE back pain (e.g., excruciating) AND [2] sudden onset AND [3] age > 60    Negative: [1] Unable to urinate (or only a few drops) > 4 hours AND [2] bladder feels very full (e.g., palpable bladder or strong urge to urinate)    Negative: [1] Loss of bladder or bowel control (urine or bowel incontinence; wetting self, leaking stool) AND [2] new onset    Negative: Numbness in groin or rectal area (i.e., loss of sensation)    Negative: [1] SEVERE abdominal pain AND [2] present > 1 hour    Negative: [1] Abdominal pain AND [2] age > 60    Negative: Unable to walk    Negative: Patient sounds very sick or weak to the triager    [1] SEVERE back pain (e.g., excruciating, unable to do any normal activities) AND [2] not improved 2 hours after pain medicine    Negative: [1] Pain radiates into the thigh or further down the leg AND [2] both legs    Negative: [1] Fever > 100.0 F (37.8 C) AND [2] flank pain (i.e., in side, below ribs and above hip)    Negative: [1] Pain or burning with passing urine (urination) AND [2] flank pain (i.e., in side, below ribs and above hip)    Negative: Numbness in a leg or foot (i.e., loss of sensation)    Negative: [1] Numbness in an arm or hand (i.e., loss of sensation) AND [2] upper back pain    Protocols used: BACK PAIN-A-AH  COVID 19 Nurse Triage Plan/Patient Instructions    Please be aware that novel coronavirus (COVID-19) may be circulating in the community. If you develop symptoms such as fever, cough, or SOB or if you have concerns about the presence of another infection including coronavirus (COVID-19), please contact your health care provider or visit [email or web address].     Disposition/Instructions    ED Visit recommended. Follow protocol based instructions.     Bring Your Own Device:  Please also bring your smart device(s)  (smart phones, tablets, laptops) and their charging cables for your personal use and to communicate with your care team during your visit.    Thank you for taking steps to prevent the spread of this virus.  o Limit your contact with others.  o Wear a simple mask to cover your cough.  o Wash your hands well and often.    Resources    [facility]: About COVID-19: [email or web address]    CDC: What to Do If You're Sick: www.cdc.gov/coronavirus/2019-ncov/about/steps-when-sick.html    CDC: Ending Home Isolation: www.cdc.gov/coronavirus/2019-ncov/hcp/disposition-in-home-patients.html     CDC: Caring for Someone: www.cdc.gov/coronavirus/2019-ncov/if-you-are-sick/care-for-someone.html     [facility]: Interim Guidance for Hospital Discharge to Home: www.health.[facility].[place]/diseases/coronavirus/hcp/hospdischarge.pdf    [facility] clinical trials (COVID-19 research studies): [email or web address]-[email or web address]-[email or web address]     Below are the COVID-19 hotlines at the [place] Department of [facility] ([facility]). Interpreters are available.   o For health questions: Call 343-974-4823 or 1-821.822.4227 (7 a.m. to 7 p.m.)  o For questions about schools and childcare: Call 268-110-0011 or 1-826.176.6234 (7 a.m. to 7 p.m.)

## 2020-12-17 ENCOUNTER — HOSPITAL ENCOUNTER (OUTPATIENT)
Dept: GENERAL RADIOLOGY | Facility: CLINIC | Age: 45
Discharge: HOME | End: 2020-12-17
Attending: NURSE PRACTITIONER | Admitting: NURSE PRACTITIONER
Payer: COMMERCIAL

## 2020-12-17 ENCOUNTER — OFFICE VISIT (OUTPATIENT)
Dept: FAMILY MEDICINE | Facility: CLINIC | Age: 45
End: 2020-12-17
Payer: COMMERCIAL

## 2020-12-17 VITALS
OXYGEN SATURATION: 98 % | SYSTOLIC BLOOD PRESSURE: 124 MMHG | RESPIRATION RATE: 18 BRPM | DIASTOLIC BLOOD PRESSURE: 74 MMHG | WEIGHT: 175 LBS | BODY MASS INDEX: 25.66 KG/M2 | HEART RATE: 110 BPM | TEMPERATURE: 97.8 F

## 2020-12-17 DIAGNOSIS — M54.31 SCIATIC NERVE PAIN, RIGHT: Primary | ICD-10-CM

## 2020-12-17 DIAGNOSIS — M54.31 SCIATIC NERVE PAIN, RIGHT: ICD-10-CM

## 2020-12-17 PROCEDURE — 99213 OFFICE O/P EST LOW 20 MIN: CPT | Performed by: NURSE PRACTITIONER

## 2020-12-17 PROCEDURE — 73502 X-RAY EXAM HIP UNI 2-3 VIEWS: CPT

## 2020-12-17 RX ORDER — OXYCODONE HYDROCHLORIDE 5 MG/1
5-10 TABLET ORAL EVERY 6 HOURS PRN
Qty: 2 TABLET | Refills: 0 | Status: CANCELLED | OUTPATIENT
Start: 2020-12-17

## 2020-12-17 RX ORDER — OXYCODONE HYDROCHLORIDE 5 MG/1
5-10 TABLET ORAL EVERY 6 HOURS PRN
Qty: 10 TABLET | Refills: 0 | Status: SHIPPED | OUTPATIENT
Start: 2020-12-17 | End: 2021-02-15

## 2020-12-17 ASSESSMENT — PAIN SCALES - GENERAL: PAINLEVEL: EXTREME PAIN (9)

## 2020-12-17 NOTE — NURSING NOTE
Chief Complaint   Patient presents with     Back Pain     right side for about 5 days now     Musculoskeletal Problem     MP/MA

## 2020-12-17 NOTE — PATIENT INSTRUCTIONS
We will get an x-ray today.  We will call with results     Increase your Prednisone to 40 mg daily for 5 days, then go back to your 10 mg dose after that.     I refilled your Oxycodone.     They should call to get you scheduled with sports medicine.

## 2020-12-17 NOTE — PROGRESS NOTES
Subjective     Deshawn Flood is a 45 year old male who presents to clinic today for the following health issues:    HPI         ED/UC Followup:    Facility:  [facility]  Date of visit: 12/13/2020  Reason for visit: Acute right-sided low back pain with right-sided sciatica  Current Status: not better     Excerpt from ER record:       Assessments & Plan (with Medical Decision Making)  Patient with exam findings consistent with acute lumbar area muscle spasm with resultant irritation to the sciatic nerve.  His pain extends down to his knee but not below.  He is also without evidence of concerning features to his back pain, and he has no risk factors to suggest a concerning etiology for his back pain.  He is completely neurologically intact in upper and lower extremities. There is no evidence or risk factors for epidural abscess, epidural hematoma, pathologic fracture, cauda equina syndrome, etc. I advised that the patient follow up with his primary care physician in one week or so if his symptoms have not improved.  Patient medication prescribed as listed below.  He was also encouraged to keep some activity but to avoid lifting and twisting motion until this syndrome improves.  Additional handouts sent home with him discussing things he can do to improve his symptoms.  He was discharged into the care of his father.     Since discharge, he is not any better.  The Oxycodone only helps slightly.  Pain is radiating down his entire right leg.  No numbness or weakness.  It is interfering with sleep and he is having a hard time walking due to this.  No previous back issues.  He denies any new physical activity prior to this pain.  He is laid off from work for the winter.  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  Is being tapered off his daily Prednisone, currently at 10 mg a day.  In the ER his labs, including UA,  were normal.        Review of Systems   Constitutional, HEENT,  cardiovascular, pulmonary, gi and gu systems are negative, except as otherwise noted.      Objective    /74   Pulse 110   Temp 97.8  F (36.6  C) (Temporal)   Resp 18   Wt 79.4 kg (175 lb)   SpO2 98%   BMI 25.66 kg/m    Body mass index is 25.66 kg/m .  Physical Exam   GENERAL: alert and mild distress  RESP: lungs clear to auscultation - no rales, rhonchi or wheezes  CV: regular rate and rhythm, normal S1 S2, no S3 or S4, no murmur, click or rub,   MS: Right side SI joint tenderness to palpation.  This radiates down his right leg.  ROM is limited due to pain. Distal CMS intact.      Xray - right hip: negative by my reading, pending radiology review.         Assessment & Plan     Sciatic nerve pain, right  Refer to sports medicine to consider injection.  Will increase his oral Prednisone to 40 mg daily x 5 days.  Oxycodone refilled.  He is aware he should not be on this long term.    - XR Pelvis and Hip Right 1 View; Future  - Orthopedic & Spine  Referral; Future  - oxyCODONE (ROXICODONE) 5 MG tablet; Take 1-2 tablets (5-10 mg) by mouth every 6 hours as needed for pain          See Patient Instructions    Return in about 1 week (around 12/24/2020) for Recheck only if not improving.    DESI Barillas CNP  [facility]

## 2020-12-18 NOTE — RESULT ENCOUNTER NOTE
Called pt - informed of result of xray/recommendations. Patient voiced understanding.   ................Eduin Frazier LPN,   December 18, 2020,      9:26 AM,   [facility]

## 2020-12-28 NOTE — PROGRESS NOTES
"Sports Medicine Clinic Visit    PCP: Alvarez Yu    CC: Patient presents with:  Right Hip - Pain  Lower Back - Pain, Lumbar/SI      HPI:  Deshawn Flood is a 45 year old male who is seen in consultation at the request of  Alisha Garcia CNP.   He notes right SI joint/hip pain that began 12/13/20, ~2 weeks ago when he \"just woke up with it.\"  He notes pain over the right low back that radiates into the right anterior hip and knee.  He rates the pain at a  0/10 at its worst and a 7/10 currently.  Symptoms are relieved with rest and laying down. Symptoms are worsened by walking, sitting. He endorses sharp radiating pain.   He denies popping, grinding, catching, locking, instability, numbness, tingling, weakness, pain in other joints and fever, chills.  Other treatment has included other medications: prednisone, analgesics and rest. He notes difficulty with walking, sitting.       He works as a , currently on [employer]    Review of Systems:  Musculoskeletal: as above  Remainder of review of systems is negative including constitutional, eyes, ENT, CV, pulmonary, GI, , endocrine, skin, hematologic, and neurologic except as noted in HPI or medical history.    History reviewed. No pertinent past surgical/medical/family/social history other than as mentioned in HPI.    Patient Active Problem List   Diagnosis     Loose body of left knee     Left knee pain     Tobacco abuse     History of pneumothorax     CARDIOVASCULAR SCREENING; LDL GOAL LESS THAN 160     Nephrotic syndrome     Anasarca     Minimal change disease     Past Medical History:   Diagnosis Date     Anemia      Other spontaneous pneumothorax      SPONTANEOUS PNEUMOTHORAX NEC 3/25/2005     Unspecified asthma(493.90)      Past Surgical History:   Procedure Laterality Date     ARTHROSCOPY KNEE  8/3/2012    Procedure: ARTHROSCOPY KNEE;  Left knee Arthroscopy with removal of loose bodies;  Surgeon: Bibi Roberts MD;  Location:  OR "     HC REPAIR UMBILICAL GODFREY,<6Y/O,REDUC  1978     IR RENAL BIOPSY LEFT  4/9/2019     TUBE THORACOSTOMY,ABSC/EMPYEMA/HEMO  3/20/2005    Small chest tube with Heimlich valve.     Family History   Problem Relation Age of Onset     Autoimmune Disease No family hx of      Social History     Socioeconomic History     Marital status: Single     Spouse name: Not on file     Number of children: Not on file     Years of education: Not on file     Highest education level: Not on file   Occupational History     Not on file   Social Needs     Financial resource strain: Not on file     Food insecurity     Worry: Not on file     Inability: Not on file     Transportation needs     Medical: Not on file     Non-medical: Not on file   Tobacco Use     Smoking status: Current Some Day Smoker     Packs/day: 0.10     Years: 15.00     Pack years: 1.50     Types: Cigarettes     Smokeless tobacco: Former User     Types: Chew   Substance and Sexual Activity     Alcohol use: Yes     Alcohol/week: 17.5 standard drinks     Types: 21 Cans of beer per week     Comment: occ     Drug use: No     Sexual activity: Not Currently   Lifestyle     Physical activity     Days per week: Not on file     Minutes per session: Not on file     Stress: Not on file   Relationships     Social connections     Talks on phone: Not on file     Gets together: Not on file     Attends Jew service: Not on file     Active member of club or organization: Not on file     Attends meetings of clubs or organizations: Not on file     Relationship status: Not on file     Intimate partner violence     Fear of current or ex partner: Not on file     Emotionally abused: Not on file     Physically abused: Not on file     Forced sexual activity: Not on file   Other Topics Concern     Parent/sibling w/ CABG, MI or angioplasty before 65F 55M? Not Asked   Social History Narrative     Not on file           Current Outpatient Medications   Medication     atorvastatin (LIPITOR) 20 MG  tablet     gabapentin (NEURONTIN) 300 MG capsule     lisinopril (ZESTRIL) 20 MG tablet     omeprazole (PRILOSEC) 20 MG DR capsule     predniSONE (DELTASONE) 20 MG tablet     sulfamethoxazole-trimethoprim (BACTRIM DS) 800-160 MG tablet     VENTOLIN  (90 Base) MCG/ACT inhaler     oxyCODONE (ROXICODONE) 5 MG tablet     No current facility-administered medications for this visit.      Allergies   Allergen Reactions     Bee Venom      swells up     No Known Drug Allergies      Seasonal Allergies          Objective:  /88 (BP Location: Right arm, Patient Position: Sitting, Cuff Size: Adult Regular)   Wt 79.4 kg (175 lb)   BMI 25.66 kg/m      General: Alert and in no distress    Head: Normocephalic, atraumatic  Eyes: no scleral icterus or conjunctival erythema   Skin: no erythema, petechiae, or jaundice  CV: regular rhythm by palpation, 2+ distal pulses  Resp: normal respiratory effort without conversational dyspnea   Psych: normal mood and affect    Gait: Antalgic - not straightening right knee with ambulation    Musculoskeletal:    Low back exam:    Inspection:     no visible deformity in the low back       normal skin       normal vascular       normal lymphatic       no asymmetry    Tenderness:  None    ROM: Full lumbar flexion, extension, rotation, and lateral flexion.  Lumbar flexion and left lateral flexion cause right sided low back pain and right knee pain.  Difficulty and pain with knee extension.  Right hip flexion is painful.       Strength:  Ankle dorsiflexion 5/5 bilaterally  Ankle plantarflexion 5/5 bilaterally  Great toe extension 5/5 bilaterally  Toe flexion 5/5 bilaterally    Sensation:    grossly intact throughout lower extremities    Radiology:  X-rays ordered and independent visualization of images performed and reviewed with Deshawn.  Recent Results (from the past 744 hour(s))   XR Pelvis and Hip Right 1 View    Narrative    PELVIS AND HIP RIGHT ONE VIEW   12/17/2020 12:16 PM     HISTORY:  Sciatic nerve pain, right.    COMPARISON: Abdominal x-rays and CT abdomen and pelvis dated  3/29/2018.    FINDINGS: There are small to moderate-sized osteophytes surrounding  the bilateral femoral heads, slightly worse in the left hip as  compared to the right hip. Hip joint spaces are grossly  well-maintained. SI joints are unremarkable. No acute fracture or  malalignment.      Impression    IMPRESSION:  1. Mild degenerative changes of bilateral hips, slightly worse on the  left.  2. No acute fracture or malalignment.    DUC ZAVALA MD   XR Lumbar Spine 2-3 Views*    Narrative    LUMBAR SPINE TWO TO THREE VIEWS December 29, 2020 1:09 PM     HISTORY: Sciatic nerve pain, right.    COMPARISON: None.      Impression    IMPRESSION: Lumbar vertebrae are in near normal anatomic alignment.  Trace retrolisthesis of L2 relative to L3 measuring 3 mm. Moderate  intervertebral disc space narrowing at L2-L3 and L5-S1. Mild  intervertebral disc space narrowing at remaining levels. No  compression deformity or acute fracture.       Assessment:  1. Acute right-sided low back pain with right-sided sciatica    2. Lumbar degenerative disc disease        Plan:  Discussed the assessment with the patient and developed a plan together:  -Prescribed gabapentin.  Take 1 cap at night for 3 days, if tolerating; then increase to 1 cap twice daily for 3 days, if tolerating, then increase to 1 cap 3 times a day.   -Physical therapy ordered.  Please do 5-6 days of exercises per week between formal sessions and the home exercises they provide.  -Ice or heat 15-20 minutes as needed (Avoid sleeping on a heating pad or ice)  -Patient's preferred over the counter medications as directed on packaging as needed for pain or soreness.     -Follow up as needed if symptoms fail to improve or worsen.  Please call with questions or concerns.        Rebecca Cotton MD, [facility] Sports Medicine  [facility]

## 2020-12-29 ENCOUNTER — ANCILLARY PROCEDURE (OUTPATIENT)
Dept: GENERAL RADIOLOGY | Facility: CLINIC | Age: 45
End: 2020-12-29
Attending: PHYSICAL MEDICINE & REHABILITATION
Payer: COMMERCIAL

## 2020-12-29 ENCOUNTER — OFFICE VISIT (OUTPATIENT)
Dept: ORTHOPEDICS | Facility: CLINIC | Age: 45
End: 2020-12-29
Payer: COMMERCIAL

## 2020-12-29 VITALS — SYSTOLIC BLOOD PRESSURE: 125 MMHG | BODY MASS INDEX: 25.66 KG/M2 | DIASTOLIC BLOOD PRESSURE: 88 MMHG | WEIGHT: 175 LBS

## 2020-12-29 DIAGNOSIS — M51.369 LUMBAR DEGENERATIVE DISC DISEASE: ICD-10-CM

## 2020-12-29 DIAGNOSIS — M54.31 SCIATIC NERVE PAIN, RIGHT: ICD-10-CM

## 2020-12-29 DIAGNOSIS — M54.41 ACUTE RIGHT-SIDED LOW BACK PAIN WITH RIGHT-SIDED SCIATICA: Primary | ICD-10-CM

## 2020-12-29 DIAGNOSIS — J45.40 MODERATE PERSISTENT ASTHMA WITHOUT COMPLICATION: ICD-10-CM

## 2020-12-29 PROCEDURE — 72100 X-RAY EXAM L-S SPINE 2/3 VWS: CPT | Mod: TC | Performed by: RADIOLOGY

## 2020-12-29 PROCEDURE — 99244 OFF/OP CNSLTJ NEW/EST MOD 40: CPT | Performed by: PHYSICAL MEDICINE & REHABILITATION

## 2020-12-29 RX ORDER — PREDNISONE 20 MG/1
TABLET ORAL
COMMUNITY
Start: 2020-12-08 | End: 2021-03-19

## 2020-12-29 RX ORDER — GABAPENTIN 300 MG/1
300 CAPSULE ORAL 3 TIMES DAILY
Qty: 90 CAPSULE | Refills: 1 | Status: SHIPPED | OUTPATIENT
Start: 2020-12-29 | End: 2021-03-19

## 2020-12-29 ASSESSMENT — PAIN SCALES - GENERAL: PAINLEVEL: SEVERE PAIN (7)

## 2020-12-29 NOTE — PATIENT INSTRUCTIONS
-Prescribed gabapentin.  Take 1 cap at night for 3 days, if tolerating; then increase to 1 cap twice daily for 3 days, if tolerating, then increase to 1 cap 3 times a day.   -Physical therapy ordered.  Please do 5-6 days of exercises per week between formal sessions and the home exercises they provide.  -Ice or heat 15-20 minutes as needed (Avoid sleeping on a heating pad or ice)  -Patient's preferred over the counter medications as directed on packaging as needed for pain or soreness.     -Follow up as needed if symptoms fail to improve or worsen.  Please call with questions or concerns.

## 2020-12-29 NOTE — LETTER
"    12/29/2020         RE: eDshawn Flood  [address] [address]        Dear Colleague,    Thank you for referring your patient, Deshawn Flood, to the [facility] [place]. Please see a copy of my visit note below.    Sports Medicine Clinic Visit    PCP: Alvarez Yu    CC: Patient presents with:  Right Hip - Pain  Lower Back - Pain, Lumbar/SI      HPI:  Deshawn Flood is a 45 year old male who is seen in consultation at the request of  Alisha Garcia CNP.   He notes right SI joint/hip pain that began 12/13/20, ~2 weeks ago when he \"just woke up with it.\"  He notes pain over the right low back that radiates into the right anterior hip and knee.  He rates the pain at a  0/10 at its worst and a 7/10 currently.  Symptoms are relieved with rest and laying down. Symptoms are worsened by walking, sitting. He endorses sharp radiating pain.   He denies popping, grinding, catching, locking, instability, numbness, tingling, weakness, pain in other joints and fever, chills.  Other treatment has included other medications: prednisone, analgesics and rest. He notes difficulty with walking, sitting.       He works as a , currently on [employer]    Review of Systems:  Musculoskeletal: as above  Remainder of review of systems is negative including constitutional, eyes, ENT, CV, pulmonary, GI, , endocrine, skin, hematologic, and neurologic except as noted in HPI or medical history.    History reviewed. No pertinent past surgical/medical/family/social history other than as mentioned in HPI.    Patient Active Problem List   Diagnosis     Loose body of left knee     Left knee pain     Tobacco abuse     History of pneumothorax     CARDIOVASCULAR SCREENING; LDL GOAL LESS THAN 160     Nephrotic syndrome     Anasarca     Minimal change disease     Past Medical History:   Diagnosis Date     Anemia      Other spontaneous pneumothorax      SPONTANEOUS PNEUMOTHORAX NEC " 3/25/2005     Unspecified asthma(493.90)      Past Surgical History:   Procedure Laterality Date     ARTHROSCOPY KNEE  8/3/2012    Procedure: ARTHROSCOPY KNEE;  Left knee Arthroscopy with removal of loose bodies;  Surgeon: Bibi Roberts MD;  Location: [facility] OR     HC REPAIR UMBILICAL GODFREY,<4Y/O,REDUC  1978     IR RENAL BIOPSY LEFT  4/9/2019     TUBE THORACOSTOMY,ABSC/EMPYEMA/HEMO  3/20/2005    Small chest tube with Heimlich valve.     Family History   Problem Relation Age of Onset     Autoimmune Disease No family hx of      Social History     Socioeconomic History     Marital status: Single     Spouse name: Not on file     Number of children: Not on file     Years of education: Not on file     Highest education level: Not on file   Occupational History     Not on file   Social Needs     Financial resource strain: Not on file     Food insecurity     Worry: Not on file     Inability: Not on file     Transportation needs     Medical: Not on file     Non-medical: Not on file   Tobacco Use     Smoking status: Current Some Day Smoker     Packs/day: 0.10     Years: 15.00     Pack years: 1.50     Types: Cigarettes     Smokeless tobacco: Former User     Types: Chew   Substance and Sexual Activity     Alcohol use: Yes     Alcohol/week: 17.5 standard drinks     Types: 21 Cans of beer per week     Comment: occ     Drug use: No     Sexual activity: Not Currently   Lifestyle     Physical activity     Days per week: Not on file     Minutes per session: Not on file     Stress: Not on file   Relationships     Social connections     Talks on phone: Not on file     Gets together: Not on file     Attends [employer] service: Not on file     Active member of club or organization: Not on file     Attends meetings of clubs or organizations: Not on file     Relationship status: Not on file     Intimate partner violence     Fear of current or ex partner: Not on file     Emotionally abused: Not on file     Physically abused: Not on file      Forced sexual activity: Not on file   Other Topics Concern     Parent/sibling w/ CABG, MI or angioplasty before 65F 55M? Not Asked   Social History Narrative     Not on file           Current Outpatient Medications   Medication     atorvastatin (LIPITOR) 20 MG tablet     gabapentin (NEURONTIN) 300 MG capsule     lisinopril (ZESTRIL) 20 MG tablet     omeprazole (PRILOSEC) 20 MG DR capsule     predniSONE (DELTASONE) 20 MG tablet     sulfamethoxazole-trimethoprim (BACTRIM DS) 800-160 MG tablet     VENTOLIN  (90 Base) MCG/ACT inhaler     oxyCODONE (ROXICODONE) 5 MG tablet     No current facility-administered medications for this visit.      Allergies   Allergen Reactions     Bee Venom      swells up     No Known Drug Allergies      Seasonal Allergies          Objective:  /88 (BP Location: Right arm, Patient Position: Sitting, Cuff Size: Adult Regular)   Wt 79.4 kg (175 lb)   BMI 25.66 kg/m      General: Alert and in no distress    Head: Normocephalic, atraumatic  Eyes: no scleral icterus or conjunctival erythema   Skin: no erythema, petechiae, or jaundice  CV: regular rhythm by palpation, 2+ distal pulses  Resp: normal respiratory effort without conversational dyspnea   Psych: normal mood and affect    Gait: Antalgic - not straightening right knee with ambulation    Musculoskeletal:    Low back exam:    Inspection:     no visible deformity in the low back       normal skin       normal vascular       normal lymphatic       no asymmetry    Tenderness:  None    ROM: Full lumbar flexion, extension, rotation, and lateral flexion.  Lumbar flexion and left lateral flexion cause right sided low back pain and right knee pain.  Difficulty and pain with knee extension.  Right hip flexion is painful.       Strength:  Ankle dorsiflexion 5/5 bilaterally  Ankle plantarflexion 5/5 bilaterally  Great toe extension 5/5 bilaterally  Toe flexion 5/5 bilaterally    Sensation:    grossly intact throughout lower  extremities    Radiology:  X-rays ordered and independent visualization of images performed and reviewed with Deshawn.  Recent Results (from the past 744 hour(s))   XR Pelvis and Hip Right 1 View    Narrative    PELVIS AND HIP RIGHT ONE VIEW   12/17/2020 12:16 PM     HISTORY: Sciatic nerve pain, right.    COMPARISON: Abdominal x-rays and CT abdomen and pelvis dated  3/29/2018.    FINDINGS: There are small to moderate-sized osteophytes surrounding  the bilateral femoral heads, slightly worse in the left hip as  compared to the right hip. Hip joint spaces are grossly  well-maintained. SI joints are unremarkable. No acute fracture or  malalignment.      Impression    IMPRESSION:  1. Mild degenerative changes of bilateral hips, slightly worse on the  left.  2. No acute fracture or malalignment.    DUC ZAVALA MD   XR Lumbar Spine 2-3 Views*    Narrative    LUMBAR SPINE TWO TO THREE VIEWS December 29, 2020 1:09 PM     HISTORY: Sciatic nerve pain, right.    COMPARISON: None.      Impression    IMPRESSION: Lumbar vertebrae are in near normal anatomic alignment.  Trace retrolisthesis of L2 relative to L3 measuring 3 mm. Moderate  intervertebral disc space narrowing at L2-L3 and L5-S1. Mild  intervertebral disc space narrowing at remaining levels. No  compression deformity or acute fracture.       Assessment:  1. Acute right-sided low back pain with right-sided sciatica    2. Lumbar degenerative disc disease        Plan:  Discussed the assessment with the patient and developed a plan together:  -Prescribed gabapentin.  Take 1 cap at night for 3 days, if tolerating; then increase to 1 cap twice daily for 3 days, if tolerating, then increase to 1 cap 3 times a day.   -Physical therapy ordered.  Please do 5-6 days of exercises per week between formal sessions and the home exercises they provide.  -Ice or heat 15-20 minutes as needed (Avoid sleeping on a heating pad or ice)  -Patient's preferred over the counter medications as  directed on packaging as needed for pain or soreness.     -Follow up as needed if symptoms fail to improve or worsen.  Please call with questions or concerns.        Rebecca Cotton MD, [facility] Sports Medicine  [facility]        Again, thank you for allowing me to participate in the care of your patient.        Sincerely,        Ashlyn Cotton MD

## 2020-12-30 RX ORDER — ALBUTEROL SULFATE 90 UG/1
AEROSOL, METERED RESPIRATORY (INHALATION)
Qty: 1 INHALER | Refills: 0 | Status: SHIPPED | OUTPATIENT
Start: 2020-12-30 | End: 2021-01-22

## 2020-12-31 ENCOUNTER — HOSPITAL ENCOUNTER (OUTPATIENT)
Dept: PHYSICAL THERAPY | Facility: CLINIC | Age: 45
Setting detail: THERAPIES SERIES
End: 2020-12-31
Attending: PHYSICAL MEDICINE & REHABILITATION
Payer: COMMERCIAL

## 2020-12-31 DIAGNOSIS — M54.41 ACUTE RIGHT-SIDED LOW BACK PAIN WITH RIGHT-SIDED SCIATICA: ICD-10-CM

## 2020-12-31 PROCEDURE — 97162 PT EVAL MOD COMPLEX 30 MIN: CPT | Mod: GP | Performed by: PHYSICAL THERAPIST

## 2020-12-31 PROCEDURE — 97140 MANUAL THERAPY 1/> REGIONS: CPT | Mod: GP | Performed by: PHYSICAL THERAPIST

## 2020-12-31 PROCEDURE — 97116 GAIT TRAINING THERAPY: CPT | Mod: GP | Performed by: PHYSICAL THERAPIST

## 2020-12-31 PROCEDURE — 97110 THERAPEUTIC EXERCISES: CPT | Mod: GP | Performed by: PHYSICAL THERAPIST

## 2020-12-31 NOTE — PROGRESS NOTES
12/31/20 1000   General Information   Type of Visit Initial OP Ortho PT Evaluation   Start of Care Date 12/31/20   Referring Physician Alisha Garcia APRN CNP    Patient/Family Goals Statement Reduce pain.   Orders Evaluate and Treat   Date of Order 12/29/20   Certification Required? No   Medical Diagnosis R sided sciatica.   Surgical/Medical history reviewed Yes   Precautions/Limitations no known precautions/limitations   Body Part(s)   Body Part(s) Lumbar Spine/SI   Presentation and Etiology   Pertinent history of current problem (include personal factors and/or comorbidities that impact the POC) Pt presented to PT with symptoms of R LE pain.  Pt notes pain start several weeks ago in R leg right in the morning.  Pt notes pain in R hip through R knee along anterior thigh.  PMHx: pneumothorax 2005, knee scope 2012.  Medications: Gabapentin, prednisone, oxycodone.     Impairments A. Pain;B. Decreased WB tolerance;D. Decreased ROM;F. Decreased strength and endurance;G. Impaired balance;H. Impaired gait   Functional Limitations perform activities of daily living;perform required work activities   Symptom Location R hip, anterior thigh, and knee.   How/Where did it occur From insidious onset   Onset date of current episode/exacerbation 12/13/20   Chronicity New   Pain rating (0-10 point scale) Best (/10);Worst (/10)   Best (/10) 5/10   Worst (/10) 8/10.   Pain quality E. Shooting   Frequency of pain/symptoms A. Constant   Pain/symptoms are: Worse during the day   Pain/symptoms exacerbated by B. Walking;C. Lifting;D. Carrying;G. Certain positions   Pain/symptoms eased by E. Changing positions;I. OTC medication(s)   Progression of symptoms since onset: Unchanged   Current Level of Function   Living environment [facility]   Home/community accessibility 5 steps into house.     Current equipment-Gait/Locomotion None   Current equipment-ADL None   Fall Risk Screen   Fall screen completed by PT   Have you fallen 2 or  more times in the past year? No   Have you fallen and had an injury in the past year? No   Abuse Screen (yes response referral indicated)   Feels Unsafe at Home or Work/School no   Feels Threatened by Someone no   Does Anyone Try to Keep You From Having Contact with Others or Doing Things Outside Your Home? no   Physical Signs of Abuse Present no   Functional Scales   Functional Scales Other   Other Scales  LEFS, SAMSON.     Lumbar Spine/SI Objective Findings   Observation Pt displays inconsistent pain signs with shakiness and grunting while moving but no difficulty at another time with the same task.     Integumentary Unremarkable.   Posture Guarded hip flexion position in standing and while walking.     Gait/Locomotion Ambulates with reduced L stride length and keeps R hip in flexed position.  Able to correct mildly with VC's.     Balance/Proprioception (Single Leg Stance) NBOS no limitation, unable to assess SLS.     Flexion ROM 50%   Extension %   Right Side Bending ROM 75%   Left Side Bending ROM 75%   Repeated Extension-Standing ROM reduction of R knee pain.   Repeated Flexion-Standing ROM Peripherialization into R knee.   Hip Screen Unable to fully assess d/t muscle guarding.     Hip Flexion (L2) Strength 4/5   Hip Abduction Strength 4/5   Hip Adduction Strength 5/5   Hip Extension Strength 4/5   Knee Flexion Strength 5/5   Knee Extension (L3) Strength 4/5   Ankle Dorsiflexion (L4) Strength 5/5   Great Toe Extension (L5) Strength 5/5   Ankle Plantar Flexion (S1) Strength 5/5   Hamstring Flexibility Unable to assess due to hip flexor tightness.     Hip Flexor Flexibility Tight   SLR 45 degrees L, unable to assess on R d/t hip flexor guarding.   Renato Test Tightness on R hip flexors (iliacus)   Crossover SLR Negative.   Repeated Extension Prone Relief of R LE symptoms.     Slump Test Unremarkable.   Spring Test Negative.   Patellar Tendon Reflexes  2+   Achilles Tendon Reflexes 2+   Palpation Tenderness to  palpation at R iliacus, rectus femoris, vastus lateralis, and TFL.     Planned Therapy Interventions   Planned Therapy Interventions gait training;joint mobilization;manual therapy;motor coordination training;neuromuscular re-education;ROM;strengthening;stretching   Planned Modality Interventions   Planned Modality Interventions Cryotherapy;Electrical stimulation;Hot packs;TENS   Clinical Impression   Criteria for Skilled Therapeutic Interventions Met yes, treatment indicated   PT Diagnosis R LE pain.   Influenced by the following impairments Pain, weakness, impaired gait, impaired balance.     Functional limitations due to impairments Prolonged standing, walking, lifting, sitting.     Clinical Presentation Evolving/Changing   Clinical Presentation Rationale Clinical judgement.   Clinical Decision Making (Complexity) Moderate complexity   Therapy Frequency 1 time/week   Predicted Duration of Therapy Intervention (days/wks) 6 weeks.   Risk & Benefits of therapy have been explained Yes   Patient, Family & other staff in agreement with plan of care Yes   Clinical Impression Comments Pt is a 45 y.o. male who presented to PT with symptoms of R anterior thigh pain inconsistent with diagnosis of sciatica.  It appears that R anterior thigh pain is muscular in nature and responds well to stretching and also to extension biased lumbar exercises.  Pt will benefit from skilled PT to improve activity tolerance, mobility, and reduce pain with manual therapy and modalities as indicated.     Education Assessment   Preferred Learning Style Listening;Demonstration;Pictures/video   ORTHO GOALS   PT Ortho Eval Goals 1;2   Ortho Goal 1   Goal Identifier LEFS   Goal Description Pt will demonstrate 10% improvement per LEFS in order to demonstrate functional improvement of R leg.    Target Date 02/11/21   Ortho Goal 2   Goal Identifier HEP   Goal Description Pt will be independent with HEP in order to improve R LE strength, proprioception,  flexibility, and gait.   Target Date 02/11/21   Total Evaluation Time   PT Eval, Moderate Complexity Minutes (03422) 20

## 2021-01-04 ENCOUNTER — TELEPHONE (OUTPATIENT)
Dept: ORTHOPEDICS | Facility: OTHER | Age: 46
End: 2021-01-04

## 2021-01-04 DIAGNOSIS — R80.1 PERSISTENT PROTEINURIA: ICD-10-CM

## 2021-01-04 DIAGNOSIS — Z79.52 LONG TERM CURRENT USE OF SYSTEMIC STEROIDS: ICD-10-CM

## 2021-01-04 DIAGNOSIS — N04.9 NEPHROTIC SYNDROME: ICD-10-CM

## 2021-01-04 LAB
ALBUMIN SERPL-MCNC: 3.8 G/DL (ref 3.4–5)
ANION GAP SERPL CALCULATED.3IONS-SCNC: 3 MMOL/L (ref 3–14)
BUN SERPL-MCNC: 19 MG/DL (ref 7–30)
CALCIUM SERPL-MCNC: 9.2 MG/DL (ref 8.5–10.1)
CHLORIDE SERPL-SCNC: 104 MMOL/L (ref 94–109)
CO2 SERPL-SCNC: 30 MMOL/L (ref 20–32)
CREAT SERPL-MCNC: 0.94 MG/DL (ref 0.66–1.25)
CREAT UR-MCNC: 92 MG/DL
ERYTHROCYTE [DISTWIDTH] IN BLOOD BY AUTOMATED COUNT: 12.1 % (ref 10–15)
GFR SERPL CREATININE-BSD FRML MDRD: >90 ML/MIN/{1.73_M2}
GLUCOSE SERPL-MCNC: 106 MG/DL (ref 70–99)
HCT VFR BLD AUTO: 45.5 % (ref 40–53)
HGB BLD-MCNC: 15.6 G/DL (ref 13.3–17.7)
MCH RBC QN AUTO: 31 PG (ref 26.5–33)
MCHC RBC AUTO-ENTMCNC: 34.3 G/DL (ref 31.5–36.5)
MCV RBC AUTO: 91 FL (ref 78–100)
MICROALBUMIN UR-MCNC: <5 MG/L
MICROALBUMIN/CREAT UR: NORMAL MG/G CR (ref 0–17)
PHOSPHATE SERPL-MCNC: 3 MG/DL (ref 2.5–4.5)
PLATELET # BLD AUTO: 189 10E9/L (ref 150–450)
POTASSIUM SERPL-SCNC: 4 MMOL/L (ref 3.4–5.3)
RBC # BLD AUTO: 5.03 10E12/L (ref 4.4–5.9)
SODIUM SERPL-SCNC: 137 MMOL/L (ref 133–144)
WBC # BLD AUTO: 10.7 10E9/L (ref 4–11)

## 2021-01-04 PROCEDURE — 82043 UR ALBUMIN QUANTITATIVE: CPT | Performed by: INTERNAL MEDICINE

## 2021-01-04 PROCEDURE — 85027 COMPLETE CBC AUTOMATED: CPT | Performed by: INTERNAL MEDICINE

## 2021-01-04 PROCEDURE — 36415 COLL VENOUS BLD VENIPUNCTURE: CPT | Performed by: INTERNAL MEDICINE

## 2021-01-04 PROCEDURE — 80069 RENAL FUNCTION PANEL: CPT | Performed by: INTERNAL MEDICINE

## 2021-01-04 NOTE — TELEPHONE ENCOUNTER
Deshawn has been taking Prednisone 20mg daily for kidney issue.  When he saw you, he said you increased him to 40mg daily and he is going to be out of Prednisone tomorrow.  Will you please send new rx for him?  He has been on this long term and should not abruptly discontinue.    Thank you,  Marlene Pineda [facility]  [phone]

## 2021-01-07 ENCOUNTER — HOSPITAL ENCOUNTER (OUTPATIENT)
Dept: PHYSICAL THERAPY | Facility: CLINIC | Age: 46
Setting detail: THERAPIES SERIES
End: 2021-01-07
Attending: PHYSICAL MEDICINE & REHABILITATION
Payer: COMMERCIAL

## 2021-01-07 PROCEDURE — 97110 THERAPEUTIC EXERCISES: CPT | Mod: GP | Performed by: PHYSICAL THERAPIST

## 2021-01-07 PROCEDURE — 97112 NEUROMUSCULAR REEDUCATION: CPT | Mod: GP | Performed by: PHYSICAL THERAPIST

## 2021-01-07 PROCEDURE — 97530 THERAPEUTIC ACTIVITIES: CPT | Mod: GP | Performed by: PHYSICAL THERAPIST

## 2021-01-14 ENCOUNTER — HOSPITAL ENCOUNTER (OUTPATIENT)
Dept: PHYSICAL THERAPY | Facility: CLINIC | Age: 46
Setting detail: THERAPIES SERIES
End: 2021-01-14
Attending: PHYSICAL MEDICINE & REHABILITATION
Payer: COMMERCIAL

## 2021-01-14 PROCEDURE — 97530 THERAPEUTIC ACTIVITIES: CPT | Mod: GP | Performed by: PHYSICAL THERAPIST

## 2021-01-14 PROCEDURE — 97110 THERAPEUTIC EXERCISES: CPT | Mod: GP | Performed by: PHYSICAL THERAPIST

## 2021-01-21 ENCOUNTER — HOSPITAL ENCOUNTER (OUTPATIENT)
Dept: PHYSICAL THERAPY | Facility: CLINIC | Age: 46
Setting detail: THERAPIES SERIES
End: 2021-01-21
Attending: PHYSICAL MEDICINE & REHABILITATION
Payer: COMMERCIAL

## 2021-01-21 PROCEDURE — 97530 THERAPEUTIC ACTIVITIES: CPT | Mod: GP | Performed by: PHYSICAL THERAPIST

## 2021-01-21 PROCEDURE — 97110 THERAPEUTIC EXERCISES: CPT | Mod: GP | Performed by: PHYSICAL THERAPIST

## 2021-01-22 DIAGNOSIS — J45.40 MODERATE PERSISTENT ASTHMA WITHOUT COMPLICATION: ICD-10-CM

## 2021-01-22 RX ORDER — ALBUTEROL SULFATE 90 UG/1
AEROSOL, METERED RESPIRATORY (INHALATION)
Qty: 1 INHALER | Refills: 11 | Status: SHIPPED | OUTPATIENT
Start: 2021-01-22 | End: 2021-04-22

## 2021-01-22 NOTE — TELEPHONE ENCOUNTER
Routing refill request to provider for review/approval because:  ACT not current    Jazmine Hernandez RN

## 2021-01-24 DIAGNOSIS — J45.40 MODERATE PERSISTENT ASTHMA WITHOUT COMPLICATION: ICD-10-CM

## 2021-01-25 RX ORDER — ALBUTEROL SULFATE 90 UG/1
AEROSOL, METERED RESPIRATORY (INHALATION)
Refills: 0 | OUTPATIENT
Start: 2021-01-25

## 2021-01-28 ENCOUNTER — HOSPITAL ENCOUNTER (OUTPATIENT)
Dept: PHYSICAL THERAPY | Facility: CLINIC | Age: 46
Setting detail: THERAPIES SERIES
End: 2021-01-28
Attending: PHYSICAL MEDICINE & REHABILITATION
Payer: COMMERCIAL

## 2021-01-28 PROCEDURE — 97530 THERAPEUTIC ACTIVITIES: CPT | Mod: GP | Performed by: PHYSICAL THERAPIST

## 2021-01-28 PROCEDURE — 97110 THERAPEUTIC EXERCISES: CPT | Mod: GP | Performed by: PHYSICAL THERAPIST

## 2021-02-02 RX ORDER — PREDNISONE 20 MG/1
TABLET ORAL
OUTPATIENT
Start: 2021-02-02

## 2021-02-02 NOTE — TELEPHONE ENCOUNTER
Requested Prescriptions   Pending Prescriptions Disp Refills     predniSONE (DELTASONE) 20 MG tablet       Last Written Prescription Date:  12/08/2020  Last Fill Quantity: N/A,   # refills: N/A  Last Office Visit: 12/17/2020  Future Office visit:       Routing refill request to provider for review/approval because:  Drug not on the [facility], [facility] or [facility] refill protocol or controlled substance   Patient reported medication.     Audrey Castro MA

## 2021-02-03 DIAGNOSIS — N04.9 NEPHROTIC SYNDROME: ICD-10-CM

## 2021-02-03 DIAGNOSIS — N05.0 MINIMAL CHANGE DISEASE: ICD-10-CM

## 2021-02-04 DIAGNOSIS — R80.1 PERSISTENT PROTEINURIA: ICD-10-CM

## 2021-02-04 DIAGNOSIS — Z79.52 LONG TERM CURRENT USE OF SYSTEMIC STEROIDS: ICD-10-CM

## 2021-02-04 DIAGNOSIS — N04.9 NEPHROTIC SYNDROME: ICD-10-CM

## 2021-02-04 LAB
ALBUMIN SERPL-MCNC: 3.7 G/DL (ref 3.4–5)
ANION GAP SERPL CALCULATED.3IONS-SCNC: 4 MMOL/L (ref 3–14)
BUN SERPL-MCNC: 11 MG/DL (ref 7–30)
CALCIUM SERPL-MCNC: 9.2 MG/DL (ref 8.5–10.1)
CHLORIDE SERPL-SCNC: 106 MMOL/L (ref 94–109)
CO2 SERPL-SCNC: 28 MMOL/L (ref 20–32)
CREAT SERPL-MCNC: 1.16 MG/DL (ref 0.66–1.25)
ERYTHROCYTE [DISTWIDTH] IN BLOOD BY AUTOMATED COUNT: 11.9 % (ref 10–15)
GFR SERPL CREATININE-BSD FRML MDRD: 75 ML/MIN/{1.73_M2}
GLUCOSE SERPL-MCNC: 86 MG/DL (ref 70–99)
HCT VFR BLD AUTO: 44.3 % (ref 40–53)
HGB BLD-MCNC: 15.4 G/DL (ref 13.3–17.7)
MCH RBC QN AUTO: 31.4 PG (ref 26.5–33)
MCHC RBC AUTO-ENTMCNC: 34.8 G/DL (ref 31.5–36.5)
MCV RBC AUTO: 90 FL (ref 78–100)
PHOSPHATE SERPL-MCNC: 2.3 MG/DL (ref 2.5–4.5)
PLATELET # BLD AUTO: 182 10E9/L (ref 150–450)
POTASSIUM SERPL-SCNC: 3.6 MMOL/L (ref 3.4–5.3)
RBC # BLD AUTO: 4.91 10E12/L (ref 4.4–5.9)
SODIUM SERPL-SCNC: 138 MMOL/L (ref 133–144)
WBC # BLD AUTO: 7.2 10E9/L (ref 4–11)

## 2021-02-04 PROCEDURE — 85027 COMPLETE CBC AUTOMATED: CPT | Performed by: INTERNAL MEDICINE

## 2021-02-04 PROCEDURE — 36415 COLL VENOUS BLD VENIPUNCTURE: CPT | Performed by: INTERNAL MEDICINE

## 2021-02-04 PROCEDURE — 80069 RENAL FUNCTION PANEL: CPT | Performed by: INTERNAL MEDICINE

## 2021-02-04 RX ORDER — LISINOPRIL 20 MG/1
TABLET ORAL
Qty: 90 TABLET | Refills: 1 | Status: SHIPPED | OUTPATIENT
Start: 2021-02-04 | End: 2021-02-15

## 2021-02-04 NOTE — TELEPHONE ENCOUNTER
Prescription approved per [facility] Refill Protocol.    HAZEL ClaytonN, RN  [facility]

## 2021-02-15 ENCOUNTER — VIRTUAL VISIT (OUTPATIENT)
Dept: NEPHROLOGY | Facility: CLINIC | Age: 46
End: 2021-02-15
Payer: COMMERCIAL

## 2021-02-15 DIAGNOSIS — N04.9 NEPHROTIC SYNDROME: Primary | ICD-10-CM

## 2021-02-15 DIAGNOSIS — D84.9 IMMUNOSUPPRESSION (H): ICD-10-CM

## 2021-02-15 DIAGNOSIS — K50.00 CROHN'S DISEASE OF SMALL INTESTINE WITHOUT COMPLICATION (H): ICD-10-CM

## 2021-02-15 DIAGNOSIS — N05.0 MINIMAL CHANGE DISEASE: ICD-10-CM

## 2021-02-15 DIAGNOSIS — D58.2 ELEVATED HEMOGLOBIN (H): ICD-10-CM

## 2021-02-15 DIAGNOSIS — E78.5 HYPERLIPIDEMIA LDL GOAL <100: ICD-10-CM

## 2021-02-15 PROCEDURE — 99214 OFFICE O/P EST MOD 30 MIN: CPT | Mod: 95 | Performed by: INTERNAL MEDICINE

## 2021-02-15 RX ORDER — LISINOPRIL 20 MG/1
20 TABLET ORAL DAILY
Qty: 90 TABLET | Refills: 3 | Status: SHIPPED | OUTPATIENT
Start: 2021-02-15 | End: 2021-11-23

## 2021-02-15 RX ORDER — ATORVASTATIN CALCIUM 20 MG/1
20 TABLET, FILM COATED ORAL DAILY
Qty: 90 TABLET | Refills: 3 | Status: SHIPPED | OUTPATIENT
Start: 2021-02-15 | End: 2021-11-23

## 2021-02-15 NOTE — LETTER
2/15/2021       RE: Deshawn Flood  [address] [address]     Dear Colleague,    Thank you for referring your patient, Deshawn Flood, to the [facility] at [facility]. Please see a copy of my visit note below.    Deshawn is a 45 year old who is being evaluated via a billable telephone visit.      What phone number would you like to be contacted at? [phone]    How would you like to obtain your AVS? Mail a copy     *sciatic back pain injury in 12/20 getting better  *laid off all winter  *has medication to be picked up at pharm    Phone call duration: 18 minutes    Assessment and Plan:  45 year old male who presents for followup of minimal change disease. He presented for evaluation after presenting to ER where CT scan noted ascites and nonspecific small bowel enteritis, and UA showed proteinuria. He had biopsy which showed minimal change on 4/9/2019 and treated with prednisone 70mg. He was in remission and down to prednsione 10mg then stopped it, and had relapse within a couple months.  His mother noted swelling and he was up 20-25 lbs and proteinuria up to 6 grams/g cr.   - Prednisone round #2 started March 2020, tried to do a longer wean once on lower doses.    July 20- 50mg  August 24- 40mg  Sept 21- 30mg  October 18- 20mg    - March 2021 - he is off prednisone labs are stable thus far  - continue labs monthly for now, RTC 2-3 months  # Renal function- Scr was 1.1 in the past and was1.27 in ER. It was up to 1.6-1.8 at time of biopsy after diuretics were started.  - scr back to 1-1.2 range, a little higher, ? Volume related- asked him to drink more water, at least 1.5-2 liters a day (he drinks 3-4 mountain dew a day)  - RTC 8-12 weeks  - labs monthly    - proteinuria improved very quickly down to normal once prednisone restarted   - he is trying to do sodium restriction (really is 3-4000mg restriction typically) and if he notices  swelling in face, he is a little more careful. He is getting to 5000mg some days as he eats hot dogs and other foods, cheese.    - labs monthly  - slower wean with this taper  - if relapses again, will consider CNI for long term therapy- discussed with him today that if relapses again, will need a different maintenance long term regimen  - he is off prednisone    # Hyperlipidemia- LDL very elevated,was on low dose statin but stopped;  Restarted given nephrotic  - recheck lipids     # Hypertension: normotensive   - continue lisinopril    # Not anemic- hgb 18 now down to 16- stable    # Electrolytes: normal low potassium      # Mineral Bone Disorder:   Check baseline PTH if creatinine remains elevated.     - RTC and abs monthly for now    Assessment and plan was discussed with patient and he voiced his understanding and agreement.    Reason for Visit:  Deshawn Flood is a 45 year old male with nephrotic range proteinuria noted and biopsy 4/9/19 shows minimal change disease    HPI:  He is a 45 year old male recently in normal health who presented with nephrotic syndrome. He underwent kidney biopsy 4/9 and it shows minimal change disease. He was admitted to the hospital briefly after biopsy as he was having poor appetite due to bloating, and elevated creatinine.    Since then he went into remission, we tapered down on prednisone and unfortunately he relapsed within a couple months (6g/g cr February 2020),  thus we restarted prednisone in March 2020 and by early April he was down to normal range proteinuria and he was feeling better.    His weight is usually closer to 150-160  but was up to 184 lbs with last episode, was down to 160s with no proteinuria, but was back up to 188 lbs and once back on prednisone, his weight went back down to 158- 159lbs. He likes to stay at 160-165lbs but can't seem to keep weight on    Since last visit four months ago, he is doing fair. He has had a lot of issues with back pain and was not  working  He denies any swelling or fluid retention.  He is now off prednisone though no specific instruction were given, as he was not seen two months ago when I planned to wean down further  At this time, he is stable and we will just monitor closely.  If he relapses, will consider CNI therapy.      Renal History:   None prior to minimal change disease Spring 2019    ROS:   A comprehensive review of systems was obtained and negative, except as noted in the HPI or PMH.    Active Medical Problems:  Patient Active Problem List   Diagnosis     Loose body of left knee     Left knee pain     Tobacco abuse     History of pneumothorax     CARDIOVASCULAR SCREENING; LDL GOAL LESS THAN 160     Nephrotic syndrome     Anasarca     Minimal change disease     Immunosuppression (H)     Crohn's disease of small intestine without complication (H)     Elevated hemoglobin (H)     PMH:   Medical record was reviewed and PMH was discussed with patient and noted below.  Past Medical History:   Diagnosis Date     Anemia      Other spontaneous pneumothorax      SPONTANEOUS PNEUMOTHORAX NEC 3/25/2005     Unspecified asthma(493.90)      PSH:   Past Surgical History:   Procedure Laterality Date     ARTHROSCOPY KNEE  8/3/2012    Procedure: ARTHROSCOPY KNEE;  Left knee Arthroscopy with removal of loose bodies;  Surgeon: Bibi Roberts MD;  Location: [facility] OR     HC REPAIR UMBILICAL GODFREY,<6Y/O,REDUC  1978     IR RENAL BIOPSY LEFT  4/9/2019     TUBE THORACOSTOMY,ABSC/EMPYEMA/HEMO  3/20/2005    Small chest tube with Heimlich valve.       Family Hx:   Family History   Problem Relation Age of Onset     Autoimmune Disease No family hx of      Personal Hx:   Social History     Tobacco Use     Smoking status: Current Some Day Smoker     Packs/day: 0.10     Years: 15.00     Pack years: 1.50     Types: Cigarettes     Smokeless tobacco: Former User     Types: Chew   Substance Use Topics     Alcohol use: Yes     Alcohol/week: 17.5 standard drinks      Types: 21 Cans of beer per week     Comment: occ       Allergies:  Allergies   Allergen Reactions     Bee Venom      swells up     No Known Drug Allergies      Seasonal Allergies        Medications:  Current Outpatient Medications   Medication Sig     albuterol (VENTOLIN HFA) 108 (90 Base) MCG/ACT inhaler INHALE 1-2 PUFFS INTO THE LUNGS EVERY 6 HOURS AS NEEDED FOR SHORTNESS OF BREATH, DIFFICULTY BREATHING OR WHEEZING. (NEED TO BE SEEN IN CLINIC FOR FURTHER REFILLS)     atorvastatin (LIPITOR) 20 MG tablet Take 1 tablet (20 mg) by mouth daily     lisinopril (ZESTRIL) 20 MG tablet Take 1 tablet (20 mg) by mouth daily     sulfamethoxazole-trimethoprim (BACTRIM DS) 800-160 MG tablet Take 1 tablet by mouth three times a week Monday, Wednesday, Friday     gabapentin (NEURONTIN) 300 MG capsule Take 1 capsule (300 mg) by mouth 3 times daily     predniSONE (DELTASONE) 20 MG tablet      No current facility-administered medications for this visit.       Vitals:  There were no vitals taken for this visit.  154-155 lbs  Exam:  GENERAL : alert and no distress  Speaks in full sentences without dyspnea  Normal speech and thought pattern    LABS:   CMP  Recent Labs   Lab Test 02/04/21  0816 01/04/21  0838 12/13/20 2231 12/04/20  1006    137 138 136   POTASSIUM 3.6 4.0 3.8 4.3   CHLORIDE 106 104 105 104   CO2 28 30 29 28   ANIONGAP 4 3 4 4   GLC 86 106* 116* 128*   BUN 11 19 12 14   CR 1.16 0.94 1.16 1.18   GFRESTIMATED 75 >90 75 73   GFRESTBLACK 87 >90 87 85   BASIA 9.2 9.2 9.5 9.8     Recent Labs   Lab Test 11/26/19  1426 09/09/19  1506 05/20/19  1350 04/10/19  1009   BILITOTAL 0.8 0.4 0.4 0.7   ALKPHOS 41 65 61 42   ALT 25 41 57 9   AST 11 14 19 12     CBC  Recent Labs   Lab Test 02/04/21  0816 01/04/21  0838 12/13/20  2231 12/04/20  1006   HGB 15.4 15.6 16.6 15.7   WBC 7.2 10.7 9.6 11.7*   RBC 4.91 5.03 5.32 5.05   HCT 44.3 45.5 47.9 45.3   MCV 90 91 90 90   MCH 31.4 31.0 31.2 31.1   MCHC 34.8 34.3 34.7 34.7   RDW 11.9 12.1  11.9 11.9    189 264 206     URINE STUDIES  Recent Labs   Lab Test 12/14/20  0037 09/12/20  0841 02/28/20  1620 04/01/19  1500 03/29/19  1858   COLOR Yellow Yellow Yellow Yellow Yellow   APPEARANCE Clear Clear Clear Clear Clear   URINEGLC Negative Negative Negative Negative Negative   URINEBILI Negative Negative Negative Negative Negative   URINEKETONE Negative Negative Negative Negative Negative   SG 1.019 1.021 >1.030 1.015 >1.035*   UBLD Negative Negative Moderate* Moderate* Moderate*   URINEPH 5.0 5.0 6.0 6.0 6.0   PROTEIN Negative Negative >=300* >=300* >499*   UROBILINOGEN  --   --  0.2 0.2  --    NITRITE Negative Negative Negative Negative Negative   LEUKEST Negative Negative Negative Negative Negative   RBCU  --   --  O - 2 25-50* 7*   WBCU  --   --  0 - 5 0 - 5 7*     Recent Labs   Lab Test 04/03/20  0900 02/28/20  1619 11/26/19  1435 09/09/19  1510   UTPG 0.18 6.20* 0.06 0.14     PTH  No lab results found.  IRON STUDIES  No lab results found.    IMPRESSION:  1. Findings are concerning for diffuse small bowel enteritis likely  infectious or inflammatory in etiology, with associated inflammatory  changes of the mesenteric adipose tissues, moderate ascites, small  bilateral probably reactive pleural effusions and with some edema in  the subcutaneous adipose tissues.  2. No significant atherosclerosis is identified. No obvious arterial  blockage is seen in the mesenteric vessels to suggest ischemia.  3. Mild degenerative changes of the spine and hips. No acute fracture  or aggressive osseous lesion.  Jenny Aram Scott MD

## 2021-02-15 NOTE — PROGRESS NOTES
Deshawn is a 45 year old who is being evaluated via a billable telephone visit.      What phone number would you like to be contacted at? [phone]    How would you like to obtain your AVS? Mail a copy     *sciatic back pain injury in 12/20 getting better  *laid off all winter  *has medication to be picked up at pharm    Phone call duration: 18 minutes    Assessment and Plan:  45 year old male who presents for followup of minimal change disease. He presented for evaluation after presenting to ER where CT scan noted ascites and nonspecific small bowel enteritis, and UA showed proteinuria. He had biopsy which showed minimal change on 4/9/2019 and treated with prednisone 70mg. He was in remission and down to prednsione 10mg then stopped it, and had relapse within a couple months.  His mother noted swelling and he was up 20-25 lbs and proteinuria up to 6 grams/g cr.   - Prednisone round #2 started March 2020, tried to do a longer wean once on lower doses.    July 20- 50mg  August 24- 40mg  Sept 21- 30mg  October 18- 20mg    - March 2021 - he is off prednisone labs are stable thus far  - continue labs monthly for now, RTC 2-3 months  # Renal function- Scr was 1.1 in the past and was1.27 in ER. It was up to 1.6-1.8 at time of biopsy after diuretics were started.  - scr back to 1-1.2 range, a little higher, ? Volume related- asked him to drink more water, at least 1.5-2 liters a day (he drinks 3-4 mountain dew a day)  - RTC 8-12 weeks  - labs monthly    - proteinuria improved very quickly down to normal once prednisone restarted   - he is trying to do sodium restriction (really is 3-4000mg restriction typically) and if he notices swelling in face, he is a little more careful. He is getting to 5000mg some days as he eats hot dogs and other foods, cheese.    - labs monthly  - slower wean with this taper  - if relapses again, will consider CNI for long term therapy- discussed with him today that if relapses again, will need a  different maintenance long term regimen  - he is off prednisone    # Hyperlipidemia- LDL very elevated,was on low dose statin but stopped;  Restarted given nephrotic  - recheck lipids     # Hypertension: normotensive   - continue lisinopril    # Not anemic- hgb 18 now down to 16- stable    # Electrolytes: normal low potassium      # Mineral Bone Disorder:   Check baseline PTH if creatinine remains elevated.     - RTC and abs monthly for now    Assessment and plan was discussed with patient and he voiced his understanding and agreement.    Reason for Visit:  Deshawn Flood is a 45 year old male with nephrotic range proteinuria noted and biopsy 4/9/19 shows minimal change disease    HPI:  He is a 45 year old male recently in normal health who presented with nephrotic syndrome. He underwent kidney biopsy 4/9 and it shows minimal change disease. He was admitted to the hospital briefly after biopsy as he was having poor appetite due to bloating, and elevated creatinine.    Since then he went into remission, we tapered down on prednisone and unfortunately he relapsed within a couple months (6g/g cr February 2020),  thus we restarted prednisone in March 2020 and by early April he was down to normal range proteinuria and he was feeling better.    His weight is usually closer to 150-160  but was up to 184 lbs with last episode, was down to 160s with no proteinuria, but was back up to 188 lbs and once back on prednisone, his weight went back down to 158- 159lbs. He likes to stay at 160-165lbs but can't seem to keep weight on    Since last visit four months ago, he is doing fair. He has had a lot of issues with back pain and was not working  He denies any swelling or fluid retention.  He is now off prednisone though no specific instruction were given, as he was not seen two months ago when I planned to wean down further  At this time, he is stable and we will just monitor closely.  If he relapses, will consider CNI  therapy.      Renal History:   None prior to minimal change disease Spring 2019    ROS:   A comprehensive review of systems was obtained and negative, except as noted in the HPI or PMH.    Active Medical Problems:  Patient Active Problem List   Diagnosis     Loose body of left knee     Left knee pain     Tobacco abuse     History of pneumothorax     CARDIOVASCULAR SCREENING; LDL GOAL LESS THAN 160     Nephrotic syndrome     Anasarca     Minimal change disease     Immunosuppression (H)     Crohn's disease of small intestine without complication (H)     Elevated hemoglobin (H)     PMH:   Medical record was reviewed and PMH was discussed with patient and noted below.  Past Medical History:   Diagnosis Date     Anemia      Other spontaneous pneumothorax      SPONTANEOUS PNEUMOTHORAX NEC 3/25/2005     Unspecified asthma(493.90)      PSH:   Past Surgical History:   Procedure Laterality Date     ARTHROSCOPY KNEE  8/3/2012    Procedure: ARTHROSCOPY KNEE;  Left knee Arthroscopy with removal of loose bodies;  Surgeon: Bibi Roberts MD;  Location: [facility]     HC REPAIR UMBILICAL GODFREY,<4Y/O,REDUC  1978     IR RENAL BIOPSY LEFT  4/9/2019     TUBE THORACOSTOMY,ABSC/EMPYEMA/HEMO  3/20/2005    Small chest tube with Heimlich valve.       Family Hx:   Family History   Problem Relation Age of Onset     Autoimmune Disease No family hx of      Personal Hx:   Social History     Tobacco Use     Smoking status: Current Some Day Smoker     Packs/day: 0.10     Years: 15.00     Pack years: 1.50     Types: Cigarettes     Smokeless tobacco: Former User     Types: Chew   Substance Use Topics     Alcohol use: Yes     Alcohol/week: 17.5 standard drinks     Types: 21 Cans of beer per week     Comment: occ       Allergies:  Allergies   Allergen Reactions     Bee Venom      swells up     No Known Drug Allergies      Seasonal Allergies        Medications:  Current Outpatient Medications   Medication Sig     albuterol (VENTOLIN HFA) 108 (90 Base)  MCG/ACT inhaler INHALE 1-2 PUFFS INTO THE LUNGS EVERY 6 HOURS AS NEEDED FOR SHORTNESS OF BREATH, DIFFICULTY BREATHING OR WHEEZING. (NEED TO BE SEEN IN CLINIC FOR FURTHER REFILLS)     atorvastatin (LIPITOR) 20 MG tablet Take 1 tablet (20 mg) by mouth daily     lisinopril (ZESTRIL) 20 MG tablet Take 1 tablet (20 mg) by mouth daily     sulfamethoxazole-trimethoprim (BACTRIM DS) 800-160 MG tablet Take 1 tablet by mouth three times a week Monday, Wednesday, Friday     gabapentin (NEURONTIN) 300 MG capsule Take 1 capsule (300 mg) by mouth 3 times daily     predniSONE (DELTASONE) 20 MG tablet      No current facility-administered medications for this visit.       Vitals:  There were no vitals taken for this visit.  154-155 lbs  Exam:  GENERAL : alert and no distress  Speaks in full sentences without dyspnea  Normal speech and thought pattern    LABS:   CMP  Recent Labs   Lab Test 02/04/21  0816 01/04/21  0838 12/13/20 2231 12/04/20  1006    137 138 136   POTASSIUM 3.6 4.0 3.8 4.3   CHLORIDE 106 104 105 104   CO2 28 30 29 28   ANIONGAP 4 3 4 4   GLC 86 106* 116* 128*   BUN 11 19 12 14   CR 1.16 0.94 1.16 1.18   GFRESTIMATED 75 >90 75 73   GFRESTBLACK 87 >90 87 85   BASIA 9.2 9.2 9.5 9.8     Recent Labs   Lab Test 11/26/19  1426 09/09/19  1506 05/20/19  1350 04/10/19  1009   BILITOTAL 0.8 0.4 0.4 0.7   ALKPHOS 41 65 61 42   ALT 25 41 57 9   AST 11 14 19 12     CBC  Recent Labs   Lab Test 02/04/21  0816 01/04/21  0838 12/13/20  2231 12/04/20  1006   HGB 15.4 15.6 16.6 15.7   WBC 7.2 10.7 9.6 11.7*   RBC 4.91 5.03 5.32 5.05   HCT 44.3 45.5 47.9 45.3   MCV 90 91 90 90   MCH 31.4 31.0 31.2 31.1   MCHC 34.8 34.3 34.7 34.7   RDW 11.9 12.1 11.9 11.9    189 264 206     URINE STUDIES  Recent Labs   Lab Test 12/14/20  0037 09/12/20  0841 02/28/20  1620 04/01/19  1500 03/29/19  1858   COLOR Yellow Yellow Yellow Yellow Yellow   APPEARANCE Clear Clear Clear Clear Clear   URINEGLC Negative Negative Negative Negative Negative    URINEBILI Negative Negative Negative Negative Negative   URINEKETONE Negative Negative Negative Negative Negative   SG 1.019 1.021 >1.030 1.015 >1.035*   UBLD Negative Negative Moderate* Moderate* Moderate*   URINEPH 5.0 5.0 6.0 6.0 6.0   PROTEIN Negative Negative >=300* >=300* >499*   UROBILINOGEN  --   --  0.2 0.2  --    NITRITE Negative Negative Negative Negative Negative   LEUKEST Negative Negative Negative Negative Negative   RBCU  --   --  O - 2 25-50* 7*   WBCU  --   --  0 - 5 0 - 5 7*     Recent Labs   Lab Test 04/03/20  0900 02/28/20  1619 11/26/19  1435 09/09/19  1510   UTPG 0.18 6.20* 0.06 0.14     PTH  No lab results found.  IRON STUDIES  No lab results found.    IMPRESSION:  1. Findings are concerning for diffuse small bowel enteritis likely  infectious or inflammatory in etiology, with associated inflammatory  changes of the mesenteric adipose tissues, moderate ascites, small  bilateral probably reactive pleural effusions and with some edema in  the subcutaneous adipose tissues.  2. No significant atherosclerosis is identified. No obvious arterial  blockage is seen in the mesenteric vessels to suggest ischemia.  3. Mild degenerative changes of the spine and hips. No acute fracture  or aggressive osseous lesion.  Jenny Scott MD

## 2021-03-10 DIAGNOSIS — K50.00 CROHN'S DISEASE OF SMALL INTESTINE WITHOUT COMPLICATION (H): ICD-10-CM

## 2021-03-10 DIAGNOSIS — N04.9 NEPHROTIC SYNDROME: ICD-10-CM

## 2021-03-10 DIAGNOSIS — D58.2 ELEVATED HEMOGLOBIN (H): ICD-10-CM

## 2021-03-10 DIAGNOSIS — D84.9 IMMUNOSUPPRESSION (H): ICD-10-CM

## 2021-03-10 DIAGNOSIS — E78.5 HYPERLIPIDEMIA LDL GOAL <100: ICD-10-CM

## 2021-03-10 DIAGNOSIS — N05.0 MINIMAL CHANGE DISEASE: ICD-10-CM

## 2021-03-10 LAB
ALBUMIN SERPL-MCNC: 3.9 G/DL (ref 3.4–5)
ANION GAP SERPL CALCULATED.3IONS-SCNC: 1 MMOL/L (ref 3–14)
BUN SERPL-MCNC: 11 MG/DL (ref 7–30)
CALCIUM SERPL-MCNC: 9.6 MG/DL (ref 8.5–10.1)
CHLORIDE SERPL-SCNC: 106 MMOL/L (ref 94–109)
CO2 SERPL-SCNC: 31 MMOL/L (ref 20–32)
CREAT SERPL-MCNC: 1.13 MG/DL (ref 0.66–1.25)
CREAT UR-MCNC: 231 MG/DL
ERYTHROCYTE [DISTWIDTH] IN BLOOD BY AUTOMATED COUNT: 11.8 % (ref 10–15)
GFR SERPL CREATININE-BSD FRML MDRD: 77 ML/MIN/{1.73_M2}
GLUCOSE SERPL-MCNC: 88 MG/DL (ref 70–99)
HCT VFR BLD AUTO: 43.8 % (ref 40–53)
HGB BLD-MCNC: 15.4 G/DL (ref 13.3–17.7)
MCH RBC QN AUTO: 31 PG (ref 26.5–33)
MCHC RBC AUTO-ENTMCNC: 35.2 G/DL (ref 31.5–36.5)
MCV RBC AUTO: 88 FL (ref 78–100)
MICROALBUMIN UR-MCNC: 8 MG/L
MICROALBUMIN/CREAT UR: 3.34 MG/G CR (ref 0–17)
PHOSPHATE SERPL-MCNC: 3.4 MG/DL (ref 2.5–4.5)
PLATELET # BLD AUTO: 199 10E9/L (ref 150–450)
POTASSIUM SERPL-SCNC: 4.1 MMOL/L (ref 3.4–5.3)
RBC # BLD AUTO: 4.97 10E12/L (ref 4.4–5.9)
SODIUM SERPL-SCNC: 138 MMOL/L (ref 133–144)
WBC # BLD AUTO: 6.4 10E9/L (ref 4–11)

## 2021-03-10 PROCEDURE — 36415 COLL VENOUS BLD VENIPUNCTURE: CPT | Performed by: INTERNAL MEDICINE

## 2021-03-10 PROCEDURE — 82043 UR ALBUMIN QUANTITATIVE: CPT | Performed by: INTERNAL MEDICINE

## 2021-03-10 PROCEDURE — 80069 RENAL FUNCTION PANEL: CPT | Performed by: INTERNAL MEDICINE

## 2021-03-10 PROCEDURE — 85027 COMPLETE CBC AUTOMATED: CPT | Performed by: INTERNAL MEDICINE

## 2021-03-19 ENCOUNTER — OFFICE VISIT (OUTPATIENT)
Dept: FAMILY MEDICINE | Facility: OTHER | Age: 46
End: 2021-03-19
Payer: COMMERCIAL

## 2021-03-19 VITALS
WEIGHT: 162 LBS | DIASTOLIC BLOOD PRESSURE: 70 MMHG | RESPIRATION RATE: 14 BRPM | TEMPERATURE: 99.2 F | OXYGEN SATURATION: 98 % | SYSTOLIC BLOOD PRESSURE: 116 MMHG | HEART RATE: 74 BPM | BODY MASS INDEX: 23.75 KG/M2

## 2021-03-19 DIAGNOSIS — L29.9 ITCHING: ICD-10-CM

## 2021-03-19 DIAGNOSIS — N05.0 MINIMAL CHANGE DISEASE: ICD-10-CM

## 2021-03-19 DIAGNOSIS — K50.00 CROHN'S DISEASE OF SMALL INTESTINE WITHOUT COMPLICATION (H): ICD-10-CM

## 2021-03-19 DIAGNOSIS — N04.9 NEPHROTIC SYNDROME: ICD-10-CM

## 2021-03-19 DIAGNOSIS — B35.3 TINEA PEDIS OF BOTH FEET: Primary | ICD-10-CM

## 2021-03-19 DIAGNOSIS — J45.40 MODERATE PERSISTENT ASTHMA WITHOUT COMPLICATION: ICD-10-CM

## 2021-03-19 PROCEDURE — 99214 OFFICE O/P EST MOD 30 MIN: CPT | Performed by: FAMILY MEDICINE

## 2021-03-19 RX ORDER — TRIAMCINOLONE ACETONIDE 1 MG/G
CREAM TOPICAL 2 TIMES DAILY PRN
Qty: 80 G | Refills: 0 | Status: SHIPPED | OUTPATIENT
Start: 2021-03-19 | End: 2021-05-10

## 2021-03-19 RX ORDER — ECONAZOLE NITRATE 10 MG/G
CREAM TOPICAL 2 TIMES DAILY
Qty: 85 G | Refills: 1 | Status: SHIPPED | OUTPATIENT
Start: 2021-03-19 | End: 2021-05-10

## 2021-03-19 NOTE — PATIENT INSTRUCTIONS
Thank you for visiting [facility]    Use the econazole cream 2 times/day for at least 2-4 weeks.    Can use the triamcinolone cream for itching and irritation up to 3 times/day for up to 2 weeks.  This will help you feel better fast, but will not correct the problem.  The other medication will.      Apply the econazole first and then put the triamcinolone on top of the econazole.      Try to give your feet some time to dry out each day.      Once this is healed up, you can use rubbing alcohol on your feet once each day to help prevent recurrence.      Let me know if you want help quitting smoking.    Contact us or return if questions or concerns.     Have a nice day!    Dr. Dowell     No follow-ups on file.      If you need medication refills, please contact your pharmacy 3 days before your prescriptions runs out or download the [facility] gabby for your smart phone. If you are out of refills, your pharmacy will contact contact the clinic.                                     MyChart Assistance [phone]

## 2021-03-19 NOTE — PROGRESS NOTES
Assessment & Plan       ICD-10-CM    1. Tinea pedis of both feet  B35.3 econazole nitrate 1 % external cream   2. Itching  L29.9 triamcinolone (KENALOG) 0.1 % external cream   3. Nephrotic syndrome  N04.9    4. 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 Crohn's disease of small intestine without complication (H)  K50.00    6. Moderate persistent asthma without complication  J45.40       1, 2.  Clinically, patient symptoms are most consistent with tinea pedis.  Will treat with topical antifungal.  Given severity of his itching associated with this, will also treat with topical steroid cream.  If not responding adequately, consider additional testing including biopsy or referral.  Follow-up if not improving.  3, 4.  Primarily managed by nephrology.  Currently in remission.  I do not think that his current symptoms are related to this.  5.  Previous imaging showed findings consistent with possible Crohn's disease.  Patient is not currently on treatment for this.  He has no significant symptoms at this time.  We will need to consider this as a potential contributor to his current skin findings, but I am not convinced these are related at this time.  6.  Encourage patient to quit smoking.  At this time, he only uses albuterol intermittently.  Unclear what his asthma control is like overall, but I suspect this is under adequate control.  He was probably more accurately intermittent asthma.    Portions of this note were completed using Dragon dictation software.  Although reviewed, there may be typographical and other inadvertent errors that remain.         Review of the result(s) of each unique test - Renal panel, CBC, microalbumin  35 minutes spent on the date of the encounter doing chart review, history and exam, documentation and further activities as noted above       Tobacco Cessation:   reports that he has been smoking cigarettes. He has a 1.50 pack-year smoking history. He has quit using smokeless tobacco.  His  smokeless tobacco use included chew.  Tobacco Cessation Action Plan: Information offered: Patient not interested at this time    Patient Instructions   Thank you for visiting [facility]    Use the econazole cream 2 times/day for at least 2-4 weeks.    Can use the triamcinolone cream for itching and irritation up to 3 times/day for up to 2 weeks.  This will help you feel better fast, but will not correct the problem.  The other medication will.      Apply the econazole first and then put the triamcinolone on top of the econazole.      Try to give your feet some time to dry out each day.      Once this is healed up, you can use rubbing alcohol on your feet once each day to help prevent recurrence.      Let me know if you want help quitting smoking.    Contact us or return if questions or concerns.     Have a nice day!    Dr. Dowell     No follow-ups on file.      If you need medication refills, please contact your pharmacy 3 days before your prescriptions runs out or download the [facility] gabby for your smart phone. If you are out of refills, your pharmacy will contact contact the clinic.                                     [facility] [phone]                       Return in about 2 weeks (around 4/2/2021), or if symptoms worsen or fail to improve.    Anastacio Dowell MD, MD  [facility] BRITTANY Hess is a 45 year old who presents for the following health issues     HPI     Concern - bilateral feet  Onset: 2 weeks  Description: has been increasingly getting worse  Intensity: moderate  Progression of Symptoms:  worsening  Accompanying Signs & Symptoms: his legs are very sore as well  Previous history of similar problem: no  Precipitating factors:        Worsened by: walking and moving  Alleviating factors:        Improved by: nothing  Therapies tried and outcome: has tries to relax and elevate them and does not give any relief     Pt notes a lot of  "itching on his feet.  When he scratches them, the skin flakes, and they look \"weird\".  The itching started in March, probably about 2 weeks ago.         Feels like his feet are turning into \"zombie feet.\"  When he stands, he feels like he's standing on his bones directly, that there's no padding on his feet.      The only reason he decided to come in today was that [place]      Review of Systems   Constitutional, HEENT, cardiovascular, pulmonary, gi and gu systems are negative, except as otherwise noted.      Objective    /70   Pulse 74   Temp 99.2  F (37.3  C) (Temporal)   Resp 14   Wt 73.5 kg (162 lb)   SpO2 98%   BMI 23.75 kg/m    Body mass index is 23.75 kg/m .  Physical Exam   GENERAL: healthy, alert and no distress  NECK: no adenopathy, no asymmetry, masses, or scars and thyroid normal to palpation  RESP: lungs clear to auscultation - no rales, rhonchi or wheezes  CV: regular rate and rhythm, normal S1 S2, no S3 or S4, no murmur, click or rub, no peripheral edema and peripheral pulses strong  ABDOMEN: soft, nontender, no hepatosplenomegaly, no masses and bowel sounds normal  MS: no gross musculoskeletal defects noted, no edema  SKIN: extensive erythema, flaking and itching of bilateral feet c/w tinea pedis.    Orders Only on 03/10/2021   Component Date Value Ref Range Status     WBC 03/10/2021 6.4  4.0 - 11.0 10e9/L Final     RBC Count 03/10/2021 4.97  4.4 - 5.9 10e12/L Final     Hemoglobin 03/10/2021 15.4  13.3 - 17.7 g/dL Final     Hematocrit 03/10/2021 43.8  40.0 - 53.0 % Final     MCV 03/10/2021 88  78 - 100 fl Final     MCH 03/10/2021 31.0  26.5 - 33.0 pg Final     MCHC 03/10/2021 35.2  31.5 - 36.5 g/dL Final     RDW 03/10/2021 11.8  10.0 - 15.0 % Final     Platelet Count 03/10/2021 199  150 - 450 10e9/L Final     Creatinine Urine 03/10/2021 231  mg/dL Final     Albumin Urine mg/L 03/10/2021 8  mg/L Final     Albumin Urine mg/g Cr " 03/10/2021 3.34  0 - 17 mg/g Cr Final     Sodium 03/10/2021 138  133 - 144 mmol/L Final     Potassium 03/10/2021 4.1  3.4 - 5.3 mmol/L Final     Chloride 03/10/2021 106  94 - 109 mmol/L Final     Carbon Dioxide 03/10/2021 31  20 - 32 mmol/L Final     Anion Gap 03/10/2021 1* 3 - 14 mmol/L Final     Glucose 03/10/2021 88  70 - 99 mg/dL Final     Urea Nitrogen 03/10/2021 11  7 - 30 mg/dL Final     Creatinine 03/10/2021 1.13  0.66 - 1.25 mg/dL Final     GFR Estimate 03/10/2021 77  >60 mL/min/[1.73_m2] Final    Comment: Non  GFR Calc  Starting 12/18/2018, serum creatinine based estimated GFR (eGFR) will be   calculated using the Chronic Kidney Disease Epidemiology Collaboration   (CKD-EPI) equation.       GFR Estimate If Black 03/10/2021 89  >60 mL/min/[1.73_m2] Final    Comment:  GFR Calc  Starting 12/18/2018, serum creatinine based estimated GFR (eGFR) will be   calculated using the Chronic Kidney Disease Epidemiology Collaboration   (CKD-EPI) equation.       Calcium 03/10/2021 9.6  8.5 - 10.1 mg/dL Final     Phosphorus 03/10/2021 3.4  2.5 - 4.5 mg/dL Final     Albumin 03/10/2021 3.9  3.4 - 5.0 g/dL Final

## 2021-03-26 NOTE — PROGRESS NOTES
Outpatient Physical Therapy Discharge Note     Patient: Deshawn Flood  : 1975    Beginning/End Dates of Reporting Period:  20 to 3/26/2021 (pt seen for 5 visits from 20 to 21)    Referring Provider: Alisha Garcia APRN CNP    Therapy Diagnosis: R LE pain     Client Self Report: Pt has not had any pain. Pt has been very active, no significant complaints when reporting at last visit on 21.    Objective Measurements:21  Objective Measure: Peripheral symptoms  Details: no sx today.   Objective Measure: LEFS ( on 20)  Details:  today     Goals:  Goal Identifier LEFS   Goal Description Pt will demonstrate 10% improvement per LEFS in order to demonstrate functional improvement of R leg.    Target Date 21   Date Met  21   Progress:     Goal Identifier HEP   Goal Description Pt will be independent with HEP in order to improve R LE strength, proprioception, flexibility, and gait.   Target Date 21   Date Met  21   Progress:       Progress Toward Goals:   Progress this reporting period: pt progressed well with PT.    Plan:  Discharge from therapy.    Discharge:    Reason for Discharge: Patient has met all goals. PT has not been notified of any problems or questions since last visit.    Equipment Issued: none    Discharge Plan: Patient to continue home program.

## 2021-04-06 ENCOUNTER — OFFICE VISIT (OUTPATIENT)
Dept: FAMILY MEDICINE | Facility: CLINIC | Age: 46
End: 2021-04-06
Payer: COMMERCIAL

## 2021-04-06 VITALS
HEART RATE: 81 BPM | SYSTOLIC BLOOD PRESSURE: 110 MMHG | TEMPERATURE: 98.1 F | BODY MASS INDEX: 24.63 KG/M2 | DIASTOLIC BLOOD PRESSURE: 80 MMHG | WEIGHT: 168 LBS | OXYGEN SATURATION: 97 % | RESPIRATION RATE: 14 BRPM

## 2021-04-06 DIAGNOSIS — M25.50 ARTHRALGIA, UNSPECIFIED JOINT: Primary | ICD-10-CM

## 2021-04-06 LAB
ALBUMIN SERPL-MCNC: 3 G/DL (ref 3.4–5)
ALP SERPL-CCNC: 50 U/L (ref 40–150)
ALT SERPL W P-5'-P-CCNC: 17 U/L (ref 0–70)
ANION GAP SERPL CALCULATED.3IONS-SCNC: 3 MMOL/L (ref 3–14)
AST SERPL W P-5'-P-CCNC: 9 U/L (ref 0–45)
B BURGDOR IGG+IGM SER QL: 0.3 (ref 0–0.89)
BASOPHILS # BLD AUTO: 0.1 10E9/L (ref 0–0.2)
BASOPHILS NFR BLD AUTO: 1.2 %
BILIRUB SERPL-MCNC: 0.8 MG/DL (ref 0.2–1.3)
BUN SERPL-MCNC: 11 MG/DL (ref 7–30)
CALCIUM SERPL-MCNC: 9.1 MG/DL (ref 8.5–10.1)
CHLORIDE SERPL-SCNC: 105 MMOL/L (ref 94–109)
CK SERPL-CCNC: 80 U/L (ref 30–300)
CO2 SERPL-SCNC: 29 MMOL/L (ref 20–32)
CREAT SERPL-MCNC: 1.05 MG/DL (ref 0.66–1.25)
CRP SERPL-MCNC: <2.9 MG/L (ref 0–8)
DIFFERENTIAL METHOD BLD: NORMAL
EOSINOPHIL # BLD AUTO: 0.3 10E9/L (ref 0–0.7)
EOSINOPHIL NFR BLD AUTO: 5.3 %
ERYTHROCYTE [SEDIMENTATION RATE] IN BLOOD BY WESTERGREN METHOD: 7 MM/H (ref 0–15)
GFR SERPL CREATININE-BSD FRML MDRD: 84 ML/MIN/{1.73_M2}
GLUCOSE SERPL-MCNC: 91 MG/DL (ref 70–99)
IMM GRANULOCYTES # BLD: 0 10E9/L (ref 0–0.4)
IMM GRANULOCYTES NFR BLD: 0.2 %
LYMPHOCYTES # BLD AUTO: 1.3 10E9/L (ref 0.8–5.3)
LYMPHOCYTES NFR BLD AUTO: 23.4 %
MONOCYTES # BLD AUTO: 0.4 10E9/L (ref 0–1.3)
MONOCYTES NFR BLD AUTO: 6.4 %
NEUTROPHILS # BLD AUTO: 3.6 10E9/L (ref 1.6–8.3)
NEUTROPHILS NFR BLD AUTO: 63.5 %
NRBC # BLD AUTO: 0 10*3/UL
NRBC BLD AUTO-RTO: 0 /100
POTASSIUM SERPL-SCNC: 4.1 MMOL/L (ref 3.4–5.3)
PROT SERPL-MCNC: 6.2 G/DL (ref 6.8–8.8)
SODIUM SERPL-SCNC: 137 MMOL/L (ref 133–144)
WBC # BLD AUTO: 5.7 10E9/L (ref 4–11)

## 2021-04-06 PROCEDURE — 85004 AUTOMATED DIFF WBC COUNT: CPT | Performed by: FAMILY MEDICINE

## 2021-04-06 PROCEDURE — 86140 C-REACTIVE PROTEIN: CPT | Performed by: FAMILY MEDICINE

## 2021-04-06 PROCEDURE — 85048 AUTOMATED LEUKOCYTE COUNT: CPT | Performed by: FAMILY MEDICINE

## 2021-04-06 PROCEDURE — 86038 ANTINUCLEAR ANTIBODIES: CPT | Performed by: FAMILY MEDICINE

## 2021-04-06 PROCEDURE — 36415 COLL VENOUS BLD VENIPUNCTURE: CPT | Performed by: FAMILY MEDICINE

## 2021-04-06 PROCEDURE — 82550 ASSAY OF CK (CPK): CPT | Performed by: FAMILY MEDICINE

## 2021-04-06 PROCEDURE — 99000 SPECIMEN HANDLING OFFICE-LAB: CPT | Performed by: FAMILY MEDICINE

## 2021-04-06 PROCEDURE — 85652 RBC SED RATE AUTOMATED: CPT | Performed by: FAMILY MEDICINE

## 2021-04-06 PROCEDURE — 86666 EHRLICHIA ANTIBODY: CPT | Mod: 90 | Performed by: FAMILY MEDICINE

## 2021-04-06 PROCEDURE — 99214 OFFICE O/P EST MOD 30 MIN: CPT | Performed by: FAMILY MEDICINE

## 2021-04-06 PROCEDURE — 80053 COMPREHEN METABOLIC PANEL: CPT | Performed by: FAMILY MEDICINE

## 2021-04-06 PROCEDURE — 85730 THROMBOPLASTIN TIME PARTIAL: CPT | Performed by: FAMILY MEDICINE

## 2021-04-06 PROCEDURE — 85613 RUSSELL VIPER VENOM DILUTED: CPT | Performed by: FAMILY MEDICINE

## 2021-04-06 PROCEDURE — 86060 ANTISTREPTOLYSIN O TITER: CPT | Mod: 90 | Performed by: FAMILY MEDICINE

## 2021-04-06 PROCEDURE — 99N1023 PR STATISTIC INR NC: Performed by: FAMILY MEDICINE

## 2021-04-06 PROCEDURE — 86618 LYME DISEASE ANTIBODY: CPT | Performed by: FAMILY MEDICINE

## 2021-04-06 PROCEDURE — 99N1035 PR STATISTIC THROMBIN TIME NC: Performed by: FAMILY MEDICINE

## 2021-04-06 RX ORDER — PREDNISONE 20 MG/1
20 TABLET ORAL
Qty: 60 TABLET | Refills: 0 | Status: SHIPPED | OUTPATIENT
Start: 2021-04-06 | End: 2021-04-06

## 2021-04-06 RX ORDER — PREDNISONE 20 MG/1
60 TABLET ORAL
Qty: 30 TABLET | Refills: 0 | Status: SHIPPED | OUTPATIENT
Start: 2021-04-06 | End: 2021-04-16

## 2021-04-06 ASSESSMENT — PAIN SCALES - GENERAL: PAINLEVEL: SEVERE PAIN (6)

## 2021-04-06 ASSESSMENT — ASTHMA QUESTIONNAIRES
QUESTION_2 LAST FOUR WEEKS HOW OFTEN HAVE YOU HAD SHORTNESS OF BREATH: NOT AT ALL
QUESTION_1 LAST FOUR WEEKS HOW MUCH OF THE TIME DID YOUR ASTHMA KEEP YOU FROM GETTING AS MUCH DONE AT WORK, SCHOOL OR AT HOME: NONE OF THE TIME
QUESTION_4 LAST FOUR WEEKS HOW OFTEN HAVE YOU USED YOUR RESCUE INHALER OR NEBULIZER MEDICATION (SUCH AS ALBUTEROL): TWO OR THREE TIMES PER WEEK
QUESTION_5 LAST FOUR WEEKS HOW WOULD YOU RATE YOUR ASTHMA CONTROL: WELL CONTROLLED
QUESTION_3 LAST FOUR WEEKS HOW OFTEN DID YOUR ASTHMA SYMPTOMS (WHEEZING, COUGHING, SHORTNESS OF BREATH, CHEST TIGHTNESS OR PAIN) WAKE YOU UP AT NIGHT OR EARLIER THAN USUAL IN THE MORNING: NOT AT ALL
ACT_TOTALSCORE: 22

## 2021-04-07 LAB
A PHAGOCYTOPH IGG TITR SER IF: NORMAL {TITER}
A PHAGOCYTOPH IGM TITR SER IF: NORMAL {TITER}
ANA SER QL IF: NEGATIVE
ANA TITR SER IF: NORMAL {TITER}
ASO AB SERPL-ACNC: <55 IU/ML (ref 0–330)

## 2021-04-07 ASSESSMENT — ASTHMA QUESTIONNAIRES: ACT_TOTALSCORE: 22

## 2021-04-08 LAB — LA PPP-IMP: NEGATIVE

## 2021-04-09 ENCOUNTER — TELEPHONE (OUTPATIENT)
Dept: FAMILY MEDICINE | Facility: CLINIC | Age: 46
End: 2021-04-09

## 2021-04-09 NOTE — TELEPHONE ENCOUNTER
Spoke with patient and informed of results below. Patient understood. He is starting to feel better will continue on medication for now.   Audrey Castro MA

## 2021-04-09 NOTE — TELEPHONE ENCOUNTER
----- Message from Jefferson Gupta MD sent at 4/8/2021 11:04 PM CDT -----  Please call patient with following results.  Let Deshawn know that all of his labs were normal other than his albumin and protein levels were just a little low.  The [place]ym[place] titer, and other tick born illnesses were also negative.  If he is not getting any better he will need to let us know and he will need to be seen for follow up.  If he is doing better on the prednisone, we should see if any of his symptoms return after he is done with the 10 days of prednisone.      Electronically signed by:  Jefferson Gupta M.D.  4/8/2021

## 2021-04-10 DIAGNOSIS — K50.00 CROHN'S DISEASE OF SMALL INTESTINE WITHOUT COMPLICATION (H): ICD-10-CM

## 2021-04-10 DIAGNOSIS — D58.2 ELEVATED HEMOGLOBIN (H): ICD-10-CM

## 2021-04-10 DIAGNOSIS — D84.9 IMMUNOSUPPRESSION (H): ICD-10-CM

## 2021-04-10 DIAGNOSIS — N05.0 MINIMAL CHANGE DISEASE: ICD-10-CM

## 2021-04-10 DIAGNOSIS — N04.9 NEPHROTIC SYNDROME: ICD-10-CM

## 2021-04-10 DIAGNOSIS — E78.5 HYPERLIPIDEMIA LDL GOAL <100: ICD-10-CM

## 2021-04-10 LAB
ALBUMIN SERPL-MCNC: 2.5 G/DL (ref 3.4–5)
ANION GAP SERPL CALCULATED.3IONS-SCNC: 4 MMOL/L (ref 3–14)
BUN SERPL-MCNC: 14 MG/DL (ref 7–30)
CALCIUM SERPL-MCNC: 8.7 MG/DL (ref 8.5–10.1)
CHLORIDE SERPL-SCNC: 109 MMOL/L (ref 94–109)
CO2 SERPL-SCNC: 28 MMOL/L (ref 20–32)
CREAT SERPL-MCNC: 0.98 MG/DL (ref 0.66–1.25)
CREAT UR-MCNC: 322 MG/DL
ERYTHROCYTE [DISTWIDTH] IN BLOOD BY AUTOMATED COUNT: 12.2 % (ref 10–15)
GFR SERPL CREATININE-BSD FRML MDRD: >90 ML/MIN/{1.73_M2}
GLUCOSE SERPL-MCNC: 126 MG/DL (ref 70–99)
HCT VFR BLD AUTO: 46.5 % (ref 40–53)
HGB BLD-MCNC: 16.1 G/DL (ref 13.3–17.7)
MCH RBC QN AUTO: 31.3 PG (ref 26.5–33)
MCHC RBC AUTO-ENTMCNC: 34.6 G/DL (ref 31.5–36.5)
MCV RBC AUTO: 90 FL (ref 78–100)
MICROALBUMIN UR-MCNC: ABNORMAL MG/L
MICROALBUMIN/CREAT UR: ABNORMAL MG/G CR (ref 0–17)
PHOSPHATE SERPL-MCNC: 4.6 MG/DL (ref 2.5–4.5)
PLATELET # BLD AUTO: 209 10E9/L (ref 150–450)
POTASSIUM SERPL-SCNC: 3.6 MMOL/L (ref 3.4–5.3)
RBC # BLD AUTO: 5.15 10E12/L (ref 4.4–5.9)
SODIUM SERPL-SCNC: 141 MMOL/L (ref 133–144)
WBC # BLD AUTO: 8.7 10E9/L (ref 4–11)

## 2021-04-10 PROCEDURE — 82043 UR ALBUMIN QUANTITATIVE: CPT | Performed by: INTERNAL MEDICINE

## 2021-04-10 PROCEDURE — 85027 COMPLETE CBC AUTOMATED: CPT | Performed by: INTERNAL MEDICINE

## 2021-04-10 PROCEDURE — 36415 COLL VENOUS BLD VENIPUNCTURE: CPT | Performed by: INTERNAL MEDICINE

## 2021-04-10 PROCEDURE — 80069 RENAL FUNCTION PANEL: CPT | Performed by: INTERNAL MEDICINE

## 2021-04-20 DIAGNOSIS — R80.9 NEPHROTIC RANGE PROTEINURIA: Primary | ICD-10-CM

## 2021-04-20 RX ORDER — SULFAMETHOXAZOLE/TRIMETHOPRIM 800-160 MG
1 TABLET ORAL
Qty: 15 TABLET | Refills: 4 | Status: SHIPPED | OUTPATIENT
Start: 2021-04-21 | End: 2021-08-09

## 2021-04-20 RX ORDER — PREDNISONE 20 MG/1
60 TABLET ORAL DAILY
Qty: 90 TABLET | Refills: 3 | Status: SHIPPED | OUTPATIENT
Start: 2021-04-20 | End: 2021-11-23

## 2021-04-22 DIAGNOSIS — J45.40 MODERATE PERSISTENT ASTHMA WITHOUT COMPLICATION: ICD-10-CM

## 2021-04-22 RX ORDER — ALBUTEROL SULFATE 90 UG/1
AEROSOL, METERED RESPIRATORY (INHALATION)
Qty: 18 G | Refills: 0 | Status: SHIPPED | OUTPATIENT
Start: 2021-04-22 | End: 2021-08-09

## 2021-04-27 ENCOUNTER — TELEPHONE (OUTPATIENT)
Dept: NEPHROLOGY | Facility: CLINIC | Age: 46
End: 2021-04-27

## 2021-04-27 NOTE — TELEPHONE ENCOUNTER
Call to patient, he reports that he picked up the Bactrim and the prednisone. He is asking if he should be taking omeprazole. Will route to Dr. Scott.  Kami Simmons, JOAN  Nephrology  [phone]        Looks like the protein is back up   Let's restart prednisone 60mg daily and I will talk to you about another medication at our upcoming appointment     Kami, please call him and have him restart just 60mg prednisone daily, bactrim prophylaxis. He should be on lisinopril and statin, if you can confirm that as well     thanks

## 2021-05-10 ENCOUNTER — VIRTUAL VISIT (OUTPATIENT)
Dept: NEPHROLOGY | Facility: CLINIC | Age: 46
End: 2021-05-10
Payer: COMMERCIAL

## 2021-05-10 DIAGNOSIS — N05.0 MINIMAL CHANGE DISEASE: ICD-10-CM

## 2021-05-10 DIAGNOSIS — N04.9 NEPHROTIC SYNDROME: Primary | ICD-10-CM

## 2021-05-10 PROCEDURE — 99214 OFFICE O/P EST MOD 30 MIN: CPT | Mod: 95 | Performed by: INTERNAL MEDICINE

## 2021-05-10 RX ORDER — TACROLIMUS 1 MG/1
1 CAPSULE ORAL 2 TIMES DAILY
Status: CANCELLED | OUTPATIENT
Start: 2021-05-10

## 2021-05-10 RX ORDER — CYCLOSPORINE 100 MG/1
100 CAPSULE, LIQUID FILLED ORAL 2 TIMES DAILY
Qty: 60 CAPSULE | Refills: 4 | Status: SHIPPED | OUTPATIENT
Start: 2021-05-10 | End: 2021-12-06

## 2021-05-10 NOTE — LETTER
5/10/2021       RE: Deshawn Flood  [address] [place] [id number]     Dear Colleague,    Thank you for referring your patient, Deshawn Flood, to the [facility]. Please see a copy of my visit note below.    Deshawn is a 46 year old who is being evaluated via a billable telephone visit.      What phone number would you like to be contacted at? [phone]    How would you like to obtain your AVS? [employer] [address] [employer]  Phone call duration: 15 minutes      Assessment and Plan:  46 year old male who presents for followup of minimal change disease. He presented for evaluation after presenting to ER where CT scan noted ascites and nonspecific small bowel enteritis, and UA showed proteinuria. He had biopsy which showed minimal change on 4/9/2019 and treated with prednisone 70mg. He was in remission and down to prednsione 10mg then stopped it, and had relapse within a couple months.  His mother noted swelling and he was up 20-25 lbs and proteinuria up to 6 grams/g cr.   # Minimal change disease/ nephrotic syndrome, second relapse within weeks of weaning prednisone, despite slow wean.  - Prednisone round #2 started March 2020, tried to do a longer wean once on lower doses.  - Prednisone round #3 started April 20,2021. He notes his weight was up 10 pounds but had not noted the swelling or other symptoms outside of it.  - March 2021 - he was off prednisone and has relapsed within a few weeks.  - Thus restarted prednisone, and now will plan a second agent, will start cyclosporine 3-4mg/kg, so will start at 100mg twice a day   - he is near 70-75 kg  - continue labs monthly for now, RTC monthly  # Renal function- Scr was 1.1 in the past and was1.27 in ER. It was up to 1.6-1.8 at time of biopsy after diuretics were started.  - scr back to 1-1.2 range, a little higher, ? Volume related- asked him to drink more water, at least 1.5-2 liters a day (he drinks  3-4 mountain dew a day)  - labs monthly    - proteinuria improved very quickly down to normal once prednisone restarted , now back up to 13 grams but he is not that symptomatic as yet.    Labs on Saturdays if he is working.  Start new medication, cyclosporine, twice a day   - discussed that his is an immunosuppressant, that it increases risk of infection and malignancy. He is agreeable to start it.  - will plan to wean down prednisone in a few weeks, decrease every 2 weeks, given he is on CSA, if labs stable.    Continue all the other medications, I will let you know when to start weaning down on prednisone, not yet.  Do labs the week of June 7- either June 5 or June 12  Appointment with me Monday June 14 at 1130 am     Trough level cyclosporine in June 2021, 12 hours or so after last dose.     # Hyperlipidemia- LDL very elevated,was on low dose statin but stopped;  Restarted given nephrotic  - recheck lipids   - monitor on CNI    # Hypertension: normotensive   - continue lisinopril    # Not anemic- hgb 18 now down to 16- stable    # Electrolytes: normal low potassium      # Mineral Bone Disorder:   Check baseline PTH if creatinine remains elevated.     - RTC and abs monthly for now    Assessment and plan was discussed with patient and he voiced his understanding and agreement.    Reason for Visit:  Deshawn Flood is a 45 year old male with nephrotic range proteinuria noted and biopsy 4/9/19 shows minimal change disease    HPI:  He is a 45 year old male recently in normal health who presented with nephrotic syndrome. He underwent kidney biopsy 4/9 and it shows minimal change disease. He was admitted to the hospital briefly after biopsy as he was having poor appetite due to bloating, and elevated creatinine.    Since then he went into remission, we tapered down on prednisone and unfortunately he relapsed within a couple months (6g/g cr February 2020),  thus we restarted prednisone in March 2020 and by early April he  was down to normal range proteinuria and he was feeling better.    His weight is usually closer to 150-160  but was up to 184 lbs with last episode, was down to 160s with no proteinuria, but was back up to 188 lbs and once back on prednisone, his weight went back down to 158- 159lbs. He likes to stay at 160-165lbs but can't seem to keep weight on    Since last visit four months ago, he is doing fair. He has had a lot of issues with back pain and was not working  He denies any swelling or fluid retention.  He is now off prednisone though no specific instruction were given, as he was not seen two months ago when I planned to wean down further  At this time, he is stable and we will just monitor closely.  If he relapses, will consider CNI therapy.    He does not have a BP cuff    Renal History:   None prior to minimal change disease Spring 2019    ROS:   A comprehensive review of systems was obtained and negative, except as noted in the HPI or PMH.    Active Medical Problems:  Patient Active Problem List   Diagnosis     Loose body of left knee     Left knee pain     Tobacco abuse     History of pneumothorax     CARDIOVASCULAR SCREENING; LDL GOAL LESS THAN 160     Nephrotic syndrome     Anasarca     Minimal change disease     Immunosuppression (H)     Crohn's disease of small intestine without complication (H)     Elevated hemoglobin (H)     PMH:   Medical record was reviewed and PMH was discussed with patient and noted below.  Past Medical History:   Diagnosis Date     Anemia      Other spontaneous pneumothorax      SPONTANEOUS PNEUMOTHORAX NEC 3/25/2005     Unspecified asthma(493.90)      PSH:   Past Surgical History:   Procedure Laterality Date     ARTHROSCOPY KNEE  8/3/2012    Procedure: ARTHROSCOPY KNEE;  Left knee Arthroscopy with removal of loose bodies;  Surgeon: Bibi Roberts MD;  Location: [facility] OR     HC REPAIR UMBILICAL GODFREY,<4Y/O,REDUC  1978     IR RENAL BIOPSY LEFT  4/9/2019     TUBE  THORACOSTOMY,ABSC/EMPYEMA/HEMO  3/20/2005    Small chest tube with Heimlich valve.       Family Hx:   Family History   Problem Relation Age of Onset     Autoimmune Disease No family hx of      Personal Hx:   Social History     Tobacco Use     Smoking status: Light Tobacco Smoker     Packs/day: 0.10     Years: 15.00     Pack years: 1.50     Types: Cigarettes     Smokeless tobacco: Former User     Types: Chew     Tobacco comment: 5/21 pt states he is cutting back    Substance Use Topics     Alcohol use: Yes     Alcohol/week: 17.5 standard drinks     Types: 21 Cans of beer per week     Comment: occ       Allergies:  Allergies   Allergen Reactions     Bee Venom      swells up     No Known Drug Allergies      Seasonal Allergies        Medications:  Current Outpatient Medications   Medication Sig     albuterol (VENTOLIN HFA) 108 (90 Base) MCG/ACT inhaler INHALE 1-2 PUFFS INTO THE LUNGS EVERY 6 HOURS AS NEEDED FOR SHORTNESS OF BREATH, DIFFICULTY BREATHING OR WHEEZING. (NEED TO BE SEEN IN CLINIC FOR FURTHER REFILLS)     atorvastatin (LIPITOR) 20 MG tablet Take 1 tablet (20 mg) by mouth daily     lisinopril (ZESTRIL) 20 MG tablet Take 1 tablet (20 mg) by mouth daily     predniSONE (DELTASONE) 20 MG tablet Take 3 tablets (60 mg) by mouth daily     sulfamethoxazole-trimethoprim (BACTRIM DS) 800-160 MG tablet Take 1 tablet by mouth three times a week     No current facility-administered medications for this visit.       Vitals:  There were no vitals taken for this visit.  154-155 lbs  Exam:  GENERAL : alert and no distress  Speaks in full sentences without dyspnea  Normal speech and thought pattern    LABS:   CMP  Recent Labs   Lab Test 04/10/21  0745 04/06/21  1014 03/10/21  0807 02/04/21  0816    137 138 138   POTASSIUM 3.6 4.1 4.1 3.6   CHLORIDE 109 105 106 106   CO2 28 29 31 28   ANIONGAP 4 3 1* 4   * 91 88 86   BUN 14 11 11 11   CR 0.98 1.05 1.13 1.16   GFRESTIMATED >90 84 77 75   GFRESTBLACK >90 >90 89 87    BASIA 8.7 9.1 9.6 9.2     Recent Labs   Lab Test 04/06/21  1014 11/26/19  1426 09/09/19  1506 05/20/19  1350   BILITOTAL 0.8 0.8 0.4 0.4   ALKPHOS 50 41 65 61   ALT 17 25 41 57   AST 9 11 14 19     CBC  Recent Labs   Lab Test 04/10/21  0745 04/06/21  1014 03/10/21  0807 02/04/21  0816 01/04/21  0838   HGB 16.1  --  15.4 15.4 15.6   WBC 8.7 5.7 6.4 7.2 10.7   RBC 5.15  --  4.97 4.91 5.03   HCT 46.5  --  43.8 44.3 45.5   MCV 90  --  88 90 91   MCH 31.3  --  31.0 31.4 31.0   MCHC 34.6  --  35.2 34.8 34.3   RDW 12.2  --  11.8 11.9 12.1     --  199 182 189     URINE STUDIES  Recent Labs   Lab Test 12/14/20  0037 09/12/20  0841 02/28/20  1620 04/01/19  1500 03/29/19  1858   COLOR Yellow Yellow Yellow Yellow Yellow   APPEARANCE Clear Clear Clear Clear Clear   URINEGLC Negative Negative Negative Negative Negative   URINEBILI Negative Negative Negative Negative Negative   URINEKETONE Negative Negative Negative Negative Negative   SG 1.019 1.021 >1.030 1.015 >1.035*   UBLD Negative Negative Moderate* Moderate* Moderate*   URINEPH 5.0 5.0 6.0 6.0 6.0   PROTEIN Negative Negative >=300* >=300* >499*   UROBILINOGEN  --   --  0.2 0.2  --    NITRITE Negative Negative Negative Negative Negative   LEUKEST Negative Negative Negative Negative Negative   RBCU  --   --  O - 2 25-50* 7*   WBCU  --   --  0 - 5 0 - 5 7*     Recent Labs   Lab Test 04/03/20  0900 02/28/20  1619 11/26/19  1435 09/09/19  1510   UTPG 0.18 6.20* 0.06 0.14     PTH  No lab results found.  IRON STUDIES  No lab results found.    IMPRESSION:  1. Findings are concerning for diffuse small bowel enteritis likely  infectious or inflammatory in etiology, with associated inflammatory  changes of the mesenteric adipose tissues, moderate ascites, small  bilateral probably reactive pleural effusions and with some edema in  the subcutaneous adipose tissues.  2. No significant atherosclerosis is identified. No obvious arterial  blockage is seen in the mesenteric vessels  to suggest ischemia.  3. Mild degenerative changes of the spine and hips. No acute fracture  or aggressive osseous lesion.  Jenny Scott MD

## 2021-05-10 NOTE — PATIENT INSTRUCTIONS
Labs on Saturday  Start new medication, cyclosporine twice a day  Continue all the other medications, I will let you know when to start weaning down on prednisone, not yet.  Do labs the week of June 7- either June 5 or June 12 - we will check a cyclosporine level, it should be drawn before you take the cyclosporine pill  (12 hours after you last took it)    Appointment with me Monday June 14 at 1130 am    Monthly labs and visit with me for next 3 months, one in person hopefully

## 2021-05-10 NOTE — PROGRESS NOTES
Deshawn is a 46 year old who is being evaluated via a billable telephone visit.      What phone number would you like to be contacted at? [phone]    How would you like to obtain your AVS? Luis Enriquehimachristian  Phone call duration: 15 minutes      Assessment and Plan:  46 year old male who presents for followup of minimal change disease. He presented for evaluation after presenting to ER where CT scan noted ascites and nonspecific small bowel enteritis, and UA showed proteinuria. He had biopsy which showed minimal change on 4/9/2019 and treated with prednisone 70mg. He was in remission and down to prednsione 10mg then stopped it, and had relapse within a couple months.  His mother noted swelling and he was up 20-25 lbs and proteinuria up to 6 grams/g cr.   # Minimal change disease/ nephrotic syndrome, second relapse within weeks of weaning prednisone, despite slow wean.  - Prednisone round #2 started March 2020, tried to do a longer wean once on lower doses.  - Prednisone round #3 started April 20,2021. He notes his weight was up 10 pounds but had not noted the swelling or other symptoms outside of it.  - March 2021 - he was off prednisone and has relapsed within a few weeks.  - Thus restarted prednisone, and now will plan a second agent, will start cyclosporine 3-4mg/kg, so will start at 100mg twice a day   - he is near 70-75 kg  - continue labs monthly for now, RTC monthly  # Renal function- Scr was 1.1 in the past and was1.27 in ER. It was up to 1.6-1.8 at time of biopsy after diuretics were started.  - scr back to 1-1.2 range, a little higher, ? Volume related- asked him to drink more water, at least 1.5-2 liters a day (he drinks 3-4 mountain dew a day)  - labs monthly    - proteinuria improved very quickly down to normal once prednisone restarted , now back up to 13 grams but he is not that symptomatic as yet.    Labs on Saturdays if he is working.  Start new medication, cyclosporine, twice a day   - discussed that his is  an immunosuppressant, that it increases risk of infection and malignancy. He is agreeable to start it.  - will plan to wean down prednisone in a few weeks, decrease every 2 weeks, given he is on CSA, if labs stable.    Continue all the other medications, I will let you know when to start weaning down on prednisone, not yet.  Do labs the week of June 7- either June 5 or June 12  Appointment with me Monday June 14 at 1130 am     Trough level cyclosporine in June 2021, 12 hours or so after last dose.     # Hyperlipidemia- LDL very elevated,was on low dose statin but stopped;  Restarted given nephrotic  - recheck lipids   - monitor on CNI    # Hypertension: normotensive   - continue lisinopril    # Not anemic- hgb 18 now down to 16- stable    # Electrolytes: normal low potassium      # Mineral Bone Disorder:   Check baseline PTH if creatinine remains elevated.     - RTC and abs monthly for now    Assessment and plan was discussed with patient and he voiced his understanding and agreement.    Reason for Visit:  Deshawn Flood is a 45 year old male with nephrotic range proteinuria noted and biopsy 4/9/19 shows minimal change disease    HPI:  He is a 45 year old male recently in normal health who presented with nephrotic syndrome. He underwent kidney biopsy 4/9 and it shows minimal change disease. He was admitted to the hospital briefly after biopsy as he was having poor appetite due to bloating, and elevated creatinine.    Since then he went into remission, we tapered down on prednisone and unfortunately he relapsed within a couple months (6g/g cr February 2020),  thus we restarted prednisone in March 2020 and by early April he was down to normal range proteinuria and he was feeling better.    His weight is usually closer to 150-160  but was up to 184 lbs with last episode, was down to 160s with no proteinuria, but was back up to 188 lbs and once back on prednisone, his weight went back down to 158- 159lbs. He likes to  stay at 160-165lbs but can't seem to keep weight on    Since last visit four months ago, he is doing fair. He has had a lot of issues with back pain and was not working  He denies any swelling or fluid retention.  He is now off prednisone though no specific instruction were given, as he was not seen two months ago when I planned to wean down further  At this time, he is stable and we will just monitor closely.  If he relapses, will consider CNI therapy.    He does not have a BP cuff    Renal History:   None prior to minimal change disease Spring 2019    ROS:   A comprehensive review of systems was obtained and negative, except as noted in the HPI or PMH.    Active Medical Problems:  Patient Active Problem List   Diagnosis     Loose body of left knee     Left knee pain     Tobacco abuse     History of pneumothorax     CARDIOVASCULAR SCREENING; LDL GOAL LESS THAN 160     Nephrotic syndrome     Anasarca     Minimal change disease     Immunosuppression (H)     Crohn's disease of small intestine without complication (H)     Elevated hemoglobin (H)     PMH:   Medical record was reviewed and PMH was discussed with patient and noted below.  Past Medical History:   Diagnosis Date     Anemia      Other spontaneous pneumothorax      SPONTANEOUS PNEUMOTHORAX NEC 3/25/2005     Unspecified asthma(493.90)      PSH:   Past Surgical History:   Procedure Laterality Date     ARTHROSCOPY KNEE  8/3/2012    Procedure: ARTHROSCOPY KNEE;  Left knee Arthroscopy with removal of loose bodies;  Surgeon: Bibi Roberts MD;  Location: [facility]     HC REPAIR UMBILICAL GODFREY,<6Y/O,REDUC  1978     IR RENAL BIOPSY LEFT  4/9/2019     TUBE THORACOSTOMY,ABSC/EMPYEMA/HEMO  3/20/2005    Small chest tube with Heimlich valve.       Family Hx:   Family History   Problem Relation Age of Onset     Autoimmune Disease No family hx of      Personal Hx:   Social History     Tobacco Use     Smoking status: Light Tobacco Smoker     Packs/day: 0.10     Years: 15.00      Pack years: 1.50     Types: Cigarettes     Smokeless tobacco: Former User     Types: Chew     Tobacco comment: 5/21 pt states he is cutting back    Substance Use Topics     Alcohol use: Yes     Alcohol/week: 17.5 standard drinks     Types: 21 Cans of beer per week     Comment: occ       Allergies:  Allergies   Allergen Reactions     Bee Venom      swells up     No Known Drug Allergies      Seasonal Allergies        Medications:  Current Outpatient Medications   Medication Sig     albuterol (VENTOLIN HFA) 108 (90 Base) MCG/ACT inhaler INHALE 1-2 PUFFS INTO THE LUNGS EVERY 6 HOURS AS NEEDED FOR SHORTNESS OF BREATH, DIFFICULTY BREATHING OR WHEEZING. (NEED TO BE SEEN IN CLINIC FOR FURTHER REFILLS)     atorvastatin (LIPITOR) 20 MG tablet Take 1 tablet (20 mg) by mouth daily     lisinopril (ZESTRIL) 20 MG tablet Take 1 tablet (20 mg) by mouth daily     predniSONE (DELTASONE) 20 MG tablet Take 3 tablets (60 mg) by mouth daily     sulfamethoxazole-trimethoprim (BACTRIM DS) 800-160 MG tablet Take 1 tablet by mouth three times a week     No current facility-administered medications for this visit.       Vitals:  There were no vitals taken for this visit.  154-155 lbs  Exam:  GENERAL : alert and no distress  Speaks in full sentences without dyspnea  Normal speech and thought pattern    LABS:   CMP  Recent Labs   Lab Test 04/10/21  0745 04/06/21  1014 03/10/21  0807 02/04/21  0816    137 138 138   POTASSIUM 3.6 4.1 4.1 3.6   CHLORIDE 109 105 106 106   CO2 28 29 31 28   ANIONGAP 4 3 1* 4   * 91 88 86   BUN 14 11 11 11   CR 0.98 1.05 1.13 1.16   GFRESTIMATED >90 84 77 75   GFRESTBLACK >90 >90 89 87   BASIA 8.7 9.1 9.6 9.2     Recent Labs   Lab Test 04/06/21  1014 11/26/19  1426 09/09/19  1506 05/20/19  1350   BILITOTAL 0.8 0.8 0.4 0.4   ALKPHOS 50 41 65 61   ALT 17 25 41 57   AST 9 11 14 19     CBC  Recent Labs   Lab Test 04/10/21  0745 04/06/21  1014 03/10/21  0807 02/04/21  0816 01/04/21  0838   HGB 16.1  --   15.4 15.4 15.6   WBC 8.7 5.7 6.4 7.2 10.7   RBC 5.15  --  4.97 4.91 5.03   HCT 46.5  --  43.8 44.3 45.5   MCV 90  --  88 90 91   MCH 31.3  --  31.0 31.4 31.0   MCHC 34.6  --  35.2 34.8 34.3   RDW 12.2  --  11.8 11.9 12.1     --  199 182 189     URINE STUDIES  Recent Labs   Lab Test 12/14/20  0037 09/12/20  0841 02/28/20  1620 04/01/19  1500 03/29/19  1858   COLOR Yellow Yellow Yellow Yellow Yellow   APPEARANCE Clear Clear Clear Clear Clear   URINEGLC Negative Negative Negative Negative Negative   URINEBILI Negative Negative Negative Negative Negative   URINEKETONE Negative Negative Negative Negative Negative   SG 1.019 1.021 >1.030 1.015 >1.035*   UBLD Negative Negative Moderate* Moderate* Moderate*   URINEPH 5.0 5.0 6.0 6.0 6.0   PROTEIN Negative Negative >=300* >=300* >499*   UROBILINOGEN  --   --  0.2 0.2  --    NITRITE Negative Negative Negative Negative Negative   LEUKEST Negative Negative Negative Negative Negative   RBCU  --   --  O - 2 25-50* 7*   WBCU  --   --  0 - 5 0 - 5 7*     Recent Labs   Lab Test 04/03/20  0900 02/28/20  1619 11/26/19  1435 09/09/19  1510   UTPG 0.18 6.20* 0.06 0.14     PTH  No lab results found.  IRON STUDIES  No lab results found.    IMPRESSION:  1. Findings are concerning for diffuse small bowel enteritis likely  infectious or inflammatory in etiology, with associated inflammatory  changes of the mesenteric adipose tissues, moderate ascites, small  bilateral probably reactive pleural effusions and with some edema in  the subcutaneous adipose tissues.  2. No significant atherosclerosis is identified. No obvious arterial  blockage is seen in the mesenteric vessels to suggest ischemia.  3. Mild degenerative changes of the spine and hips. No acute fracture  or aggressive osseous lesion.  Jenny Aram Scott MD

## 2021-05-15 DIAGNOSIS — D84.9 IMMUNOSUPPRESSION (H): ICD-10-CM

## 2021-05-15 DIAGNOSIS — N05.0 MINIMAL CHANGE DISEASE: ICD-10-CM

## 2021-05-15 DIAGNOSIS — N04.9 NEPHROTIC SYNDROME: ICD-10-CM

## 2021-05-15 DIAGNOSIS — K50.00 CROHN'S DISEASE OF SMALL INTESTINE WITHOUT COMPLICATION (H): ICD-10-CM

## 2021-05-15 DIAGNOSIS — D58.2 ELEVATED HEMOGLOBIN (H): ICD-10-CM

## 2021-05-15 DIAGNOSIS — E78.5 HYPERLIPIDEMIA LDL GOAL <100: ICD-10-CM

## 2021-05-15 LAB
ALBUMIN SERPL-MCNC: 3.2 G/DL (ref 3.4–5)
ALP SERPL-CCNC: 41 U/L (ref 40–150)
ALT SERPL W P-5'-P-CCNC: 43 U/L (ref 0–70)
ANION GAP SERPL CALCULATED.3IONS-SCNC: 3 MMOL/L (ref 3–14)
AST SERPL W P-5'-P-CCNC: 30 U/L (ref 0–45)
BILIRUB SERPL-MCNC: 0.9 MG/DL (ref 0.2–1.3)
BUN SERPL-MCNC: 14 MG/DL (ref 7–30)
CALCIUM SERPL-MCNC: 8.6 MG/DL (ref 8.5–10.1)
CHLORIDE SERPL-SCNC: 103 MMOL/L (ref 94–109)
CO2 SERPL-SCNC: 32 MMOL/L (ref 20–32)
CREAT SERPL-MCNC: 1.11 MG/DL (ref 0.66–1.25)
CREAT UR-MCNC: 200 MG/DL
ERYTHROCYTE [DISTWIDTH] IN BLOOD BY AUTOMATED COUNT: 12.1 % (ref 10–15)
GFR SERPL CREATININE-BSD FRML MDRD: 79 ML/MIN/{1.73_M2}
GLUCOSE SERPL-MCNC: 95 MG/DL (ref 70–99)
HCT VFR BLD AUTO: 47.1 % (ref 40–53)
HGB BLD-MCNC: 16 G/DL (ref 13.3–17.7)
MCH RBC QN AUTO: 30.9 PG (ref 26.5–33)
MCHC RBC AUTO-ENTMCNC: 34 G/DL (ref 31.5–36.5)
MCV RBC AUTO: 91 FL (ref 78–100)
MICROALBUMIN UR-MCNC: 5 MG/L
MICROALBUMIN/CREAT UR: 2.62 MG/G CR (ref 0–17)
PHOSPHATE SERPL-MCNC: 3 MG/DL (ref 2.5–4.5)
PLATELET # BLD AUTO: 180 10E9/L (ref 150–450)
POTASSIUM SERPL-SCNC: 3.8 MMOL/L (ref 3.4–5.3)
PROT SERPL-MCNC: 6 G/DL (ref 6.8–8.8)
RBC # BLD AUTO: 5.17 10E12/L (ref 4.4–5.9)
SODIUM SERPL-SCNC: 138 MMOL/L (ref 133–144)
WBC # BLD AUTO: 8.4 10E9/L (ref 4–11)

## 2021-05-15 PROCEDURE — 82043 UR ALBUMIN QUANTITATIVE: CPT | Performed by: INTERNAL MEDICINE

## 2021-05-15 PROCEDURE — 80053 COMPREHEN METABOLIC PANEL: CPT | Performed by: INTERNAL MEDICINE

## 2021-05-15 PROCEDURE — 85027 COMPLETE CBC AUTOMATED: CPT | Performed by: INTERNAL MEDICINE

## 2021-05-15 PROCEDURE — 84100 ASSAY OF PHOSPHORUS: CPT | Performed by: INTERNAL MEDICINE

## 2021-05-15 PROCEDURE — 36415 COLL VENOUS BLD VENIPUNCTURE: CPT | Performed by: INTERNAL MEDICINE

## 2021-06-03 ENCOUNTER — TELEPHONE (OUTPATIENT)
Dept: INTERNAL MEDICINE | Facility: CLINIC | Age: 46
End: 2021-06-03

## 2021-06-03 DIAGNOSIS — D84.9 IMMUNOSUPPRESSION (H): Primary | ICD-10-CM

## 2021-06-03 RX ORDER — OMEPRAZOLE 40 MG/1
40 CAPSULE, DELAYED RELEASE ORAL DAILY
Qty: 30 CAPSULE | Refills: 11 | Status: SHIPPED | OUTPATIENT
Start: 2021-06-03 | End: 2021-11-23

## 2021-06-03 NOTE — TELEPHONE ENCOUNTER
Patient is requesting omeprazole. I don't see it on their file.      Thank You,  Francesco Lofton, Pharmacy [facility] [place]

## 2021-06-05 DIAGNOSIS — D84.9 IMMUNOSUPPRESSION (H): ICD-10-CM

## 2021-06-05 DIAGNOSIS — E78.5 HYPERLIPIDEMIA LDL GOAL <100: ICD-10-CM

## 2021-06-05 DIAGNOSIS — K50.00 CROHN'S DISEASE OF SMALL INTESTINE WITHOUT COMPLICATION (H): ICD-10-CM

## 2021-06-05 DIAGNOSIS — N04.9 NEPHROTIC SYNDROME: ICD-10-CM

## 2021-06-05 DIAGNOSIS — N05.0 MINIMAL CHANGE DISEASE: ICD-10-CM

## 2021-06-05 DIAGNOSIS — D58.2 ELEVATED HEMOGLOBIN (H): ICD-10-CM

## 2021-06-05 LAB
ALBUMIN SERPL-MCNC: 3.6 G/DL (ref 3.4–5)
ALP SERPL-CCNC: 43 U/L (ref 40–150)
ALT SERPL W P-5'-P-CCNC: 42 U/L (ref 0–70)
ANION GAP SERPL CALCULATED.3IONS-SCNC: 4 MMOL/L (ref 3–14)
AST SERPL W P-5'-P-CCNC: 18 U/L (ref 0–45)
BILIRUB SERPL-MCNC: 1.4 MG/DL (ref 0.2–1.3)
BUN SERPL-MCNC: 17 MG/DL (ref 7–30)
CALCIUM SERPL-MCNC: 8.5 MG/DL (ref 8.5–10.1)
CHLORIDE SERPL-SCNC: 105 MMOL/L (ref 94–109)
CO2 SERPL-SCNC: 29 MMOL/L (ref 20–32)
CREAT SERPL-MCNC: 1.11 MG/DL (ref 0.66–1.25)
CREAT UR-MCNC: 268 MG/DL
ERYTHROCYTE [DISTWIDTH] IN BLOOD BY AUTOMATED COUNT: 12.3 % (ref 10–15)
GFR SERPL CREATININE-BSD FRML MDRD: 79 ML/MIN/{1.73_M2}
GLUCOSE SERPL-MCNC: 89 MG/DL (ref 70–99)
HCT VFR BLD AUTO: 47.6 % (ref 40–53)
HGB BLD-MCNC: 16.5 G/DL (ref 13.3–17.7)
MCH RBC QN AUTO: 31 PG (ref 26.5–33)
MCHC RBC AUTO-ENTMCNC: 34.7 G/DL (ref 31.5–36.5)
MCV RBC AUTO: 90 FL (ref 78–100)
MICROALBUMIN UR-MCNC: 8 MG/L
MICROALBUMIN/CREAT UR: 2.91 MG/G CR (ref 0–17)
PHOSPHATE SERPL-MCNC: 3.4 MG/DL (ref 2.5–4.5)
PLATELET # BLD AUTO: 188 10E9/L (ref 150–450)
POTASSIUM SERPL-SCNC: 3.4 MMOL/L (ref 3.4–5.3)
PROT SERPL-MCNC: 6.4 G/DL (ref 6.8–8.8)
RBC # BLD AUTO: 5.32 10E12/L (ref 4.4–5.9)
SODIUM SERPL-SCNC: 138 MMOL/L (ref 133–144)
WBC # BLD AUTO: 8.5 10E9/L (ref 4–11)

## 2021-06-05 PROCEDURE — 82043 UR ALBUMIN QUANTITATIVE: CPT | Performed by: INTERNAL MEDICINE

## 2021-06-05 PROCEDURE — 80053 COMPREHEN METABOLIC PANEL: CPT | Performed by: INTERNAL MEDICINE

## 2021-06-05 PROCEDURE — 36415 COLL VENOUS BLD VENIPUNCTURE: CPT | Performed by: INTERNAL MEDICINE

## 2021-06-05 PROCEDURE — 85027 COMPLETE CBC AUTOMATED: CPT | Performed by: INTERNAL MEDICINE

## 2021-06-05 PROCEDURE — 84100 ASSAY OF PHOSPHORUS: CPT | Performed by: INTERNAL MEDICINE

## 2021-06-05 PROCEDURE — 80158 DRUG ASSAY CYCLOSPORINE: CPT | Performed by: INTERNAL MEDICINE

## 2021-06-06 LAB
CYCLOSPORINE BLD LC/MS/MS-MCNC: <25 UG/L (ref 50–400)
TME LAST DOSE: ABNORMAL H

## 2021-06-14 ENCOUNTER — VIRTUAL VISIT (OUTPATIENT)
Dept: NEPHROLOGY | Facility: CLINIC | Age: 46
End: 2021-06-14
Payer: COMMERCIAL

## 2021-06-14 DIAGNOSIS — N04.9 NEPHROTIC SYNDROME: ICD-10-CM

## 2021-06-14 DIAGNOSIS — N05.0 MINIMAL CHANGE DISEASE: Primary | ICD-10-CM

## 2021-06-14 DIAGNOSIS — D84.9 IMMUNOSUPPRESSION (H): ICD-10-CM

## 2021-06-14 PROCEDURE — 99212 OFFICE O/P EST SF 10 MIN: CPT | Mod: 95 | Performed by: INTERNAL MEDICINE

## 2021-06-14 NOTE — LETTER
6/14/2021       RE: Deshawn Flood  [address] [place] [id number]     Dear Colleague,    Thank you for referring your patient, Deshawn Flood, to the [facility] at [facility]. Please see a copy of my visit note below.    Deshawn is a 46 year old who is being evaluated via a billable telephone visit.      What phone number would you like to be contacted at? [phone]    How would you like to obtain your AVS? Mail a copy    Phone call duration: 11 minutes        Assessment and Plan:  46 year old male who presents for followup of minimal change disease. He presented for evaluation after presenting to ER where CT scan noted ascites and nonspecific small bowel enteritis, and UA showed proteinuria. He had biopsy which showed minimal change on 4/9/2019 and treated with prednisone 70mg. He was in remission and down to prednsione 10mg then stopped it, and had relapse within a couple months.  His mother noted swelling and he was up 20-25 lbs and proteinuria up to 6 grams/g cr.   # Minimal change disease/ nephrotic syndrome, second relapse within weeks of weaning prednisone, despite slow wean.  - Prednisone round #2 started March 2020, tried to do a longer wean once on lower doses.  - Prednisone round #3 started April 20,2021. He notes his weight was up 10 pounds but had not noted the swelling or other symptoms outside of it.  - March 2021 - he was off prednisone and has relapsed within a few weeks.  - Thus restarted prednisone, and started  cyclosporine 3-4mg/kg, started at 100mg twice a day- he was only taking it once a day for a while until he realized it was twice a day- the level was <25 - may need to increase to 150mg bid   - he is near 70-75 kg  - continue labs monthly for now, RTC monthly  # Renal function- Scr was 1.1 in the past and was1.27 in ER. It was up to 1.6-1.8 at time of biopsy after diuretics were started.  - scr back to 1-1.2  range, a little higher, ? Volume related- asked him to drink more water, at least 1.5-2 liters a day (he drinks 3-4 mountain dew a day, he has cut down to 2 !!)  - labs monthly    - proteinuria improved very quickly down to normal once prednisone restarted , down from 13 grams    Labs on Saturdays if he is working.  Started new medication, cyclosporine, twice a day- initially took it once a day only   - discussed that his is an immunosuppressant, that it increases risk of infection and malignancy. He was agreeable to start it.  - we had planned to wean prednisone but he stopped it , has been of for two weeks; given labs are stable, will hold off on restarting.       Trough level cyclosporine in June 2021, 12 hours or so after last dose- was low but likely only took morning dose- he is now taking twice a day    # Hyperlipidemia- LDL very elevated,was on low dose statin but stopped;  Restarted given nephrotic  - recheck lipids   - monitor on CNI    # Hypertension: normotensive   - continue lisinopril    # Not anemic- hgb 18 now down to 16- stable    # Electrolytes: normal low potassium      # Mineral Bone Disorder:   Check baseline PTH if creatinine remains elevated.     - RTC and abs monthly for now    Assessment and plan was discussed with patient and he voiced his understanding and agreement.    Reason for Visit:  Deshawn Flood is a 45 year old male with nephrotic range proteinuria noted and biopsy 4/9/19 shows minimal change disease    HPI:  He is a 45 year old male recently in normal health who presented with nephrotic syndrome. He underwent kidney biopsy 4/9 and it shows minimal change disease. He was admitted to the hospital briefly after biopsy as he was having poor appetite due to bloating, and elevated creatinine.    Since then he went into remission, we tapered down on prednisone and unfortunately he relapsed within a couple months (6g/g cr February 2020),  thus we restarted prednisone in March 2020 and by  early April he was down to normal range proteinuria and he was feeling better.    His weight is usually closer to 150-160  but was up to 184 lbs with last episode, was down to 160s with no proteinuria, but was back up to 188 lbs and once back on prednisone, his weight went back down to 158- 159lbs. He likes to stay at 160-165lbs but can't seem to keep weight on    Since last visit four months ago, he is doing fair. He has had a lot of issues with back pain and was not working  He denies any swelling or fluid retention.  He is now off prednisone though no specific instruction were given, as he was not seen two months ago when I planned to wean down further  At this time, he is stable and we will just monitor closely.  If he relapses, will consider CNI therapy.    He does not have a BP cuff    Renal History:   None prior to minimal change disease Spring 2019    ROS:   A comprehensive review of systems was obtained and negative, except as noted in the HPI or PMH.    Active Medical Problems:  Patient Active Problem List   Diagnosis     Loose body of left knee     Left knee pain     Tobacco abuse     History of pneumothorax     CARDIOVASCULAR SCREENING; LDL GOAL LESS THAN 160     Nephrotic syndrome     Anasarca     Minimal change disease     Immunosuppression (H)     Crohn's disease of small intestine without complication (H)     Elevated hemoglobin (H)     PMH:   Medical record was reviewed and PMH was discussed with patient and noted below.  Past Medical History:   Diagnosis Date     Anemia      Other spontaneous pneumothorax      SPONTANEOUS PNEUMOTHORAX NEC 3/25/2005     Unspecified asthma(493.90)      PSH:   Past Surgical History:   Procedure Laterality Date     ARTHROSCOPY KNEE  8/3/2012    Procedure: ARTHROSCOPY KNEE;  Left knee Arthroscopy with removal of loose bodies;  Surgeon: Bbii Roberts MD;  Location: [facility]     HC REPAIR UMBILICAL GODFREY,<6Y/O,REDUC  1978     IR RENAL BIOPSY LEFT  4/9/2019     TUBE  THORACOSTOMY,ABSC/EMPYEMA/HEMO  3/20/2005    Small chest tube with Heimlich valve.       Family Hx:   Family History   Problem Relation Age of Onset     Autoimmune Disease No family hx of      Personal Hx:   Social History     Tobacco Use     Smoking status: Light Tobacco Smoker     Packs/day: 0.10     Years: 15.00     Pack years: 1.50     Types: Cigarettes     Smokeless tobacco: Former User     Types: Chew     Tobacco comment: 5/21 pt states he is cutting back    Substance Use Topics     Alcohol use: Yes     Alcohol/week: 17.5 standard drinks     Types: 21 Cans of beer per week     Comment: occ       Allergies:  Allergies   Allergen Reactions     Bee Venom      swells up     No Known Drug Allergies      Seasonal Allergies        Medications:  Current Outpatient Medications   Medication Sig     albuterol (VENTOLIN HFA) 108 (90 Base) MCG/ACT inhaler INHALE 1-2 PUFFS INTO THE LUNGS EVERY 6 HOURS AS NEEDED FOR SHORTNESS OF BREATH, DIFFICULTY BREATHING OR WHEEZING. (NEED TO BE SEEN IN CLINIC FOR FURTHER REFILLS)     atorvastatin (LIPITOR) 20 MG tablet Take 1 tablet (20 mg) by mouth daily     cycloSPORINE modified (GENERIC EQUIVALENT) 100 MG capsule Take 1 capsule (100 mg) by mouth 2 times daily     lisinopril (ZESTRIL) 20 MG tablet Take 1 tablet (20 mg) by mouth daily     omeprazole (PRILOSEC) 40 MG DR capsule Take 1 capsule (40 mg) by mouth daily     sulfamethoxazole-trimethoprim (BACTRIM DS) 800-160 MG tablet Take 1 tablet by mouth three times a week     predniSONE (DELTASONE) 20 MG tablet Take 3 tablets (60 mg) by mouth daily     No current facility-administered medications for this visit.       Vitals:  There were no vitals taken for this visit.  154-155 lbs  Exam:  GENERAL : alert and no distress  Speaks in full sentences without dyspnea  Normal speech and thought pattern    LABS:   CMP  Recent Labs   Lab Test 06/05/21  0743 05/15/21  0757 04/10/21  0745 04/06/21  1014    138 141 137   POTASSIUM 3.4 3.8 3.6  4.1   CHLORIDE 105 103 109 105   CO2 29 32 28 29   ANIONGAP 4 3 4 3   GLC 89 95 126* 91   BUN 17 14 14 11   CR 1.11 1.11 0.98 1.05   GFRESTIMATED 79 79 >90 84   GFRESTBLACK >90 >90 >90 >90   BASIA 8.5 8.6 8.7 9.1     Recent Labs   Lab Test 06/05/21  0743 05/15/21  0757 04/06/21  1014 11/26/19  1426   BILITOTAL 1.4* 0.9 0.8 0.8   ALKPHOS 43 41 50 41   ALT 42 43 17 25   AST 18 30 9 11     CBC  Recent Labs   Lab Test 06/05/21  0743 05/15/21  0757 04/10/21  0745 04/06/21  1014 03/10/21  0807   HGB 16.5 16.0 16.1  --  15.4   WBC 8.5 8.4 8.7 5.7 6.4   RBC 5.32 5.17 5.15  --  4.97   HCT 47.6 47.1 46.5  --  43.8   MCV 90 91 90  --  88   MCH 31.0 30.9 31.3  --  31.0   MCHC 34.7 34.0 34.6  --  35.2   RDW 12.3 12.1 12.2  --  11.8    180 209  --  199     URINE STUDIES  Recent Labs   Lab Test 12/14/20  0037 09/12/20  0841 02/28/20  1620 04/01/19  1500 03/29/19  1858   COLOR Yellow Yellow Yellow Yellow Yellow   APPEARANCE Clear Clear Clear Clear Clear   URINEGLC Negative Negative Negative Negative Negative   URINEBILI Negative Negative Negative Negative Negative   URINEKETONE Negative Negative Negative Negative Negative   SG 1.019 1.021 >1.030 1.015 >1.035*   UBLD Negative Negative Moderate* Moderate* Moderate*   URINEPH 5.0 5.0 6.0 6.0 6.0   PROTEIN Negative Negative >=300* >=300* >499*   UROBILINOGEN  --   --  0.2 0.2  --    NITRITE Negative Negative Negative Negative Negative   LEUKEST Negative Negative Negative Negative Negative   RBCU  --   --  O - 2 25-50* 7*   WBCU  --   --  0 - 5 0 - 5 7*     Recent Labs   Lab Test 04/03/20  0900 02/28/20  1619 11/26/19  1435 09/09/19  1510   UTPG 0.18 6.20* 0.06 0.14     PTH  No lab results found.  IRON STUDIES  No lab results found.    IMPRESSION:  1. Findings are concerning for diffuse small bowel enteritis likely  infectious or inflammatory in etiology, with associated inflammatory  changes of the mesenteric adipose tissues, moderate ascites, small  bilateral probably reactive  pleural effusions and with some edema in  the subcutaneous adipose tissues.  2. No significant atherosclerosis is identified. No obvious arterial  blockage is seen in the mesenteric vessels to suggest ischemia.  3. Mild degenerative changes of the spine and hips. No acute fracture  or aggressive osseous lesion.  Jenny Scott MD

## 2021-06-14 NOTE — PROGRESS NOTES
Deshawn is a 46 year old who is being evaluated via a billable telephone visit.      What phone number would you like to be contacted at? [phone]    How would you like to obtain your AVS? Mail a copy    Phone call duration: 11 minutes        Assessment and Plan:  46 year old male who presents for followup of minimal change disease. He presented for evaluation after presenting to ER where CT scan noted ascites and nonspecific small bowel enteritis, and UA showed proteinuria. He had biopsy which showed minimal change on 4/9/2019 and treated with prednisone 70mg. He was in remission and down to prednsione 10mg then stopped it, and had relapse within a couple months.  His mother noted swelling and he was up 20-25 lbs and proteinuria up to 6 grams/g cr.   # Minimal change disease/ nephrotic syndrome, second relapse within weeks of weaning prednisone, despite slow wean.  - Prednisone round #2 started March 2020, tried to do a longer wean once on lower doses.  - Prednisone round #3 started April 20,2021. He notes his weight was up 10 pounds but had not noted the swelling or other symptoms outside of it.  - March 2021 - he was off prednisone and has relapsed within a few weeks.  - Thus restarted prednisone, and started  cyclosporine 3-4mg/kg, started at 100mg twice a day- he was only taking it once a day for a while until he realized it was twice a day- the level was <25 - may need to increase to 150mg bid   - he is near 70-75 kg  - continue labs monthly for now, RTC monthly  # Renal function- Scr was 1.1 in the past and was1.27 in ER. It was up to 1.6-1.8 at time of biopsy after diuretics were started.  - scr back to 1-1.2 range, a little higher, ? Volume related- asked him to drink more water, at least 1.5-2 liters a day (he drinks 3-4 mountain dew a day, he has cut down to 2 !!)  - labs monthly    - proteinuria improved very quickly down to normal once prednisone restarted , down from 13 grams    Labs on Saturdays if  he is working.  Started new medication, cyclosporine, twice a day- initially took it once a day only   - discussed that his is an immunosuppressant, that it increases risk of infection and malignancy. He was agreeable to start it.  - we had planned to wean prednisone but he stopped it , has been of for two weeks; given labs are stable, will hold off on restarting.       Trough level cyclosporine in June 2021, 12 hours or so after last dose- was low but likely only took morning dose- he is now taking twice a day    # Hyperlipidemia- LDL very elevated,was on low dose statin but stopped;  Restarted given nephrotic  - recheck lipids   - monitor on CNI    # Hypertension: normotensive   - continue lisinopril    # Not anemic- hgb 18 now down to 16- stable    # Electrolytes: normal low potassium      # Mineral Bone Disorder:   Check baseline PTH if creatinine remains elevated.     - RTC and abs monthly for now    Assessment and plan was discussed with patient and he voiced his understanding and agreement.    Reason for Visit:  Deshawn Flood is a 45 year old male with nephrotic range proteinuria noted and biopsy 4/9/19 shows minimal change disease    HPI:  He is a 45 year old male recently in normal health who presented with nephrotic syndrome. He underwent kidney biopsy 4/9 and it shows minimal change disease. He was admitted to the hospital briefly after biopsy as he was having poor appetite due to bloating, and elevated creatinine.    Since then he went into remission, we tapered down on prednisone and unfortunately he relapsed within a couple months (6g/g cr February 2020),  thus we restarted prednisone in March 2020 and by early April he was down to normal range proteinuria and he was feeling better.    His weight is usually closer to 150-160  but was up to 184 lbs with last episode, was down to 160s with no proteinuria, but was back up to 188 lbs and once back on prednisone, his weight went back down to 158- 159lbs.  He likes to stay at 160-165lbs but can't seem to keep weight on    Since last visit four months ago, he is doing fair. He has had a lot of issues with back pain and was not working  He denies any swelling or fluid retention.  He is now off prednisone though no specific instruction were given, as he was not seen two months ago when I planned to wean down further  At this time, he is stable and we will just monitor closely.  If he relapses, will consider CNI therapy.    He does not have a BP cuff    Renal History:   None prior to minimal change disease Spring 2019    ROS:   A comprehensive review of systems was obtained and negative, except as noted in the HPI or PMH.    Active Medical Problems:  Patient Active Problem List   Diagnosis     Loose body of left knee     Left knee pain     Tobacco abuse     History of pneumothorax     CARDIOVASCULAR SCREENING; LDL GOAL LESS THAN 160     Nephrotic syndrome     Anasarca     Minimal change disease     Immunosuppression (H)     Crohn's disease of small intestine without complication (H)     Elevated hemoglobin (H)     PMH:   Medical record was reviewed and PMH was discussed with patient and noted below.  Past Medical History:   Diagnosis Date     Anemia      Other spontaneous pneumothorax      SPONTANEOUS PNEUMOTHORAX NEC 3/25/2005     Unspecified asthma(493.90)      PSH:   Past Surgical History:   Procedure Laterality Date     ARTHROSCOPY KNEE  8/3/2012    Procedure: ARTHROSCOPY KNEE;  Left knee Arthroscopy with removal of loose bodies;  Surgeon: Bibi Roberts MD;  Location: [facility]     HC REPAIR UMBILICAL GODF[place]Y,<6Y/O,REDUC  1978     IR RENAL BIOPSY LEFT  4/9/2019     TUBE THORACOSTOMY,ABSC/EMPYEMA/HEMO  3/20/2005    Small chest tube with Heimlich valve.       Family Hx:   Family History   Problem Relation Age of Onset     Autoimmune Disease No family hx of      Personal Hx:   Social History     Tobacco Use     Smoking status: Light Tobacco Smoker     Packs/day: 0.10      Years: 15.00     Pack years: 1.50     Types: Cigarettes     Smokeless tobacco: Former User     Types: Chew     Tobacco comment: 5/21 pt states he is cutting back    Substance Use Topics     Alcohol use: Yes     Alcohol/week: 17.5 standard drinks     Types: 21 Cans of beer per week     Comment: occ       Allergies:  Allergies   Allergen Reactions     Bee Venom      swells up     No Known Drug Allergies      Seasonal Allergies        Medications:  Current Outpatient Medications   Medication Sig     albuterol (VENTOLIN HFA) 108 (90 Base) MCG/ACT inhaler INHALE 1-2 PUFFS INTO THE LUNGS EVERY 6 HOURS AS NEEDED FOR SHORTNESS OF BREATH, DIFFICULTY BREATHING OR WHEEZING. (NEED TO BE SEEN IN CLINIC FOR FURTHER REFILLS)     atorvastatin (LIPITOR) 20 MG tablet Take 1 tablet (20 mg) by mouth daily     cycloSPORINE modified (GENERIC EQUIVALENT) 100 MG capsule Take 1 capsule (100 mg) by mouth 2 times daily     lisinopril (ZESTRIL) 20 MG tablet Take 1 tablet (20 mg) by mouth daily     omeprazole (PRILOSEC) 40 MG DR capsule Take 1 capsule (40 mg) by mouth daily     sulfamethoxazole-trimethoprim (BACTRIM DS) 800-160 MG tablet Take 1 tablet by mouth three times a week     predniSONE (DELTASONE) 20 MG tablet Take 3 tablets (60 mg) by mouth daily     No current facility-administered medications for this visit.       Vitals:  There were no vitals taken for this visit.  154-155 lbs  Exam:  GENERAL : alert and no distress  Speaks in full sentences without dyspnea  Normal speech and thought pattern    LABS:   CMP  Recent Labs   Lab Test 06/05/21  0743 05/15/21  0757 04/10/21  0745 04/06/21  1014    138 141 137   POTASSIUM 3.4 3.8 3.6 4.1   CHLORIDE 105 103 109 105   CO2 29 32 28 29   ANIONGAP 4 3 4 3   GLC 89 95 126* 91   BUN 17 14 14 11   CR 1.11 1.11 0.98 1.05   GFRESTIMATED 79 79 >90 84   GFRESTBLACK >90 >90 >90 >90   BASIA 8.5 8.6 8.7 9.1     Recent Labs   Lab Test 06/05/21  0743 05/15/21  0757 04/06/21  1014 11/26/19  1426    BILITOTAL 1.4* 0.9 0.8 0.8   ALKPHOS 43 41 50 41   ALT 42 43 17 25   AST 18 30 9 11     CBC  Recent Labs   Lab Test 06/05/21  0743 05/15/21  0757 04/10/21  0745 04/06/21  1014 03/10/21  0807   HGB 16.5 16.0 16.1  --  15.4   WBC 8.5 8.4 8.7 5.7 6.4   RBC 5.32 5.17 5.15  --  4.97   HCT 47.6 47.1 46.5  --  43.8   MCV 90 91 90  --  88   MCH 31.0 30.9 31.3  --  31.0   MCHC 34.7 34.0 34.6  --  35.2   RDW 12.3 12.1 12.2  --  11.8    180 209  --  199     URINE STUDIES  Recent Labs   Lab Test 12/14/20  0037 09/12/20  0841 02/28/20  1620 04/01/19  1500 03/29/19  1858   COLOR Yellow Yellow Yellow Yellow Yellow   APPEARANCE Clear Clear Clear Clear Clear   URINEGLC Negative Negative Negative Negative Negative   URINEBILI Negative Negative Negative Negative Negative   URINEKETONE Negative Negative Negative Negative Negative   SG 1.019 1.021 >1.030 1.015 >1.035*   UBLD Negative Negative Moderate* Moderate* Moderate*   URINEPH 5.0 5.0 6.0 6.0 6.0   PROTEIN Negative Negative >=300* >=300* >499*   UROBILINOGEN  --   --  0.2 0.2  --    NITRITE Negative Negative Negative Negative Negative   LEUKEST Negative Negative Negative Negative Negative   RBCU  --   --  O - 2 25-50* 7*   WBCU  --   --  0 - 5 0 - 5 7*     Recent Labs   Lab Test 04/03/20  0900 02/28/20  1619 11/26/19  1435 09/09/19  1510   UTPG 0.18 6.20* 0.06 0.14     PTH  No lab results found.  IRON STUDIES  No lab results found.    IMPRESSION:  1. Findings are concerning for diffuse small bowel enteritis likely  infectious or inflammatory in etiology, with associated inflammatory  changes of the mesenteric adipose tissues, moderate ascites, small  bilateral probably reactive pleural effusions and with some edema in  the subcutaneous adipose tissues.  2. No significant atherosclerosis is identified. No obvious arterial  blockage is seen in the mesenteric vessels to suggest ischemia.  3. Mild degenerative changes of the spine and hips. No acute fracture  or aggressive  osseous lesion.  Jenny Scott MD

## 2021-06-14 NOTE — PATIENT INSTRUCTIONS
Continue cyclosporine twice a day  Continue lisinopril  Can stop bactrim - no more refills for now  Can stop omeprazole if stomach is not bothering you  Continue atorvastatin, recheck fasting labs next time (check cholesterol when fasting- 10 hours without eating, can drink water or black coffee only)

## 2021-07-10 DIAGNOSIS — D84.9 IMMUNOSUPPRESSION (H): ICD-10-CM

## 2021-07-10 DIAGNOSIS — N04.9 NEPHROTIC SYNDROME: ICD-10-CM

## 2021-07-10 DIAGNOSIS — N05.0 MINIMAL CHANGE DISEASE: ICD-10-CM

## 2021-07-10 LAB
ALBUMIN SERPL-MCNC: 3.8 G/DL (ref 3.4–5)
ANION GAP SERPL CALCULATED.3IONS-SCNC: 3 MMOL/L (ref 3–14)
BUN SERPL-MCNC: 19 MG/DL (ref 7–30)
CALCIUM SERPL-MCNC: 9.2 MG/DL (ref 8.5–10.1)
CHLORIDE SERPL-SCNC: 110 MMOL/L (ref 94–109)
CHOLEST SERPL-MCNC: 103 MG/DL
CO2 SERPL-SCNC: 26 MMOL/L (ref 20–32)
CREAT SERPL-MCNC: 1.06 MG/DL (ref 0.66–1.25)
CREAT UR-MCNC: 248 MG/DL
GFR SERPL CREATININE-BSD FRML MDRD: 83 ML/MIN/{1.73_M2}
GLUCOSE SERPL-MCNC: 84 MG/DL (ref 70–99)
HDLC SERPL-MCNC: 48 MG/DL
LDLC SERPL CALC-MCNC: 44 MG/DL
MICROALBUMIN UR-MCNC: 10 MG/L
MICROALBUMIN/CREAT UR: 4.03 MG/G CR (ref 0–17)
NONHDLC SERPL-MCNC: 55 MG/DL
PHOSPHATE SERPL-MCNC: 3 MG/DL (ref 2.5–4.5)
POTASSIUM SERPL-SCNC: 4.1 MMOL/L (ref 3.4–5.3)
SODIUM SERPL-SCNC: 139 MMOL/L (ref 133–144)
TRIGL SERPL-MCNC: 57 MG/DL

## 2021-07-10 PROCEDURE — 80069 RENAL FUNCTION PANEL: CPT | Performed by: INTERNAL MEDICINE

## 2021-07-10 PROCEDURE — 80061 LIPID PANEL: CPT | Performed by: INTERNAL MEDICINE

## 2021-07-10 PROCEDURE — 36415 COLL VENOUS BLD VENIPUNCTURE: CPT | Performed by: INTERNAL MEDICINE

## 2021-07-10 PROCEDURE — 82043 UR ALBUMIN QUANTITATIVE: CPT | Performed by: INTERNAL MEDICINE

## 2021-07-10 PROCEDURE — 80158 DRUG ASSAY CYCLOSPORINE: CPT | Performed by: INTERNAL MEDICINE

## 2021-07-11 LAB
CYCLOSPORINE BLD LC/MS/MS-MCNC: 86 UG/L (ref 50–400)
TME LAST DOSE: 2030 H

## 2021-08-06 ENCOUNTER — TELEPHONE (OUTPATIENT)
Dept: NEPHROLOGY | Facility: CLINIC | Age: 46
End: 2021-08-06

## 2021-08-06 NOTE — TELEPHONE ENCOUNTER
Spoke to patient. He reports for about 2.5 weeks that he has been experiencing increased joint soreness and weakness and has a hard time standing up from kneeling position.   Patient reports no rash, no swelling, no increased edema or swelling.  Patient currently taking:  Cyclosporine 100 BID  Lisinopril 20  Atorvastatin 20  Will send to Dr. Sonia Simmons, JOAN  Nephrology  [phone]

## 2021-08-09 ENCOUNTER — OFFICE VISIT (OUTPATIENT)
Dept: INTERNAL MEDICINE | Facility: CLINIC | Age: 46
End: 2021-08-09
Payer: COMMERCIAL

## 2021-08-09 VITALS
HEART RATE: 64 BPM | RESPIRATION RATE: 18 BRPM | HEIGHT: 69 IN | OXYGEN SATURATION: 99 % | DIASTOLIC BLOOD PRESSURE: 76 MMHG | TEMPERATURE: 98.8 F | WEIGHT: 165 LBS | SYSTOLIC BLOOD PRESSURE: 124 MMHG | BODY MASS INDEX: 24.44 KG/M2

## 2021-08-09 DIAGNOSIS — K50.00 CROHN'S DISEASE OF SMALL INTESTINE WITHOUT COMPLICATION (H): ICD-10-CM

## 2021-08-09 DIAGNOSIS — J45.40 MODERATE PERSISTENT ASTHMA WITHOUT COMPLICATION: Primary | ICD-10-CM

## 2021-08-09 DIAGNOSIS — Z72.0 TOBACCO ABUSE: ICD-10-CM

## 2021-08-09 DIAGNOSIS — D84.9 IMMUNOSUPPRESSION (H): ICD-10-CM

## 2021-08-09 DIAGNOSIS — N04.9 NEPHROTIC SYNDROME: ICD-10-CM

## 2021-08-09 DIAGNOSIS — R06.02 SHORTNESS OF BREATH: ICD-10-CM

## 2021-08-09 PROCEDURE — 99214 OFFICE O/P EST MOD 30 MIN: CPT | Performed by: INTERNAL MEDICINE

## 2021-08-09 RX ORDER — ALBUTEROL SULFATE 90 UG/1
AEROSOL, METERED RESPIRATORY (INHALATION)
Qty: 18 G | Refills: 3 | Status: SHIPPED | OUTPATIENT
Start: 2021-08-09 | End: 2022-01-31

## 2021-08-09 ASSESSMENT — PAIN SCALES - GENERAL: PAINLEVEL: NO PAIN (1)

## 2021-08-09 ASSESSMENT — MIFFLIN-ST. JEOR: SCORE: 1613.82

## 2021-08-09 NOTE — PROGRESS NOTES
Tri Hess is a 46 year old who presents for the following health issues     HPI     Medication Followup/ refills    Taking Medication as prescribed: yes    Side Effects:  None    Medication Helping Symptoms:  yes      EMR reviewed including:             Complaint, History of Chief Complaint, Corresponding Review of Systems, and Complaint Specific Physical Examination.    #1  Shortness of breath.  History of mild persistent asthma.  Uses albuterol on a rescue basis.  Not on any chronic suppressive medication.  Continues to smoke regularly.  Denies excess sputum production cough or hemoptysis.       Exam:   LUNGS: clear bilaterally, airflow is brisk, no intercostal retraction or stridor is noted. No coughing is noted during visit.      #2   History of Crohn's colitis.  Has been fairly well controlled because patient has been on steroids for some time  Denies any abdominal pain, cramping or bleeding.  Has now been weaned off prednisone and is on cyclosporine and maintaining remission.  GI notes from July 2018 reviewed.       Exam:   GI: Abdomen is soft, without rebound, guarding or tenderness. Bowel sounds are appropriate. No renal bruits are heard.   Constitutional: The patient appears to be in no acute distress. The patient appears to be adequately hydrated. No acute respiratory or hemodynamic distress is noted at this time.      #3   Nephrotic syndrome  Nephrology notes and lab work reviewed and appreciated  Currently no proteinuria.  No ascites.  No edema.       Exam:   URINARY: James's punch is negative. No suprapubic tenderness is noted with palpation.   SKIN:  warm and dry. No erythema, or rashes are noted. No specific lesions of concern are noted.    NEURO: Pt is alert and appropriate. No neurologic lateralization is noted. Cranial nerves 2-12 are intact. Peripheral sensory and motor function are grossly normal.         Vital Signs:   /76   Pulse 64   Temp 98.8  F (37.1  C) (Temporal)    "Resp 18   Ht 1.753 m (5' 9\")   Wt 74.8 kg (165 lb)   SpO2 99%   BMI 24.37 kg/m               Decision Making    Problem and Complexity     1. Shortness of breath  Secondary to [place] wildfires, smoking, and the allergy season.  Recommend not smoking,  Recommend suppressive inhaler therapy (steroid/LABA) if worsens    2. Moderate persistent asthma without complication  As above  - Asthma Action Plan (AAP)  - albuterol (VENTOLIN HFA) 108 (90 Base) MCG/ACT inhaler; INHALE 1-2 PUFFS INTO THE LUNGS EVERY 6 HOURS AS NEEDED FOR SHORTNESS OF BREATH, DIFFICULTY BREATHING OR WHEEZING. (NEED TO BE SEEN IN CLINIC FOR FURTHER REFILLS)  Dispense: 18 g; Refill: 3    3. Tobacco abuse  Smoking cessation discussed    4. Nephrotic syndrome  Stable.  Follow-up nephrology    5. Immunosuppression (H)  Lab work reviewed and stable    6. Crohn's disease of small intestine without complication (H)  Working diagnosis.  No signs of recurrence.  Inflammatory bowel disease would be serendipitously controlled with treatment of the nephrotic syndrome.  Would recommend routine endoscopic surveillance.            ------------------------------------------------------------------------------------------------------------------------------  Data    4=1/3 5=2/3    1  >3  Specialty (external) notes reviewed:   Yes  Individual tests ordered (by provider) reviewed:   No  Individual tests ordered (by provider):   No  Independent Historians Interview:   No  2  Review of outside (other providers) Tests:   Yes  3   Verbal Discussion with Specialists:    None    ----------------------------------------------------------------------------------------------------------------------------------  Risk   Prescription drug management:   Yes                                High risk for toxicity:   Decision regarding surgery:    None   Social determinants of health:   No   Decision regarding hospitalization:     None   Decision to withhold therapy:   " None                                   FOLLOW UP   I have asked the patient to make an appointment for followup with me in 6 months or as needed            I have carefully explained the diagnosis and treatment options to the patient.  The patient has displayed an understanding of the above, and all subsequent questions were answered.      DO CARISSA Jones    Portions of this note were produced using [employer]  Although every attempt at real-time proof reading has been made, occasional grammar/syntax errors may have been missed.

## 2021-08-10 NOTE — TELEPHONE ENCOUNTER
[facility] Face to Face clinic is 9/13/21 in [place].    Will that be ok to wait?, she does have virtual visit open sooner    Sandra Chiang CMA

## 2021-08-13 DIAGNOSIS — N04.9 NEPHROTIC SYNDROME: Primary | ICD-10-CM

## 2021-08-14 ENCOUNTER — LAB (OUTPATIENT)
Dept: LAB | Facility: CLINIC | Age: 46
End: 2021-08-14
Payer: COMMERCIAL

## 2021-08-14 DIAGNOSIS — N04.9 NEPHROTIC SYNDROME: ICD-10-CM

## 2021-08-14 LAB
ALBUMIN SERPL-MCNC: 4.1 G/DL (ref 3.4–5)
ALBUMIN UR-MCNC: NEGATIVE MG/DL
ANION GAP SERPL CALCULATED.3IONS-SCNC: 3 MMOL/L (ref 3–14)
APPEARANCE UR: CLEAR
BILIRUB UR QL STRIP: NEGATIVE
BUN SERPL-MCNC: 24 MG/DL (ref 7–30)
CALCIUM SERPL-MCNC: 9.2 MG/DL (ref 8.5–10.1)
CHLORIDE BLD-SCNC: 110 MMOL/L (ref 94–109)
CK SERPL-CCNC: 98 U/L (ref 30–300)
CO2 SERPL-SCNC: 28 MMOL/L (ref 20–32)
COLOR UR AUTO: YELLOW
CREAT SERPL-MCNC: 1.03 MG/DL (ref 0.66–1.25)
CREAT UR-MCNC: 165 MG/DL
ERYTHROCYTE [DISTWIDTH] IN BLOOD BY AUTOMATED COUNT: 12.3 % (ref 10–15)
GFR SERPL CREATININE-BSD FRML MDRD: 86 ML/MIN/1.73M2
GLUCOSE BLD-MCNC: 95 MG/DL (ref 70–99)
GLUCOSE UR STRIP-MCNC: NEGATIVE MG/DL
HCT VFR BLD AUTO: 41.1 % (ref 40–53)
HGB BLD-MCNC: 14.6 G/DL (ref 13.3–17.7)
HGB UR QL STRIP: NEGATIVE
KETONES UR STRIP-MCNC: NEGATIVE MG/DL
LEUKOCYTE ESTERASE UR QL STRIP: NEGATIVE
MCH RBC QN AUTO: 31.5 PG (ref 26.5–33)
MCHC RBC AUTO-ENTMCNC: 35.5 G/DL (ref 31.5–36.5)
MCV RBC AUTO: 89 FL (ref 78–100)
MICROALBUMIN UR-MCNC: 9 MG/L
MICROALBUMIN/CREAT UR: 5.45 MG/G CR (ref 0–17)
NITRATE UR QL: NEGATIVE
PH UR STRIP: 6 [PH] (ref 5–7)
PHOSPHATE SERPL-MCNC: 2.9 MG/DL (ref 2.5–4.5)
PLATELET # BLD AUTO: 195 10E3/UL (ref 150–450)
POTASSIUM BLD-SCNC: 4.4 MMOL/L (ref 3.4–5.3)
RBC # BLD AUTO: 4.63 10E6/UL (ref 4.4–5.9)
RBC URINE: <1 /HPF
SODIUM SERPL-SCNC: 141 MMOL/L (ref 133–144)
SP GR UR STRIP: 1.03 (ref 1–1.03)
UROBILINOGEN UR STRIP-MCNC: NORMAL MG/DL
WBC # BLD AUTO: 5.6 10E3/UL (ref 4–11)
WBC URINE: <1 /HPF

## 2021-08-14 PROCEDURE — 82550 ASSAY OF CK (CPK): CPT

## 2021-08-14 PROCEDURE — 81001 URINALYSIS AUTO W/SCOPE: CPT

## 2021-08-14 PROCEDURE — 82043 UR ALBUMIN QUANTITATIVE: CPT

## 2021-08-14 PROCEDURE — 80069 RENAL FUNCTION PANEL: CPT

## 2021-08-14 PROCEDURE — 85027 COMPLETE CBC AUTOMATED: CPT

## 2021-08-14 PROCEDURE — 36415 COLL VENOUS BLD VENIPUNCTURE: CPT

## 2021-08-17 ENCOUNTER — TELEPHONE (OUTPATIENT)
Dept: NEPHROLOGY | Facility: CLINIC | Age: 46
End: 2021-08-17

## 2021-08-17 NOTE — TELEPHONE ENCOUNTER
Needs Follow-up appt with Jonah Renal  [facility]MT[facility](left mess to call). To set up appts  Ok to do Virtual 8/23/21    Sandra Chiang CMA

## 2021-08-18 ENCOUNTER — TELEPHONE (OUTPATIENT)
Dept: NEPHROLOGY | Facility: CLINIC | Age: 46
End: 2021-08-18

## 2021-08-18 NOTE — TELEPHONE ENCOUNTER
[facility] Call Center    Phone Message    May a detailed message be left on voicemail: yes     Reason for Call: Other: Patient is requesting a call back to discuss health concerns and poosible appointment 8/23/21. Please call patient at [phone] between 11:45am - 12:15pm to advise.     Action Taken: Message routed to:  [facility] ([facility]): JOHN Ling    Travel Screening: Not Applicable

## 2021-08-18 NOTE — TELEPHONE ENCOUNTER
Writer attempted to call patient regarding message below. Informed him that we have been trying to reach him regarding in person visit with PREETI Rasmussen at the [place] location and mentioned per Barbara LONGO that his labs were stable. Writer mentioned in message will pass this along to CIRA Flores if she is able to reach patient to schedule for follow up soon. Also provided call back number for patient to call regarding symptoms or any other questions or concerns.  Kami Simmons LPN  Nephrology  [phone]

## 2021-08-23 NOTE — TELEPHONE ENCOUNTER
Patient is following up with clinic in regards to schedule virtual visit today with either  or Barbara Alcantar. Please call mother to discuss since patient is traveling on the road for work and can't be on his phone. Patient provide his mother, Marnie Flood phone:  [phone]-    Please call to advise.

## 2021-08-23 NOTE — TELEPHONE ENCOUNTER
Patient is requesting a call back. Pt thought he is set up to have a phone/telephone visit with Dr. Scott today.    Please call to advise.

## 2021-08-27 NOTE — TELEPHONE ENCOUNTER
Left voice message for patient to check and see how he is doing. Spoke to patients mom, she reports that he has been complaining of more joint pain over the last few weeks but he has been working on the road. Will send to Barbara and Dr. Scott. Appointment schedued in a couple weeks.  Kami Simmons LPN  Nephrology  [phone]

## 2021-09-11 ENCOUNTER — LAB (OUTPATIENT)
Dept: LAB | Facility: CLINIC | Age: 46
End: 2021-09-11
Payer: COMMERCIAL

## 2021-09-11 DIAGNOSIS — D58.2 ELEVATED HEMOGLOBIN (H): ICD-10-CM

## 2021-09-11 DIAGNOSIS — K50.00 CROHN'S DISEASE OF SMALL INTESTINE WITHOUT COMPLICATION (H): ICD-10-CM

## 2021-09-11 DIAGNOSIS — N04.9 NEPHROTIC SYNDROME: ICD-10-CM

## 2021-09-11 DIAGNOSIS — N05.0 MINIMAL CHANGE DISEASE: ICD-10-CM

## 2021-09-11 DIAGNOSIS — D84.9 IMMUNOSUPPRESSION (H): ICD-10-CM

## 2021-09-11 DIAGNOSIS — E78.5 HYPERLIPIDEMIA LDL GOAL <100: ICD-10-CM

## 2021-09-11 LAB
ALBUMIN SERPL-MCNC: 3.9 G/DL (ref 3.4–5)
ALP SERPL-CCNC: 42 U/L (ref 40–150)
ALT SERPL W P-5'-P-CCNC: 26 U/L (ref 0–70)
ANION GAP SERPL CALCULATED.3IONS-SCNC: 5 MMOL/L (ref 3–14)
AST SERPL W P-5'-P-CCNC: 15 U/L (ref 0–45)
BILIRUB DIRECT SERPL-MCNC: 0.4 MG/DL (ref 0–0.2)
BILIRUB SERPL-MCNC: 1.5 MG/DL (ref 0.2–1.3)
BUN SERPL-MCNC: 24 MG/DL (ref 7–30)
CALCIUM SERPL-MCNC: 9.6 MG/DL (ref 8.5–10.1)
CHLORIDE BLD-SCNC: 108 MMOL/L (ref 94–109)
CO2 SERPL-SCNC: 27 MMOL/L (ref 20–32)
CREAT SERPL-MCNC: 1.1 MG/DL (ref 0.66–1.25)
CREAT UR-MCNC: 213 MG/DL
CYCLOSPORINE BLD LC/MS/MS-MCNC: 827 UG/L (ref 50–400)
ERYTHROCYTE [DISTWIDTH] IN BLOOD BY AUTOMATED COUNT: 11.7 % (ref 10–15)
GFR SERPL CREATININE-BSD FRML MDRD: 80 ML/MIN/1.73M2
GLUCOSE BLD-MCNC: 81 MG/DL (ref 70–99)
HCT VFR BLD AUTO: 39.5 % (ref 40–53)
HGB BLD-MCNC: 14.1 G/DL (ref 13.3–17.7)
MCH RBC QN AUTO: 31.4 PG (ref 26.5–33)
MCHC RBC AUTO-ENTMCNC: 35.7 G/DL (ref 31.5–36.5)
MCV RBC AUTO: 88 FL (ref 78–100)
MICROALBUMIN UR-MCNC: 7 MG/L
MICROALBUMIN/CREAT UR: 3.29 MG/G CR (ref 0–17)
PHOSPHATE SERPL-MCNC: 3.2 MG/DL (ref 2.5–4.5)
PLATELET # BLD AUTO: 170 10E3/UL (ref 150–450)
POTASSIUM BLD-SCNC: 4.3 MMOL/L (ref 3.4–5.3)
PROT SERPL-MCNC: 6.4 G/DL (ref 6.8–8.8)
RBC # BLD AUTO: 4.49 10E6/UL (ref 4.4–5.9)
SODIUM SERPL-SCNC: 140 MMOL/L (ref 133–144)
TME LAST DOSE: ABNORMAL H
TME LAST DOSE: ABNORMAL H
WBC # BLD AUTO: 5.3 10E3/UL (ref 4–11)

## 2021-09-11 PROCEDURE — 82248 BILIRUBIN DIRECT: CPT

## 2021-09-11 PROCEDURE — 85027 COMPLETE CBC AUTOMATED: CPT

## 2021-09-11 PROCEDURE — 80053 COMPREHEN METABOLIC PANEL: CPT

## 2021-09-11 PROCEDURE — 36415 COLL VENOUS BLD VENIPUNCTURE: CPT

## 2021-09-11 PROCEDURE — 84100 ASSAY OF PHOSPHORUS: CPT

## 2021-09-11 PROCEDURE — 80158 DRUG ASSAY CYCLOSPORINE: CPT

## 2021-09-11 PROCEDURE — 82043 UR ALBUMIN QUANTITATIVE: CPT

## 2021-09-13 DIAGNOSIS — R17 ELEVATED BILIRUBIN: Primary | ICD-10-CM

## 2021-09-16 ENCOUNTER — OFFICE VISIT (OUTPATIENT)
Dept: NEPHROLOGY | Facility: CLINIC | Age: 46
End: 2021-09-16
Attending: PHYSICIAN ASSISTANT
Payer: COMMERCIAL

## 2021-09-16 VITALS
OXYGEN SATURATION: 97 % | BODY MASS INDEX: 23.69 KG/M2 | SYSTOLIC BLOOD PRESSURE: 145 MMHG | DIASTOLIC BLOOD PRESSURE: 90 MMHG | HEART RATE: 66 BPM | WEIGHT: 160.4 LBS

## 2021-09-16 DIAGNOSIS — N05.0 MINIMAL CHANGE DISEASE: Primary | ICD-10-CM

## 2021-09-16 DIAGNOSIS — R17 ELEVATED BILIRUBIN: ICD-10-CM

## 2021-09-16 DIAGNOSIS — M25.50 ARTHRALGIA, UNSPECIFIED JOINT: ICD-10-CM

## 2021-09-16 DIAGNOSIS — N04.9 NEPHROTIC SYNDROME: ICD-10-CM

## 2021-09-16 PROCEDURE — 99214 OFFICE O/P EST MOD 30 MIN: CPT | Performed by: PHYSICIAN ASSISTANT

## 2021-09-16 PROCEDURE — G0463 HOSPITAL OUTPT CLINIC VISIT: HCPCS

## 2021-09-16 NOTE — PROGRESS NOTES
Nephrology Progress Note  9/16/21      Assessment and Plan:  46 year old male who presents for followup of minimal change disease. He presented for evaluation after presenting to ER where CT scan noted ascites and nonspecific small bowel enteritis, and UA showed proteinuria. He had biopsy which showed minimal change on 4/9/2019 and treated with prednisone 70mg. He was in remission and down to prednsione 10mg then stopped it, and had relapse within a couple months.  His mother noted swelling and he was up 20-25 lbs and proteinuria up to 6 grams/g cr.   # Minimal change disease/ nephrotic syndrome, second relapse within weeks of weaning prednisone, despite slow wean.  - Prednisone round #2 started March 2020, tried to do a longer wean once on lower doses.  - Prednisone round #3 started April 20,2021. He notes his weight was up 10 pounds but had not noted the swelling or other symptoms outside of it.  - March 2021 - he was off prednisone and has relapsed within a few weeks.  - Thus restarted prednisone, and started  cyclosporine 3-4mg/kg, started at 100mg twice a day- he was only taking it once a day for a while until he realized it was twice a day- the level was <25 - may need to increase to 150mg bid   - he is near 70-75 kg  - he developed generalized joint pain approximately 2 weeks after starting cyclosporin that has progressively worsened, now plateaued. He denies any improvement. He has not been taking any ibuprofen, aleve, or tylenol. He has no associated fevers,chills, or rashes.  - will discuss with Dr. Scott  - continue labs monthly for now, RTC monthly    # Renal function- Scr was 1.1 in the past and was1.27 in ER. It was up to 1.6-1.8 at time of biopsy after diuretics were started.  - scr back to 1-1.2 range, a little higher, ? Volume related- asked him to drink more water, at least 1.5-2 liters a day (he drinks 3-4 mountain dew a day, he has cut down to 2 !!) now drinking 1-2 bottles of water per day  -  labs monthly    - proteinuria improved very quickly down to normal once prednisone restarted, down from 13 grams  - urine albumin/cr ratio ordered but was not completed    Labs on Saturdays if he is working.  Started new medication, cyclosporine, twice a day- initially took it once a day only   - discussed that his is an immunosuppressant, that it increases risk of infection and malignancy. He was agreeable to start it.  - we had planned to wean prednisone but he stopped it, since labs were stable it was not restarted.      Trough level cyclosporine in June 2021, 12 hours or so after last dose- was low but likely only took morning dose- he is now taking twice a day    Cyclosporin level on 9/11/21 was 824. He took the medication one hour before labs. Repeat in one month.     Bilirubin elevated - check LDH and abdominal US.     # Hyperlipidemia- LDL very elevated,was on low dose statin but stopped;  Restarted given nephrotic  - recheck lipids   - monitor on CNI    # Hypertension: normotensive   - continue lisinopril    # Not anemic- hgb 18 now down to 16- stable    # Electrolytes: normal low potassium      # Mineral Bone Disorder:   Check baseline PTH if creatinine remains elevated.     - RTC and abs monthly for now    Assessment and plan was discussed with patient and he voiced his understanding and agreement.    Reason for Visit:  Deshawn Flood is a 45 year old male with nephrotic range proteinuria noted and biopsy 4/9/19 shows minimal change disease    HPI:  He is a 45 year old male recently in normal health who presented with nephrotic syndrome. He underwent kidney biopsy 4/9 and it shows minimal change disease. He was admitted to the hospital briefly after biopsy as he was having poor appetite due to bloating, and elevated creatinine.    Since then he went into remission, we tapered down on prednisone and unfortunately he relapsed within a couple months (6g/g cr February 2020),  thus we restarted prednisone in  March 2020 and by early April he was down to normal range proteinuria and he was feeling better.    His weight is usually closer to 150-160  but was up to 184 lbs with last episode, was down to 160s with no proteinuria, but was back up to 188 lbs and once back on prednisone, his weight went back down to 158-159 lbs. He is currently 160 lbs.     He is working again as a .   He notes worsening joint pain since starting cyclosporin.  He denies any swelling or fluid retention.  He is now off prednisone though no specific instruction were given, as he was not seen for follow up at the time when Dr. Scott planned to wean down further.     Urine albumin was not obtained with recent labs, needs to be completed.     His BP was 145/90 in clinic today. He isn't checking BP at home, does not have a BP cuff    Renal History:   None prior to minimal change disease Spring 2019    ROS:   A comprehensive review of systems was obtained and negative, except as noted in the HPI or PMH.     Active Medical Problems:  Patient Active Problem List   Diagnosis     Loose body of left knee     Left knee pain     Tobacco abuse     History of pneumothorax     CARDIOVASCULAR SCREENING; LDL GOAL LESS THAN 160     Nephrotic syndrome     Anasarca     Minimal change disease     Immunosuppression (H)     Crohn's disease of small intestine without complication (H)     Elevated hemoglobin (H)     PMH:   Medical record was reviewed and PMH was discussed with patient and noted below.  Past Medical History:   Diagnosis Date     Anemia      Other spontaneous pneumothorax      SPONTANEOUS PNEUMOTHORAX NEC 3/25/2005     Unspecified asthma(493.90)      PSH:   Past Surgical History:   Procedure Laterality Date     ARTHROSCOPY KNEE  8/3/2012    Procedure: ARTHROSCOPY KNEE;  Left knee Arthroscopy with removal of loose bodies;  Surgeon: Bibi Roberts MD;  Location: [facility]     HC REPAIR UMBILICAL GODFREY,<6Y/O,REDUC  1978     IR RENAL BIOPSY LEFT   4/9/2019     TUBE THORACOSTOMY,ABSC/EMPYEMA/HEMO  3/20/2005    Small chest tube with Heimlich valve.       Family Hx:   Family History   Problem Relation Age of Onset     Autoimmune Disease No family hx of      Personal Hx:   Social History     Tobacco Use     Smoking status: Light Tobacco Smoker     Packs/day: 0.10     Years: 15.00     Pack years: 1.50     Types: Cigarettes     Smokeless tobacco: Former User     Types: Chew     Tobacco comment: 1 pack per day   Substance Use Topics     Alcohol use: Yes     Alcohol/week: 17.5 standard drinks     Types: 21 Cans of beer per week     Comment: occ       Allergies:  Allergies   Allergen Reactions     Bee Venom      swells up     No Known Drug Allergies      Seasonal Allergies        Medications:  Current Outpatient Medications   Medication Sig     albuterol (VENTOLIN HFA) 108 (90 Base) MCG/ACT inhaler INHALE 1-2 PUFFS INTO THE LUNGS EVERY 6 HOURS AS NEEDED FOR SHORTNESS OF BREATH, DIFFICULTY BREATHING OR WHEEZING. (NEED TO BE SEEN IN CLINIC FOR FURTHER REFILLS)     atorvastatin (LIPITOR) 20 MG tablet Take 1 tablet (20 mg) by mouth daily     cycloSPORINE modified (GENERIC EQUIVALENT) 100 MG capsule Take 1 capsule (100 mg) by mouth 2 times daily     lisinopril (ZESTRIL) 20 MG tablet Take 1 tablet (20 mg) by mouth daily     omeprazole (PRILOSEC) 40 MG DR capsule Take 1 capsule (40 mg) by mouth daily (Patient not taking: Reported on 9/16/2021)     predniSONE (DELTASONE) 20 MG tablet Take 3 tablets (60 mg) by mouth daily (Patient not taking: Reported on 8/9/2021)     No current facility-administered medications for this visit.      Vitals:  BP (!) 145/90 (BP Location: Right arm, Patient Position: Sitting, Cuff Size: Adult Regular)   Pulse 66   Wt 72.8 kg (160 lb 6.4 oz)   SpO2 97%   BMI 23.69 kg/m    154-155 lbs  Exam:  GENERAL : alert and no distress  Speaks in full sentences without dyspnea  Normal speech and thought pattern    LABS:   CMP  Recent Labs   Lab Test  09/11/21  0805 08/14/21  0807 07/10/21  0829 06/05/21  0743 05/15/21  0757 05/15/21  0757 04/10/21  0745 04/10/21  0745    141 139 138   < > 138   < > 141   POTASSIUM 4.3 4.4 4.1 3.4   < > 3.8   < > 3.6   CHLORIDE 108 110* 110* 105   < > 103   < > 109   CO2 27 28 26 29   < > 32   < > 28   ANIONGAP 5 3 3 4   < > 3   < > 4   GLC 81 95 84 89   < > 95   < > 126*   BUN 24 24 19 17   < > 14   < > 14   CR 1.10 1.03 1.06 1.11   < > 1.11   < > 0.98   GFRESTIMATED 80 86 83 79   < > 79   < > >90   GFRESTBLACK  --   --  >90 >90  --  >90  --  >90   BASIA 9.6 9.2 9.2 8.5   < > 8.6   < > 8.7    < > = values in this interval not displayed.     Recent Labs   Lab Test 09/11/21  0805 06/05/21  0743 05/15/21  0757 04/06/21  1014   BILITOTAL 1.5* 1.4* 0.9 0.8   ALKPHOS 42 43 41 50   ALT 26 42 43 17   AST 15 18 30 9     CBC  Recent Labs   Lab Test 09/11/21  0805 08/14/21  0807 06/05/21  0743 05/15/21  0757   HGB 14.1 14.6 16.5 16.0   WBC 5.3 5.6 8.5 8.4   RBC 4.49 4.63 5.32 5.17   HCT 39.5* 41.1 47.6 47.1   MCV 88 89 90 91   MCH 31.4 31.5 31.0 30.9   MCHC 35.7 35.5 34.7 34.0   RDW 11.7 12.3 12.3 12.1    195 188 180     URINE STUDIES  Recent Labs   Lab Test 08/14/21  0819 12/14/20  0037 09/12/20  0841 02/28/20  1620 04/01/19  1500 04/01/19  1500 03/29/19  1858 03/29/19  1858   COLOR Yellow Yellow Yellow Yellow   < > Yellow   < > Yellow   APPEARANCE Clear Clear Clear Clear   < > Clear   < > Clear   URINEGLC Negative Negative Negative Negative   < > Negative   < > Negative   URINEBILI Negative Negative Negative Negative   < > Negative   < > Negative   URINEKETONE Negative Negative Negative Negative   < > Negative   < > Negative   SG 1.026 1.019 1.021 >1.030   < > 1.015   < > >1.035*   UBLD Negative Negative Negative Moderate*   < > Moderate*   < > Moderate*   URINEPH 6.0 5.0 5.0 6.0   < > 6.0   < > 6.0   PROTEIN Negative Negative Negative >=300*   < > >=300*   < > >499*   UROBILINOGEN  --   --   --  0.2  --  0.2  --   --     NITRITE Negative Negative Negative Negative   < > Negative   < > Negative   LEUKEST Negative Negative Negative Negative   < > Negative   < > Negative   RBCU <1  --   --  O - 2  --  25-50*  --  7*   WBCU <1  --   --  0 - 5  --  0 - 5  --  7*    < > = values in this interval not displayed.     Recent Labs   Lab Test 04/03/20  0900 02/28/20  1619 11/26/19  1435 09/09/19  1510   UTPG 0.18 6.20* 0.06 0.14     PTH  No lab results found.  IRON STUDIES  No lab results found.    IMPRESSION:  1. Findings are concerning for diffuse small bowel enteritis likely  infectious or inflammatory in etiology, with associated inflammatory  changes of the mesenteric adipose tissues, moderate ascites, small  bilateral probably reactive pleural effusions and with some edema in  the subcutaneous adipose tissues.  2. No significant atherosclerosis is identified. No obvious arterial  blockage is seen in the mesenteric vessels to suggest ischemia.  3. Mild degenerative changes of the spine and hips. No acute fracture  or aggressive osseous lesion.    Barbara Alcantar PA-C    Face to face time 12 minutes. An additional 25 minutes was spent on date of service in chart review, documentation, and other activities as noted.

## 2021-09-16 NOTE — NURSING NOTE
Chief Complaint   Patient presents with     RECHECK     follow up         Patient has a lot of joint pain since starting cyclosporine. He is ambulating slowly.      BP (!) 145/90 (BP Location: Right arm, Patient Position: Sitting, Cuff Size: Adult Regular)   Pulse 66   Wt 72.8 kg (160 lb 6.4 oz)   SpO2 97%   BMI 23.69 kg/m        Jesu Jones, EMT

## 2021-09-16 NOTE — LETTER
9/16/2021       RE: Deshawn Flood  [address]  [facility] [address]     Dear Colleague,    Thank you for referring your patient, Deshawn Flood, to the [facility] [place] at [facility]. Please see a copy of my visit note below.    Nephrology Progress Note  9/16/21      Assessment and Plan:  46 year old male who presents for followup of minimal change disease. He presented for evaluation after presenting to ER where CT scan noted ascites and nonspecific small bowel enteritis, and UA showed proteinuria. He had biopsy which showed minimal change on 4/9/2019 and treated with prednisone 70mg. He was in remission and down to prednsione 10mg then stopped it, and had relapse within a couple months.  His mother noted swelling and he was up 20-25 lbs and proteinuria up to 6 grams/g cr.   # Minimal change disease/ nephrotic syndrome, second relapse within weeks of weaning prednisone, despite slow wean.  - Prednisone round #2 started March 2020, tried to do a longer wean once on lower doses.  - Prednisone round #3 started April 20,2021. He notes his weight was up 10 pounds but had not noted the swelling or other symptoms outside of it.  - March 2021 - he was off prednisone and has relapsed within a few weeks.  - Thus restarted prednisone, and started  cyclosporine 3-4mg/kg, started at 100mg twice a day- he was only taking it once a day for a while until he realized it was twice a day- the level was <25 - may need to increase to 150mg bid   - he is near 70-75 kg  - he developed generalized joint pain approximately 2 weeks after starting cyclosporin that has progressively worsened, now plateaued. He denies any improvement. He has not been taking any ibuprofen, aleve, or tylenol. He has no associated fevers,chills, or rashes.  - will discuss with Dr. Scott  - continue labs monthly for now, RTC monthly    # Renal function- Scr was 1.1 in the past  and was1.27 in ER. It was up to 1.6-1.8 at time of biopsy after diuretics were started.  - scr back to 1-1.2 range, a little higher, ? Volume related- asked him to drink more water, at least 1.5-2 liters a day (he drinks 3-4 mountain dew a day, he has cut down to 2 !!) now drinking 1-2 bottles of water per day  - labs monthly    - proteinuria improved very quickly down to normal once prednisone restarted, down from 13 grams  - urine albumin/cr ratio ordered but was not completed    Labs on Saturdays if he is working.  Started new medication, cyclosporine, twice a day- initially took it once a day only   - discussed that his is an immunosuppressant, that it increases risk of infection and malignancy. He was agreeable to start it.  - we had planned to wean prednisone but he stopped it, since labs were stable it was not restarted.      Trough level cyclosporine in June 2021, 12 hours or so after last dose- was low but likely only took morning dose- he is now taking twice a day    Cyclosporin level on 9/11/21 was 824. He took the medication one hour before labs. Repeat in one month.     Bilirubin elevated - check LDH and abdominal US.     # Hyperlipidemia- LDL very elevated,was on low dose statin but stopped;  Restarted given nephrotic  - recheck lipids   - monitor on CNI    # Hypertension: normotensive   - continue lisinopril    # Not anemic- hgb 18 now down to 16- stable    # Electrolytes: normal low potassium      # Mineral Bone Disorder:   Check baseline PTH if creatinine remains elevated.     - RTC and abs monthly for now    Assessment and plan was discussed with patient and he voiced his understanding and agreement.    Reason for Visit:  Deshawn Flood is a 45 year old male with nephrotic range proteinuria noted and biopsy 4/9/19 shows minimal change disease    HPI:  He is a 45 year old male recently in normal health who presented with nephrotic syndrome. He underwent kidney biopsy 4/9 and it shows minimal change  disease. He was admitted to the hospital briefly after biopsy as he was having poor appetite due to bloating, and elevated creatinine.    Since then he went into remission, we tapered down on prednisone and unfortunately he relapsed within a couple months (6g/g cr February 2020),  thus we restarted prednisone in March 2020 and by early April he was down to normal range proteinuria and he was feeling better.    His weight is usually closer to 150-160  but was up to 184 lbs with last episode, was down to 160s with no proteinuria, but was back up to 188 lbs and once back on prednisone, his weight went back down to 158-159 lbs. He is currently 160 lbs.     He is working again as a .   He notes worsening joint pain since starting cyclosporin.  He denies any swelling or fluid retention.  He is now off prednisone though no specific instruction were given, as he was not seen for follow up at the time when Dr. Scott planned to wean down further.     Urine albumin was not obtained with recent labs, needs to be completed.     His BP was 145/90 in clinic today. He isn't checking BP at home, does not have a BP cuff    Renal History:   None prior to minimal change disease Spring 2019    ROS:   A comprehensive review of systems was obtained and negative, except as noted in the HPI or PMH.     Active Medical Problems:  Patient Active Problem List   Diagnosis     Loose body of left knee     Left knee pain     Tobacco abuse     History of pneumothorax     CARDIOVASCULAR SCREENING; LDL GOAL LESS THAN 160     Nephrotic syndrome     Anasarca     Minimal change disease     Immunosuppression (H)     Crohn's disease of small intestine without complication (H)     Elevated hemoglobin (H)     PMH:   Medical record was reviewed and PMH was discussed with patient and noted below.  Past Medical History:   Diagnosis Date     Anemia      Other spontaneous pneumothorax      SPONTANEOUS PNEUMOTHORAX NEC 3/25/2005     Unspecified  asthma(493.90)      PSH:   Past Surgical History:   Procedure Laterality Date     ARTHROSCOPY KNEE  8/3/2012    Procedure: ARTHROSCOPY KNEE;  Left knee Arthroscopy with removal of loose bodies;  Surgeon: Bibi Roberts MD;  Location: [facility]     HC REPAIR UMBILICAL GODFREY,<6Y/O,REDUC  1978     IR RENAL BIOPSY LEFT  4/9/2019     TUBE THORACOSTOMY,ABSC/EMPYEMA/HEMO  3/20/2005    Small chest tube with Heimlich valve.       Family Hx:   Family History   Problem Relation Age of Onset     Autoimmune Disease No family hx of      Personal Hx:   Social History     Tobacco Use     Smoking status: Light Tobacco Smoker     Packs/day: 0.10     Years: 15.00     Pack years: 1.50     Types: Cigarettes     Smokeless tobacco: Former User     Types: Chew     Tobacco comment: 1 pack per day   Substance Use Topics     Alcohol use: Yes     Alcohol/week: 17.5 standard drinks     Types: 21 Cans of beer per week     Comment: occ       Allergies:  Allergies   Allergen Reactions     Bee Venom      swells up     No Known Drug Allergies      Seasonal Allergies        Medications:  Current Outpatient Medications   Medication Sig     albuterol (VENTOLIN HFA) 108 (90 Base) MCG/ACT inhaler INHALE 1-2 PUFFS INTO THE LUNGS EVERY 6 HOURS AS NEEDED FOR SHORTNESS OF BREATH, DIFFICULTY BREATHING OR WHEEZING. (NEED TO BE SEEN IN CLINIC FOR FURTHER REFILLS)     atorvastatin (LIPITOR) 20 MG tablet Take 1 tablet (20 mg) by mouth daily     cycloSPORINE modified (GENERIC EQUIVALENT) 100 MG capsule Take 1 capsule (100 mg) by mouth 2 times daily     lisinopril (ZESTRIL) 20 MG tablet Take 1 tablet (20 mg) by mouth daily     omeprazole (PRILOSEC) 40 MG DR capsule Take 1 capsule (40 mg) by mouth daily (Patient not taking: Reported on 9/16/2021)     predniSONE (DELTASONE) 20 MG tablet Take 3 tablets (60 mg) by mouth daily (Patient not taking: Reported on 8/9/2021)     No current facility-administered medications for this visit.      Vitals:  BP (!) 145/90 (BP  Location: Right arm, Patient Position: Sitting, Cuff Size: Adult Regular)   Pulse 66   Wt 72.8 kg (160 lb 6.4 oz)   SpO2 97%   BMI 23.69 kg/m    154-155 lbs  Exam:  GENERAL : alert and no distress  Speaks in full sentences without dyspnea  Normal speech and thought pattern    LABS:   CMP  Recent Labs   Lab Test 09/11/21  0805 08/14/21  0807 07/10/21  0829 06/05/21  0743 05/15/21  0757 05/15/21  0757 04/10/21  0745 04/10/21  0745    141 139 138   < > 138   < > 141   POTASSIUM 4.3 4.4 4.1 3.4   < > 3.8   < > 3.6   CHLORIDE 108 110* 110* 105   < > 103   < > 109   CO2 27 28 26 29   < > 32   < > 28   ANIONGAP 5 3 3 4   < > 3   < > 4   GLC 81 95 84 89   < > 95   < > 126*   BUN 24 24 19 17   < > 14   < > 14   CR 1.10 1.03 1.06 1.11   < > 1.11   < > 0.98   GFRESTIMATED 80 86 83 79   < > 79   < > >90   GFRESTBLACK  --   --  >90 >90  --  >90  --  >90   BASIA 9.6 9.2 9.2 8.5   < > 8.6   < > 8.7    < > = values in this interval not displayed.     Recent Labs   Lab Test 09/11/21  0805 06/05/21  0743 05/15/21  0757 04/06/21  1014   BILITOTAL 1.5* 1.4* 0.9 0.8   ALKPHOS 42 43 41 50   ALT 26 42 43 17   AST 15 18 30 9     CBC  Recent Labs   Lab Test 09/11/21  0805 08/14/21  0807 06/05/21  0743 05/15/21  0757   HGB 14.1 14.6 16.5 16.0   WBC 5.3 5.6 8.5 8.4   RBC 4.49 4.63 5.32 5.17   HCT 39.5* 41.1 47.6 47.1   MCV 88 89 90 91   MCH 31.4 31.5 31.0 30.9   MCHC 35.7 35.5 34.7 34.0   RDW 11.7 12.3 12.3 12.1    195 188 180     URINE STUDIES  Recent Labs   Lab Test 08/14/21  0819 12/14/20  0037 09/12/20  0841 02/28/20  1620 04/01/19  1500 04/01/19  1500 03/29/19  1858 03/29/19  1858   COLOR Yellow Yellow Yellow Yellow   < > Yellow   < > Yellow   APPEARANCE Clear Clear Clear Clear   < > Clear   < > Clear   URINEGLC Negative Negative Negative Negative   < > Negative   < > Negative   URINEBILI Negative Negative Negative Negative   < > Negative   < > Negative   URINEKETONE Negative Negative Negative Negative   < > Negative   < >  Negative   SG 1.026 1.019 1.021 >1.030   < > 1.015   < > >1.035*   UBLD Negative Negative Negative Moderate*   < > Moderate*   < > Moderate*   URINEPH 6.0 5.0 5.0 6.0   < > 6.0   < > 6.0   PROTEIN Negative Negative Negative >=300*   < > >=300*   < > >499*   UROBILINOGEN  --   --   --  0.2  --  0.2  --   --    NITRITE Negative Negative Negative Negative   < > Negative   < > Negative   LEUKEST Negative Negative Negative Negative   < > Negative   < > Negative   RBCU <1  --   --  O - 2  --  25-50*  --  7*   WBCU <1  --   --  0 - 5  --  0 - 5  --  7*    < > = values in this interval not displayed.     Recent Labs   Lab Test 04/03/20  0900 02/28/20  1619 11/26/19  1435 09/09/19  1510   UTPG 0.18 6.20* 0.06 0.14     PTH  No lab results found.  IRON STUDIES  No lab results found.    IMPRESSION:  1. Findings are concerning for diffuse small bowel enteritis likely  infectious or inflammatory in etiology, with associated inflammatory  changes of the mesenteric adipose tissues, moderate ascites, small  bilateral probably reactive pleural effusions and with some edema in  the subcutaneous adipose tissues.  2. No significant atherosclerosis is identified. No obvious arterial  blockage is seen in the mesenteric vessels to suggest ischemia.  3. Mild degenerative changes of the spine and hips. No acute fracture  or aggressive osseous lesion.    Aris Alcantar PA-C    Face to face time 12 minutes. An additional 25 minutes was spent on date of service in chart review, documentation, and other activities as noted.           Again, thank you for allowing me to participate in the care of your patient.      Sincerely,    ARIS ROBLEDO PA-C

## 2021-09-18 LAB
ALBUMIN SERPL-MCNC: 4.1 G/DL (ref 3.4–5)
ALP SERPL-CCNC: 44 U/L (ref 40–150)
ALT SERPL W P-5'-P-CCNC: 28 U/L (ref 0–70)
ANION GAP SERPL CALCULATED.3IONS-SCNC: 6 MMOL/L (ref 3–14)
AST SERPL W P-5'-P-CCNC: 16 U/L (ref 0–45)
BILIRUB DIRECT SERPL-MCNC: 0.4 MG/DL (ref 0–0.2)
BILIRUB SERPL-MCNC: 1.6 MG/DL (ref 0.2–1.3)
BUN SERPL-MCNC: 24 MG/DL (ref 7–30)
CALCIUM SERPL-MCNC: 9.5 MG/DL (ref 8.5–10.1)
CHLORIDE BLD-SCNC: 108 MMOL/L (ref 94–109)
CO2 SERPL-SCNC: 26 MMOL/L (ref 20–32)
CREAT SERPL-MCNC: 1.06 MG/DL (ref 0.66–1.25)
CREAT UR-MCNC: 162 MG/DL
ERYTHROCYTE [DISTWIDTH] IN BLOOD BY AUTOMATED COUNT: 11.7 % (ref 10–15)
GFR SERPL CREATININE-BSD FRML MDRD: 83 ML/MIN/1.73M2
GLUCOSE BLD-MCNC: 77 MG/DL (ref 70–99)
HCT VFR BLD AUTO: 41.6 % (ref 40–53)
HGB BLD-MCNC: 14.9 G/DL (ref 13.3–17.7)
MCH RBC QN AUTO: 31 PG (ref 26.5–33)
MCHC RBC AUTO-ENTMCNC: 35.8 G/DL (ref 31.5–36.5)
MCV RBC AUTO: 87 FL (ref 78–100)
MICROALBUMIN UR-MCNC: 6 MG/L
MICROALBUMIN/CREAT UR: 3.7 MG/G CR (ref 0–17)
PHOSPHATE SERPL-MCNC: 3.4 MG/DL (ref 2.5–4.5)
PLATELET # BLD AUTO: 179 10E3/UL (ref 150–450)
POTASSIUM BLD-SCNC: 4 MMOL/L (ref 3.4–5.3)
PROT SERPL-MCNC: 6.9 G/DL (ref 6.8–8.8)
RBC # BLD AUTO: 4.81 10E6/UL (ref 4.4–5.9)
SODIUM SERPL-SCNC: 140 MMOL/L (ref 133–144)
WBC # BLD AUTO: 4.5 10E3/UL (ref 4–11)

## 2021-09-18 PROCEDURE — 82043 UR ALBUMIN QUANTITATIVE: CPT

## 2021-09-18 PROCEDURE — 80158 DRUG ASSAY CYCLOSPORINE: CPT

## 2021-09-18 PROCEDURE — 84100 ASSAY OF PHOSPHORUS: CPT

## 2021-09-18 PROCEDURE — 85027 COMPLETE CBC AUTOMATED: CPT

## 2021-09-18 PROCEDURE — 36415 COLL VENOUS BLD VENIPUNCTURE: CPT

## 2021-09-18 PROCEDURE — 80053 COMPREHEN METABOLIC PANEL: CPT

## 2021-09-18 PROCEDURE — 82248 BILIRUBIN DIRECT: CPT

## 2021-09-19 LAB
CYCLOSPORINE BLD LC/MS/MS-MCNC: 83 UG/L (ref 50–400)
TME LAST DOSE: NORMAL H
TME LAST DOSE: NORMAL H

## 2021-10-01 ENCOUNTER — HOSPITAL ENCOUNTER (OUTPATIENT)
Dept: ULTRASOUND IMAGING | Facility: CLINIC | Age: 46
Discharge: HOME | End: 2021-10-01
Attending: INTERNAL MEDICINE | Admitting: INTERNAL MEDICINE
Payer: COMMERCIAL

## 2021-10-01 DIAGNOSIS — R17 ELEVATED BILIRUBIN: ICD-10-CM

## 2021-10-01 PROCEDURE — 76705 ECHO EXAM OF ABDOMEN: CPT

## 2021-10-05 DIAGNOSIS — R17 ELEVATED BILIRUBIN: ICD-10-CM

## 2021-10-05 DIAGNOSIS — N04.9 NEPHROTIC SYNDROME: Primary | ICD-10-CM

## 2021-10-05 DIAGNOSIS — K50.00 CROHN'S DISEASE OF SMALL INTESTINE WITHOUT COMPLICATION (H): ICD-10-CM

## 2021-10-05 DIAGNOSIS — N05.0 MINIMAL CHANGE DISEASE: ICD-10-CM

## 2021-10-05 NOTE — PROGRESS NOTES
Repeat cyclosporine level, check CK, rheumatoid factor, blood culture, and go from there?   Can lower cyclosporine if level >100     Kami, can you help coordinate labs     Thanks        Patient scheduled for lab appointment 10/9  Kami Simmons LPN  Nephrology  [phone]

## 2021-10-09 ENCOUNTER — LAB (OUTPATIENT)
Dept: LAB | Facility: CLINIC | Age: 46
End: 2021-10-09
Payer: COMMERCIAL

## 2021-10-09 DIAGNOSIS — D58.2 ELEVATED HEMOGLOBIN (H): ICD-10-CM

## 2021-10-09 DIAGNOSIS — R17 ELEVATED BILIRUBIN: ICD-10-CM

## 2021-10-09 DIAGNOSIS — N04.9 NEPHROTIC SYNDROME: ICD-10-CM

## 2021-10-09 DIAGNOSIS — D84.9 IMMUNOSUPPRESSION (H): ICD-10-CM

## 2021-10-09 DIAGNOSIS — N05.0 MINIMAL CHANGE DISEASE: ICD-10-CM

## 2021-10-09 DIAGNOSIS — K50.00 CROHN'S DISEASE OF SMALL INTESTINE WITHOUT COMPLICATION (H): ICD-10-CM

## 2021-10-09 DIAGNOSIS — E78.5 HYPERLIPIDEMIA LDL GOAL <100: ICD-10-CM

## 2021-10-09 LAB
ALBUMIN SERPL-MCNC: 3.9 G/DL (ref 3.4–5)
ALP SERPL-CCNC: 48 U/L (ref 40–150)
ALT SERPL W P-5'-P-CCNC: 21 U/L (ref 0–70)
ANION GAP SERPL CALCULATED.3IONS-SCNC: 5 MMOL/L (ref 3–14)
AST SERPL W P-5'-P-CCNC: 13 U/L (ref 0–45)
BILIRUB SERPL-MCNC: 1 MG/DL (ref 0.2–1.3)
BUN SERPL-MCNC: 14 MG/DL (ref 7–30)
CALCIUM SERPL-MCNC: 8.9 MG/DL (ref 8.5–10.1)
CHLORIDE BLD-SCNC: 108 MMOL/L (ref 94–109)
CK SERPL-CCNC: 111 U/L (ref 30–300)
CO2 SERPL-SCNC: 26 MMOL/L (ref 20–32)
CREAT SERPL-MCNC: 0.99 MG/DL (ref 0.66–1.25)
CREAT UR-MCNC: 238 MG/DL
ERYTHROCYTE [DISTWIDTH] IN BLOOD BY AUTOMATED COUNT: 11.4 % (ref 10–15)
GFR SERPL CREATININE-BSD FRML MDRD: 90 ML/MIN/1.73M2
GLUCOSE BLD-MCNC: 91 MG/DL (ref 70–99)
HCT VFR BLD AUTO: 41.9 % (ref 40–53)
HGB BLD-MCNC: 14.8 G/DL (ref 13.3–17.7)
MCH RBC QN AUTO: 30.8 PG (ref 26.5–33)
MCHC RBC AUTO-ENTMCNC: 35.3 G/DL (ref 31.5–36.5)
MCV RBC AUTO: 87 FL (ref 78–100)
MICROALBUMIN UR-MCNC: 9 MG/L
MICROALBUMIN/CREAT UR: 3.78 MG/G CR (ref 0–17)
PHOSPHATE SERPL-MCNC: 2.8 MG/DL (ref 2.5–4.5)
PLATELET # BLD AUTO: 180 10E3/UL (ref 150–450)
POTASSIUM BLD-SCNC: 3.8 MMOL/L (ref 3.4–5.3)
PROT SERPL-MCNC: 6.6 G/DL (ref 6.8–8.8)
RBC # BLD AUTO: 4.81 10E6/UL (ref 4.4–5.9)
SODIUM SERPL-SCNC: 139 MMOL/L (ref 133–144)
WBC # BLD AUTO: 4.4 10E3/UL (ref 4–11)

## 2021-10-09 PROCEDURE — 87040 BLOOD CULTURE FOR BACTERIA: CPT

## 2021-10-09 PROCEDURE — 82550 ASSAY OF CK (CPK): CPT

## 2021-10-09 PROCEDURE — 84100 ASSAY OF PHOSPHORUS: CPT

## 2021-10-09 PROCEDURE — 82043 UR ALBUMIN QUANTITATIVE: CPT

## 2021-10-09 PROCEDURE — 85027 COMPLETE CBC AUTOMATED: CPT

## 2021-10-09 PROCEDURE — 80158 DRUG ASSAY CYCLOSPORINE: CPT

## 2021-10-09 PROCEDURE — 36415 COLL VENOUS BLD VENIPUNCTURE: CPT

## 2021-10-09 PROCEDURE — 86431 RHEUMATOID FACTOR QUANT: CPT

## 2021-10-09 PROCEDURE — 80053 COMPREHEN METABOLIC PANEL: CPT

## 2021-10-10 LAB
CYCLOSPORINE BLD LC/MS/MS-MCNC: <25 UG/L (ref 50–400)
TME LAST DOSE: ABNORMAL H
TME LAST DOSE: ABNORMAL H

## 2021-10-11 LAB — RHEUMATOID FACT SER NEPH-ACNC: <7 IU/ML

## 2021-10-14 LAB — BACTERIA BLD CULT: NO GROWTH

## 2021-11-13 ENCOUNTER — LAB (OUTPATIENT)
Dept: LAB | Facility: CLINIC | Age: 46
End: 2021-11-13
Payer: COMMERCIAL

## 2021-11-13 DIAGNOSIS — D58.2 ELEVATED HEMOGLOBIN (H): ICD-10-CM

## 2021-11-13 DIAGNOSIS — N04.9 NEPHROTIC SYNDROME: ICD-10-CM

## 2021-11-13 DIAGNOSIS — N05.0 MINIMAL CHANGE DISEASE: ICD-10-CM

## 2021-11-13 DIAGNOSIS — K50.00 CROHN'S DISEASE OF SMALL INTESTINE WITHOUT COMPLICATION (H): ICD-10-CM

## 2021-11-13 DIAGNOSIS — D84.9 IMMUNOSUPPRESSION (H): ICD-10-CM

## 2021-11-13 DIAGNOSIS — E78.5 HYPERLIPIDEMIA LDL GOAL <100: ICD-10-CM

## 2021-11-13 LAB
ALBUMIN SERPL-MCNC: 2.7 G/DL (ref 3.4–5)
ALP SERPL-CCNC: 56 U/L (ref 40–150)
ALT SERPL W P-5'-P-CCNC: 18 U/L (ref 0–70)
ANION GAP SERPL CALCULATED.3IONS-SCNC: 1 MMOL/L (ref 3–14)
AST SERPL W P-5'-P-CCNC: 13 U/L (ref 0–45)
BILIRUB SERPL-MCNC: 0.8 MG/DL (ref 0.2–1.3)
BUN SERPL-MCNC: 18 MG/DL (ref 7–30)
CALCIUM SERPL-MCNC: 8.4 MG/DL (ref 8.5–10.1)
CHLORIDE BLD-SCNC: 110 MMOL/L (ref 94–109)
CO2 SERPL-SCNC: 29 MMOL/L (ref 20–32)
CREAT SERPL-MCNC: 0.9 MG/DL (ref 0.66–1.25)
CREAT UR-MCNC: 272 MG/DL
ERYTHROCYTE [DISTWIDTH] IN BLOOD BY AUTOMATED COUNT: 11.9 % (ref 10–15)
GFR SERPL CREATININE-BSD FRML MDRD: >90 ML/MIN/1.73M2
GLUCOSE BLD-MCNC: 94 MG/DL (ref 70–99)
HCT VFR BLD AUTO: 46.8 % (ref 40–53)
HGB BLD-MCNC: 16.2 G/DL (ref 13.3–17.7)
MCH RBC QN AUTO: 30.8 PG (ref 26.5–33)
MCHC RBC AUTO-ENTMCNC: 34.6 G/DL (ref 31.5–36.5)
MCV RBC AUTO: 89 FL (ref 78–100)
MICROALBUMIN UR-MCNC: ABNORMAL MG/L
MICROALBUMIN/CREAT UR: ABNORMAL MG/G CR (ref 0–17)
PHOSPHATE SERPL-MCNC: 3.6 MG/DL (ref 2.5–4.5)
PLATELET # BLD AUTO: 202 10E3/UL (ref 150–450)
POTASSIUM BLD-SCNC: 3.9 MMOL/L (ref 3.4–5.3)
PROT SERPL-MCNC: 5.6 G/DL (ref 6.8–8.8)
RBC # BLD AUTO: 5.26 10E6/UL (ref 4.4–5.9)
SODIUM SERPL-SCNC: 140 MMOL/L (ref 133–144)
WBC # BLD AUTO: 5 10E3/UL (ref 4–11)

## 2021-11-13 PROCEDURE — 80053 COMPREHEN METABOLIC PANEL: CPT

## 2021-11-13 PROCEDURE — 84100 ASSAY OF PHOSPHORUS: CPT

## 2021-11-13 PROCEDURE — 36415 COLL VENOUS BLD VENIPUNCTURE: CPT

## 2021-11-13 PROCEDURE — 80158 DRUG ASSAY CYCLOSPORINE: CPT

## 2021-11-13 PROCEDURE — 85027 COMPLETE CBC AUTOMATED: CPT

## 2021-11-13 PROCEDURE — 82043 UR ALBUMIN QUANTITATIVE: CPT

## 2021-11-23 ENCOUNTER — TELEPHONE (OUTPATIENT)
Dept: NEPHROLOGY | Facility: CLINIC | Age: 46
End: 2021-11-23
Payer: COMMERCIAL

## 2021-11-23 DIAGNOSIS — N04.9 NEPHROTIC SYNDROME: ICD-10-CM

## 2021-11-23 DIAGNOSIS — R80.9 NEPHROTIC RANGE PROTEINURIA: ICD-10-CM

## 2021-11-23 DIAGNOSIS — D84.9 IMMUNOSUPPRESSION (H): ICD-10-CM

## 2021-11-23 DIAGNOSIS — N05.0 MINIMAL CHANGE DISEASE: ICD-10-CM

## 2021-11-23 DIAGNOSIS — E78.5 HYPERLIPIDEMIA LDL GOAL <100: ICD-10-CM

## 2021-11-23 RX ORDER — OMEPRAZOLE 40 MG/1
40 CAPSULE, DELAYED RELEASE ORAL DAILY
Qty: 30 CAPSULE | Refills: 11 | Status: SHIPPED | OUTPATIENT
Start: 2021-11-23 | End: 2022-04-18

## 2021-11-23 RX ORDER — CYCLOSPORINE 100 MG/1
100 CAPSULE, LIQUID FILLED ORAL 2 TIMES DAILY
Qty: 60 CAPSULE | Refills: 11 | Status: SHIPPED | OUTPATIENT
Start: 2021-11-23 | End: 2022-04-18

## 2021-11-23 RX ORDER — LISINOPRIL 20 MG/1
20 TABLET ORAL DAILY
Qty: 90 TABLET | Refills: 3 | Status: SHIPPED | OUTPATIENT
Start: 2021-11-23 | End: 2022-12-09

## 2021-11-23 RX ORDER — BUMETANIDE 1 MG/1
1 TABLET ORAL DAILY PRN
Qty: 30 TABLET | Refills: 1 | Status: SHIPPED | OUTPATIENT
Start: 2021-11-23 | End: 2022-06-20

## 2021-11-23 RX ORDER — PREDNISONE 20 MG/1
60 TABLET ORAL DAILY
Qty: 90 TABLET | Refills: 3 | Status: SHIPPED | OUTPATIENT
Start: 2021-11-23 | End: 2022-04-18

## 2021-11-23 RX ORDER — SULFAMETHOXAZOLE/TRIMETHOPRIM 800-160 MG
1 TABLET ORAL
Qty: 45 TABLET | Refills: 1 | Status: SHIPPED | OUTPATIENT
Start: 2021-11-24 | End: 2022-04-18

## 2021-11-23 NOTE — TELEPHONE ENCOUNTER
Call to patient per Dr. Scott to see if he is sill taking cyclosporine. Left voice message with patient. Provided number for call back.   Call to Marnie, patients mom, who reports that she did not think the patient was taking any of his medications and is at home as he is now laid off. She reports that he was in a lot of pain when he was taking it and she believed he isnt taking anything right now. Marnie reports the last time she saw him she did not think he was in pain. Await to hear from patient.    I will set up a phone visit in the next few days, but let's restart prednisone 60mg daily     Kami, can you double check if he is taking cyclosporine?       Kami Simmons LPN  Nephrology  [phone]

## 2021-11-23 NOTE — TELEPHONE ENCOUNTER
Patient called back to report that he has not been taking his medications since Sept or October as he said he ran out of prescriptions and his mom called about that. Writer didn't get a call and no prescription see from pharmacy. Patient reports that he is laid off now and was on his way to another doctors appointment as he needed to  his asthma medication at the pharmacy. Patient states he did not know if his body hurt (sore muscles and joints) from cyclosporine of what from. He reports swelling in his hands, face and elbow. Will send to Dr. Scott for further recommendations.  Kami Simmons LPN  Nephrology  [phone]

## 2021-12-06 ENCOUNTER — OFFICE VISIT (OUTPATIENT)
Dept: NEPHROLOGY | Facility: CLINIC | Age: 46
End: 2021-12-06
Payer: COMMERCIAL

## 2021-12-06 ENCOUNTER — LAB (OUTPATIENT)
Dept: LAB | Facility: CLINIC | Age: 46
End: 2021-12-06
Payer: COMMERCIAL

## 2021-12-06 VITALS
SYSTOLIC BLOOD PRESSURE: 126 MMHG | HEART RATE: 78 BPM | DIASTOLIC BLOOD PRESSURE: 80 MMHG | HEIGHT: 69 IN | WEIGHT: 160 LBS | OXYGEN SATURATION: 100 % | BODY MASS INDEX: 23.7 KG/M2

## 2021-12-06 DIAGNOSIS — R60.9 EDEMA, UNSPECIFIED TYPE: ICD-10-CM

## 2021-12-06 DIAGNOSIS — N05.0 MINIMAL CHANGE DISEASE: Primary | ICD-10-CM

## 2021-12-06 DIAGNOSIS — D84.9 IMMUNOSUPPRESSION (H): ICD-10-CM

## 2021-12-06 DIAGNOSIS — R80.9 NEPHROTIC RANGE PROTEINURIA: ICD-10-CM

## 2021-12-06 DIAGNOSIS — N04.9 NEPHROTIC SYNDROME: ICD-10-CM

## 2021-12-06 DIAGNOSIS — N05.0 MINIMAL CHANGE DISEASE: ICD-10-CM

## 2021-12-06 LAB
ALBUMIN SERPL-MCNC: 2.1 G/DL (ref 3.4–5)
ALP SERPL-CCNC: 78 U/L (ref 40–150)
ALT SERPL W P-5'-P-CCNC: 36 U/L (ref 0–70)
ANION GAP SERPL CALCULATED.3IONS-SCNC: 6 MMOL/L (ref 3–14)
AST SERPL W P-5'-P-CCNC: 20 U/L (ref 0–45)
BILIRUB DIRECT SERPL-MCNC: 0.1 MG/DL (ref 0–0.2)
BILIRUB SERPL-MCNC: 0.5 MG/DL (ref 0.2–1.3)
BUN SERPL-MCNC: 35 MG/DL (ref 7–30)
CALCIUM SERPL-MCNC: 8.4 MG/DL (ref 8.5–10.1)
CHLORIDE BLD-SCNC: 100 MMOL/L (ref 94–109)
CO2 SERPL-SCNC: 30 MMOL/L (ref 20–32)
CREAT SERPL-MCNC: 0.96 MG/DL (ref 0.66–1.25)
CREAT UR-MCNC: 21 MG/DL
CREAT UR-MCNC: 23 MG/DL
ERYTHROCYTE [DISTWIDTH] IN BLOOD BY AUTOMATED COUNT: 12.4 % (ref 10–15)
GFR SERPL CREATININE-BSD FRML MDRD: >90 ML/MIN/1.73M2
GLUCOSE BLD-MCNC: 249 MG/DL (ref 70–99)
HCT VFR BLD AUTO: 47.1 % (ref 40–53)
HGB BLD-MCNC: 16.3 G/DL (ref 13.3–17.7)
MCH RBC QN AUTO: 30.4 PG (ref 26.5–33)
MCHC RBC AUTO-ENTMCNC: 34.6 G/DL (ref 31.5–36.5)
MCV RBC AUTO: 88 FL (ref 78–100)
MICROALBUMIN UR-MCNC: 588 MG/L
MICROALBUMIN/CREAT UR: 2556.52 MG/G CR (ref 0–17)
PHOSPHATE SERPL-MCNC: 2 MG/DL (ref 2.5–4.5)
PLATELET # BLD AUTO: 258 10E3/UL (ref 150–450)
POTASSIUM BLD-SCNC: 4.3 MMOL/L (ref 3.4–5.3)
PROT SERPL-MCNC: 5.6 G/DL (ref 6.8–8.8)
PROT UR-MCNC: 0.76 G/L
PROT/CREAT 24H UR: 3.62 G/G CR (ref 0–0.2)
RBC # BLD AUTO: 5.37 10E6/UL (ref 4.4–5.9)
SODIUM SERPL-SCNC: 136 MMOL/L (ref 133–144)
WBC # BLD AUTO: 16.1 10E3/UL (ref 4–11)

## 2021-12-06 PROCEDURE — 84100 ASSAY OF PHOSPHORUS: CPT

## 2021-12-06 PROCEDURE — 36415 COLL VENOUS BLD VENIPUNCTURE: CPT

## 2021-12-06 PROCEDURE — 99214 OFFICE O/P EST MOD 30 MIN: CPT | Performed by: PHYSICIAN ASSISTANT

## 2021-12-06 PROCEDURE — 80053 COMPREHEN METABOLIC PANEL: CPT

## 2021-12-06 PROCEDURE — 85027 COMPLETE CBC AUTOMATED: CPT

## 2021-12-06 PROCEDURE — 82248 BILIRUBIN DIRECT: CPT

## 2021-12-06 PROCEDURE — 82043 UR ALBUMIN QUANTITATIVE: CPT

## 2021-12-06 PROCEDURE — 84156 ASSAY OF PROTEIN URINE: CPT

## 2021-12-06 RX ORDER — ATORVASTATIN CALCIUM 20 MG/1
20 TABLET, FILM COATED ORAL DAILY
COMMUNITY
End: 2022-04-06

## 2021-12-06 ASSESSMENT — MIFFLIN-ST. JEOR: SCORE: 1591.14

## 2021-12-06 NOTE — PATIENT INSTRUCTIONS
1. No medication changes today. Get labs today. If improving, will start to taper prednisone down by 10 mg every couple of weeks.   2. Okay to stop bumetanide once swelling in legs improves.   3. Continue good hydration and low sodium diet.   4. Follow up in 2 months with me and in 4 months with Dr. Scott.   5. Call sooner with any questions or concerns.

## 2021-12-06 NOTE — PROGRESS NOTES
Nephrology Progress Note  12/6/21      Assessment and Plan:  46 year old male who presents for followup of minimal change disease. He presented for evaluation after presenting to ER where CT scan noted ascites and nonspecific small bowel enteritis, and UA showed proteinuria. He had biopsy which showed minimal change on 4/9/2019 and treated with prednisone 70mg. He was in remission and down to prednsione 10mg then stopped it, and had relapse within a couple months.  His mother noted swelling and he was up 20-25 lbs and proteinuria up to 6 grams/g cr.   # Minimal change disease/ nephrotic syndrome, second relapse within weeks of weaning prednisone, despite slow wean.  - Prednisone round #2 started March 2020, tried to do a longer wean once on lower doses.  - Prednisone round #3 started April 20,2021. He notes his weight was up 10 pounds but had not noted the swelling or other symptoms outside of it.  - March 2021 - he was off prednisone and has relapsed within a few weeks.  - Thus restarted prednisone, and started  cyclosporine 3-4mg/kg, started at 100mg twice a day- he was only taking it once a day for a while until he realized it was twice a day- the level was <25    - he is near 70-75 kg      - he developed generalized joint pain approximately 2 weeks after starting cyclosporin that progressively worsened, then plateaued. He was last seen in September 2021 for this.   He ultimately ran out of all medications while he was working out of town. He noticed increasing LE edema.   - repeat labs showed > 10 g/g urine prot/cr ratio. He was felt to be in relapse of minimal change disease.   - was started back on prednisone 60 mg daily, cyclosporin, bumex 1 mg prn for swelling, and bacrim prophylaxis on MWF  His LE swelling is improving. He has no current joint pain.     He has not been taking any ibuprofen, aleve, or tylenol. He has no associated fevers,chills, or rashes.    - labs today, continue labs monthly for now. If  urine prot/cr ratio improving, will start tapering prednisone by 10 mg every 2 weeks.     # Renal function- Scr was 1.1 in the past and was1.27 in ER. It was up to 1.6-1.8 at time of biopsy after diuretics were started.  - scr back to 0.9 range a few weeks ago  - he drinks 3-4 mountain dew a day, he has cut down to 2 !!) now drinking 1-2 bottles of water per day  - labs monthly    - 11/18/21 urine prot/cr 10.6 g/g    Labs on Saturdays if he is working.     Trough level cyclosporine in June 2021, 12 hours or so after last dose- was low but likely only took morning dose- he is now taking twice a day    Cyclosporin level on 9/11/21 was 824. He took the medication one hour before labs. Repeat this saturday       # Hyperlipidemia- LDL very elevated,was on low dose statin but stopped;  Restarted given nephrotic  - recheck lipids   - monitor on CNI    # Hypertension: normotensive   - continue lisinopril    # Not anemic- hgb 18 recently down to 16- stable    # Electrolytes: normal low potassium      # Mineral Bone Disorder:   Check baseline PTH if creatinine increases again     - RTC I 2 months with me and in 4 months with Dr. Scott. Labs monthly for now    Assessment and plan was discussed with patient and he voiced his understanding and agreement.    Reason for Visit:  Follow up for nephrotic range proteinuria noted and biopsy 4/9/19 proven minimal change disease    HPI:  He is a 46 year old male recently in normal health who presented with nephrotic syndrome. He underwent kidney biopsy 4/9 and it shows minimal change disease. He was admitted to the hospital briefly after biopsy as he was having poor appetite due to bloating, and elevated creatinine.    Since then he went into remission, we tapered down on prednisone and unfortunately he relapsed within a couple months (6g/g cr February 2020),  thus we restarted prednisone in March 2020 and by early April he was down to normal range proteinuria and he was feeling  better.    His weight is usually closer to 150-160  but was up to 184 lbs with last episode, was down to 160s with no proteinuria, but was back up to 188 lbs and once back on prednisone, his weight went back down to 158-159 lbs. He is currently 160 lbs.     He is working again as a .   He notes worsening joint pain since starting cyclosporin.  He denies any swelling or fluid retention.  He is now off prednisone though no specific instruction were given, as he was not seen for follow up at the time when Dr. Scott planned to wean down further.     Urine albumin was not obtained with recent labs, needs to be completed.     His BP was 145/90 in clinic today. He isn't checking BP at home, does not have a BP cuff    Renal History:   None prior to minimal change disease Spring 2019    ROS:   A comprehensive review of systems was obtained and negative, except as noted in the HPI or PMH.     Active Medical Problems:  Patient Active Problem List   Diagnosis     Loose body of left knee     Left knee pain     Tobacco abuse     History of pneumothorax     CARDIOVASCULAR SCREENING; LDL GOAL LESS THAN 160     Nephrotic syndrome     Anasarca     Minimal change disease     Immunosuppression (H)     Crohn's disease of small intestine without complication (H)     Elevated hemoglobin (H)     PMH:   Medical record was reviewed and PMH was discussed with patient and noted below.  Past Medical History:   Diagnosis Date     Anemia      Other spontaneous pneumothorax      SPONTANEOUS PNEUMOTHORAX NEC 3/25/2005     Unspecified asthma(493.90)      PSH:   Past Surgical History:   Procedure Laterality Date     ARTHROSCOPY KNEE  8/3/2012    Procedure: ARTHROSCOPY KNEE;  Left knee Arthroscopy with removal of loose bodies;  Surgeon: Bibi Roberts MD;  Location: [facility]     HC REPAIR UMBILICAL GODFREY,<4Y/O,REDUC  1978     IR RENAL BIOPSY LEFT  4/9/2019     TUBE THORACOSTOMY,ABSC/EMPYEMA/HEMO  3/20/2005    Small chest tube with  "Heimlich valve.       Family Hx:   Family History   Problem Relation Age of Onset     Autoimmune Disease No family hx of      Personal Hx:   Social History     Tobacco Use     Smoking status: Light Tobacco Smoker     Packs/day: 0.10     Years: 15.00     Pack years: 1.50     Types: Cigarettes     Smokeless tobacco: Former User     Types: Chew     Tobacco comment: 1 pack per day   Substance Use Topics     Alcohol use: Yes     Alcohol/week: 17.5 standard drinks     Types: 21 Cans of beer per week     Comment: occ       Allergies:  Allergies   Allergen Reactions     Bee Venom      swells up     No Known Drug Allergies      Seasonal Allergies        Medications:  Current Outpatient Medications   Medication Sig     albuterol (VENTOLIN HFA) 108 (90 Base) MCG/ACT inhaler INHALE 1-2 PUFFS INTO THE LUNGS EVERY 6 HOURS AS NEEDED FOR SHORTNESS OF BREATH, DIFFICULTY BREATHING OR WHEEZING. (NEED TO BE SEEN IN CLINIC FOR FURTHER REFILLS)     bumetanide (BUMEX) 1 MG tablet Take 1 tablet (1 mg) by mouth daily as needed (for increased swelling IF NEEDED)     cycloSPORINE modified (GENERIC EQUIVALENT) 100 MG capsule Take 1 capsule (100 mg) by mouth 2 times daily     cycloSPORINE modified (GENERIC EQUIVALENT) 100 MG capsule Take 1 capsule (100 mg) by mouth 2 times daily     lisinopril (ZESTRIL) 20 MG tablet Take 1 tablet (20 mg) by mouth daily     omeprazole (PRILOSEC) 40 MG DR capsule Take 1 capsule (40 mg) by mouth daily     predniSONE (DELTASONE) 20 MG tablet Take 3 tablets (60 mg) by mouth daily     sulfamethoxazole-trimethoprim (BACTRIM DS) 800-160 MG tablet Take 1 tablet by mouth Every Mon, Wed, Fri Morning     No current facility-administered medications for this visit.      Vitals:  /80 (BP Location: Left arm, Patient Position: Sitting, Cuff Size: Adult Regular)   Pulse 78   Ht 1.753 m (5' 9\")   Wt 72.6 kg (160 lb)   SpO2 100%   BMI 23.63 kg/m    155-160 lbs  Exam:  GENERAL : alert and no distress  Speaks in full " sentences without dyspnea  Normal speech and thought pattern  Lungs: clear  Extremities: 1+ pitting edema to knee    LABS:   CMP  Recent Labs   Lab Test 11/13/21  0756 10/09/21  0755 09/18/21  1158 09/11/21  0805 08/14/21  0807 07/10/21  0829 06/05/21  0743 05/15/21  0757 04/10/21  0745    139 140 140   < > 139 138 138 141   POTASSIUM 3.9 3.8 4.0 4.3   < > 4.1 3.4 3.8 3.6   CHLORIDE 110* 108 108 108   < > 110* 105 103 109   CO2 29 26 26 27   < > 26 29 32 28   ANIONGAP 1* 5 6 5   < > 3 4 3 4   GLC 94 91 77 81   < > 84 89 95 126*   BUN 18 14 24 24   < > 19 17 14 14   CR 0.90 0.99 1.06 1.10   < > 1.06 1.11 1.11 0.98   GFRESTIMATED >90 90 83 80   < > 83 79 79 >90   GFRESTBLACK  --   --   --   --   --  >90 >90 >90 >90   BASIA 8.4* 8.9 9.5 9.6   < > 9.2 8.5 8.6 8.7    < > = values in this interval not displayed.     Recent Labs   Lab Test 11/13/21  0756 10/09/21  0755 09/18/21  1158 09/11/21  0805   BILITOTAL 0.8 1.0 1.6* 1.5*   ALKPHOS 56 48 44 42   ALT 18 21 28 26   AST 13 13 16 15     CBC  Recent Labs   Lab Test 11/13/21  0756 10/09/21  0755 09/18/21  1158 09/11/21  0805   HGB 16.2 14.8 14.9 14.1   WBC 5.0 4.4 4.5 5.3   RBC 5.26 4.81 4.81 4.49   HCT 46.8 41.9 41.6 39.5*   MCV 89 87 87 88   MCH 30.8 30.8 31.0 31.4   MCHC 34.6 35.3 35.8 35.7   RDW 11.9 11.4 11.7 11.7    180 179 170     URINE STUDIES  Recent Labs   Lab Test 08/14/21  0819 12/14/20  0037 09/12/20  0841 02/28/20  1620 04/01/19  1500 03/29/19  1858   COLOR Yellow Yellow Yellow Yellow Yellow Yellow   APPEARANCE Clear Clear Clear Clear Clear Clear   URINEGLC Negative Negative Negative Negative Negative Negative   URINEBILI Negative Negative Negative Negative Negative Negative   URINEKETONE Negative Negative Negative Negative Negative Negative   SG 1.026 1.019 1.021 >1.030 1.015 >1.035*   UBLD Negative Negative Negative Moderate* Moderate* Moderate*   URINEPH 6.0 5.0 5.0 6.0 6.0 6.0   PROTEIN Negative Negative Negative >=300* >=300* >499*    UROBILINOGEN  --   --   --  0.2 0.2  --    NITRITE Negative Negative Negative Negative Negative Negative   LEUKEST Negative Negative Negative Negative Negative Negative   RBCU <1  --   --  O - 2 25-50* 7*   WBCU <1  --   --  0 - 5 0 - 5 7*     Recent Labs   Lab Test 04/03/20  0900 02/28/20  1619 11/26/19  1435 09/09/19  1510   UTPG 0.18 6.20* 0.06 0.14     PTH  No lab results found.  IRON STUDIES  No lab results found.    IMPRESSION:  1. Findings are concerning for diffuse small bowel enteritis likely  infectious or inflammatory in etiology, with associated inflammatory  changes of the mesenteric adipose tissues, moderate ascites, small  bilateral probably reactive pleural effusions and with some edema in  the subcutaneous adipose tissues.  2. No significant atherosclerosis is identified. No obvious arterial  blockage is seen in the mesenteric vessels to suggest ischemia.  3. Mild degenerative changes of the spine and hips. No acute fracture  or aggressive osseous lesion.    Barbara Alcantar PA-C    Face to face time 15 minutes. An additional 20 minutes was spent on date of service in chart review, documentation, and other activities as noted.

## 2021-12-06 NOTE — LETTER
12/6/2021     RE: Deshawn Flood  [address]  [facility] [address]     Dear Colleague,    Thank you for referring your patient, Deshawn Flood, to the [facility] at [facility]. Please see a copy of my visit note below.    Nephrology Progress Note  12/6/21      Assessment and Plan:  46 year old male who presents for followup of minimal change disease. He presented for evaluation after presenting to ER where CT scan noted ascites and nonspecific small bowel enteritis, and UA showed proteinuria. He had biopsy which showed minimal change on 4/9/2019 and treated with prednisone 70mg. He was in remission and down to prednsione 10mg then stopped it, and had relapse within a couple months.  His mother noted swelling and he was up 20-25 lbs and proteinuria up to 6 grams/g cr.   # Minimal change disease/ nephrotic syndrome, second relapse within weeks of weaning prednisone, despite slow wean.  - Prednisone round #2 started March 2020, tried to do a longer wean once on lower doses.  - Prednisone round #3 started April 20,2021. He notes his weight was up 10 pounds but had not noted the swelling or other symptoms outside of it.  - March 2021 - he was off prednisone and has relapsed within a few weeks.  - Thus restarted prednisone, and started  cyclosporine 3-4mg/kg, started at 100mg twice a day- he was only taking it once a day for a while until he realized it was twice a day- the level was <25    - he is near 70-75 kg      - he developed generalized joint pain approximately 2 weeks after starting cyclosporin that progressively worsened, then plateaued. He was last seen in September 2021 for this.   He ultimately ran out of all medications while he was working out of town. He noticed increasing LE edema.   - repeat labs showed > 10 g/g urine prot/cr ratio. He was felt to be in relapse of minimal change disease.   - was started back on prednisone 60 mg  daily, cyclosporin, bumex 1 mg prn for swelling, and bacrim prophylaxis on MWF  His LE swelling is improving. He has no current joint pain.     He has not been taking any ibuprofen, aleve, or tylenol. He has no associated fevers,chills, or rashes.    - labs today, continue labs monthly for now. If urine prot/cr ratio improving, will start tapering prednisone by 10 mg every 2 weeks.     # Renal function- Scr was 1.1 in the past and was1.27 in ER. It was up to 1.6-1.8 at time of biopsy after diuretics were started.  - scr back to 0.9 range a few weeks ago  - he drinks 3-4 mountain dew a day, he has cut down to 2 !!) now drinking 1-2 bottles of water per day  - labs monthly    - 11/18/21 urine prot/cr 10.6 g/g    Labs on Saturdays if he is working.     Trough level cyclosporine in June 2021, 12 hours or so after last dose- was low but likely only took morning dose- he is now taking twice a day    Cyclosporin level on 9/11/21 was 824. He took the medication one hour before labs. Repeat this saturday       # Hyperlipidemia- LDL very elevated,was on low dose statin but stopped;  Restarted given nephrotic  - recheck lipids   - monitor on CNI    # Hypertension: normotensive   - continue lisinopril    # Not anemic- hgb 18 recently down to 16- stable    # Electrolytes: normal low potassium      # Mineral Bone Disorder:   Check baseline PTH if creatinine increases again     - RTC I 2 months with me and in 4 months with Dr. Scott. Labs monthly for now    Assessment and plan was discussed with patient and he voiced his understanding and agreement.    Reason for Visit:  Follow up for nephrotic range proteinuria noted and biopsy 4/9/19 proven minimal change disease    HPI:  He is a 46 year old male recently in normal health who presented with nephrotic syndrome. He underwent kidney biopsy 4/9 and it shows minimal change disease. He was admitted to the hospital briefly after biopsy as he was having poor appetite due to bloating,  and elevated creatinine.    Since then he went into remission, we tapered down on prednisone and unfortunately he relapsed within a couple months (6g/g cr February 2020),  thus we restarted prednisone in March 2020 and by early April he was down to normal range proteinuria and he was feeling better.    His weight is usually closer to 150-160  but was up to 184 lbs with last episode, was down to 160s with no proteinuria, but was back up to 188 lbs and once back on prednisone, his weight went back down to 158-159 lbs. He is currently 160 lbs.     He is working again as a .   He notes worsening joint pain since starting cyclosporin.  He denies any swelling or fluid retention.  He is now off prednisone though no specific instruction were given, as he was not seen for follow up at the time when Dr. Scott planned to wean down further.     Urine albumin was not obtained with recent labs, needs to be completed.     His BP was 145/90 in clinic today. He isn't checking BP at home, does not have a BP cuff    Renal History:   None prior to minimal change disease Spring 2019    ROS:   A comprehensive review of systems was obtained and negative, except as noted in the HPI or PMH.     Active Medical Problems:  Patient Active Problem List   Diagnosis     Loose body of left knee     Left knee pain     Tobacco abuse     History of pneumothorax     CARDIOVASCULAR SCREENING; LDL GOAL LESS THAN 160     Nephrotic syndrome     Anasarca     Minimal change disease     Immunosuppression (H)     Crohn's disease of small intestine without complication (H)     Elevated hemoglobin (H)     PMH:   Medical record was reviewed and PMH was discussed with patient and noted below.  Past Medical History:   Diagnosis Date     Anemia      Other spontaneous pneumothorax      SPONTANEOUS PNEUMOTHORAX NEC 3/25/2005     Unspecified asthma(493.90)      PSH:   Past Surgical History:   Procedure Laterality Date     ARTHROSCOPY KNEE  8/3/2012     Procedure: ARTHROSCOPY KNEE;  Left knee Arthroscopy with removal of loose bodies;  Surgeon: Bibi Roebrts MD;  Location: [facility]     HC REPAIR UMBILICAL GODFREY,<4Y/O,REDUC  1978     IR RENAL BIOPSY LEFT  4/9/2019     TUBE THORACOSTOMY,ABSC/EMPYEMA/HEMO  3/20/2005    Small chest tube with Heimlich valve.       Family Hx:   Family History   Problem Relation Age of Onset     Autoimmune Disease No family hx of      Personal Hx:   Social History     Tobacco Use     Smoking status: Light Tobacco Smoker     Packs/day: 0.10     Years: 15.00     Pack years: 1.50     Types: Cigarettes     Smokeless tobacco: Former User     Types: Chew     Tobacco comment: 1 pack per day   Substance Use Topics     Alcohol use: Yes     Alcohol/week: 17.5 standard drinks     Types: 21 Cans of beer per week     Comment: occ       Allergies:  Allergies   Allergen Reactions     Bee Venom      swells up     No Known Drug Allergies      Seasonal Allergies        Medications:  Current Outpatient Medications   Medication Sig     albuterol (VENTOLIN HFA) 108 (90 Base) MCG/ACT inhaler INHALE 1-2 PUFFS INTO THE LUNGS EVERY 6 HOURS AS NEEDED FOR SHORTNESS OF BREATH, DIFFICULTY BREATHING OR WHEEZING. (NEED TO BE SEEN IN CLINIC FOR FURTHER REFILLS)     bumetanide (BUMEX) 1 MG tablet Take 1 tablet (1 mg) by mouth daily as needed (for increased swelling IF NEEDED)     cycloSPORINE modified (GENERIC EQUIVALENT) 100 MG capsule Take 1 capsule (100 mg) by mouth 2 times daily     cycloSPORINE modified (GENERIC EQUIVALENT) 100 MG capsule Take 1 capsule (100 mg) by mouth 2 times daily     lisinopril (ZESTRIL) 20 MG tablet Take 1 tablet (20 mg) by mouth daily     omeprazole (PRILOSEC) 40 MG DR capsule Take 1 capsule (40 mg) by mouth daily     predniSONE (DELTASONE) 20 MG tablet Take 3 tablets (60 mg) by mouth daily     sulfamethoxazole-trimethoprim (BACTRIM DS) 800-160 MG tablet Take 1 tablet by mouth Every Mon, Wed, Fri Morning     No current facility-administered  "medications for this visit.      Vitals:  /80 (BP Location: Left arm, Patient Position: Sitting, Cuff Size: Adult Regular)   Pulse 78   Ht 1.753 m (5' 9\")   Wt 72.6 kg (160 lb)   SpO2 100%   BMI 23.63 kg/m    155-160 lbs  Exam:  GENERAL : alert and no distress  Speaks in full sentences without dyspnea  Normal speech and thought pattern  Lungs: clear  Extremities: 1+ pitting edema to knee    LABS:   CMP  Recent Labs   Lab Test 11/13/21  0756 10/09/21  0755 09/18/21  1158 09/11/21  0805 08/14/21  0807 07/10/21  0829 06/05/21  0743 05/15/21  0757 04/10/21  0745    139 140 140   < > 139 138 138 141   POTASSIUM 3.9 3.8 4.0 4.3   < > 4.1 3.4 3.8 3.6   CHLORIDE 110* 108 108 108   < > 110* 105 103 109   CO2 29 26 26 27   < > 26 29 32 28   ANIONGAP 1* 5 6 5   < > 3 4 3 4   GLC 94 91 77 81   < > 84 89 95 126*   BUN 18 14 24 24   < > 19 17 14 14   CR 0.90 0.99 1.06 1.10   < > 1.06 1.11 1.11 0.98   GFRESTIMATED >90 90 83 80   < > 83 79 79 >90   GFRESTBLACK  --   --   --   --   --  >90 >90 >90 >90   BASIA 8.4* 8.9 9.5 9.6   < > 9.2 8.5 8.6 8.7    < > = values in this interval not displayed.     Recent Labs   Lab Test 11/13/21  0756 10/09/21  0755 09/18/21  1158 09/11/21  0805   BILITOTAL 0.8 1.0 1.6* 1.5*   ALKPHOS 56 48 44 42   ALT 18 21 28 26   AST 13 13 16 15     CBC  Recent Labs   Lab Test 11/13/21  0756 10/09/21  0755 09/18/21  1158 09/11/21  0805   HGB 16.2 14.8 14.9 14.1   WBC 5.0 4.4 4.5 5.3   RBC 5.26 4.81 4.81 4.49   HCT 46.8 41.9 41.6 39.5*   MCV 89 87 87 88   MCH 30.8 30.8 31.0 31.4   MCHC 34.6 35.3 35.8 35.7   RDW 11.9 11.4 11.7 11.7    180 179 170     URINE STUDIES  Recent Labs   Lab Test 08/14/21  0819 12/14/20  0037 09/12/20  0841 02/28/20  1620 04/01/19  1500 03/29/19  1858   COLOR Yellow Yellow Yellow Yellow Yellow Yellow   APPEARANCE Clear Clear Clear Clear Clear Clear   URINEGLC Negative Negative Negative Negative Negative Negative   URINEBILI Negative Negative Negative Negative Negative " Negative   URINEKETONE Negative Negative Negative Negative Negative Negative   SG 1.026 1.019 1.021 >1.030 1.015 >1.035*   UBLD Negative Negative Negative Moderate* Moderate* Moderate*   URINEPH 6.0 5.0 5.0 6.0 6.0 6.0   PROTEIN Negative Negative Negative >=300* >=300* >499*   UROBILINOGEN  --   --   --  0.2 0.2  --    NITRITE Negative Negative Negative Negative Negative Negative   LEUKEST Negative Negative Negative Negative Negative Negative   RBCU <1  --   --  O - 2 25-50* 7*   WBCU <1  --   --  0 - 5 0 - 5 7*     Recent Labs   Lab Test 04/03/20  0900 02/28/20  1619 11/26/19  1435 09/09/19  1510   UTPG 0.18 6.20* 0.06 0.14     PTH  No lab results found.  IRON STUDIES  No lab results found.    IMPRESSION:  1. Findings are concerning for diffuse small bowel enteritis likely  infectious or inflammatory in etiology, with associated inflammatory  changes of the mesenteric adipose tissues, moderate ascites, small  bilateral probably reactive pleural effusions and with some edema in  the subcutaneous adipose tissues.  2. No significant atherosclerosis is identified. No obvious arterial  blockage is seen in the mesenteric vessels to suggest ischemia.  3. Mild degenerative changes of the spine and hips. No acute fracture  or aggressive osseous lesion.    Barbara Alcantar PA-C    Face to face time 15 minutes. An additional 20 minutes was spent on date of service in chart review, documentation, and other activities as noted.

## 2021-12-10 ENCOUNTER — TELEPHONE (OUTPATIENT)
Dept: NEPHROLOGY | Facility: CLINIC | Age: 46
End: 2021-12-10
Payer: COMMERCIAL

## 2021-12-10 NOTE — TELEPHONE ENCOUNTER
Barbara Alcantar PA-C Engen, Kayla, RN  Please tell pt okay to start tapering off prednisone. He can decrease by 10 mg every 2 weeks. Follow up as planned. Thank you      Left message informing pt to start tapering prednisone by 10mg every 2 weeks and to follow up with PREETI Rasmussen in 2 months and Dr. Scott in 4 months. Will send message to  to arrange appointments.

## 2021-12-10 NOTE — TELEPHONE ENCOUNTER
Call to patient per Dr. Scott to inform him of recommendations.    The protein leaking is nicely improved   We will wean down the prednisone and monitor closely (60mg for 4 weeks then 40mg for 4 weeks as called in to pharmacy)     Patient reports that he is doing fine, no aches and pains in his joints and no other concerns at this time. Will check in in a few weeks to see if he needs prednisone 40 mg sent to pharmacy.  Kami Simmons LPN  Nephrology  [phone]

## 2021-12-11 ENCOUNTER — LAB (OUTPATIENT)
Dept: LAB | Facility: CLINIC | Age: 46
End: 2021-12-11
Payer: COMMERCIAL

## 2021-12-11 DIAGNOSIS — N04.9 NEPHROTIC SYNDROME: ICD-10-CM

## 2021-12-11 DIAGNOSIS — D58.2 ELEVATED HEMOGLOBIN (H): ICD-10-CM

## 2021-12-11 DIAGNOSIS — K50.00 CROHN'S DISEASE OF SMALL INTESTINE WITHOUT COMPLICATION (H): ICD-10-CM

## 2021-12-11 DIAGNOSIS — D84.9 IMMUNOSUPPRESSION (H): ICD-10-CM

## 2021-12-11 DIAGNOSIS — E78.5 HYPERLIPIDEMIA LDL GOAL <100: ICD-10-CM

## 2021-12-11 DIAGNOSIS — N05.0 MINIMAL CHANGE DISEASE: ICD-10-CM

## 2021-12-11 LAB
ALBUMIN SERPL-MCNC: 2.4 G/DL (ref 3.4–5)
ALP SERPL-CCNC: 60 U/L (ref 40–150)
ALT SERPL W P-5'-P-CCNC: 30 U/L (ref 0–70)
ANION GAP SERPL CALCULATED.3IONS-SCNC: 4 MMOL/L (ref 3–14)
AST SERPL W P-5'-P-CCNC: 10 U/L (ref 0–45)
BILIRUB SERPL-MCNC: 0.6 MG/DL (ref 0.2–1.3)
BUN SERPL-MCNC: 18 MG/DL (ref 7–30)
CALCIUM SERPL-MCNC: 8.4 MG/DL (ref 8.5–10.1)
CHLORIDE BLD-SCNC: 106 MMOL/L (ref 94–109)
CO2 SERPL-SCNC: 29 MMOL/L (ref 20–32)
CREAT SERPL-MCNC: 0.89 MG/DL (ref 0.66–1.25)
CREAT UR-MCNC: 184 MG/DL
ERYTHROCYTE [DISTWIDTH] IN BLOOD BY AUTOMATED COUNT: 12.1 % (ref 10–15)
GFR SERPL CREATININE-BSD FRML MDRD: >90 ML/MIN/1.73M2
GLUCOSE BLD-MCNC: 81 MG/DL (ref 70–99)
HCT VFR BLD AUTO: 45.9 % (ref 40–53)
HGB BLD-MCNC: 15.8 G/DL (ref 13.3–17.7)
MCH RBC QN AUTO: 30.6 PG (ref 26.5–33)
MCHC RBC AUTO-ENTMCNC: 34.4 G/DL (ref 31.5–36.5)
MCV RBC AUTO: 89 FL (ref 78–100)
MICROALBUMIN UR-MCNC: 10 MG/L
MICROALBUMIN/CREAT UR: 5.43 MG/G CR (ref 0–17)
PHOSPHATE SERPL-MCNC: 2.7 MG/DL (ref 2.5–4.5)
PLATELET # BLD AUTO: 190 10E3/UL (ref 150–450)
POTASSIUM BLD-SCNC: 3.4 MMOL/L (ref 3.4–5.3)
PROT SERPL-MCNC: 5.5 G/DL (ref 6.8–8.8)
RBC # BLD AUTO: 5.16 10E6/UL (ref 4.4–5.9)
SODIUM SERPL-SCNC: 139 MMOL/L (ref 133–144)
WBC # BLD AUTO: 10.4 10E3/UL (ref 4–11)

## 2021-12-11 PROCEDURE — 80053 COMPREHEN METABOLIC PANEL: CPT

## 2021-12-11 PROCEDURE — 85027 COMPLETE CBC AUTOMATED: CPT

## 2021-12-11 PROCEDURE — 84100 ASSAY OF PHOSPHORUS: CPT

## 2021-12-11 PROCEDURE — 36415 COLL VENOUS BLD VENIPUNCTURE: CPT

## 2021-12-11 PROCEDURE — 82043 UR ALBUMIN QUANTITATIVE: CPT

## 2021-12-31 ENCOUNTER — TELEPHONE (OUTPATIENT)
Dept: NEPHROLOGY | Facility: CLINIC | Age: 46
End: 2021-12-31
Payer: COMMERCIAL

## 2021-12-31 NOTE — TELEPHONE ENCOUNTER
----- Message from Kami Simmons LPN sent at 12/24/2021  8:39 AM CST -----  Protein leaking is down to normal range which is great   We will have you wean down on the prednisone as discussed, decrease by 10mg every 2 weeks   We need a cyclosporine level, it was not drawn correctly, please do this next time, 10-12 hours after your last dose and before you take the next dose (trough level)   thanks

## 2021-12-31 NOTE — TELEPHONE ENCOUNTER
Call to patient to check in and see how is doing and to relay the message per Dr. Scott below. Patient reports that he has been taking prednisone 60 mg daily and will decrease to 50 mg tomorrow. Patient verbalized that he should decrease by 10 mg every 2 weeks and he is scheduled for labs Jan 8th. Patient states that he takes his cyclosporine at 7 or 8 at night and in the morning and will not take his medication for next lab at 8 am on the 8th but take after. Patient verbalized understanding from Dr. Scott's instructions.  Kami Simmons LPN  Nephrology  [phone]

## 2022-01-08 ENCOUNTER — LAB (OUTPATIENT)
Dept: LAB | Facility: CLINIC | Age: 47
End: 2022-01-08
Payer: COMMERCIAL

## 2022-01-08 DIAGNOSIS — E78.5 HYPERLIPIDEMIA LDL GOAL <100: ICD-10-CM

## 2022-01-08 DIAGNOSIS — K50.00 CROHN'S DISEASE OF SMALL INTESTINE WITHOUT COMPLICATION (H): ICD-10-CM

## 2022-01-08 DIAGNOSIS — N04.9 NEPHROTIC SYNDROME: ICD-10-CM

## 2022-01-08 DIAGNOSIS — D84.9 IMMUNOSUPPRESSION (H): ICD-10-CM

## 2022-01-08 DIAGNOSIS — D58.2 ELEVATED HEMOGLOBIN (H): ICD-10-CM

## 2022-01-08 DIAGNOSIS — N05.0 MINIMAL CHANGE DISEASE: ICD-10-CM

## 2022-01-08 LAB
ALBUMIN SERPL-MCNC: 3.4 G/DL (ref 3.4–5)
ALP SERPL-CCNC: 47 U/L (ref 40–150)
ALT SERPL W P-5'-P-CCNC: 49 U/L (ref 0–70)
ANION GAP SERPL CALCULATED.3IONS-SCNC: 4 MMOL/L (ref 3–14)
AST SERPL W P-5'-P-CCNC: 14 U/L (ref 0–45)
BILIRUB SERPL-MCNC: 1.1 MG/DL (ref 0.2–1.3)
BUN SERPL-MCNC: 21 MG/DL (ref 7–30)
CALCIUM SERPL-MCNC: 9 MG/DL (ref 8.5–10.1)
CHLORIDE BLD-SCNC: 107 MMOL/L (ref 94–109)
CO2 SERPL-SCNC: 30 MMOL/L (ref 20–32)
CREAT SERPL-MCNC: 1.04 MG/DL (ref 0.66–1.25)
CREAT UR-MCNC: 266 MG/DL
ERYTHROCYTE [DISTWIDTH] IN BLOOD BY AUTOMATED COUNT: 12.9 % (ref 10–15)
GFR SERPL CREATININE-BSD FRML MDRD: 89 ML/MIN/1.73M2
GLUCOSE BLD-MCNC: 86 MG/DL (ref 70–99)
HCT VFR BLD AUTO: 46.2 % (ref 40–53)
HGB BLD-MCNC: 16.1 G/DL (ref 13.3–17.7)
MCH RBC QN AUTO: 32.3 PG (ref 26.5–33)
MCHC RBC AUTO-ENTMCNC: 34.8 G/DL (ref 31.5–36.5)
MCV RBC AUTO: 93 FL (ref 78–100)
MICROALBUMIN UR-MCNC: 8 MG/L
MICROALBUMIN/CREAT UR: 3.01 MG/G CR (ref 0–17)
PHOSPHATE SERPL-MCNC: 3.7 MG/DL (ref 2.5–4.5)
PLATELET # BLD AUTO: 167 10E3/UL (ref 150–450)
POTASSIUM BLD-SCNC: 3.7 MMOL/L (ref 3.4–5.3)
PROT SERPL-MCNC: 6.3 G/DL (ref 6.8–8.8)
RBC # BLD AUTO: 4.98 10E6/UL (ref 4.4–5.9)
SODIUM SERPL-SCNC: 141 MMOL/L (ref 133–144)
WBC # BLD AUTO: 9.9 10E3/UL (ref 4–11)

## 2022-01-08 PROCEDURE — 85027 COMPLETE CBC AUTOMATED: CPT

## 2022-01-08 PROCEDURE — 36415 COLL VENOUS BLD VENIPUNCTURE: CPT

## 2022-01-08 PROCEDURE — 80053 COMPREHEN METABOLIC PANEL: CPT

## 2022-01-08 PROCEDURE — 84100 ASSAY OF PHOSPHORUS: CPT

## 2022-01-08 PROCEDURE — 80158 DRUG ASSAY CYCLOSPORINE: CPT

## 2022-01-08 PROCEDURE — 82043 UR ALBUMIN QUANTITATIVE: CPT

## 2022-01-09 LAB
CYCLOSPORINE BLD LC/MS/MS-MCNC: 64 UG/L (ref 50–400)
TME LAST DOSE: NORMAL H
TME LAST DOSE: NORMAL H

## 2022-01-28 DIAGNOSIS — J45.40 MODERATE PERSISTENT ASTHMA WITHOUT COMPLICATION: ICD-10-CM

## 2022-01-31 RX ORDER — ALBUTEROL SULFATE 90 UG/1
AEROSOL, METERED RESPIRATORY (INHALATION)
Qty: 18 G | Refills: 3 | Status: SHIPPED | OUTPATIENT
Start: 2022-01-31 | End: 2022-09-13

## 2022-01-31 NOTE — TELEPHONE ENCOUNTER
Albuterol inhaler  Routing refill request to provider for review/approval because:  ACT failed RN refill protocol    Bere Calderon RN

## 2022-02-05 ENCOUNTER — LAB (OUTPATIENT)
Dept: LAB | Facility: CLINIC | Age: 47
End: 2022-02-05
Payer: COMMERCIAL

## 2022-02-05 DIAGNOSIS — N05.0 MINIMAL CHANGE DISEASE: ICD-10-CM

## 2022-02-05 DIAGNOSIS — D84.9 IMMUNOSUPPRESSION (H): ICD-10-CM

## 2022-02-05 DIAGNOSIS — N04.9 NEPHROTIC SYNDROME: ICD-10-CM

## 2022-02-05 LAB
ALBUMIN SERPL-MCNC: 3.7 G/DL (ref 3.4–5)
ANION GAP SERPL CALCULATED.3IONS-SCNC: 4 MMOL/L (ref 3–14)
BUN SERPL-MCNC: 25 MG/DL (ref 7–30)
CALCIUM SERPL-MCNC: 9.8 MG/DL (ref 8.5–10.1)
CHLORIDE BLD-SCNC: 107 MMOL/L (ref 94–109)
CO2 SERPL-SCNC: 30 MMOL/L (ref 20–32)
CREAT SERPL-MCNC: 1.08 MG/DL (ref 0.66–1.25)
CREAT UR-MCNC: 233 MG/DL
GFR SERPL CREATININE-BSD FRML MDRD: 85 ML/MIN/1.73M2
GLUCOSE BLD-MCNC: 93 MG/DL (ref 70–99)
MICROALBUMIN UR-MCNC: 9 MG/L
MICROALBUMIN/CREAT UR: 3.86 MG/G CR (ref 0–17)
PHOSPHATE SERPL-MCNC: 3.3 MG/DL (ref 2.5–4.5)
POTASSIUM BLD-SCNC: 4.1 MMOL/L (ref 3.4–5.3)
SODIUM SERPL-SCNC: 141 MMOL/L (ref 133–144)

## 2022-02-05 PROCEDURE — 82043 UR ALBUMIN QUANTITATIVE: CPT

## 2022-02-05 PROCEDURE — 80158 DRUG ASSAY CYCLOSPORINE: CPT

## 2022-02-05 PROCEDURE — 80069 RENAL FUNCTION PANEL: CPT

## 2022-02-05 PROCEDURE — 36415 COLL VENOUS BLD VENIPUNCTURE: CPT

## 2022-02-06 LAB
CYCLOSPORINE BLD LC/MS/MS-MCNC: 36 UG/L (ref 50–400)
TME LAST DOSE: ABNORMAL H
TME LAST DOSE: ABNORMAL H

## 2022-02-06 NOTE — PROGRESS NOTES
Nephrology Progress Note  2/7/2022      Assessment and Plan:  46 year old male who presents for followup of minimal change disease. He presented for evaluation after presenting to ER where CT scan noted ascites and nonspecific small bowel enteritis, and UA showed proteinuria. He had biopsy which showed minimal change on 4/9/2019 and treated with prednisone 70mg. He was in remission and down to prednsione 10mg then stopped it, and had relapse within a couple months.  His mother noted swelling and he was up 20-25 lbs and proteinuria up to 6 grams/g cr.     # Minimal change disease/ nephrotic syndrome, second relapse within weeks of weaning prednisone, despite slow wean.  - Prednisone round #2 started March 2020, tried to do a longer wean once on lower doses.  - Prednisone round #3 started April 20,2021. He notes his weight was up 10 pounds but had not noted the swelling or other symptoms outside of it.  - March 2021 - he was off prednisone and has relapsed within a few weeks.  - Thus restarted prednisone, and started  cyclosporine 3-4mg/kg, started at 100mg twice a day- he was only taking it once a day for a while until he realized it was twice a day- the level was <25    - he is near 70-75 kg    - he developed generalized joint pain approximately 2 weeks after starting cyclosporin that progressively worsened, then plateaued. He was last seen in September 2021 for this.   He ultimately ran out of all medications while he was working out of town. He noticed increasing LE edema.   - repeat labs showed > 10 g/g urine prot/cr ratio. He was felt to be in relapse of minimal change disease.   - was started back on prednisone 60 mg daily, cyclosporin, bumex 1 mg prn for swelling, and bacrim prophylaxis on MWF  His LE swelling improved. He has no current joint pain.   - he is slowly tapering off prednisone    He has not been taking any ibuprofen, aleve, or tylenol. He has no associated fevers,chills, or rashes.      # Renal  function- Scr was 1.1 in the past and was 1.27 in ER. It was up to 1.6-1.8 at time of biopsy after diuretics were started.  - scr back to 1.08, eGFR 85  - he drinks 3-4 mountain dew a day, he has cut down to 2 !!) now drinking 1-2 bottles of water per day  - labs monthly  2/5/22 album/cr 3.86 mg/g  - 11/18/21 urine prot/cr 10.6 g/g    Labs on Saturdays if he is working.     Trough level cyclosporine in June 2021, 12 hours or so after last dose- was low but likely only took morning dose- he is now taking twice a day    Cyclosporin level on 9/11/21 was 824. He took the medication one hour before labs.     Cyclosporin level 2/5/22 was 36 in am, he did not take evening dose the day before.     # Hyperlipidemia- LDL very elevated,was on low dose statin but stopped;  Restarted given nephrotic  - monitor on CNI    # Hypertension: normotensive   - continue lisinopril 20 mg, bumex 1 mg, takes 1 tab PRN for edema    # Not anemic- hgb 18 recently down to 16- stable    # Electrolytes:   - potassium 4.1, sodium 141, bicarb 30  - stable    # Mineral Bone Disorder:   - recent calcium 10.04 high normal,  phos 3.3, albumin 3.3  - increase hydration, does not drink water daily  Check baseline PTH if creatinine increases again     - RTC I 2 months with Dr. Scott as scheduled. Labs monthly for now    Assessment and plan was discussed with patient and he voiced his understanding and agreement.    Reason for Visit:  Follow up for nephrotic range proteinuria noted and biopsy 4/9/19 proven minimal change disease    HPI:  He is a 46 year old male recently in normal health who presented with nephrotic syndrome. He underwent kidney biopsy 4/9 and it shows minimal change disease. He was admitted to the hospital briefly after biopsy as he was having poor appetite due to bloating, and elevated creatinine.    Since then he went into remission, we tapered down on prednisone and unfortunately he relapsed within a couple months (6g/g cr February  2020),  thus we restarted prednisone in March 2020 and by early April he was down to normal range proteinuria and he was feeling better.    His weight is usually closer to 150-160  but was up to 184 lbs with last episode, was down to 160s with no proteinuria, but was back up to 188 lbs and once back on prednisone, his weight went back down to 158-159 lbs. He is currently 160 lbs.     He is working again as a .   He notes worsening joint pain since starting cyclosporin.  He denies any swelling or fluid retention.    He is now tapering off prednisone, cutting by 10 mg every 2 weeks, will be down to 20 mg in a couple of days.        His BP was 127/81 in clinic today. He isn't checking BP at home, does not have a BP cuff    Renal History:   None prior to minimal change disease Spring 2019    ROS:   A comprehensive review of systems was obtained and negative, except as noted in the HPI or PMH.     Active Medical Problems:  Patient Active Problem List   Diagnosis     Loose body of left knee     Left knee pain     Tobacco abuse     History of pneumothorax     CARDIOVASCULAR SCREENING; LDL GOAL LESS THAN 160     Nephrotic syndrome     Anasarca     Minimal change disease     Immunosuppression (H)     Crohn's disease of small intestine without complication (H)     Elevated hemoglobin (H)     PMH:   Medical record was reviewed and PMH was discussed with patient and noted below.  Past Medical History:   Diagnosis Date     Anemia      Other spontaneous pneumothorax      SPONTANEOUS PNEUMOTHORAX NEC 3/25/2005     Unspecified asthma(493.90)      PSH:   Past Surgical History:   Procedure Laterality Date     ARTHROSCOPY KNEE  8/3/2012    Procedure: ARTHROSCOPY KNEE;  Left knee Arthroscopy with removal of loose bodies;  Surgeon: Bibi Roberts MD;  Location: [facility]     HC REPAIR UMBILICAL GODFREY,<4Y/O,REDUC  1978     IR RENAL BIOPSY LEFT  4/9/2019     TUBE THORACOSTOMY,ABSC/EMPYEMA/HEMO  3/20/2005    Small chest tube  "with Heimlich valve.       Family Hx:   Family History   Problem Relation Age of Onset     Autoimmune Disease No family hx of      Personal Hx:   Social History     Tobacco Use     Smoking status: Light Tobacco Smoker     Packs/day: 0.10     Years: 15.00     Pack years: 1.50     Types: Cigarettes     Smokeless tobacco: Former User     Types: Chew     Tobacco comment: 1 pack per day   Substance Use Topics     Alcohol use: Yes     Alcohol/week: 17.5 standard drinks     Types: 21 Cans of beer per week     Comment: occ       Allergies:  Allergies   Allergen Reactions     Bee Venom      swells up     No Known Drug Allergies      Seasonal Allergies        Medications:  Current Outpatient Medications   Medication Sig     albuterol (VENTOLIN HFA) 108 (90 Base) MCG/ACT inhaler INHALE 1-2 PUFFS INTO THE LUNGS EVERY 6 HOURS AS NEEDED FOR SHORTNESS OF BREATH, DIFFICULTY BREATHING OR WHEEZING. (NEED TO BE SEEN IN CLINIC FOR FURTHER REFILLS)     atorvastatin (LIPITOR) 20 MG tablet Take 20 mg by mouth daily     bumetanide (BUMEX) 1 MG tablet Take 1 tablet (1 mg) by mouth daily as needed (for increased swelling IF NEEDED)     cycloSPORINE modified (GENERIC EQUIVALENT) 100 MG capsule Take 1 capsule (100 mg) by mouth 2 times daily     lisinopril (ZESTRIL) 20 MG tablet Take 1 tablet (20 mg) by mouth daily     omeprazole (PRILOSEC) 40 MG DR capsule Take 1 capsule (40 mg) by mouth daily     predniSONE (DELTASONE) 20 MG tablet Take 3 tablets (60 mg) by mouth daily     sulfamethoxazole-trimethoprim (BACTRIM DS) 800-160 MG tablet Take 1 tablet by mouth Every Mon, Wed, Fri Morning     No current facility-administered medications for this visit.      Vitals:  /81 (BP Location: Right arm, Patient Position: Sitting, Cuff Size: Adult Regular)   Pulse 100   Ht 1.753 m (5' 9\")   Wt 72.6 kg (160 lb)   SpO2 96%   BMI 23.63 kg/m    155-160 lbs  Exam:  GENERAL : alert and no distress  Speaks in full sentences without dyspnea  Normal speech " and thought pattern  Lungs: clear  Extremities: No edema in LE    LABS:   CMP  Recent Labs   Lab Test 02/05/22  0811 01/08/22  0758 12/11/21  0755 12/06/21  1435 08/14/21  0807 07/10/21  0829 06/05/21  0743 05/15/21  0757 04/10/21  0745    141 139 136   < > 139 138 138 141   POTASSIUM 4.1 3.7 3.4 4.3   < > 4.1 3.4 3.8 3.6   CHLORIDE 107 107 106 100   < > 110* 105 103 109   CO2 30 30 29 30   < > 26 29 32 28   ANIONGAP 4 4 4 6   < > 3 4 3 4   GLC 93 86 81 249*   < > 84 89 95 126*   BUN 25 21 18 35*   < > 19 17 14 14   CR 1.08 1.04 0.89 0.96   < > 1.06 1.11 1.11 0.98   GFRESTIMATED 85 89 >90 >90   < > 83 79 79 >90   GFRESTBLACK  --   --   --   --   --  >90 >90 >90 >90   BASIA 9.8 9.0 8.4* 8.4*   < > 9.2 8.5 8.6 8.7    < > = values in this interval not displayed.     Recent Labs   Lab Test 01/08/22 0758 12/11/21  0755 12/06/21  1435 11/13/21  0756   BILITOTAL 1.1 0.6 0.5 0.8   ALKPHOS 47 60 78 56   ALT 49 30 36 18   AST 14 10 20 13     CBC  Recent Labs   Lab Test 01/08/22 0758 12/11/21  0755 12/06/21  1435 11/13/21  0756   HGB 16.1 15.8 16.3 16.2   WBC 9.9 10.4 16.1* 5.0   RBC 4.98 5.16 5.37 5.26   HCT 46.2 45.9 47.1 46.8   MCV 93 89 88 89   MCH 32.3 30.6 30.4 30.8   MCHC 34.8 34.4 34.6 34.6   RDW 12.9 12.1 12.4 11.9    190 258 202     URINE STUDIES  Recent Labs   Lab Test 08/14/21  0819 12/14/20  0037 09/12/20  0841 02/28/20  1620 04/01/19  1500 03/29/19  1858   COLOR Yellow Yellow Yellow Yellow Yellow Yellow   APPEARANCE Clear Clear Clear Clear Clear Clear   URINEGLC Negative Negative Negative Negative Negative Negative   URINEBILI Negative Negative Negative Negative Negative Negative   URINEKETONE Negative Negative Negative Negative Negative Negative   SG 1.026 1.019 1.021 >1.030 1.015 >1.035*   UBLD Negative Negative Negative Moderate* Moderate* Moderate*   URINEPH 6.0 5.0 5.0 6.0 6.0 6.0   PROTEIN Negative Negative Negative >=300* >=300* >499*   UROBILINOGEN  --   --   --  0.2 0.2  --    NITRITE  Negative Negative Negative Negative Negative Negative   LEUKEST Negative Negative Negative Negative Negative Negative   RBCU <1  --   --  O - 2 25-50* 7*   WBCU <1  --   --  0 - 5 0 - 5 7*     Recent Labs   Lab Test 12/06/21  1435 04/03/20  0900 02/28/20  1619 11/26/19  1435   UTPG 3.62* 0.18 6.20* 0.06     PTH  No lab results found.  IRON STUDIES  No lab results found.    IMPRESSION:  1. Findings are concerning for diffuse small bowel enteritis likely  infectious or inflammatory in etiology, with associated inflammatory  changes of the mesenteric adipose tissues, moderate ascites, small  bilateral probably reactive pleural effusions and with some edema in  the subcutaneous adipose tissues.  2. No significant atherosclerosis is identified. No obvious arterial  blockage is seen in the mesenteric vessels to suggest ischemia.  3. Mild degenerative changes of the spine and hips. No acute fracture  or aggressive osseous lesion.    Barbara Alcantar PA-C    Visit length 15 minutes. An additional 15 minutes was spent on date of service in chart review, documentation, and other activities as noted.

## 2022-02-07 ENCOUNTER — OFFICE VISIT (OUTPATIENT)
Dept: NEPHROLOGY | Facility: CLINIC | Age: 47
End: 2022-02-07
Payer: COMMERCIAL

## 2022-02-07 VITALS
OXYGEN SATURATION: 96 % | HEART RATE: 100 BPM | DIASTOLIC BLOOD PRESSURE: 81 MMHG | WEIGHT: 160 LBS | BODY MASS INDEX: 23.7 KG/M2 | HEIGHT: 69 IN | SYSTOLIC BLOOD PRESSURE: 127 MMHG

## 2022-02-07 DIAGNOSIS — N05.0 MINIMAL CHANGE DISEASE: Primary | ICD-10-CM

## 2022-02-07 DIAGNOSIS — R60.9 EDEMA, UNSPECIFIED TYPE: ICD-10-CM

## 2022-02-07 DIAGNOSIS — R80.9 NEPHROTIC RANGE PROTEINURIA: ICD-10-CM

## 2022-02-07 PROCEDURE — 99214 OFFICE O/P EST MOD 30 MIN: CPT | Performed by: PHYSICIAN ASSISTANT

## 2022-02-07 ASSESSMENT — MIFFLIN-ST. JEOR: SCORE: 1591.14

## 2022-02-07 NOTE — LETTER
2/7/2022     RE: Deshawn Flood  [address]  [facility] [address]     Dear Colleague,    Thank you for referring your patient, Deshawn Flood, to the [facility]. Please see a copy of my visit note below.    Nephrology Progress Note  2/7/2022      Assessment and Plan:  46 year old male who presents for followup of minimal change disease. He presented for evaluation after presenting to ER where CT scan noted ascites and nonspecific small bowel enteritis, and UA showed proteinuria. He had biopsy which showed minimal change on 4/9/2019 and treated with prednisone 70mg. He was in remission and down to prednsione 10mg then stopped it, and had relapse within a couple months.  His mother noted swelling and he was up 20-25 lbs and proteinuria up to 6 grams/g cr.     # Minimal change disease/ nephrotic syndrome, second relapse within weeks of weaning prednisone, despite slow wean.  - Prednisone round #2 started March 2020, tried to do a longer wean once on lower doses.  - Prednisone round #3 started April 20,2021. He notes his weight was up 10 pounds but had not noted the swelling or other symptoms outside of it.  - March 2021 - he was off prednisone and has relapsed within a few weeks.  - Thus restarted prednisone, and started  cyclosporine 3-4mg/kg, started at 100mg twice a day- he was only taking it once a day for a while until he realized it was twice a day- the level was <25    - he is near 70-75 kg    - he developed generalized joint pain approximately 2 weeks after starting cyclosporin that progressively worsened, then plateaued. He was last seen in September 2021 for this.   He ultimately ran out of all medications while he was working out of town. He noticed increasing LE edema.   - repeat labs showed > 10 g/g urine prot/cr ratio. He was felt to be in relapse of minimal change disease.   - was started back on prednisone 60 mg  daily, cyclosporin, bumex 1 mg prn for swelling, and bacrim prophylaxis on MWF  His LE swelling improved. He has no current joint pain.   - he is slowly tapering off prednisone    He has not been taking any ibuprofen, aleve, or tylenol. He has no associated fevers,chills, or rashes.      # Renal function- Scr was 1.1 in the past and was 1.27 in ER. It was up to 1.6-1.8 at time of biopsy after diuretics were started.  - scr back to 1.08, eGFR 85  - he drinks 3-4 mountain dew a day, he has cut down to 2 !!) now drinking 1-2 bottles of water per day  - labs monthly  2/5/22 album/cr 3.86 mg/g  - 11/18/21 urine prot/cr 10.6 g/g    Labs on Saturdays if he is working.     Trough level cyclosporine in June 2021, 12 hours or so after last dose- was low but likely only took morning dose- he is now taking twice a day    Cyclosporin level on 9/11/21 was 824. He took the medication one hour before labs.     Cyclosporin level 2/5/22 was 36 in am, he did not take evening dose the day before.     # Hyperlipidemia- LDL very elevated,was on low dose statin but stopped;  Restarted given nephrotic  - monitor on CNI    # Hypertension: normotensive   - continue lisinopril 20 mg, bumex 1 mg, takes 1 tab PRN for edema    # Not anemic- hgb 18 recently down to 16- stable    # Electrolytes:   - potassium 4.1, sodium 141, bicarb 30  - stable    # Mineral Bone Disorder:   - recent calcium 10.04 high normal,  phos 3.3, albumin 3.3  - increase hydration, does not drink water daily  Check baseline PTH if creatinine increases again     - RTC I 2 months with Dr. Scott as scheduled. Labs monthly for now    Assessment and plan was discussed with patient and he voiced his understanding and agreement.    Reason for Visit:  Follow up for nephrotic range proteinuria noted and biopsy 4/9/19 proven minimal change disease    HPI:  He is a 46 year old male recently in normal health who presented with nephrotic syndrome. He underwent kidney biopsy 4/9 and  it shows minimal change disease. He was admitted to the hospital briefly after biopsy as he was having poor appetite due to bloating, and elevated creatinine.    Since then he went into remission, we tapered down on prednisone and unfortunately he relapsed within a couple months (6g/g cr February 2020),  thus we restarted prednisone in March 2020 and by early April he was down to normal range proteinuria and he was feeling better.    His weight is usually closer to 150-160  but was up to 184 lbs with last episode, was down to 160s with no proteinuria, but was back up to 188 lbs and once back on prednisone, his weight went back down to 158-159 lbs. He is currently 160 lbs.     He is working again as a .   He notes worsening joint pain since starting cyclosporin.  He denies any swelling or fluid retention.    He is now tapering off prednisone, cutting by 10 mg every 2 weeks, will be down to 20 mg in a couple of days.        His BP was 127/81 in clinic today. He isn't checking BP at home, does not have a BP cuff    Renal History:   None prior to minimal change disease Spring 2019    ROS:   A comprehensive review of systems was obtained and negative, except as noted in the HPI or PMH.     Active Medical Problems:  Patient Active Problem List   Diagnosis     Loose body of left knee     Left knee pain     Tobacco abuse     History of pneumothorax     CARDIOVASCULAR SCREENING; LDL GOAL LESS THAN 160     Nephrotic syndrome     Anasarca     Minimal change disease     Immunosuppression (H)     Crohn's disease of small intestine without complication (H)     Elevated hemoglobin (H)     PMH:   Medical record was reviewed and PMH was discussed with patient and noted below.  Past Medical History:   Diagnosis Date     Anemia      Other spontaneous pneumothorax      SPONTANEOUS PNEUMOTHORAX NEC 3/25/2005     Unspecified asthma(493.90)      PSH:   Past Surgical History:   Procedure Laterality Date     ARTHROSCOPY KNEE   8/3/2012    Procedure: ARTHROSCOPY KNEE;  Left knee Arthroscopy with removal of loose bodies;  Surgeon: Bibi Roberts MD;  Location: [facility]     HC REPAIR UMBILICAL GODFREY,<6Y/O,REDUC  1978     IR RENAL BIOPSY LEFT  4/9/2019     TUBE THORACOSTOMY,ABSC/EMPYEMA/HEMO  3/20/2005    Small chest tube with Heimlich valve.       Family Hx:   Family History   Problem Relation Age of Onset     Autoimmune Disease No family hx of      Personal Hx:   Social History     Tobacco Use     Smoking status: Light Tobacco Smoker     Packs/day: 0.10     Years: 15.00     Pack years: 1.50     Types: Cigarettes     Smokeless tobacco: Former User     Types: Chew     Tobacco comment: 1 pack per day   Substance Use Topics     Alcohol use: Yes     Alcohol/week: 17.5 standard drinks     Types: 21 Cans of beer per week     Comment: occ       Allergies:  Allergies   Allergen Reactions     Bee Venom      swells up     No Known Drug Allergies      Seasonal Allergies        Medications:  Current Outpatient Medications   Medication Sig     albuterol (VENTOLIN HFA) 108 (90 Base) MCG/ACT inhaler INHALE 1-2 PUFFS INTO THE LUNGS EVERY 6 HOURS AS NEEDED FOR SHORTNESS OF BREATH, DIFFICULTY BREATHING OR WHEEZING. (NEED TO BE SEEN IN CLINIC FOR FURTHER REFILLS)     atorvastatin (LIPITOR) 20 MG tablet Take 20 mg by mouth daily     bumetanide (BUMEX) 1 MG tablet Take 1 tablet (1 mg) by mouth daily as needed (for increased swelling IF NEEDED)     cycloSPORINE modified (GENERIC EQUIVALENT) 100 MG capsule Take 1 capsule (100 mg) by mouth 2 times daily     lisinopril (ZESTRIL) 20 MG tablet Take 1 tablet (20 mg) by mouth daily     omeprazole (PRILOSEC) 40 MG DR capsule Take 1 capsule (40 mg) by mouth daily     predniSONE (DELTASONE) 20 MG tablet Take 3 tablets (60 mg) by mouth daily     sulfamethoxazole-trimethoprim (BACTRIM DS) 800-160 MG tablet Take 1 tablet by mouth Every Mon, Wed, Fri Morning     No current facility-administered medications for this visit.     "  Vitals:  /81 (BP Location: Right arm, Patient Position: Sitting, Cuff Size: Adult Regular)   Pulse 100   Ht 1.753 m (5' 9\")   Wt 72.6 kg (160 lb)   SpO2 96%   BMI 23.63 kg/m    155-160 lbs  Exam:  GENERAL : alert and no distress  Speaks in full sentences without dyspnea  Normal speech and thought pattern  Lungs: clear  Extremities: No edema in LE    LABS:   CMP  Recent Labs   Lab Test 02/05/22  0811 01/08/22 0758 12/11/21 0755 12/06/21  1435 08/14/21  0807 07/10/21  0829 06/05/21  0743 05/15/21  0757 04/10/21  0745    141 139 136   < > 139 138 138 141   POTASSIUM 4.1 3.7 3.4 4.3   < > 4.1 3.4 3.8 3.6   CHLORIDE 107 107 106 100   < > 110* 105 103 109   CO2 30 30 29 30   < > 26 29 32 28   ANIONGAP 4 4 4 6   < > 3 4 3 4   GLC 93 86 81 249*   < > 84 89 95 126*   BUN 25 21 18 35*   < > 19 17 14 14   CR 1.08 1.04 0.89 0.96   < > 1.06 1.11 1.11 0.98   GFRESTIMATED 85 89 >90 >90   < > 83 79 79 >90   GFRESTBLACK  --   --   --   --   --  >90 >90 >90 >90   BASIA 9.8 9.0 8.4* 8.4*   < > 9.2 8.5 8.6 8.7    < > = values in this interval not displayed.     Recent Labs   Lab Test 01/08/22 0758 12/11/21 0755 12/06/21  1435 11/13/21  0756   BILITOTAL 1.1 0.6 0.5 0.8   ALKPHOS 47 60 78 56   ALT 49 30 36 18   AST 14 10 20 13     CBC  Recent Labs   Lab Test 01/08/22 0758 12/11/21 0755 12/06/21  1435 11/13/21  0756   HGB 16.1 15.8 16.3 16.2   WBC 9.9 10.4 16.1* 5.0   RBC 4.98 5.16 5.37 5.26   HCT 46.2 45.9 47.1 46.8   MCV 93 89 88 89   MCH 32.3 30.6 30.4 30.8   MCHC 34.8 34.4 34.6 34.6   RDW 12.9 12.1 12.4 11.9    190 258 202     URINE STUDIES  Recent Labs   Lab Test 08/14/21  0819 12/14/20  0037 09/12/20  0841 02/28/20  1620 04/01/19  1500 03/29/19  1858   COLOR Yellow Yellow Yellow Yellow Yellow Yellow   APPEARANCE Clear Clear Clear Clear Clear Clear   URINEGLC Negative Negative Negative Negative Negative Negative   URINEBILI Negative Negative Negative Negative Negative Negative   URINEKETONE Negative " Negative Negative Negative Negative Negative   SG 1.026 1.019 1.021 >1.030 1.015 >1.035*   UBLD Negative Negative Negative Moderate* Moderate* Moderate*   URINEPH 6.0 5.0 5.0 6.0 6.0 6.0   PROTEIN Negative Negative Negative >=300* >=300* >499*   UROBILINOGEN  --   --   --  0.2 0.2  --    NITRITE Negative Negative Negative Negative Negative Negative   LEUKEST Negative Negative Negative Negative Negative Negative   RBCU <1  --   --  O - 2 25-50* 7*   WBCU <1  --   --  0 - 5 0 - 5 7*     Recent Labs   Lab Test 12/06/21  1435 04/03/20  0900 02/28/20  1619 11/26/19  1435   UTPG 3.62* 0.18 6.20* 0.06     PTH  No lab results found.  IRON STUDIES  No lab results found.    IMPRESSION:  1. Findings are concerning for diffuse small bowel enteritis likely  infectious or inflammatory in etiology, with associated inflammatory  changes of the mesenteric adipose tissues, moderate ascites, small  bilateral probably reactive pleural effusions and with some edema in  the subcutaneous adipose tissues.  2. No significant atherosclerosis is identified. No obvious arterial  blockage is seen in the mesenteric vessels to suggest ischemia.  3. Mild degenerative changes of the spine and hips. No acute fracture  or aggressive osseous lesion.    Barbara Alcantar PA-C    Visit length 15 minutes. An additional 15 minutes was spent on date of service in chart review, documentation, and other activities as noted.

## 2022-02-07 NOTE — PATIENT INSTRUCTIONS
1. Continue tapering off prednisone as planned.   2. Continue cyclosporin.   3. Drink plenty of water, follow low sodium diet, less than 2 grams per day.   4. Monthly labs, and follow up with Dr. Scott in April as planned.   5. Call sooner with any questions or concerns.

## 2022-02-11 ENCOUNTER — OFFICE VISIT (OUTPATIENT)
Dept: INTERNAL MEDICINE | Facility: CLINIC | Age: 47
End: 2022-02-11
Payer: COMMERCIAL

## 2022-02-11 VITALS
HEIGHT: 70 IN | RESPIRATION RATE: 20 BRPM | TEMPERATURE: 98.5 F | OXYGEN SATURATION: 96 % | DIASTOLIC BLOOD PRESSURE: 76 MMHG | WEIGHT: 165.3 LBS | BODY MASS INDEX: 23.66 KG/M2 | HEART RATE: 88 BPM | SYSTOLIC BLOOD PRESSURE: 112 MMHG

## 2022-02-11 DIAGNOSIS — Z72.0 TOBACCO ABUSE: ICD-10-CM

## 2022-02-11 DIAGNOSIS — D84.9 IMMUNOSUPPRESSION (H): Primary | ICD-10-CM

## 2022-02-11 DIAGNOSIS — Z23 NEED FOR PNEUMOCOCCAL VACCINATION: ICD-10-CM

## 2022-02-11 DIAGNOSIS — Z00.00 ROUTINE GENERAL MEDICAL EXAMINATION AT A HEALTH CARE FACILITY: ICD-10-CM

## 2022-02-11 DIAGNOSIS — N05.0 MINIMAL CHANGE DISEASE: ICD-10-CM

## 2022-02-11 DIAGNOSIS — K50.00 CROHN'S DISEASE OF SMALL INTESTINE WITHOUT COMPLICATION (H): ICD-10-CM

## 2022-02-11 DIAGNOSIS — N04.9 NEPHROTIC SYNDROME: ICD-10-CM

## 2022-02-11 PROCEDURE — 99396 PREV VISIT EST AGE 40-64: CPT | Mod: 25 | Performed by: INTERNAL MEDICINE

## 2022-02-11 PROCEDURE — 90471 IMMUNIZATION ADMIN: CPT | Performed by: INTERNAL MEDICINE

## 2022-02-11 PROCEDURE — 90732 PPSV23 VACC 2 YRS+ SUBQ/IM: CPT | Performed by: INTERNAL MEDICINE

## 2022-02-11 ASSESSMENT — ENCOUNTER SYMPTOMS
HEARTBURN: 0
FREQUENCY: 0
DIZZINESS: 0
FEVER: 0
HEMATURIA: 0
NERVOUS/ANXIOUS: 0
EYE PAIN: 0
COUGH: 0
CHILLS: 0
NAUSEA: 0
JOINT SWELLING: 0
PALPITATIONS: 0
ABDOMINAL PAIN: 0
SORE THROAT: 0
CONSTIPATION: 0
HEMATOCHEZIA: 0
DIARRHEA: 0
SHORTNESS OF BREATH: 0
WEAKNESS: 0
PARESTHESIAS: 0
ARTHRALGIAS: 0
MYALGIAS: 0
HEADACHES: 0
DYSURIA: 0

## 2022-02-11 ASSESSMENT — ASTHMA QUESTIONNAIRES: ACT_TOTALSCORE: 21

## 2022-02-11 ASSESSMENT — MIFFLIN-ST. JEOR: SCORE: 1623.11

## 2022-02-11 ASSESSMENT — PAIN SCALES - GENERAL: PAINLEVEL: NO PAIN (0)

## 2022-02-11 NOTE — PROGRESS NOTES
SUBJECTIVE:   CC: Deshawn Flood is an 46 year old male who presents for preventative health visit.     Patient has been advised of split billing requirements and indicates understanding: Yes  Healthy Habits:     Getting at least 3 servings of Calcium per day:  NO    Bi-annual eye exam:  Yes    Dental care twice a year:  NO    Sleep apnea or symptoms of sleep apnea:  None    Diet:  Low salt    Frequency of exercise:  1 day/week    Duration of exercise:  N/A    Taking medications regularly:  Yes    Medication side effects:  None    PHQ-2 Total Score: 0    Additional concerns today:  No      Someone reviewed his MyChart and told him he needed a pneumonia shot.  He was then told that he could not get the pneumonia shot without a physical.  He just wants the pneumonia shot.    Today's PHQ-2 Score:   PHQ-2 ( 1999 [employer]) 2/11/2022   Q1: Little interest or pleasure in doing things 0   Q2: Feeling down, depressed or hopeless 0   PHQ-2 Score 0   PHQ-2 Total Score (12-17 Years)- Positive if 3 or more points; Administer PHQ-A if positive -   Q1: Little interest or pleasure in doing things Not at all   Q2: Feeling down, depressed or hopeless Not at all   PHQ-2 Score 0       Abuse: Current or Past(Physical, Sexual or Emotional)- No  Do you feel safe in your environment? Yes    Have you ever done Advance Care Planning? (For example, a Health Directive, POLST, or a discussion with a medical provider or your loved ones about your wishes): No, advance care planning information given to patient to review.  Patient plans to discuss their wishes with loved ones or provider.      Social History     Tobacco Use     Smoking status: Light Tobacco Smoker     Packs/day: 0.10     Years: 15.00     Pack years: 1.50     Types: Cigarettes     Smokeless tobacco: Former User     Types: Chew     Tobacco comment: 1 pack per day   Substance Use Topics     Alcohol use: Yes     Alcohol/week: 17.5 standard drinks     Types: 21 Cans of beer per week      Comment: occ     If you drink alcohol do you typically have >3 drinks per day or >7 drinks per week? No    Alcohol Use 2/11/2022   Prescreen: >3 drinks/day or >7 drinks/week? No       Last PSA: No results found for: PSA    Reviewed orders with patient. Reviewed health maintenance and updated orders accordingly - Yes  Labs reviewed in EPIC    Reviewed and updated as needed this visit by clinical staff  Tobacco  Allergies  Meds   Med Hx  Surg Hx  Fam Hx  Soc Hx       Reviewed and updated as needed this visit by Provider               Past Medical History:   Diagnosis Date     Anemia      Other spontaneous pneumothorax      SPONTANEOUS PNEUMOTHORAX NEC 3/25/2005     Unspecified asthma(493.90)       Past Surgical History:   Procedure Laterality Date     ARTHROSCOPY KNEE  8/3/2012    Procedure: ARTHROSCOPY KNEE;  Left knee Arthroscopy with removal of loose bodies;  Surgeon: Bibi Roberts MD;  Location: [facility]     HC REPAIR UMBILICAL GODFREY,<4Y/O,REDUC  1978     IR RENAL BIOPSY LEFT  4/9/2019     TUBE THORACOSTOMY,ABSC/EMPYEMA/HEMO  3/20/2005    Small chest tube with Heimlich valve.       Review of Systems   Constitutional: Negative for chills and fever.   HENT: Negative for congestion, ear pain, hearing loss and sore throat.    Eyes: Negative for pain and visual disturbance.   Respiratory: Negative for cough and shortness of breath.    Cardiovascular: Negative for chest pain, palpitations and peripheral edema.   Gastrointestinal: Negative for abdominal pain, constipation, diarrhea, heartburn, hematochezia and nausea.   Genitourinary: Negative for dysuria, frequency, genital sores, hematuria, impotence, penile discharge and urgency.   Musculoskeletal: Negative for arthralgias, joint swelling and myalgias.   Skin: Negative for rash.   Neurological: Negative for dizziness, weakness, headaches and paresthesias.   Psychiatric/Behavioral: Negative for mood changes. The patient is not nervous/anxious.   "        OBJECTIVE:   /76 (BP Location: Right arm, Patient Position: Sitting, Cuff Size: Adult Regular)   Pulse 88   Temp 98.5  F (36.9  C) (Temporal)   Resp 20   Ht 1.765 m (5' 9.5\")   Wt 75 kg (165 lb 4.8 oz)   SpO2 96%   BMI 24.06 kg/m      Physical Exam  GENERAL: healthy, alert and no distress  EYES: Eyes grossly normal to inspection, PERRL and conjunctivae and sclerae normal  HENT: ear canals and TM's normal, nose and mouth without ulcers or lesions  NECK: no adenopathy, no asymmetry, masses, or scars and thyroid normal to palpation  RESP: lungs clear to auscultation - no rales, rhonchi or wheezes  CV: regular rate and rhythm, normal S1 S2, no S3 or S4, no murmur, click or rub, no peripheral edema and peripheral pulses strong  ABDOMEN: soft, nontender, no hepatosplenomegaly, no masses and bowel sounds normal  MS: no gross musculoskeletal defects noted, no edema  SKIN: no suspicious lesions or rashes  NEURO: Normal strength and tone, mentation intact and speech normal  PSYCH: mentation appears normal, affect normal/bright    Diagnostic Test Results:  Labs reviewed in Epic    ASSESSMENT/PLAN:       ICD-10-CM    1. Immunosuppression (H)  D84.9    2. 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 Nephrotic syndrome  N04.9    5. Crohn's disease of small intestine without complication (H)  K50.00    6. Tobacco abuse  Z72.0        Patient has been advised of split billing requirements and indicates understanding: Yes    COUNSELING:   Reviewed preventive health counseling, as reflected in patient instructions       Regular exercise       Healthy diet/nutrition       Vision screening       Hearing screening       Colorectal cancer screening    Estimated body mass index is 24.06 kg/m  as calculated from the following:    Height as of this encounter: 1.765 m (5' 9.5\").    Weight as of this encounter: 75 kg (165 lb 4.8 oz).         He reports that " "he has been smoking cigarettes. He has a 1.50 pack-year smoking history. He has quit using smokeless tobacco.  His smokeless tobacco use included chew.  Tobacco Cessation Action Plan:   Information offered: Patient not interested at this time      Given his history of inflammatory bowel disease, I recommended colonoscopy.  Patient states that he has historically refused this procedure because:  \"That hole is where sh*t comes out.  Nothing goes in there.\"    Reviewed lab work in detail with patient because it had not been \"explained to him\" by other providers.  Went through lab work test by test explaining how much better it is when he takes the medication.  Substantial improvement in his nephrotic syndrome was explained and demonstrated.                                 FOLLOW UP   I have asked the patient to make an appointment for followup with me in 1 year.  He will follow-up with his other providers as scheduled.        Counseling Resources:  ATP IV Guidelines  Pooled Cohorts Equation Calculator  FRAX Risk Assessment  ICSI Preventive Guidelines  Dietary Guidelines for Americans, 2010  USDA's MyPlate  ASA Prophylaxis  Lung CA Screening    Alvarez Yu, [facility]  "

## 2022-02-11 NOTE — PROGRESS NOTES
Prior to immunization administration, verified patients identity using patient s name and date of birth. Please see Immunization Activity for additional information.     Screening Questionnaire for Adult Immunization    Are you sick today?   No   Do you have allergies to medications, food, a vaccine component or latex?   Yes   Have you ever had a serious reaction after receiving a vaccination?   No   Do you have a long-term health problem with heart, lung, kidney, or metabolic disease (e.g., diabetes), asthma, a blood disorder, no spleen, complement component deficiency, a cochlear implant, or a spinal fluid leak?  Are you on long-term aspirin therapy?   Yes   Do you have cancer, leukemia, HIV/AIDS, or any other immune system problem?   No   Do you have a parent, brother, or sister with an immune system problem?   No   In the past 3 months, have you taken medications that affect  your immune system, such as prednisone, other steroids, or anticancer drugs; drugs for the treatment of rheumatoid arthritis, Crohn s disease, or psoriasis; or have you had radiation treatments?   No   Have you had a seizure, or a brain or other nervous system problem?   No   During the past year, have you received a transfusion of blood or blood    products, or been given immune (gamma) globulin or antiviral drug?   No   For women: Are you pregnant or is there a chance you could become       pregnant during the next month?   No   Have you received any vaccinations in the past 4 weeks?   No     Immunization questionnaire was positive for at least one answer.  Notified Yes.        Per orders of Dr. Yu, injection of Pneumovax given by Maryanne Morales CMA. Patient instructed to remain in clinic for 15 minutes afterwards, and to report any adverse reaction to me immediately.       Screening performed by Maryanne Morales CMA on 2/11/2022 at 9:09 AM.

## 2022-02-12 ENCOUNTER — HEALTH MAINTENANCE LETTER (OUTPATIENT)
Age: 47
End: 2022-02-12

## 2022-03-05 ENCOUNTER — LAB (OUTPATIENT)
Dept: LAB | Facility: CLINIC | Age: 47
End: 2022-03-05
Payer: COMMERCIAL

## 2022-03-05 DIAGNOSIS — N04.9 NEPHROTIC SYNDROME: ICD-10-CM

## 2022-03-05 DIAGNOSIS — D84.9 IMMUNOSUPPRESSION (H): ICD-10-CM

## 2022-03-05 DIAGNOSIS — N05.0 MINIMAL CHANGE DISEASE: ICD-10-CM

## 2022-03-05 LAB
ALBUMIN SERPL-MCNC: 3.7 G/DL (ref 3.4–5)
ALP SERPL-CCNC: 33 U/L (ref 40–150)
ALT SERPL W P-5'-P-CCNC: 36 U/L (ref 0–70)
ANION GAP SERPL CALCULATED.3IONS-SCNC: 3 MMOL/L (ref 3–14)
AST SERPL W P-5'-P-CCNC: 16 U/L (ref 0–45)
BILIRUB DIRECT SERPL-MCNC: 0.4 MG/DL (ref 0–0.2)
BILIRUB SERPL-MCNC: 2 MG/DL (ref 0.2–1.3)
BUN SERPL-MCNC: 17 MG/DL (ref 7–30)
CALCIUM SERPL-MCNC: 9.2 MG/DL (ref 8.5–10.1)
CHLORIDE BLD-SCNC: 109 MMOL/L (ref 94–109)
CO2 SERPL-SCNC: 28 MMOL/L (ref 20–32)
CREAT SERPL-MCNC: 1.17 MG/DL (ref 0.66–1.25)
CREAT UR-MCNC: 200 MG/DL
ERYTHROCYTE [DISTWIDTH] IN BLOOD BY AUTOMATED COUNT: 12.1 % (ref 10–15)
GFR SERPL CREATININE-BSD FRML MDRD: 77 ML/MIN/1.73M2
GLUCOSE BLD-MCNC: 80 MG/DL (ref 70–99)
HCT VFR BLD AUTO: 40.2 % (ref 40–53)
HGB BLD-MCNC: 14.4 G/DL (ref 13.3–17.7)
MCH RBC QN AUTO: 32.4 PG (ref 26.5–33)
MCHC RBC AUTO-ENTMCNC: 35.8 G/DL (ref 31.5–36.5)
MCV RBC AUTO: 91 FL (ref 78–100)
MICROALBUMIN UR-MCNC: 6 MG/L
MICROALBUMIN/CREAT UR: 3 MG/G CR (ref 0–17)
PHOSPHATE SERPL-MCNC: 2.4 MG/DL (ref 2.5–4.5)
PLATELET # BLD AUTO: 164 10E3/UL (ref 150–450)
POTASSIUM BLD-SCNC: 3.9 MMOL/L (ref 3.4–5.3)
PROT SERPL-MCNC: 6.5 G/DL (ref 6.8–8.8)
RBC # BLD AUTO: 4.44 10E6/UL (ref 4.4–5.9)
SODIUM SERPL-SCNC: 140 MMOL/L (ref 133–144)
WBC # BLD AUTO: 7.4 10E3/UL (ref 4–11)

## 2022-03-05 PROCEDURE — 82043 UR ALBUMIN QUANTITATIVE: CPT

## 2022-03-05 PROCEDURE — 85027 COMPLETE CBC AUTOMATED: CPT

## 2022-03-05 PROCEDURE — 84100 ASSAY OF PHOSPHORUS: CPT

## 2022-03-05 PROCEDURE — 80053 COMPREHEN METABOLIC PANEL: CPT

## 2022-03-05 PROCEDURE — 82248 BILIRUBIN DIRECT: CPT

## 2022-03-05 PROCEDURE — 36415 COLL VENOUS BLD VENIPUNCTURE: CPT

## 2022-03-05 PROCEDURE — 80158 DRUG ASSAY CYCLOSPORINE: CPT

## 2022-03-06 LAB
CYCLOSPORINE BLD LC/MS/MS-MCNC: 119 UG/L (ref 50–400)
TME LAST DOSE: NORMAL H
TME LAST DOSE: NORMAL H

## 2022-04-05 DIAGNOSIS — E78.5 HYPERLIPIDEMIA LDL GOAL <130: Primary | ICD-10-CM

## 2022-04-06 RX ORDER — ATORVASTATIN CALCIUM 20 MG/1
TABLET, FILM COATED ORAL
Qty: 90 TABLET | Refills: 3 | Status: SHIPPED | OUTPATIENT
Start: 2022-04-06 | End: 2023-02-07

## 2022-04-06 NOTE — TELEPHONE ENCOUNTER
Pending Prescriptions:                       Disp   Refills    atorvastatin (LIPITOR) 20 MG tablet [Pharm*90 tab*3        Sig: TAKE ONE TABLET BY MOUTH ONCE DAILY        Routing refill request to provider for review/approval because:  Medication is reported/historical    Mar Jacob, HAZELN, RN, PHN  [facility] RN for [facility]  April 6, 2022

## 2022-04-09 ENCOUNTER — LAB (OUTPATIENT)
Dept: LAB | Facility: CLINIC | Age: 47
End: 2022-04-09
Payer: COMMERCIAL

## 2022-04-09 DIAGNOSIS — D84.9 IMMUNOSUPPRESSION (H): ICD-10-CM

## 2022-04-09 DIAGNOSIS — N04.9 NEPHROTIC SYNDROME: ICD-10-CM

## 2022-04-09 DIAGNOSIS — N05.0 MINIMAL CHANGE DISEASE: ICD-10-CM

## 2022-04-09 LAB
ALBUMIN SERPL-MCNC: 3.7 G/DL (ref 3.4–5)
ALP SERPL-CCNC: 40 U/L (ref 40–150)
ALT SERPL W P-5'-P-CCNC: 24 U/L (ref 0–70)
ANION GAP SERPL CALCULATED.3IONS-SCNC: 3 MMOL/L (ref 3–14)
AST SERPL W P-5'-P-CCNC: 21 U/L (ref 0–45)
BILIRUB SERPL-MCNC: 1.3 MG/DL (ref 0.2–1.3)
BUN SERPL-MCNC: 23 MG/DL (ref 7–30)
CALCIUM SERPL-MCNC: 9.3 MG/DL (ref 8.5–10.1)
CHLORIDE BLD-SCNC: 111 MMOL/L (ref 94–109)
CO2 SERPL-SCNC: 27 MMOL/L (ref 20–32)
CREAT SERPL-MCNC: 1.55 MG/DL (ref 0.66–1.25)
CREAT UR-MCNC: 287 MG/DL
GFR SERPL CREATININE-BSD FRML MDRD: 55 ML/MIN/1.73M2
GLUCOSE BLD-MCNC: 98 MG/DL (ref 70–99)
MICROALBUMIN UR-MCNC: 11 MG/L
MICROALBUMIN/CREAT UR: 3.83 MG/G CR (ref 0–17)
PHOSPHATE SERPL-MCNC: 2.9 MG/DL (ref 2.5–4.5)
POTASSIUM BLD-SCNC: 4.2 MMOL/L (ref 3.4–5.3)
PROT SERPL-MCNC: 6.3 G/DL (ref 6.8–8.8)
SODIUM SERPL-SCNC: 141 MMOL/L (ref 133–144)

## 2022-04-09 PROCEDURE — 84100 ASSAY OF PHOSPHORUS: CPT

## 2022-04-09 PROCEDURE — 36415 COLL VENOUS BLD VENIPUNCTURE: CPT

## 2022-04-09 PROCEDURE — 80158 DRUG ASSAY CYCLOSPORINE: CPT

## 2022-04-09 PROCEDURE — 82043 UR ALBUMIN QUANTITATIVE: CPT

## 2022-04-09 PROCEDURE — 80053 COMPREHEN METABOLIC PANEL: CPT

## 2022-04-10 LAB
CYCLOSPORINE BLD LC/MS/MS-MCNC: 121 UG/L (ref 50–400)
TME LAST DOSE: NORMAL H
TME LAST DOSE: NORMAL H

## 2022-04-18 ENCOUNTER — VIRTUAL VISIT (OUTPATIENT)
Dept: NEPHROLOGY | Facility: CLINIC | Age: 47
End: 2022-04-18
Payer: COMMERCIAL

## 2022-04-18 DIAGNOSIS — N18.31 CHRONIC KIDNEY DISEASE, STAGE 3A (H): ICD-10-CM

## 2022-04-18 DIAGNOSIS — R79.89 ELEVATED SERUM CREATININE: Primary | ICD-10-CM

## 2022-04-18 PROCEDURE — 99215 OFFICE O/P EST HI 40 MIN: CPT | Mod: 95 | Performed by: INTERNAL MEDICINE

## 2022-04-18 RX ORDER — CYCLOSPORINE 50 MG/1
50 CAPSULE, LIQUID FILLED ORAL 2 TIMES DAILY
Qty: 60 CAPSULE | Refills: 11 | Status: SHIPPED | OUTPATIENT
Start: 2022-04-18 | End: 2022-11-21

## 2022-04-18 NOTE — PROGRESS NOTES
Deshawn is a 46 year old who is being evaluated via a billable telephone visit.      What phone number would you like to be contacted at? cell  How would you like to obtain your AVS? Sebastian  Phone call duration: 12 minutes  40 minutes spent on day of service in coordination of care     Assessment and Plan:  46 year old male who presents for followup of minimal change disease. He presented for evaluation after presenting to ER where CT scan noted ascites and nonspecific small bowel enteritis, and UA showed proteinuria. He had biopsy which showed minimal change on 4/9/2019 and treated with prednisone 70mg. He was in remission and down to prednsione 10mg then stopped it, and had relapse within a couple months.  His mother noted swelling and he was up 20-25 lbs and proteinuria up to 6 grams/g cr.   # Minimal change disease/ nephrotic syndrome, second relapse within weeks of weaning prednisone, despite slow wean.  - Prednisone round #2 started March 2020, tried to do a longer wean once on lower doses.  - Prednisone round #3 started April 20,2021. He notes his weight was up 10 pounds but had not noted the swelling or other symptoms outside of it.  - March 2021 - he was off prednisone and has relapsed within a few weeks.  - Thus restarted prednisone, and started  cyclosporine 3-4mg/kg, started at 100mg twice a day- he was only taking it once a day for a while until he realized it was twice a day-   - level recently 121 - thus will decrease dose. He states this was a trough of 11 hours -   - decrease cyclosporine to 50mg bid on 4/18/22- aim for level of 25-50 now that he has been on this and is stable.   - he is near 70-75 kg  - continue labs monthly for now  # Renal function- elevated creatinine / ?CKD or due to cyclosporine- Scr was 1.1 in the past and was1.27 in ER. It was up to 1.6-1.8 at time of biopsy after diuretics were started.  - scr back to 1-1.2 range, but up to 1.4 recently, likely due to cyclosporine  -  monitor    - proteinuria improved very quickly down to normal once prednisone restarted , down from 13 grams    Labs on Saturdays if he is working.  Started new medication, cyclosporine, twice a day- initially took it once a day only   - discussed that his is an immunosuppressant, that it increases risk of infection and malignancy. He was agreeable to start it.  - we had planned to wean prednisone but he stopped it , has been of for two weeks; given labs are stable, will hold off on restarting.    # Hyperlipidemia- LDL very elevated,was on low dose statin but stopped in setting of CNI and LFT elevated- it was restarted by PCP  - recheck lipid panel and LFTs    # Hypertension: normotensive   - continue lisinopril    # Not anemic- hgb 18 now down to 16- stable    # Electrolytes: normal low potassium      # Mineral Bone Disorder:   Check baseline PTH if creatinine remains elevated.     - RTC and abs monthly for now    Assessment and plan was discussed with patient and he voiced his understanding and agreement.    Reason for Visit:  Deshawn Flood is a 45 year old male with nephrotic range proteinuria noted and biopsy 4/9/19 shows minimal change disease    HPI:  He is a 45 year old male recently in normal health who presented with nephrotic syndrome. He underwent kidney biopsy 4/9 and it shows minimal change disease. He was admitted to the hospital briefly after biopsy as he was having poor appetite due to bloating, and elevated creatinine.    Since then he went into remission, we tapered down on prednisone and unfortunately he relapsed within a couple months (6g/g cr February 2020),  thus we restarted prednisone in March 2020 and by early April he was down to normal range proteinuria and he was feeling better.    His weight is usually closer to 150-160  but was up to 184 lbs with last episode, was down to 160s with no proteinuria, but was back up to 188 lbs and once back on prednisone, his weight went back down to 158-  159lbs. He likes to stay at 160-165lbs but can't seem to keep weight on    Since last visit four months ago, he is doing fair. He has had a lot of issues with back pain and was not working  He denies any swelling or fluid retention.  He is now off prednisone though no specific instruction were given, as he was not seen two months ago when I planned to wean down further  At this time, he is stable and we will just monitor closely.  If he relapses, will consider CNI therapy.    He does not have a BP cuff    Renal History:   None prior to minimal change disease Spring 2019    ROS:   A comprehensive review of systems was obtained and negative, except as noted in the HPI or PMH.    Active Medical Problems:  Patient Active Problem List   Diagnosis     Loose body of left knee     Left knee pain     Tobacco abuse     History of pneumothorax     CARDIOVASCULAR SCREENING; LDL GOAL LESS THAN 160     Nephrotic syndrome     Anasarca     Minimal change disease     Immunosuppression (H)     Crohn's disease of small intestine without complication (H)     Elevated hemoglobin (H)     PMH:   Medical record was reviewed and PMH was discussed with patient and noted below.  Past Medical History:   Diagnosis Date     Anemia      Other spontaneous pneumothorax      SPONTANEOUS PNEUMOTHORAX NEC 3/25/2005     Unspecified asthma(493.90)      PSH:   Past Surgical History:   Procedure Laterality Date     ARTHROSCOPY KNEE  8/3/2012    Procedure: ARTHROSCOPY KNEE;  Left knee Arthroscopy with removal of loose bodies;  Surgeon: Bibi Roberts MD;  Location: [facility]     HC REPAIR UMBILICAL GODFREY,<6Y/O,REDUC  1978     IR RENAL BIOPSY LEFT  4/9/2019     TUBE THORACOSTOMY,ABSC/EMPYEMA/HEMO  3/20/2005    Small chest tube with Heimlich valve.       Family Hx:   Family History   Problem Relation Age of Onset     Autoimmune Disease No family hx of      Personal Hx:   Social History     Tobacco Use     Smoking status: Light Tobacco Smoker     Packs/day:  0.10     Years: 15.00     Pack years: 1.50     Types: Cigarettes     Smokeless tobacco: Former User     Types: Chew     Tobacco comment: 1 pack per day   Substance Use Topics     Alcohol use: Yes     Alcohol/week: 17.5 standard drinks     Types: 21 Cans of beer per week     Comment: occ       Allergies:  Allergies   Allergen Reactions     Bee Venom      swells up     No Known Drug Allergies      Seasonal Allergies        Medications:  Current Outpatient Medications   Medication Sig     albuterol (VENTOLIN HFA) 108 (90 Base) MCG/ACT inhaler INHALE 1-2 PUFFS INTO THE LUNGS EVERY 6 HOURS AS NEEDED FOR SHORTNESS OF BREATH, DIFFICULTY BREATHING OR WHEEZING. (NEED TO BE SEEN IN CLINIC FOR FURTHER REFILLS)     atorvastatin (LIPITOR) 20 MG tablet TAKE ONE TABLET BY MOUTH ONCE DAILY     bumetanide (BUMEX) 1 MG tablet Take 1 tablet (1 mg) by mouth daily as needed (for increased swelling IF NEEDED)     cycloSPORINE modified (GENERIC EQUIVALENT) 100 MG capsule Take 1 capsule (100 mg) by mouth 2 times daily     lisinopril (ZESTRIL) 20 MG tablet Take 1 tablet (20 mg) by mouth daily     omeprazole (PRILOSEC) 40 MG DR capsule Take 1 capsule (40 mg) by mouth daily     sulfamethoxazole-trimethoprim (BACTRIM DS) 800-160 MG tablet Take 1 tablet by mouth Every Mon, Wed, Fri Morning     predniSONE (DELTASONE) 20 MG tablet Take 3 tablets (60 mg) by mouth daily (Patient not taking: Reported on 4/18/2022)     No current facility-administered medications for this visit.      Vitals:  There were no vitals taken for this visit.  154-155 lbs  Exam:  GENERAL : alert and no distress  Speaks in full sentences without dyspnea  Normal speech and thought pattern    LABS:   CMP  Recent Labs   Lab Test 04/09/22  0749 03/05/22  0750 02/05/22  0811 01/08/22  0758 08/14/21  0807 07/10/21  0829 06/05/21  0743 05/15/21  0757 04/10/21  0745    140 141 141   < > 139 138 138 141   POTASSIUM 4.2 3.9 4.1 3.7   < > 4.1 3.4 3.8 3.6   CHLORIDE 111* 109 107  107   < > 110* 105 103 109   CO2 27 28 30 30   < > 26 29 32 28   ANIONGAP 3 3 4 4   < > 3 4 3 4   GLC 98 80 93 86   < > 84 89 95 126*   BUN 23 17 25 21   < > 19 17 14 14   CR 1.55* 1.17 1.08 1.04   < > 1.06 1.11 1.11 0.98   GFRESTIMATED 55* 77 85 89   < > 83 79 79 >90   GFRESTBLACK  --   --   --   --   --  >90 >90 >90 >90   BASIA 9.3 9.2 9.8 9.0   < > 9.2 8.5 8.6 8.7    < > = values in this interval not displayed.     Recent Labs   Lab Test 04/09/22  0749 03/05/22  0750 01/08/22  0758 12/11/21  0755   BILITOTAL 1.3 2.0* 1.1 0.6   ALKPHOS 40 33* 47 60   ALT 24 36 49 30   AST 21 16 14 10     CBC  Recent Labs   Lab Test 03/05/22  0750 01/08/22  0758 12/11/21  0755 12/06/21  1435   HGB 14.4 16.1 15.8 16.3   WBC 7.4 9.9 10.4 16.1*   RBC 4.44 4.98 5.16 5.37   HCT 40.2 46.2 45.9 47.1   MCV 91 93 89 88   MCH 32.4 32.3 30.6 30.4   MCHC 35.8 34.8 34.4 34.6   RDW 12.1 12.9 12.1 12.4    167 190 258     URINE STUDIES  Recent Labs   Lab Test 08/14/21  0819 12/14/20  0037 09/12/20  0841 02/28/20  1620 04/01/19  1500 03/29/19  1858   COLOR Yellow Yellow Yellow Yellow Yellow Yellow   APPEARANCE Clear Clear Clear Clear Clear Clear   URINEGLC Negative Negative Negative Negative Negative Negative   URINEBILI Negative Negative Negative Negative Negative Negative   URINEKETONE Negative Negative Negative Negative Negative Negative   SG 1.026 1.019 1.021 >1.030 1.015 >1.035*   UBLD Negative Negative Negative Moderate* Moderate* Moderate*   URINEPH 6.0 5.0 5.0 6.0 6.0 6.0   PROTEIN Negative Negative Negative >=300* >=300* >499*   UROBILINOGEN  --   --   --  0.2 0.2  --    NITRITE Negative Negative Negative Negative Negative Negative   LEUKEST Negative Negative Negative Negative Negative Negative   RBCU <1  --   --  O - 2 25-50* 7*   WBCU <1  --   --  0 - 5 0 - 5 7*     Recent Labs   Lab Test 12/06/21  1435 04/03/20  0900 02/28/20  1619 11/26/19  1435   UTPG 3.62* 0.18 6.20* 0.06     PTH  No lab results found.  IRON STUDIES  No lab  results found.    IMPRESSION:  1. Findings are concerning for diffuse small bowel enteritis likely  infectious or inflammatory in etiology, with associated inflammatory  changes of the mesenteric adipose tissues, moderate ascites, small  bilateral probably reactive pleural effusions and with some edema in  the subcutaneous adipose tissues.  2. No significant atherosclerosis is identified. No obvious arterial  blockage is seen in the mesenteric vessels to suggest ischemia.  3. Mild degenerative changes of the spine and hips. No acute fracture  or aggressive osseous lesion.  Jenny Scott MD

## 2022-04-18 NOTE — LETTER
4/18/2022       RE: Deshawn Flood  [address] [place] [id number]     Dear Colleague,    Thank you for referring your patient, Deshawn Flood, to the [facility] [place] at [facility]. Please see a copy of my visit note below.    Deshawn is a 46 year old who is being evaluated via a billable telephone visit.      What phone number would you like to be contacted at? cell  How would you like to obtain your AVS? MyChart  Phone call duration: 12 minutes  40 minutes spent on day of service in coordination of care     Assessment and Plan:  46 year old male who presents for followup of minimal change disease. He presented for evaluation after presenting to ER where CT scan noted ascites and nonspecific small bowel enteritis, and UA showed proteinuria. He had biopsy which showed minimal change on 4/9/2019 and treated with prednisone 70mg. He was in remission and down to prednsione 10mg then stopped it, and had relapse within a couple months.  His mother noted swelling and he was up 20-25 lbs and proteinuria up to 6 grams/g cr.   # Minimal change disease/ nephrotic syndrome, second relapse within weeks of weaning prednisone, despite slow wean.  - Prednisone round #2 started March 2020, tried to do a longer wean once on lower doses.  - Prednisone round #3 started April 20,2021. He notes his weight was up 10 pounds but had not noted the swelling or other symptoms outside of it.  - March 2021 - he was off prednisone and has relapsed within a few weeks.  - Thus restarted prednisone, and started  cyclosporine 3-4mg/kg, started at 100mg twice a day- he was only taking it once a day for a while until he realized it was twice a day-   - level recently 121 - thus will decrease dose. He states this was a trough of 11 hours -   - decrease cyclosporine to 50mg bid on 4/18/22- aim for level of 25-50 now that he has been on this and is stable.   - he is near 70-75 kg  -  continue labs monthly for now  # Renal function- elevated creatinine / ?CKD or due to cyclosporine- Scr was 1.1 in the past and was1.27 in ER. It was up to 1.6-1.8 at time of biopsy after diuretics were started.  - scr back to 1-1.2 range, but up to 1.4 recently, likely due to cyclosporine  - monitor    - proteinuria improved very quickly down to normal once prednisone restarted , down from 13 grams    Labs on Saturdays if he is working.  Started new medication, cyclosporine, twice a day- initially took it once a day only   - discussed that his is an immunosuppressant, that it increases risk of infection and malignancy. He was agreeable to start it.  - we had planned to wean prednisone but he stopped it , has been of for two weeks; given labs are stable, will hold off on restarting.    # Hyperlipidemia- LDL very elevated,was on low dose statin but stopped in setting of CNI and LFT elevated- it was restarted by PCP  - recheck lipid panel and LFTs    # Hypertension: normotensive   - continue lisinopril    # Not anemic- hgb 18 now down to 16- stable    # Electrolytes: normal low potassium      # Mineral Bone Disorder:   Check baseline PTH if creatinine remains elevated.     - RTC and abs monthly for now    Assessment and plan was discussed with patient and he voiced his understanding and agreement.    Reason for Visit:  Deshawn Flood is a 45 year old male with nephrotic range proteinuria noted and biopsy 4/9/19 shows minimal change disease    HPI:  He is a 45 year old male recently in normal health who presented with nephrotic syndrome. He underwent kidney biopsy 4/9 and it shows minimal change disease. He was admitted to the hospital briefly after biopsy as he was having poor appetite due to bloating, and elevated creatinine.    Since then he went into remission, we tapered down on prednisone and unfortunately he relapsed within a couple months (6g/g cr February 2020),  thus we restarted prednisone in March 2020 and  by early April he was down to normal range proteinuria and he was feeling better.    His weight is usually closer to 150-160  but was up to 184 lbs with last episode, was down to 160s with no proteinuria, but was back up to 188 lbs and once back on prednisone, his weight went back down to 158- 159lbs. He likes to stay at 160-165lbs but can't seem to keep weight on    Since last visit four months ago, he is doing fair. He has had a lot of issues with back pain and was not working  He denies any swelling or fluid retention.  He is now off prednisone though no specific instruction were given, as he was not seen two months ago when I planned to wean down further  At this time, he is stable and we will just monitor closely.  If he relapses, will consider CNI therapy.    He does not have a BP cuff    Renal History:   None prior to minimal change disease Spring 2019    ROS:   A comprehensive review of systems was obtained and negative, except as noted in the HPI or PMH.    Active Medical Problems:  Patient Active Problem List   Diagnosis     Loose body of left knee     Left knee pain     Tobacco abuse     History of pneumothorax     CARDIOVASCULAR SCREENING; LDL GOAL LESS THAN 160     Nephrotic syndrome     Anasarca     Minimal change disease     Immunosuppression (H)     Crohn's disease of small intestine without complication (H)     Elevated hemoglobin (H)     PMH:   Medical record was reviewed and PMH was discussed with patient and noted below.  Past Medical History:   Diagnosis Date     Anemia      Other spontaneous pneumothorax      SPONTANEOUS PNEUMOTHORAX NEC 3/25/2005     Unspecified asthma(493.90)      PSH:   Past Surgical History:   Procedure Laterality Date     ARTHROSCOPY KNEE  8/3/2012    Procedure: ARTHROSCOPY KNEE;  Left knee Arthroscopy with removal of loose bodies;  Surgeon: Bibi Roberts MD;  Location: [facility] OR     HC REPAIR UMBILICAL GODFREY,<6Y/O,REDUC  1978     IR RENAL BIOPSY LEFT  4/9/2019     TUBE  THORACOSTOMY,ABSC/EMPYEMA/HEMO  3/20/2005    Small chest tube with Heimlich valve.       Family Hx:   Family History   Problem Relation Age of Onset     Autoimmune Disease No family hx of      Personal Hx:   Social History     Tobacco Use     Smoking status: Light Tobacco Smoker     Packs/day: 0.10     Years: 15.00     Pack years: 1.50     Types: Cigarettes     Smokeless tobacco: Former User     Types: Chew     Tobacco comment: 1 pack per day   Substance Use Topics     Alcohol use: Yes     Alcohol/week: 17.5 standard drinks     Types: 21 Cans of beer per week     Comment: occ       Allergies:  Allergies   Allergen Reactions     Bee Venom      swells up     No Known Drug Allergies      Seasonal Allergies        Medications:  Current Outpatient Medications   Medication Sig     albuterol (VENTOLIN HFA) 108 (90 Base) MCG/ACT inhaler INHALE 1-2 PUFFS INTO THE LUNGS EVERY 6 HOURS AS NEEDED FOR SHORTNESS OF BREATH, DIFFICULTY BREATHING OR WHEEZING. (NEED TO BE SEEN IN CLINIC FOR FURTHER REFILLS)     atorvastatin (LIPITOR) 20 MG tablet TAKE ONE TABLET BY MOUTH ONCE DAILY     bumetanide (BUMEX) 1 MG tablet Take 1 tablet (1 mg) by mouth daily as needed (for increased swelling IF NEEDED)     cycloSPORINE modified (GENERIC EQUIVALENT) 100 MG capsule Take 1 capsule (100 mg) by mouth 2 times daily     lisinopril (ZESTRIL) 20 MG tablet Take 1 tablet (20 mg) by mouth daily     omeprazole (PRILOSEC) 40 MG DR capsule Take 1 capsule (40 mg) by mouth daily     sulfamethoxazole-trimethoprim (BACTRIM DS) 800-160 MG tablet Take 1 tablet by mouth Every Mon, Wed, Fri Morning     predniSONE (DELTASONE) 20 MG tablet Take 3 tablets (60 mg) by mouth daily (Patient not taking: Reported on 4/18/2022)     No current facility-administered medications for this visit.      Vitals:  There were no vitals taken for this visit.  154-155 lbs  Exam:  GENERAL : alert and no distress  Speaks in full sentences without dyspnea  Normal speech and thought  pattern    LABS:   CMP  Recent Labs   Lab Test 04/09/22  0749 03/05/22  0750 02/05/22  0811 01/08/22  0758 08/14/21  0807 07/10/21  0829 06/05/21  0743 05/15/21  0757 04/10/21  0745    140 141 141   < > 139 138 138 141   POTASSIUM 4.2 3.9 4.1 3.7   < > 4.1 3.4 3.8 3.6   CHLORIDE 111* 109 107 107   < > 110* 105 103 109   CO2 27 28 30 30   < > 26 29 32 28   ANIONGAP 3 3 4 4   < > 3 4 3 4   GLC 98 80 93 86   < > 84 89 95 126*   BUN 23 17 25 21   < > 19 17 14 14   CR 1.55* 1.17 1.08 1.04   < > 1.06 1.11 1.11 0.98   GFRESTIMATED 55* 77 85 89   < > 83 79 79 >90   GFRESTBLACK  --   --   --   --   --  >90 >90 >90 >90   BASIA 9.3 9.2 9.8 9.0   < > 9.2 8.5 8.6 8.7    < > = values in this interval not displayed.     Recent Labs   Lab Test 04/09/22  0749 03/05/22  0750 01/08/22  0758 12/11/21  0755   BILITOTAL 1.3 2.0* 1.1 0.6   ALKPHOS 40 33* 47 60   ALT 24 36 49 30   AST 21 16 14 10     CBC  Recent Labs   Lab Test 03/05/22  0750 01/08/22  0758 12/11/21  0755 12/06/21  1435   HGB 14.4 16.1 15.8 16.3   WBC 7.4 9.9 10.4 16.1*   RBC 4.44 4.98 5.16 5.37   HCT 40.2 46.2 45.9 47.1   MCV 91 93 89 88   MCH 32.4 32.3 30.6 30.4   MCHC 35.8 34.8 34.4 34.6   RDW 12.1 12.9 12.1 12.4    167 190 258     URINE STUDIES  Recent Labs   Lab Test 08/14/21  0819 12/14/20  0037 09/12/20  0841 02/28/20  1620 04/01/19  1500 03/29/19  1858   COLOR Yellow Yellow Yellow Yellow Yellow Yellow   APPEARANCE Clear Clear Clear Clear Clear Clear   URINEGLC Negative Negative Negative Negative Negative Negative   URINEBILI Negative Negative Negative Negative Negative Negative   URINEKETONE Negative Negative Negative Negative Negative Negative   SG 1.026 1.019 1.021 >1.030 1.015 >1.035*   UBLD Negative Negative Negative Moderate* Moderate* Moderate*   URINEPH 6.0 5.0 5.0 6.0 6.0 6.0   PROTEIN Negative Negative Negative >=300* >=300* >499*   UROBILINOGEN  --   --   --  0.2 0.2  --    NITRITE Negative Negative Negative Negative Negative Negative   LEUKEST  Negative Negative Negative Negative Negative Negative   RBCU <1  --   --  O - 2 25-50* 7*   WBCU <1  --   --  0 - 5 0 - 5 7*     Recent Labs   Lab Test 12/06/21  1435 04/03/20  0900 02/28/20  1619 11/26/19  1435   UTPG 3.62* 0.18 6.20* 0.06     PTH  No lab results found.  IRON STUDIES  No lab results found.    IMPRESSION:  1. Findings are concerning for diffuse small bowel enteritis likely  infectious or inflammatory in etiology, with associated inflammatory  changes of the mesenteric adipose tissues, moderate ascites, small  bilateral probably reactive pleural effusions and with some edema in  the subcutaneous adipose tissues.  2. No significant atherosclerosis is identified. No obvious arterial  blockage is seen in the mesenteric vessels to suggest ischemia.  3. Mild degenerative changes of the spine and hips. No acute fracture  or aggressive osseous lesion.  Jenny Scott MD            Again, thank you for allowing me to participate in the care of your patient.      Sincerely,    Jenny Scott MD

## 2022-04-27 NOTE — TELEPHONE ENCOUNTER
"Hospital/TCU/ED for chronic condition Discharge Protocol    \"Hi, my name is Silvia Lima, a registered nurse, and I am calling from [facility].  I am calling to follow up and see how things are going for you after your recent emergency visit/hospital/TCU stay.\"    Tell me how you are doing now that you are home?\" slowly getting better. Sick and tired of this diet.\"      Discharge Instructions    \"Let's review your discharge instructions.  What is/are the follow-up recommendations?  Pt. Response: Follow up in clinic    \"Has an appointment with your primary care provider been scheduled?\"   Yes. (confirm)    \"When you see the provider, I would recommend that you bring your medications with you.\"    Medications    \"Tell me what changed about your medicines when you discharged?\"    Changes to chronic meds?    0-1    \"What questions do you have about your medications?\"    None     New diagnoses of heart failure, COPD, diabetes, or MI?    No              Medication reconciliation completed? Yes  Was MTM referral placed (*Make sure to put transitions as reason for referral)?   No    Call Summary    \"What questions or concerns do you have about your recent visit and your follow-up care?\"     none    \"If you have questions or things don't continue to improve, we encourage you contact us through the main clinic number (give number).  Even if the clinic is not open, triage nurses are available 24/7 to help you.     We would like you to know that our clinic has extended hours (provide information).  We also have urgent care (provide details on closest location and hours/contact info)\"      \"Thank you for your time and take care!\"    SHARDA Clayton, RN  [facility]  " cardiac precautions/surgical precautions

## 2022-05-07 ENCOUNTER — LAB (OUTPATIENT)
Dept: LAB | Facility: CLINIC | Age: 47
End: 2022-05-07
Payer: COMMERCIAL

## 2022-05-07 DIAGNOSIS — K50.00 CROHN'S DISEASE OF SMALL INTESTINE WITHOUT COMPLICATION (H): ICD-10-CM

## 2022-05-07 DIAGNOSIS — R79.89 ELEVATED SERUM CREATININE: ICD-10-CM

## 2022-05-07 DIAGNOSIS — N04.9 NEPHROTIC SYNDROME: ICD-10-CM

## 2022-05-07 DIAGNOSIS — D84.9 IMMUNOSUPPRESSION (H): ICD-10-CM

## 2022-05-07 DIAGNOSIS — N05.0 MINIMAL CHANGE DISEASE: ICD-10-CM

## 2022-05-07 DIAGNOSIS — D58.2 ELEVATED HEMOGLOBIN (H): ICD-10-CM

## 2022-05-07 DIAGNOSIS — N18.31 CHRONIC KIDNEY DISEASE, STAGE 3A (H): ICD-10-CM

## 2022-05-07 DIAGNOSIS — E78.5 HYPERLIPIDEMIA LDL GOAL <100: ICD-10-CM

## 2022-05-07 LAB
ALBUMIN SERPL-MCNC: 3.7 G/DL (ref 3.4–5)
ALP SERPL-CCNC: 55 U/L (ref 40–150)
ALT SERPL W P-5'-P-CCNC: 20 U/L (ref 0–70)
ANION GAP SERPL CALCULATED.3IONS-SCNC: 5 MMOL/L (ref 3–14)
AST SERPL W P-5'-P-CCNC: 12 U/L (ref 0–45)
BILIRUB DIRECT SERPL-MCNC: 0.2 MG/DL (ref 0–0.2)
BILIRUB SERPL-MCNC: 0.5 MG/DL (ref 0.2–1.3)
BUN SERPL-MCNC: 26 MG/DL (ref 7–30)
CALCIUM SERPL-MCNC: 9.2 MG/DL (ref 8.5–10.1)
CHLORIDE BLD-SCNC: 107 MMOL/L (ref 94–109)
CHOLEST SERPL-MCNC: 106 MG/DL
CO2 SERPL-SCNC: 27 MMOL/L (ref 20–32)
CREAT SERPL-MCNC: 1.14 MG/DL (ref 0.66–1.25)
CREAT UR-MCNC: 169 MG/DL
ERYTHROCYTE [DISTWIDTH] IN BLOOD BY AUTOMATED COUNT: 11.2 % (ref 10–15)
FASTING STATUS PATIENT QL REPORTED: YES
GFR SERPL CREATININE-BSD FRML MDRD: 79 ML/MIN/1.73M2
GLUCOSE BLD-MCNC: 96 MG/DL (ref 70–99)
HCT VFR BLD AUTO: 35.3 % (ref 40–53)
HDLC SERPL-MCNC: 33 MG/DL
HGB BLD-MCNC: 12.5 G/DL (ref 13.3–17.7)
LDLC SERPL CALC-MCNC: 57 MG/DL
MCH RBC QN AUTO: 31.4 PG (ref 26.5–33)
MCHC RBC AUTO-ENTMCNC: 35.4 G/DL (ref 31.5–36.5)
MCV RBC AUTO: 89 FL (ref 78–100)
MICROALBUMIN UR-MCNC: 6 MG/L
MICROALBUMIN/CREAT UR: 3.55 MG/G CR (ref 0–17)
NONHDLC SERPL-MCNC: 73 MG/DL
PHOSPHATE SERPL-MCNC: 3.3 MG/DL (ref 2.5–4.5)
PLATELET # BLD AUTO: 294 10E3/UL (ref 150–450)
POTASSIUM BLD-SCNC: 4 MMOL/L (ref 3.4–5.3)
PROT SERPL-MCNC: 7.1 G/DL (ref 6.8–8.8)
RBC # BLD AUTO: 3.98 10E6/UL (ref 4.4–5.9)
SODIUM SERPL-SCNC: 139 MMOL/L (ref 133–144)
TRIGL SERPL-MCNC: 79 MG/DL
WBC # BLD AUTO: 6 10E3/UL (ref 4–11)

## 2022-05-07 PROCEDURE — 80061 LIPID PANEL: CPT

## 2022-05-07 PROCEDURE — 85027 COMPLETE CBC AUTOMATED: CPT

## 2022-05-07 PROCEDURE — 82248 BILIRUBIN DIRECT: CPT

## 2022-05-07 PROCEDURE — 80158 DRUG ASSAY CYCLOSPORINE: CPT

## 2022-05-07 PROCEDURE — 36415 COLL VENOUS BLD VENIPUNCTURE: CPT

## 2022-05-07 PROCEDURE — 84100 ASSAY OF PHOSPHORUS: CPT

## 2022-05-07 PROCEDURE — 82043 UR ALBUMIN QUANTITATIVE: CPT

## 2022-05-07 PROCEDURE — 80053 COMPREHEN METABOLIC PANEL: CPT

## 2022-05-08 LAB
CYCLOSPORINE BLD LC/MS/MS-MCNC: 35 UG/L (ref 50–400)
TME LAST DOSE: ABNORMAL H
TME LAST DOSE: ABNORMAL H

## 2022-05-13 DIAGNOSIS — N18.2 ANEMIA IN STAGE 2 CHRONIC KIDNEY DISEASE: Primary | ICD-10-CM

## 2022-05-13 DIAGNOSIS — D63.1 ANEMIA IN STAGE 2 CHRONIC KIDNEY DISEASE: Primary | ICD-10-CM

## 2022-06-04 ENCOUNTER — LAB (OUTPATIENT)
Dept: LAB | Facility: CLINIC | Age: 47
End: 2022-06-04
Payer: COMMERCIAL

## 2022-06-04 DIAGNOSIS — N05.0 MINIMAL CHANGE DISEASE: ICD-10-CM

## 2022-06-04 DIAGNOSIS — N18.2 ANEMIA IN STAGE 2 CHRONIC KIDNEY DISEASE: ICD-10-CM

## 2022-06-04 DIAGNOSIS — E78.5 HYPERLIPIDEMIA LDL GOAL <100: ICD-10-CM

## 2022-06-04 DIAGNOSIS — K50.00 CROHN'S DISEASE OF SMALL INTESTINE WITHOUT COMPLICATION (H): ICD-10-CM

## 2022-06-04 DIAGNOSIS — D63.1 ANEMIA IN STAGE 2 CHRONIC KIDNEY DISEASE: ICD-10-CM

## 2022-06-04 DIAGNOSIS — D84.9 IMMUNOSUPPRESSION (H): ICD-10-CM

## 2022-06-04 DIAGNOSIS — D58.2 ELEVATED HEMOGLOBIN (H): ICD-10-CM

## 2022-06-04 DIAGNOSIS — N04.9 NEPHROTIC SYNDROME: ICD-10-CM

## 2022-06-04 LAB
ALBUMIN SERPL-MCNC: 4.1 G/DL (ref 3.4–5)
ANION GAP SERPL CALCULATED.3IONS-SCNC: 4 MMOL/L (ref 3–14)
BUN SERPL-MCNC: 17 MG/DL (ref 7–30)
CALCIUM SERPL-MCNC: 9.3 MG/DL (ref 8.5–10.1)
CHLORIDE BLD-SCNC: 109 MMOL/L (ref 94–109)
CHOLEST SERPL-MCNC: 108 MG/DL
CO2 SERPL-SCNC: 27 MMOL/L (ref 20–32)
CREAT SERPL-MCNC: 1.2 MG/DL (ref 0.66–1.25)
CREAT UR-MCNC: 305 MG/DL
CYCLOSPORINE BLD LC/MS/MS-MCNC: 40 UG/L (ref 50–400)
ERYTHROCYTE [DISTWIDTH] IN BLOOD BY AUTOMATED COUNT: 12 % (ref 10–15)
FASTING STATUS PATIENT QL REPORTED: YES
FERRITIN SERPL-MCNC: 369 NG/ML (ref 26–388)
GFR SERPL CREATININE-BSD FRML MDRD: 75 ML/MIN/1.73M2
GLUCOSE BLD-MCNC: 104 MG/DL (ref 70–99)
HCT VFR BLD AUTO: 39.7 % (ref 40–53)
HDLC SERPL-MCNC: 49 MG/DL
HGB BLD-MCNC: 14.1 G/DL (ref 13.3–17.7)
IRON SATN MFR SERPL: 52 % (ref 15–46)
IRON SERPL-MCNC: 144 UG/DL (ref 35–180)
LDLC SERPL CALC-MCNC: 46 MG/DL
MCH RBC QN AUTO: 31.5 PG (ref 26.5–33)
MCHC RBC AUTO-ENTMCNC: 35.5 G/DL (ref 31.5–36.5)
MCV RBC AUTO: 89 FL (ref 78–100)
MICROALBUMIN UR-MCNC: 10 MG/L
MICROALBUMIN/CREAT UR: 3.28 MG/G CR (ref 0–17)
NONHDLC SERPL-MCNC: 59 MG/DL
PHOSPHATE SERPL-MCNC: 3.1 MG/DL (ref 2.5–4.5)
PLATELET # BLD AUTO: 207 10E3/UL (ref 150–450)
POTASSIUM BLD-SCNC: 4.2 MMOL/L (ref 3.4–5.3)
RBC # BLD AUTO: 4.48 10E6/UL (ref 4.4–5.9)
SODIUM SERPL-SCNC: 140 MMOL/L (ref 133–144)
TIBC SERPL-MCNC: 277 UG/DL (ref 240–430)
TME LAST DOSE: ABNORMAL H
TME LAST DOSE: ABNORMAL H
TRIGL SERPL-MCNC: 63 MG/DL
WBC # BLD AUTO: 5.3 10E3/UL (ref 4–11)

## 2022-06-04 PROCEDURE — 82728 ASSAY OF FERRITIN: CPT

## 2022-06-04 PROCEDURE — 36415 COLL VENOUS BLD VENIPUNCTURE: CPT

## 2022-06-04 PROCEDURE — 80069 RENAL FUNCTION PANEL: CPT

## 2022-06-04 PROCEDURE — 85027 COMPLETE CBC AUTOMATED: CPT

## 2022-06-04 PROCEDURE — 83550 IRON BINDING TEST: CPT

## 2022-06-04 PROCEDURE — 82043 UR ALBUMIN QUANTITATIVE: CPT

## 2022-06-04 PROCEDURE — 80061 LIPID PANEL: CPT

## 2022-06-04 PROCEDURE — 80158 DRUG ASSAY CYCLOSPORINE: CPT

## 2022-06-20 ENCOUNTER — VIRTUAL VISIT (OUTPATIENT)
Dept: NEPHROLOGY | Facility: CLINIC | Age: 47
End: 2022-06-20
Payer: COMMERCIAL

## 2022-06-20 DIAGNOSIS — N04.9 NEPHROTIC SYNDROME: Primary | ICD-10-CM

## 2022-06-20 PROCEDURE — 99214 OFFICE O/P EST MOD 30 MIN: CPT | Mod: 95 | Performed by: INTERNAL MEDICINE

## 2022-06-20 NOTE — LETTER
6/20/2022       RE: Deshawn Flood  [address] [id number]     Dear Colleague,    Thank you for referring your patient, Deshawn Flood, to the [facility] [place] at [facility]. Please see a copy of my visit note below.    Deshawn is a 47 year old who is being evaluated via a billable telephone visit.      What phone number would you like to be contacted at? [phone]    How would you like to obtain your AVS? [employer]  Phone call duration: 11 minutes  25 minutes spent on day of service in coordination of care     Assessment and Plan:  47 year old male who presents for followup of minimal change disease. He presented for evaluation after presenting to ER where CT scan noted ascites and nonspecific small bowel enteritis, and UA showed proteinuria. He had biopsy which showed minimal change on 4/9/2019 and treated with prednisone 70mg. He was in remission and down to prednsione 10mg then stopped it, and had relapse within a couple months.  His mother noted swelling and he was up 20-25 lbs and proteinuria up to 6 grams/g cr.   # Minimal change disease/ nephrotic syndrome, second relapse within weeks of weaning prednisone, despite slow wean.  - Prednisone round #2 started March 2020, tried to do a longer wean once on lower doses.  - Prednisone round #3 started April 20,2021. He notes his weight was up 10 pounds but had not noted the swelling or other symptoms outside of it.  - March 2021 - he was off prednisone and has relapsed within a few weeks.  - Thus restarted prednisone, and started  cyclosporine 3-4mg/kg, started at 100mg twice a day- he was only taking it once a day for a while until he realized it was twice a day-   - level recently 40 - thus will decrease dose. He states this was a trough of 11 hours -   - decreased cyclosporine to 50mg bid on 4/18/22- aim for level of 25-50 now that he has been on this and is stable. Will continue this  until Spring 2023  - in January 2023 we can go to every other month labs if all is stable.    - he is near 70-75 kg  - continue labs monthly for now  # Renal function- elevated creatinine / ?CKD or due to cyclosporine- Scr was 1.1 in the past and was1.27 in ER. It was up to 1.6-1.8 at time of biopsy after diuretics were started.  - scr back to 1-1.2 range, but up to 1.4 a few months ago, ? CSA hemodynamic effect  - monitor and encouraged hydration    - proteinuria improved very quickly down to normal once prednisone restarted , down from 13 grams to normal    Labs on Saturdays if he is working.  Started new medication, cyclosporine, twice a day- initially took it once a day only   - discussed that his is an immunosuppressant, that it increases risk of infection and malignancy. He was agreeable to start it.  - stopped bactrim     # Hyperlipidemia- LDL very elevated,was on low dose statin but stopped in setting of CNI and LFT elevated- it was restarted by PCP  - recheck lipid panel and LFTs    # Hypertension: normotensive   - continue lisinopril    # Not anemic- hgb 18 now down to 16- stable    # Electrolytes: normal low potassium      # Mineral Bone Disorder:   Check baseline PTH if creatinine remains elevated.     - RTC in 3-4 months and labs monthly for now    Assessment and plan was discussed with patient and he voiced his understanding and agreement.    Reason for Visit:  Deshawn Flood is a 47 year old male with nephrotic range proteinuria noted and biopsy 4/9/19 shows minimal change disease    HPI:  He is a 45 year old male recently in normal health who presented with nephrotic syndrome. He underwent kidney biopsy 4/9 and it shows minimal change disease. He was admitted to the hospital briefly after biopsy as he was having poor appetite due to bloating, and elevated creatinine.    Since then he went into remission, we tapered down on prednisone and unfortunately he relapsed within a couple months (6g/g cr  February 2020),  thus we restarted prednisone in March 2020 and by early April he was down to normal range proteinuria and he was feeling better.    His weight is usually closer to 150-160  but was up to 184 lbs with last episode, was down to 160s with no proteinuria, but was back up to 188 lbs and once back on prednisone, his weight went back down to 158- 159lbs. He likes to stay at 160-165lbs but can't seem to keep weight on- he is working now and is outside, so we discussed drinking enough water.    Since last visit four months ago, he is doing fair. He has had a lot of issues with back pain and was not working  He denies any swelling or fluid retention.  He is now off prednisone though no specific instruction were given, as he was not seen two months ago when I planned to wean down further  At this time, he is stable and we will just monitor closely.  If he relapses, will consider CNI therapy.    He does not have a BP cuff    Renal History:   None prior to minimal change disease Spring 2019    ROS:   A comprehensive review of systems was obtained and negative, except as noted in the HPI or PMH.    Active Medical Problems:  Patient Active Problem List   Diagnosis     Loose body of left knee     Left knee pain     Tobacco abuse     History of pneumothorax     CARDIOVASCULAR SCREENING; LDL GOAL LESS THAN 160     Nephrotic syndrome     Anasarca     Minimal change disease     Immunosuppression (H)     Crohn's disease of small intestine without complication (H)     Elevated hemoglobin (H)     Chronic kidney disease, stage 3a (H)     PMH:   Medical record was reviewed and PMH was discussed with patient and noted below.  Past Medical History:   Diagnosis Date     Anemia      Other spontaneous pneumothorax      SPONTANEOUS PNEUMOTHORAX NEC 3/25/2005     Unspecified asthma(493.90)      PSH:   Past Surgical History:   Procedure Laterality Date     ARTHROSCOPY KNEE  8/3/2012    Procedure: ARTHROSCOPY KNEE;  Left knee  Arthroscopy with removal of loose bodies;  Surgeon: Bibi Roberts MD;  Location: [facility]     HC REPAIR UMBILICAL GODFREY,<4Y/O,REDUC  1978     IR RENAL BIOPSY LEFT  4/9/2019     TUBE THORACOSTOMY,ABSC/EMPYEMA/HEMO  3/20/2005    Small chest tube with Heimlich valve.       Family Hx:   Family History   Problem Relation Age of Onset     Autoimmune Disease No family hx of      Personal Hx:   Social History     Tobacco Use     Smoking status: Light Tobacco Smoker     Packs/day: 0.10     Years: 15.00     Pack years: 1.50     Types: Cigarettes     Smokeless tobacco: Former User     Types: Chew     Tobacco comment: 1 pack per day   Substance Use Topics     Alcohol use: Yes     Alcohol/week: 17.5 standard drinks     Types: 21 Cans of beer per week     Comment: occ       Allergies:  Allergies   Allergen Reactions     Bee Venom      swells up     No Known Drug Allergies      Seasonal Allergies        Medications:  Current Outpatient Medications   Medication Sig     albuterol (VENTOLIN HFA) 108 (90 Base) MCG/ACT inhaler INHALE 1-2 PUFFS INTO THE LUNGS EVERY 6 HOURS AS NEEDED FOR SHORTNESS OF BREATH, DIFFICULTY BREATHING OR WHEEZING. (NEED TO BE SEEN IN CLINIC FOR FURTHER REFILLS)     atorvastatin (LIPITOR) 20 MG tablet TAKE ONE TABLET BY MOUTH ONCE DAILY     bumetanide (BUMEX) 1 MG tablet Take 1 tablet (1 mg) by mouth daily as needed (for increased swelling IF NEEDED)     cycloSPORINE modified (GENERIC EQUIVALENT) 50 MG capsule Take 1 capsule (50 mg) by mouth 2 times daily     lisinopril (ZESTRIL) 20 MG tablet Take 1 tablet (20 mg) by mouth daily     No current facility-administered medications for this visit.      Vitals:  There were no vitals taken for this visit.  154-155 lbs  Exam:  GENERAL : alert and no distress  Speaks in full sentences without dyspnea  Normal speech and thought pattern    LABS:   CMP  Recent Labs   Lab Test 06/04/22  0803 05/07/22  0748 04/09/22  0749 03/05/22  0750 08/14/21  0807 07/10/21  0829  06/05/21  0743 05/15/21  0757 04/10/21  0745    139 141 140   < > 139 138 138 141   POTASSIUM 4.2 4.0 4.2 3.9   < > 4.1 3.4 3.8 3.6   CHLORIDE 109 107 111* 109   < > 110* 105 103 109   CO2 27 27 27 28   < > 26 29 32 28   ANIONGAP 4 5 3 3   < > 3 4 3 4   * 96 98 80   < > 84 89 95 126*   BUN 17 26 23 17   < > 19 17 14 14   CR 1.20 1.14 1.55* 1.17   < > 1.06 1.11 1.11 0.98   GFRESTIMATED 75 79 55* 77   < > 83 79 79 >90   GFRESTBLACK  --   --   --   --   --  >90 >90 >90 >90   BASIA 9.3 9.2 9.3 9.2   < > 9.2 8.5 8.6 8.7    < > = values in this interval not displayed.     Recent Labs   Lab Test 05/07/22  0748 04/09/22  0749 03/05/22  0750 01/08/22  0758   BILITOTAL 0.5 1.3 2.0* 1.1   ALKPHOS 55 40 33* 47   ALT 20 24 36 49   AST 12 21 16 14     CBC  Recent Labs   Lab Test 06/04/22  0803 05/07/22  0748 03/05/22  0750 01/08/22  0758   HGB 14.1 12.5* 14.4 16.1   WBC 5.3 6.0 7.4 9.9   RBC 4.48 3.98* 4.44 4.98   HCT 39.7* 35.3* 40.2 46.2   MCV 89 89 91 93   MCH 31.5 31.4 32.4 32.3   MCHC 35.5 35.4 35.8 34.8   RDW 12.0 11.2 12.1 12.9    294 164 167     URINE STUDIES  Recent Labs   Lab Test 08/14/21  0819 12/14/20  0037 09/12/20  0841 02/28/20  1620 04/01/19  1500 03/29/19  1858   COLOR Yellow Yellow Yellow Yellow Yellow Yellow   APPEARANCE Clear Clear Clear Clear Clear Clear   URINEGLC Negative Negative Negative Negative Negative Negative   URINEBILI Negative Negative Negative Negative Negative Negative   URINEKETONE Negative Negative Negative Negative Negative Negative   SG 1.026 1.019 1.021 >1.030 1.015 >1.035*   UBLD Negative Negative Negative Moderate* Moderate* Moderate*   URINEPH 6.0 5.0 5.0 6.0 6.0 6.0   PROTEIN Negative Negative Negative >=300* >=300* >499*   UROBILINOGEN  --   --   --  0.2 0.2  --    NITRITE Negative Negative Negative Negative Negative Negative   LEUKEST Negative Negative Negative Negative Negative Negative   RBCU <1  --   --  O - 2 25-50* 7*   WBCU <1  --   --  0 - 5 0 - 5 7*      Recent Labs   Lab Test 12/06/21  1435 04/03/20  0900 02/28/20  1619 11/26/19  1435   UTPG 3.62* 0.18 6.20* 0.06     PTH  No lab results found.  IRON STUDIES  Recent Labs   Lab Test 06/04/22  0803   IRON 144      IRONSAT 52*   REYNA 369       IMPRESSION:  1. Findings are concerning for diffuse small bowel enteritis likely  infectious or inflammatory in etiology, with associated inflammatory  changes of the mesenteric adipose tissues, moderate ascites, small  bilateral probably reactive pleural effusions and with some edema in  the subcutaneous adipose tissues.  2. No significant atherosclerosis is identified. No obvious arterial  blockage is seen in the mesenteric vessels to suggest ischemia.  3. Mild degenerative changes of the spine and hips. No acute fracture  or aggressive osseous lesion.  Jenny Scott MD

## 2022-06-20 NOTE — PROGRESS NOTES
Deshawn is a 47 year old who is being evaluated via a billable telephone visit.      What phone number would you like to be contacted at? [phone]    How would you like to obtain your AVS? Rejichristian  Phone call duration: 11 minutes  25 minutes spent on day of service in coordination of care     Assessment and Plan:  47 year old male who presents for followup of minimal change disease. He presented for evaluation after presenting to ER where CT scan noted ascites and nonspecific small bowel enteritis, and UA showed proteinuria. He had biopsy which showed minimal change on 4/9/2019 and treated with prednisone 70mg. He was in remission and down to prednsione 10mg then stopped it, and had relapse within a couple months.  His mother noted swelling and he was up 20-25 lbs and proteinuria up to 6 grams/g cr.   # Minimal change disease/ nephrotic syndrome, second relapse within weeks of weaning prednisone, despite slow wean.  - Prednisone round #2 started March 2020, tried to do a longer wean once on lower doses.  - Prednisone round #3 started April 20,2021. He notes his weight was up 10 pounds but had not noted the swelling or other symptoms outside of it.  - March 2021 - he was off prednisone and has relapsed within a few weeks.  - Thus restarted prednisone, and started  cyclosporine 3-4mg/kg, started at 100mg twice a day- he was only taking it once a day for a while until he realized it was twice a day-   - level recently 40 - thus will decrease dose. He states this was a trough of 11 hours -   - decreased cyclosporine to 50mg bid on 4/18/22- aim for level of 25-50 now that he has been on this and is stable. Will continue this until Spring 2023  - in January 2023 we can go to every other month labs if all is stable.    - he is near 70-75 kg  - continue labs monthly for now  # Renal function- elevated creatinine / ?CKD or due to cyclosporine- Scr was 1.1 in the past and was1.27 in ER. It was up to 1.6-1.8 at time of biopsy  after diuretics were started.  - scr back to 1-1.2 range, but up to 1.4 a few months ago, ? CSA hemodynamic effect  - monitor and encouraged hydration    - proteinuria improved very quickly down to normal once prednisone restarted , down from 13 grams to normal    Labs on Saturdays if he is working.  Started new medication, cyclosporine, twice a day- initially took it once a day only   - discussed that his is an immunosuppressant, that it increases risk of infection and malignancy. He was agreeable to start it.  - stopped bactrim     # Hyperlipidemia- LDL very elevated,was on low dose statin but stopped in setting of CNI and LFT elevated- it was restarted by PCP  - recheck lipid panel and LFTs    # Hypertension: normotensive   - continue lisinopril    # Not anemic- hgb 18 now down to 16- stable    # Electrolytes: normal low potassium      # Mineral Bone Disorder:   Check baseline PTH if creatinine remains elevated.     - RTC in 3-4 months and labs monthly for now    Assessment and plan was discussed with patient and he voiced his understanding and agreement.    Reason for Visit:  Deshawn Flood is a 47 year old male with nephrotic range proteinuria noted and biopsy 4/9/19 shows minimal change disease    HPI:  He is a 45 year old male recently in normal health who presented with nephrotic syndrome. He underwent kidney biopsy 4/9 and it shows minimal change disease. He was admitted to the hospital briefly after biopsy as he was having poor appetite due to bloating, and elevated creatinine.    Since then he went into remission, we tapered down on prednisone and unfortunately he relapsed within a couple months (6g/g cr February 2020),  thus we restarted prednisone in March 2020 and by early April he was down to normal range proteinuria and he was feeling better.    His weight is usually closer to 150-160  but was up to 184 lbs with last episode, was down to 160s with no proteinuria, but was back up to 188 lbs and once  back on prednisone, his weight went back down to 158- 159lbs. He likes to stay at 160-165lbs but can't seem to keep weight on- he is working now and is outside, so we discussed drinking enough water.    Since last visit four months ago, he is doing fair. He has had a lot of issues with back pain and was not working  He denies any swelling or fluid retention.  He is now off prednisone though no specific instruction were given, as he was not seen two months ago when I planned to wean down further  At this time, he is stable and we will just monitor closely.  If he relapses, will consider CNI therapy.    He does not have a BP cuff    Renal History:   None prior to minimal change disease Spring 2019    ROS:   A comprehensive review of systems was obtained and negative, except as noted in the HPI or PMH.    Active Medical Problems:  Patient Active Problem List   Diagnosis     Loose body of left knee     Left knee pain     Tobacco abuse     History of pneumothorax     CARDIOVASCULAR SCREENING; LDL GOAL LESS THAN 160     Nephrotic syndrome     Anasarca     Minimal change disease     Immunosuppression (H)     Crohn's disease of small intestine without complication (H)     Elevated hemoglobin (H)     Chronic kidney disease, stage 3a (H)     PMH:   Medical record was reviewed and PMH was discussed with patient and noted below.  Past Medical History:   Diagnosis Date     Anemia      Other spontaneous pneumothorax      SPONTANEOUS PNEUMOTHORAX NEC 3/25/2005     Unspecified asthma(493.90)      PSH:   Past Surgical History:   Procedure Laterality Date     ARTHROSCOPY KNEE  8/3/2012    Procedure: ARTHROSCOPY KNEE;  Left knee Arthroscopy with removal of loose bodies;  Surgeon: Bibi Roberts MD;  Location: [facility]     HC REPAIR UMBILICAL GODFREY,<4Y/O,REDUC  1978     IR RENAL BIOPSY LEFT  4/9/2019     TUBE THORACOSTOMY,ABSC/EMPYEMA/HEMO  3/20/2005    Small chest tube with Heimlich valve.       Family Hx:   Family History   Problem  Relation Age of Onset     Autoimmune Disease No family hx of      Personal Hx:   Social History     Tobacco Use     Smoking status: Light Tobacco Smoker     Packs/day: 0.10     Years: 15.00     Pack years: 1.50     Types: Cigarettes     Smokeless tobacco: Former User     Types: Chew     Tobacco comment: 1 pack per day   Substance Use Topics     Alcohol use: Yes     Alcohol/week: 17.5 standard drinks     Types: 21 Cans of beer per week     Comment: occ       Allergies:  Allergies   Allergen Reactions     Bee Venom      swells up     No Known Drug Allergies      Seasonal Allergies        Medications:  Current Outpatient Medications   Medication Sig     albuterol (VENTOLIN HFA) 108 (90 Base) MCG/ACT inhaler INHALE 1-2 PUFFS INTO THE LUNGS EVERY 6 HOURS AS NEEDED FOR SHORTNESS OF BREATH, DIFFICULTY BREATHING OR WHEEZING. (NEED TO BE SEEN IN CLINIC FOR FURTHER REFILLS)     atorvastatin (LIPITOR) 20 MG tablet TAKE ONE TABLET BY MOUTH ONCE DAILY     bumetanide (BUMEX) 1 MG tablet Take 1 tablet (1 mg) by mouth daily as needed (for increased swelling IF NEEDED)     cycloSPORINE modified (GENERIC EQUIVALENT) 50 MG capsule Take 1 capsule (50 mg) by mouth 2 times daily     lisinopril (ZESTRIL) 20 MG tablet Take 1 tablet (20 mg) by mouth daily     No current facility-administered medications for this visit.      Vitals:  There were no vitals taken for this visit.  154-155 lbs  Exam:  GENERAL : alert and no distress  Speaks in full sentences without dyspnea  Normal speech and thought pattern    LABS:   CMP  Recent Labs   Lab Test 06/04/22  0803 05/07/22  0748 04/09/22  0749 03/05/22  0750 08/14/21  0807 07/10/21  0829 06/05/21  0743 05/15/21  0757 04/10/21  0745    139 141 140   < > 139 138 138 141   POTASSIUM 4.2 4.0 4.2 3.9   < > 4.1 3.4 3.8 3.6   CHLORIDE 109 107 111* 109   < > 110* 105 103 109   CO2 27 27 27 28   < > 26 29 32 28   ANIONGAP 4 5 3 3   < > 3 4 3 4   * 96 98 80   < > 84 89 95 126*   BUN 17 26 23 17    < > 19 17 14 14   CR 1.20 1.14 1.55* 1.17   < > 1.06 1.11 1.11 0.98   GFRESTIMATED 75 79 55* 77   < > 83 79 79 >90   GFRESTBLACK  --   --   --   --   --  >90 >90 >90 >90   BASIA 9.3 9.2 9.3 9.2   < > 9.2 8.5 8.6 8.7    < > = values in this interval not displayed.     Recent Labs   Lab Test 05/07/22  0748 04/09/22  0749 03/05/22  0750 01/08/22  0758   BILITOTAL 0.5 1.3 2.0* 1.1   ALKPHOS 55 40 33* 47   ALT 20 24 36 49   AST 12 21 16 14     CBC  Recent Labs   Lab Test 06/04/22  0803 05/07/22  0748 03/05/22  0750 01/08/22  0758   HGB 14.1 12.5* 14.4 16.1   WBC 5.3 6.0 7.4 9.9   RBC 4.48 3.98* 4.44 4.98   HCT 39.7* 35.3* 40.2 46.2   MCV 89 89 91 93   MCH 31.5 31.4 32.4 32.3   MCHC 35.5 35.4 35.8 34.8   RDW 12.0 11.2 12.1 12.9    294 164 167     URINE STUDIES  Recent Labs   Lab Test 08/14/21  0819 12/14/20  0037 09/12/20  0841 02/28/20  1620 04/01/19  1500 03/29/19  1858   COLOR Yellow Yellow Yellow Yellow Yellow Yellow   APPEARANCE Clear Clear Clear Clear Clear Clear   URINEGLC Negative Negative Negative Negative Negative Negative   URINEBILI Negative Negative Negative Negative Negative Negative   URINEKETONE Negative Negative Negative Negative Negative Negative   SG 1.026 1.019 1.021 >1.030 1.015 >1.035*   UBLD Negative Negative Negative Moderate* Moderate* Moderate*   URINEPH 6.0 5.0 5.0 6.0 6.0 6.0   PROTEIN Negative Negative Negative >=300* >=300* >499*   UROBILINOGEN  --   --   --  0.2 0.2  --    NITRITE Negative Negative Negative Negative Negative Negative   LEUKEST Negative Negative Negative Negative Negative Negative   RBCU <1  --   --  O - 2 25-50* 7*   WBCU <1  --   --  0 - 5 0 - 5 7*     Recent Labs   Lab Test 12/06/21  1435 04/03/20  0900 02/28/20  1619 11/26/19  1435   UTPG 3.62* 0.18 6.20* 0.06     PTH  No lab results found.  IRON STUDIES  Recent Labs   Lab Test 06/04/22  0803   IRON 144      IRONSAT 52*   REYNA 369       IMPRESSION:  1. Findings are concerning for diffuse small bowel enteritis  likely  infectious or inflammatory in etiology, with associated inflammatory  changes of the mesenteric adipose tissues, moderate ascites, small  bilateral probably reactive pleural effusions and with some edema in  the subcutaneous adipose tissues.  2. No significant atherosclerosis is identified. No obvious arterial  blockage is seen in the mesenteric vessels to suggest ischemia.  3. Mild degenerative changes of the spine and hips. No acute fracture  or aggressive osseous lesion.  Jenny Scott MD

## 2022-07-02 ENCOUNTER — LAB (OUTPATIENT)
Dept: LAB | Facility: CLINIC | Age: 47
End: 2022-07-02
Payer: COMMERCIAL

## 2022-07-02 DIAGNOSIS — N05.0 MINIMAL CHANGE DISEASE: ICD-10-CM

## 2022-07-02 DIAGNOSIS — E78.5 HYPERLIPIDEMIA LDL GOAL <100: ICD-10-CM

## 2022-07-02 DIAGNOSIS — K50.00 CROHN'S DISEASE OF SMALL INTESTINE WITHOUT COMPLICATION (H): ICD-10-CM

## 2022-07-02 DIAGNOSIS — D58.2 ELEVATED HEMOGLOBIN (H): ICD-10-CM

## 2022-07-02 DIAGNOSIS — D84.9 IMMUNOSUPPRESSION (H): ICD-10-CM

## 2022-07-02 DIAGNOSIS — N04.9 NEPHROTIC SYNDROME: ICD-10-CM

## 2022-07-02 LAB
ALBUMIN SERPL-MCNC: 4.1 G/DL (ref 3.4–5)
ANION GAP SERPL CALCULATED.3IONS-SCNC: 5 MMOL/L (ref 3–14)
BUN SERPL-MCNC: 20 MG/DL (ref 7–30)
CALCIUM SERPL-MCNC: 9.2 MG/DL (ref 8.5–10.1)
CHLORIDE BLD-SCNC: 108 MMOL/L (ref 94–109)
CHOLEST SERPL-MCNC: 96 MG/DL
CO2 SERPL-SCNC: 29 MMOL/L (ref 20–32)
CREAT SERPL-MCNC: 1.06 MG/DL (ref 0.66–1.25)
CREAT UR-MCNC: 269 MG/DL
FASTING STATUS PATIENT QL REPORTED: YES
GFR SERPL CREATININE-BSD FRML MDRD: 87 ML/MIN/1.73M2
GLUCOSE BLD-MCNC: 86 MG/DL (ref 70–99)
HDLC SERPL-MCNC: 43 MG/DL
LDLC SERPL CALC-MCNC: 41 MG/DL
MICROALBUMIN UR-MCNC: 12 MG/L
MICROALBUMIN/CREAT UR: 4.46 MG/G CR (ref 0–17)
NONHDLC SERPL-MCNC: 53 MG/DL
PHOSPHATE SERPL-MCNC: 2.6 MG/DL (ref 2.5–4.5)
POTASSIUM BLD-SCNC: 4 MMOL/L (ref 3.4–5.3)
SODIUM SERPL-SCNC: 142 MMOL/L (ref 133–144)
TRIGL SERPL-MCNC: 60 MG/DL

## 2022-07-02 PROCEDURE — 80069 RENAL FUNCTION PANEL: CPT

## 2022-07-02 PROCEDURE — 82043 UR ALBUMIN QUANTITATIVE: CPT

## 2022-07-02 PROCEDURE — 80061 LIPID PANEL: CPT

## 2022-07-02 PROCEDURE — 36415 COLL VENOUS BLD VENIPUNCTURE: CPT

## 2022-07-09 ENCOUNTER — TELEPHONE (OUTPATIENT)
Dept: INTERNAL MEDICINE | Facility: CLINIC | Age: 47
End: 2022-07-09

## 2022-08-06 ENCOUNTER — LAB (OUTPATIENT)
Dept: LAB | Facility: CLINIC | Age: 47
End: 2022-08-06
Payer: COMMERCIAL

## 2022-08-06 DIAGNOSIS — D58.2 ELEVATED HEMOGLOBIN (H): ICD-10-CM

## 2022-08-06 DIAGNOSIS — E78.5 HYPERLIPIDEMIA LDL GOAL <100: ICD-10-CM

## 2022-08-06 DIAGNOSIS — K50.00 CROHN'S DISEASE OF SMALL INTESTINE WITHOUT COMPLICATION (H): ICD-10-CM

## 2022-08-06 DIAGNOSIS — N04.9 NEPHROTIC SYNDROME: ICD-10-CM

## 2022-08-06 DIAGNOSIS — D84.9 IMMUNOSUPPRESSION (H): ICD-10-CM

## 2022-08-06 DIAGNOSIS — N05.0 MINIMAL CHANGE DISEASE: ICD-10-CM

## 2022-08-06 LAB
ALBUMIN SERPL-MCNC: 3.9 G/DL (ref 3.4–5)
ANION GAP SERPL CALCULATED.3IONS-SCNC: 1 MMOL/L (ref 3–14)
BUN SERPL-MCNC: 21 MG/DL (ref 7–30)
CALCIUM SERPL-MCNC: 9 MG/DL (ref 8.5–10.1)
CHLORIDE BLD-SCNC: 110 MMOL/L (ref 94–109)
CO2 SERPL-SCNC: 29 MMOL/L (ref 20–32)
CREAT SERPL-MCNC: 1.02 MG/DL (ref 0.66–1.25)
GFR SERPL CREATININE-BSD FRML MDRD: >90 ML/MIN/1.73M2
GLUCOSE BLD-MCNC: 79 MG/DL (ref 70–99)
PHOSPHATE SERPL-MCNC: 2.4 MG/DL (ref 2.5–4.5)
POTASSIUM BLD-SCNC: 3.9 MMOL/L (ref 3.4–5.3)
SODIUM SERPL-SCNC: 140 MMOL/L (ref 133–144)

## 2022-08-06 PROCEDURE — 36415 COLL VENOUS BLD VENIPUNCTURE: CPT

## 2022-08-06 PROCEDURE — 80069 RENAL FUNCTION PANEL: CPT

## 2022-09-03 ENCOUNTER — LAB (OUTPATIENT)
Dept: LAB | Facility: CLINIC | Age: 47
End: 2022-09-03
Payer: COMMERCIAL

## 2022-09-03 DIAGNOSIS — N04.9 NEPHROTIC SYNDROME: ICD-10-CM

## 2022-09-03 DIAGNOSIS — D58.2 ELEVATED HEMOGLOBIN (H): ICD-10-CM

## 2022-09-03 DIAGNOSIS — E78.5 HYPERLIPIDEMIA LDL GOAL <100: ICD-10-CM

## 2022-09-03 DIAGNOSIS — K50.00 CROHN'S DISEASE OF SMALL INTESTINE WITHOUT COMPLICATION (H): ICD-10-CM

## 2022-09-03 DIAGNOSIS — D84.9 IMMUNOSUPPRESSION (H): ICD-10-CM

## 2022-09-03 DIAGNOSIS — N05.0 MINIMAL CHANGE DISEASE: ICD-10-CM

## 2022-09-03 LAB
ALBUMIN SERPL-MCNC: 3.8 G/DL (ref 3.4–5)
ANION GAP SERPL CALCULATED.3IONS-SCNC: 2 MMOL/L (ref 3–14)
BUN SERPL-MCNC: 18 MG/DL (ref 7–30)
CALCIUM SERPL-MCNC: 8.8 MG/DL (ref 8.5–10.1)
CHLORIDE BLD-SCNC: 109 MMOL/L (ref 94–109)
CO2 SERPL-SCNC: 29 MMOL/L (ref 20–32)
CREAT SERPL-MCNC: 1.1 MG/DL (ref 0.66–1.25)
GFR SERPL CREATININE-BSD FRML MDRD: 83 ML/MIN/1.73M2
GLUCOSE BLD-MCNC: 90 MG/DL (ref 70–99)
PHOSPHATE SERPL-MCNC: 2.8 MG/DL (ref 2.5–4.5)
POTASSIUM BLD-SCNC: 4.3 MMOL/L (ref 3.4–5.3)
SODIUM SERPL-SCNC: 140 MMOL/L (ref 133–144)

## 2022-09-03 PROCEDURE — 80069 RENAL FUNCTION PANEL: CPT

## 2022-09-03 PROCEDURE — 36415 COLL VENOUS BLD VENIPUNCTURE: CPT

## 2022-09-10 DIAGNOSIS — J45.40 MODERATE PERSISTENT ASTHMA WITHOUT COMPLICATION: ICD-10-CM

## 2022-09-13 RX ORDER — ALBUTEROL SULFATE 90 UG/1
AEROSOL, METERED RESPIRATORY (INHALATION)
Qty: 18 G | Refills: 3 | Status: SHIPPED | OUTPATIENT
Start: 2022-09-13 | End: 2022-12-15

## 2022-09-13 NOTE — TELEPHONE ENCOUNTER
Pending Prescriptions:                       Disp   Refills    VENTOLIN  (90 Base) MCG/ACT inhaler*18 g   3        Sig: INHALE ONE TO TWO PUFFS BY MOUTH EVERY 6 HOURS AS           NEEDED FOR SHORTNESS OF BREATH, DIFFICULTY           BREATHING OR WHEEZING (NEED TO BE SEEN IN CLINIC           FOR FURTHER REFILLS)  3    Routing refill request to provider for review/approval because:  Labs not current:    ACT Total Scores 11/26/2019 4/6/2021 2/11/2022   ACT TOTAL SCORE (Goal Greater than or Equal to 20) 21 22 21   In the past 12 months, how many times did you visit the emergency room for your asthma without being admitted to the hospital? 0 0 0   In the past 12 months, how many times were you hospitalized overnight because of your asthma? 0 0 0         Milli Vickers RN

## 2022-10-01 ENCOUNTER — LAB (OUTPATIENT)
Dept: LAB | Facility: CLINIC | Age: 47
End: 2022-10-01
Payer: COMMERCIAL

## 2022-10-01 DIAGNOSIS — N05.0 MINIMAL CHANGE DISEASE: ICD-10-CM

## 2022-10-01 DIAGNOSIS — K50.00 CROHN'S DISEASE OF SMALL INTESTINE WITHOUT COMPLICATION (H): ICD-10-CM

## 2022-10-01 DIAGNOSIS — D58.2 ELEVATED HEMOGLOBIN (H): ICD-10-CM

## 2022-10-01 DIAGNOSIS — D84.9 IMMUNOSUPPRESSION (H): ICD-10-CM

## 2022-10-01 DIAGNOSIS — N04.9 NEPHROTIC SYNDROME: ICD-10-CM

## 2022-10-01 DIAGNOSIS — E78.5 HYPERLIPIDEMIA LDL GOAL <100: ICD-10-CM

## 2022-10-01 LAB
ALBUMIN SERPL-MCNC: 3.6 G/DL (ref 3.4–5)
ANION GAP SERPL CALCULATED.3IONS-SCNC: 3 MMOL/L (ref 3–14)
BUN SERPL-MCNC: 17 MG/DL (ref 7–30)
CALCIUM SERPL-MCNC: 8.9 MG/DL (ref 8.5–10.1)
CHLORIDE BLD-SCNC: 109 MMOL/L (ref 94–109)
CO2 SERPL-SCNC: 29 MMOL/L (ref 20–32)
CREAT SERPL-MCNC: 1.04 MG/DL (ref 0.66–1.25)
GFR SERPL CREATININE-BSD FRML MDRD: 89 ML/MIN/1.73M2
GLUCOSE BLD-MCNC: 91 MG/DL (ref 70–99)
PHOSPHATE SERPL-MCNC: 2.7 MG/DL (ref 2.5–4.5)
POTASSIUM BLD-SCNC: 4.2 MMOL/L (ref 3.4–5.3)
SODIUM SERPL-SCNC: 141 MMOL/L (ref 133–144)

## 2022-10-01 PROCEDURE — 80069 RENAL FUNCTION PANEL: CPT

## 2022-10-01 PROCEDURE — 36415 COLL VENOUS BLD VENIPUNCTURE: CPT

## 2022-10-05 DIAGNOSIS — N05.0 MINIMAL CHANGE DISEASE: Primary | ICD-10-CM

## 2022-10-05 DIAGNOSIS — N04.9 NEPHROTIC SYNDROME: ICD-10-CM

## 2022-10-09 ENCOUNTER — HEALTH MAINTENANCE LETTER (OUTPATIENT)
Age: 47
End: 2022-10-09

## 2022-11-05 ENCOUNTER — LAB (OUTPATIENT)
Dept: LAB | Facility: CLINIC | Age: 47
End: 2022-11-05
Payer: COMMERCIAL

## 2022-11-05 DIAGNOSIS — N05.0 MINIMAL CHANGE DISEASE: ICD-10-CM

## 2022-11-05 DIAGNOSIS — N04.9 NEPHROTIC SYNDROME: ICD-10-CM

## 2022-11-05 LAB
ALBUMIN SERPL-MCNC: 2.3 G/DL (ref 3.4–5)
ANION GAP SERPL CALCULATED.3IONS-SCNC: 3 MMOL/L (ref 3–14)
BUN SERPL-MCNC: 19 MG/DL (ref 7–30)
CALCIUM SERPL-MCNC: 8.5 MG/DL (ref 8.5–10.1)
CHLORIDE BLD-SCNC: 109 MMOL/L (ref 94–109)
CO2 SERPL-SCNC: 29 MMOL/L (ref 20–32)
CREAT SERPL-MCNC: 0.95 MG/DL (ref 0.66–1.25)
CREAT UR-MCNC: 276 MG/DL
CYCLOSPORINE BLD LC/MS/MS-MCNC: 37 UG/L (ref 50–400)
GFR SERPL CREATININE-BSD FRML MDRD: >90 ML/MIN/1.73M2
GLUCOSE BLD-MCNC: 96 MG/DL (ref 70–99)
MICROALBUMIN UR-MCNC: ABNORMAL MG/L
MICROALBUMIN/CREAT UR: 6268.12 MG/G CR (ref 0–17)
PHOSPHATE SERPL-MCNC: 3.3 MG/DL (ref 2.5–4.5)
POTASSIUM BLD-SCNC: 4.1 MMOL/L (ref 3.4–5.3)
SODIUM SERPL-SCNC: 141 MMOL/L (ref 133–144)
TME LAST DOSE: ABNORMAL H
TME LAST DOSE: ABNORMAL H

## 2022-11-05 PROCEDURE — 36415 COLL VENOUS BLD VENIPUNCTURE: CPT

## 2022-11-05 PROCEDURE — 82043 UR ALBUMIN QUANTITATIVE: CPT

## 2022-11-05 PROCEDURE — 80158 DRUG ASSAY CYCLOSPORINE: CPT

## 2022-11-05 PROCEDURE — 80069 RENAL FUNCTION PANEL: CPT

## 2022-11-11 ENCOUNTER — APPOINTMENT (OUTPATIENT)
Dept: GENERAL RADIOLOGY | Facility: CLINIC | Age: 47
End: 2022-11-11
Attending: EMERGENCY MEDICINE
Payer: COMMERCIAL

## 2022-11-11 ENCOUNTER — HOSPITAL ENCOUNTER (EMERGENCY)
Facility: CLINIC | Age: 47
Discharge: HOME | End: 2022-11-11
Attending: EMERGENCY MEDICINE | Admitting: EMERGENCY MEDICINE
Payer: COMMERCIAL

## 2022-11-11 VITALS
DIASTOLIC BLOOD PRESSURE: 80 MMHG | RESPIRATION RATE: 18 BRPM | BODY MASS INDEX: 24.44 KG/M2 | OXYGEN SATURATION: 98 % | HEIGHT: 69 IN | HEART RATE: 45 BPM | SYSTOLIC BLOOD PRESSURE: 141 MMHG | TEMPERATURE: 98.2 F | WEIGHT: 165 LBS

## 2022-11-11 DIAGNOSIS — M54.50 LUMBAR BACK PAIN: ICD-10-CM

## 2022-11-11 PROCEDURE — 250N000011 HC RX IP 250 OP 636: Performed by: EMERGENCY MEDICINE

## 2022-11-11 PROCEDURE — 96375 TX/PRO/DX INJ NEW DRUG ADDON: CPT | Performed by: EMERGENCY MEDICINE

## 2022-11-11 PROCEDURE — 96374 THER/PROPH/DIAG INJ IV PUSH: CPT | Performed by: EMERGENCY MEDICINE

## 2022-11-11 PROCEDURE — 99284 EMERGENCY DEPT VISIT MOD MDM: CPT | Performed by: EMERGENCY MEDICINE

## 2022-11-11 PROCEDURE — 99285 EMERGENCY DEPT VISIT HI MDM: CPT | Mod: 25 | Performed by: EMERGENCY MEDICINE

## 2022-11-11 PROCEDURE — 72100 X-RAY EXAM L-S SPINE 2/3 VWS: CPT

## 2022-11-11 RX ORDER — CYCLOBENZAPRINE HCL 10 MG
10 TABLET ORAL 3 TIMES DAILY PRN
Qty: 20 TABLET | Refills: 0 | Status: SHIPPED | OUTPATIENT
Start: 2022-11-11 | End: 2022-11-17

## 2022-11-11 RX ORDER — HYDROMORPHONE HYDROCHLORIDE 1 MG/ML
0.5 INJECTION, SOLUTION INTRAMUSCULAR; INTRAVENOUS; SUBCUTANEOUS
Status: DISCONTINUED | OUTPATIENT
Start: 2022-11-11 | End: 2022-11-11 | Stop reason: HOSPADM

## 2022-11-11 RX ORDER — OXYCODONE AND ACETAMINOPHEN 5; 325 MG/1; MG/1
1 TABLET ORAL EVERY 6 HOURS PRN
Qty: 12 TABLET | Refills: 0 | Status: SHIPPED | OUTPATIENT
Start: 2022-11-11 | End: 2022-11-14

## 2022-11-11 RX ORDER — DIAZEPAM 10 MG/2ML
5 INJECTION, SOLUTION INTRAMUSCULAR; INTRAVENOUS ONCE
Status: COMPLETED | OUTPATIENT
Start: 2022-11-11 | End: 2022-11-11

## 2022-11-11 RX ADMIN — HYDROMORPHONE HYDROCHLORIDE 0.5 MG: 1 INJECTION, SOLUTION INTRAMUSCULAR; INTRAVENOUS; SUBCUTANEOUS at 08:53

## 2022-11-11 RX ADMIN — DIAZEPAM 5 MG: 10 INJECTION, SOLUTION INTRAMUSCULAR; INTRAVENOUS at 07:36

## 2022-11-11 ASSESSMENT — ACTIVITIES OF DAILY LIVING (ADL)
ADLS_ACUITY_SCORE: 39
ADLS_ACUITY_SCORE: 39

## 2022-11-11 NOTE — DISCHARGE INSTRUCTIONS
Your x-ray does not show any acute fracture but does show some degenerative changes at multiple levels.  You can take Percocet for severe pain.  Flexeril for muscle spasm.  Ice not heat.  Activity as tolerated but avoid lifting until things improved.  Contact your doctor if persistent symptoms into next week as you may need some physical therapy.

## 2022-11-11 NOTE — ED TRIAGE NOTES
"Pt states lower back pain. Worse on the right side than left. Onset Saturday 11/5 after helping his father cut down a tree. Pain progressively worse over the week prompting evaluation. States prior back issues but can't recall diagnosis. Pt took \"something over the counter. I can't remember what. I think it was 2 aspirin\" at 0200 this AM. He denies bowel or bladder incontinence, numbness or tingling to the legs. Difficulty walking in triage due to the pain.     Triage Assessment     Row Name 11/11/22 0645       Triage Assessment (Adult)    Airway WDL WDL       Respiratory WDL    Respiratory WDL WDL       Skin Circulation/Temperature WDL    Skin Circulation/Temperature WDL WDL       Cardiac WDL    Cardiac WDL WDL       Peripheral/Neurovascular WDL    Peripheral Neurovascular WDL WDL       Cognitive/Neuro/Behavioral WDL    Cognitive/Neuro/Behavioral WDL WDL              "

## 2022-11-18 ENCOUNTER — MYC REFILL (OUTPATIENT)
Dept: NEPHROLOGY | Facility: CLINIC | Age: 47
End: 2022-11-18

## 2022-11-18 DIAGNOSIS — K50.00 CROHN'S DISEASE OF SMALL INTESTINE WITHOUT COMPLICATION (H): Primary | ICD-10-CM

## 2022-11-18 DIAGNOSIS — N05.0 MINIMAL CHANGE DISEASE: Primary | ICD-10-CM

## 2022-11-18 RX ORDER — PREDNISONE 20 MG/1
60 TABLET ORAL DAILY
Qty: 90 TABLET | Refills: 0 | Status: SHIPPED | OUTPATIENT
Start: 2022-11-18 | End: 2022-12-15

## 2022-11-18 NOTE — TELEPHONE ENCOUNTER
Fax received from [facility] requesting refill for Omeprazole 40 mg. Patient's chart reflect omeprazole 20 mg. Omeprazole 20 mg refilled.  NICKI Dsouza

## 2022-11-21 ENCOUNTER — OFFICE VISIT (OUTPATIENT)
Dept: NEPHROLOGY | Facility: CLINIC | Age: 47
End: 2022-11-21
Payer: COMMERCIAL

## 2022-11-21 VITALS
HEART RATE: 64 BPM | SYSTOLIC BLOOD PRESSURE: 146 MMHG | WEIGHT: 170 LBS | BODY MASS INDEX: 25.1 KG/M2 | DIASTOLIC BLOOD PRESSURE: 86 MMHG

## 2022-11-21 DIAGNOSIS — N05.0 MINIMAL CHANGE DISEASE: ICD-10-CM

## 2022-11-21 DIAGNOSIS — R79.89 ELEVATED SERUM CREATININE: ICD-10-CM

## 2022-11-21 DIAGNOSIS — D84.9 IMMUNOSUPPRESSED STATUS (H): Primary | ICD-10-CM

## 2022-11-21 DIAGNOSIS — N18.31 CHRONIC KIDNEY DISEASE, STAGE 3A (H): ICD-10-CM

## 2022-11-21 PROCEDURE — 99215 OFFICE O/P EST HI 40 MIN: CPT | Performed by: INTERNAL MEDICINE

## 2022-11-21 RX ORDER — CYCLOSPORINE 25 MG/1
25 CAPSULE, LIQUID FILLED ORAL 2 TIMES DAILY
Qty: 60 CAPSULE | Refills: 11 | Status: SHIPPED | OUTPATIENT
Start: 2022-11-21 | End: 2023-02-08

## 2022-11-21 RX ORDER — CYCLOSPORINE 50 MG/1
50 CAPSULE, LIQUID FILLED ORAL 2 TIMES DAILY
Qty: 60 CAPSULE | Refills: 11 | Status: SHIPPED | OUTPATIENT
Start: 2022-11-21 | End: 2023-04-24

## 2022-11-21 RX ORDER — SULFAMETHOXAZOLE/TRIMETHOPRIM 800-160 MG
1 TABLET ORAL
Qty: 30 TABLET | Refills: 3 | Status: SHIPPED | OUTPATIENT
Start: 2022-11-21 | End: 2023-02-07

## 2022-11-21 NOTE — PROGRESS NOTES
Assessment and Plan:  47 year old male who presents for followup of minimal change disease. He presented for evaluation after presenting to ER where CT scan noted ascites and nonspecific small bowel enteritis, and UA showed proteinuria. He had biopsy which showed minimal change on 4/9/2019 and treated with prednisone 70mg. He was in remission and down to prednsione 10mg then stopped it, and had relapse within a couple months.  His mother noted swelling and he was up 20-25 lbs and proteinuria up to 6 grams/g cr. He was on maintenance cyclosporine since last relapse in November 2021, and now presents with another relapse in November 2022  # Minimal change disease/ nephrotic syndrome, another relapse noted with proteinuria up to 6 grams, despite being on cyclosporine, trough levels have been near 35-40 on cyclosporine 50mg twice a day.  - Prednisone round #2 started March 2020, tried to do a longer wean once on lower doses.  - Prednisone round #3 started April 20,2021.    - Thus restarted prednisone, and started  cyclosporine 3-4mg/kg, started at 100mg twice a day- he was only taking it once a day for a while until he realized it was twice a day-   - level recently 40   - decreased cyclosporine to 50mg bid on 4/18/22- aiming for level of 25-50- will try to aim for higher levels ~ 40-70 to see if we can keep him from relapsing  - he is up about 5 lbs  - continue labs monthly for now including cyclosporine- will increase cyclosporine to 75mg bid  - monitor carefully given immunosuppression  - restart bactrim.  - update vaccination    # Renal function- elevated creatinine / ?CKD or due to cyclosporine and hemodynamic factors- Scr was 1.1 in the past and was1.27 in ER. It was up to 1.6-1.8 at time of biopsy after diuretics were started.  - scr back to 1-1.2 range, but up to 1.4 a few months ago, ? CSA hemodynamic effect  - monitor and encouraged hydration- stable on recent labs.     - proteinuria improved very quickly down  to normal once prednisone restarted , down from 13 grams to normal    Labs on Saturdays if he is working.  Started new medication, cyclosporine, twice a day- initially took it once a day only   - discussed that his is an immunosuppressant, that it increases risk of infection and malignancy. He was agreeable to start it.  - stopped bactrim     # Hyperlipidemia- LDL very elevated,was on low dose statin but stopped in setting of CNI and LFT elevated- it was restarted by PCP  - recheck lipid panel and LFTs     # Hypertension: mildly hypertensive-    - continue lisinopril    # Not anemic- hgb 18 now down to 16- stable    # Electrolytes: normal low potassium      # Mineral Bone Disorder:   Check baseline PTH if creatinine remains elevated.     - RTC in 2months and labs monthly for now    Patient instructions  Start prednisone 60mg for 4 weeks, then will cut down to 40mg for 2 weeks, 20mg for 2 weeks, 10mg for 2 weeks, 5 mg for 2 weeks.    Bactrim every Monday Wednesday and Friday until you are down to 10mg of prednisone.      Assessment and plan was discussed with patient and he voiced his understanding and agreement.    Reason for Visit:  Deshawn Flood is a 47 year old male with nephrotic range proteinuria noted and biopsy 4/9/19 shows minimal change disease    HPI:  He is a 47 year old male with minimal change disease who follows up. He initially presented with nephrotic syndrome and underwent kidney biopsy [id number] and it showed minimal change disease. He was admitted to the hospital briefly after biopsy as he was having poor appetite due to bloating, and elevated creatinine.    Since then he went into remission, we tapered down on prednisone and unfortunately he relapsed within a couple months (6g/g cr February 2020),  thus we restarted prednisone in March 2020 and by early April he was down to normal range proteinuria and he was feeling better.    His weight is usually closer to 150-160  but was up to 184 lbs with  nephrotic syndrome.  He was started on cyclosporine after his last relapse in November 2021.  He had labs last week that showed relapse with proteinuria up to 6 g/g cr.  He had noticed some swelling and was hoping it would settle down.  His last cyclosporine level was 37  He does not have a BP cuff  He is eating fairly.  He is now off for the winter from work.  His weight is up about 5 lbs.    We discussed restarting a short steroid course, and increase cyclosporine dose to aim for level of 40-70.  He recently had back pain and was taking muscle relaxant and oxycodone. He denies taking ibuprofen    Renal History:   None prior to minimal change disease Spring 2019    ROS:   A comprehensive review of systems was obtained and negative, except as noted in the HPI or PMH.    Active Medical Problems:  Patient Active Problem List   Diagnosis     Loose body of left knee     Left knee pain     Tobacco abuse     History of pneumothorax     CARDIOVASCULAR SCREENING; LDL GOAL LESS THAN 160     Nephrotic syndrome     Anasarca     Minimal change disease     Immunosuppression (H)     Crohn's disease of small intestine without complication (H)     Elevated hemoglobin (H)     Chronic kidney disease, stage 3a (H)     PMH:   Medical record was reviewed and PMH was discussed with patient and noted below.  Past Medical History:   Diagnosis Date     Anemia      Other spontaneous pneumothorax      SPONTANEOUS PNEUMOTHORAX NEC 3/25/2005     Unspecified asthma(493.90)      PSH:   Past Surgical History:   Procedure Laterality Date     ARTHROSCOPY KNEE  8/3/2012    Procedure: ARTHROSCOPY KNEE;  Left knee Arthroscopy with removal of loose bodies;  Surgeon: Bibi Roberts MD;  Location: [facility]     HC REPAIR UMBILICAL GODFREY,<4Y/O,REDUC  1978     IR RENAL BIOPSY LEFT  4/9/2019     TUBE THORACOSTOMY,ABSC/EMPYEMA/HEMO  3/20/2005    Small chest tube with Heimlich valve.       Family Hx:   Family History   Problem Relation Age of Onset      Autoimmune Disease No family hx of      Personal Hx:   Social History     Tobacco Use     Smoking status: Light Smoker     Packs/day: 0.10     Years: 15.00     Pack years: 1.50     Types: Cigarettes     Smokeless tobacco: Former     Types: Chew     Tobacco comments:     1 pack per day   Substance Use Topics     Alcohol use: Yes     Alcohol/week: 17.5 standard drinks     Types: 21 Cans of beer per week     Comment: occ       Allergies:  Allergies   Allergen Reactions     Bee Venom      swells up     No Known Drug Allergies      Seasonal Allergies        Medications:  Current Outpatient Medications   Medication Sig     albuterol (VENTOLIN HFA) 108 (90 Base) MCG/ACT inhaler INHALE ONE TO TWO PUFFS BY MOUTH EVERY 6 HOURS AS NEEDED FOR SHORTNESS OF BREATH, DIFFICULTY BREATHING OR WHEEZING (NEED TO BE SEEN IN CLINIC FOR FURTHER REFILLS)     atorvastatin (LIPITOR) 20 MG tablet TAKE ONE TABLET BY MOUTH ONCE DAILY     cycloSPORINE modified (GENERIC EQUIVALENT) 50 MG capsule Take 1 capsule (50 mg) by mouth 2 times daily     lisinopril (ZESTRIL) 20 MG tablet Take 1 tablet (20 mg) by mouth daily     omeprazole (PRILOSEC) 20 MG DR capsule Take 1 capsule (20 mg) by mouth daily     predniSONE (DELTASONE) 20 MG tablet Take 3 tablets (60 mg) by mouth daily     sulfamethoxazole-trimethoprim (BACTRIM DS) 800-160 MG tablet Take 1 tablet by mouth Every Mon, Wed, Fri Morning     No current facility-administered medications for this visit.      Vitals:  BP (!) 146/86   Pulse 64   Wt 77.1 kg (170 lb)   BMI 25.10 kg/m    154-155 lbs  Exam:  GENERAL : alert and no distress  Speaks in full sentences without dyspnea  Normal speech and thought pattern  Ext: 1++ edema    LABS:   CMP  Recent Labs   Lab Test 11/05/22  0755 10/01/22  0750 09/03/22  0758 08/06/22  0759 08/14/21  0807 07/10/21  0829 06/05/21  0743 05/15/21  0757 04/10/21  0745    141 140 140   < > 139 138 138 141   POTASSIUM 4.1 4.2 4.3 3.9   < > 4.1 3.4 3.8 3.6   CHLORIDE  109 109 109 110*   < > 110* 105 103 109   CO2 29 29 29 29   < > 26 29 32 28   ANIONGAP 3 3 2* 1*   < > 3 4 3 4   GLC 96 91 90 79   < > 84 89 95 126*   BUN 19 17 18 21   < > 19 17 14 14   CR 0.95 1.04 1.10 1.02   < > 1.06 1.11 1.11 0.98   GFRESTIMATED >90 89 83 >90   < > 83 79 79 >90   GFRESTBLACK  --   --   --   --   --  >90 >90 >90 >90   BASIA 8.5 8.9 8.8 9.0   < > 9.2 8.5 8.6 8.7    < > = values in this interval not displayed.     Recent Labs   Lab Test 05/07/22  0748 04/09/22  0749 03/05/22  0750 01/08/22  0758   BILITOTAL 0.5 1.3 2.0* 1.1   ALKPHOS 55 40 33* 47   ALT 20 24 36 49   AST 12 21 16 14     CBC  Recent Labs   Lab Test 06/04/22  0803 05/07/22  0748 03/05/22  0750 01/08/22  0758   HGB 14.1 12.5* 14.4 16.1   WBC 5.3 6.0 7.4 9.9   RBC 4.48 3.98* 4.44 4.98   HCT 39.7* 35.3* 40.2 46.2   MCV 89 89 91 93   MCH 31.5 31.4 32.4 32.3   MCHC 35.5 35.4 35.8 34.8   RDW 12.0 11.2 12.1 12.9    294 164 167     URINE STUDIES  Recent Labs   Lab Test 08/14/21  0819 12/14/20  0037 09/12/20  0841 02/28/20  1620 04/01/19  1500 03/29/19  1858   COLOR Yellow Yellow Yellow Yellow Yellow Yellow   APPEARANCE Clear Clear Clear Clear Clear Clear   URINEGLC Negative Negative Negative Negative Negative Negative   URINEBILI Negative Negative Negative Negative Negative Negative   URINEKETONE Negative Negative Negative Negative Negative Negative   SG 1.026 1.019 1.021 >1.030 1.015 >1.035*   UBLD Negative Negative Negative Moderate* Moderate* Moderate*   URINEPH 6.0 5.0 5.0 6.0 6.0 6.0   PROTEIN Negative Negative Negative >=300* >=300* >499*   UROBILINOGEN  --   --   --  0.2 0.2  --    NITRITE Negative Negative Negative Negative Negative Negative   LEUKEST Negative Negative Negative Negative Negative Negative   RBCU <1  --   --  O - 2 25-50* 7*   WBCU <1  --   --  0 - 5 0 - 5 7*     Recent Labs   Lab Test 12/06/21  1435 04/03/20  0900 02/28/20  1619 11/26/19  1435   UTPG 3.62* 0.18 6.20* 0.06     PTH  No lab results found.  IRON  STUDIES  Recent Labs   Lab Test 06/04/22  0803   IRON 144      IRONSAT 52*   REYNA 369       IMPRESSION:  1. Findings are concerning for diffuse small bowel enteritis likely  infectious or inflammatory in etiology, with associated inflammatory  changes of the mesenteric adipose tissues, moderate ascites, small  bilateral probably reactive pleural effusions and with some edema in  the subcutaneous adipose tissues.  2. No significant atherosclerosis is identified. No obvious arterial  blockage is seen in the mesenteric vessels to suggest ischemia.  3. Mild degenerative changes of the spine and hips. No acute fracture  or aggressive osseous lesion.  Jenny Scott MD

## 2022-11-21 NOTE — LETTER
11/21/2022       RE: Deshawn Flood  [address] [place] [id number]     Dear Colleague,    Thank you for referring your patient, Deshawn Flood, to the [facility]. Please see a copy of my visit note below.    Assessment and Plan:  47 year old male who presents for followup of minimal change disease. He presented for evaluation after presenting to ER where CT scan noted ascites and nonspecific small bowel enteritis, and UA showed proteinuria. He had biopsy which showed minimal change on 4/9/2019 and treated with prednisone 70mg. He was in remission and down to prednsione 10mg then stopped it, and had relapse within a couple months.  His mother noted swelling and he was up 20-25 lbs and proteinuria up to 6 grams/g cr. He was on maintenance cyclosporine since last relapse in November 2021, and now presents with another relapse in November 2022  # Minimal change disease/ nephrotic syndrome, another relapse noted with proteinuria up to 6 grams, despite being on cyclosporine, trough levels have been near 35-40 on cyclosporine 50mg twice a day.  - Prednisone round #2 started March 2020, tried to do a longer wean once on lower doses.  - Prednisone round #3 started April 20,2021.    - Thus restarted prednisone, and started  cyclosporine 3-4mg/kg, started at 100mg twice a day- he was only taking it once a day for a while until he realized it was twice a day-   - level recently 40   - decreased cyclosporine to 50mg bid on 4/18/22- aiming for level of 25-50- will try to aim for higher levels ~ 40-70 to see if we can keep him from relapsing  - he is up about 5 lbs  - continue labs monthly for now including cyclosporine- will increase cyclosporine to 75mg bid  - monitor carefully given immunosuppression  - restart bactrim.  - update vaccination    # Renal function- elevated creatinine / ?CKD or due to cyclosporine and hemodynamic factors-  Scr was 1.1 in the past and was1.27 in ER. It was up to 1.6-1.8 at time of biopsy after diuretics were started.  - scr back to 1-1.2 range, but up to 1.4 a few months ago, ? CSA hemodynamic effect  - monitor and encouraged hydration- stable on recent labs.     - proteinuria improved very quickly down to normal once prednisone restarted , down from 13 grams to normal    Labs on Saturdays if he is working.  Started new medication, cyclosporine, twice a day- initially took it once a day only   - discussed that his is an immunosuppressant, that it increases risk of infection and malignancy. He was agreeable to start it.  - stopped bactrim     # Hyperlipidemia- LDL very elevated,was on low dose statin but stopped in setting of CNI and LFT elevated- it was restarted by PCP  - recheck lipid panel and LFTs     # Hypertension: mildly hypertensive-    - continue lisinopril    # Not anemic- hgb 18 now down to 16- stable    # Electrolytes: normal low potassium      # Mineral Bone Disorder:   Check baseline PTH if creatinine remains elevated.     - RTC in 2months and labs monthly for now    Patient instructions  Start prednisone 60mg for 4 weeks, then will cut down to 40mg for 2 weeks, 20mg for 2 weeks, 10mg for 2 weeks, 5 mg for 2 weeks.    Bactrim every Monday Wednesday and Friday until you are down to 10mg of prednisone.      Assessment and plan was discussed with patient and he voiced his understanding and agreement.    Reason for Visit:  Deshawn Flood is a 47 year old male with nephrotic range proteinuria noted and biopsy 4/9/19 shows minimal change disease    HPI:  He is a 47 year old male with minimal change disease who follows up. He initially presented with nephrotic syndrome and underwent kidney biopsy [id number] and it showed minimal change disease. He was admitted to the hospital briefly after biopsy as he was having poor appetite due to bloating, and elevated creatinine.    Since then he went into remission, we tapered  down on prednisone and unfortunately he relapsed within a couple months (6g/g cr February 2020),  thus we restarted prednisone in March 2020 and by early April he was down to normal range proteinuria and he was feeling better.    His weight is usually closer to 150-160  but was up to 184 lbs with nephrotic syndrome.  He was started on cyclosporine after his last relapse in November 2021.  He had labs last week that showed relapse with proteinuria up to 6 g/g cr.  He had noticed some swelling and was hoping it would settle down.  His last cyclosporine level was 37  He does not have a BP cuff  He is eating fairly.  He is now off for the winter from work.  His weight is up about 5 lbs.    We discussed restarting a short steroid course, and increase cyclosporine dose to aim for level of 40-70.  He recently had back pain and was taking muscle relaxant and oxycodone. He denies taking ibuprofen    Renal History:   None prior to minimal change disease Spring 2019    ROS:   A comprehensive review of systems was obtained and negative, except as noted in the HPI or PMH.    Active Medical Problems:  Patient Active Problem List   Diagnosis     Loose body of left knee     Left knee pain     Tobacco abuse     History of pneumothorax     CARDIOVASCULAR SCREENING; LDL GOAL LESS THAN 160     Nephrotic syndrome     Anasarca     Minimal change disease     Immunosuppression (H)     Crohn's disease of small intestine without complication (H)     Elevated hemoglobin (H)     Chronic kidney disease, stage 3a (H)     PMH:   Medical record was reviewed and PMH was discussed with patient and noted below.  Past Medical History:   Diagnosis Date     Anemia      Other spontaneous pneumothorax      SPONTANEOUS PNEUMOTHORAX NEC 3/25/2005     Unspecified asthma(493.90)      PSH:   Past Surgical History:   Procedure Laterality Date     ARTHROSCOPY KNEE  8/3/2012    Procedure: ARTHROSCOPY KNEE;  Left knee Arthroscopy with removal of loose bodies;   Surgeon: Bibi Roberts MD;  Location: [facility] OR     HC REPAIR UMBILICAL GODFREY,<4Y/O,REDUC  1978     IR RENAL BIOPSY LEFT  4/9/2019     TUBE THORACOSTOMY,ABSC/EMPYEMA/HEMO  3/20/2005    Small chest tube with Heimlich valve.       Family Hx:   Family History   Problem Relation Age of Onset     Autoimmune Disease No family hx of      Personal Hx:   Social History     Tobacco Use     Smoking status: Light Smoker     Packs/day: 0.10     Years: 15.00     Pack years: 1.50     Types: Cigarettes     Smokeless tobacco: Former     Types: Chew     Tobacco comments:     1 pack per day   Substance Use Topics     Alcohol use: Yes     Alcohol/week: 17.5 standard drinks     Types: 21 Cans of beer per week     Comment: occ       Allergies:  Allergies   Allergen Reactions     Bee Venom      swells up     No Known Drug Allergies      Seasonal Allergies        Medications:  Current Outpatient Medications   Medication Sig     albuterol (VENTOLIN HFA) 108 (90 Base) MCG/ACT inhaler INHALE ONE TO TWO PUFFS BY MOUTH EVERY 6 HOURS AS NEEDED FOR SHORTNESS OF BREATH, DIFFICULTY BREATHING OR WHEEZING (NEED TO BE SEEN IN CLINIC FOR FURTHER REFILLS)     atorvastatin (LIPITOR) 20 MG tablet TAKE ONE TABLET BY MOUTH ONCE DAILY     cycloSPORINE modified (GENERIC EQUIVALENT) 50 MG capsule Take 1 capsule (50 mg) by mouth 2 times daily     lisinopril (ZESTRIL) 20 MG tablet Take 1 tablet (20 mg) by mouth daily     omeprazole (PRILOSEC) 20 MG DR capsule Take 1 capsule (20 mg) by mouth daily     predniSONE (DELTASONE) 20 MG tablet Take 3 tablets (60 mg) by mouth daily     sulfamethoxazole-trimethoprim (BACTRIM DS) 800-160 MG tablet Take 1 tablet by mouth Every Mon, Wed, Fri Morning     No current facility-administered medications for this visit.      Vitals:  BP (!) 146/86   Pulse 64   Wt 77.1 kg (170 lb)   BMI 25.10 kg/m    154-155 lbs  Exam:  GENERAL : alert and no distress  Speaks in full sentences without dyspnea  Normal speech and thought  pattern  Ext: 1++ edema    LABS:   CMP  Recent Labs   Lab Test 11/05/22  0755 10/01/22  0750 09/03/22  0758 08/06/22  0759 08/14/21  0807 07/10/21  0829 06/05/21  0743 05/15/21  0757 04/10/21  0745    141 140 140   < > 139 138 138 141   POTASSIUM 4.1 4.2 4.3 3.9   < > 4.1 3.4 3.8 3.6   CHLORIDE 109 109 109 110*   < > 110* 105 103 109   CO2 29 29 29 29   < > 26 29 32 28   ANIONGAP 3 3 2* 1*   < > 3 4 3 4   GLC 96 91 90 79   < > 84 89 95 126*   BUN 19 17 18 21   < > 19 17 14 14   CR 0.95 1.04 1.10 1.02   < > 1.06 1.11 1.11 0.98   GFRESTIMATED >90 89 83 >90   < > 83 79 79 >90   GFRESTBLACK  --   --   --   --   --  >90 >90 >90 >90   BASIA 8.5 8.9 8.8 9.0   < > 9.2 8.5 8.6 8.7    < > = values in this interval not displayed.     Recent Labs   Lab Test 05/07/22  0748 04/09/22  0749 03/05/22  0750 01/08/22  0758   BILITOTAL 0.5 1.3 2.0* 1.1   ALKPHOS 55 40 33* 47   ALT 20 24 36 49   AST 12 21 16 14     CBC  Recent Labs   Lab Test 06/04/22  0803 05/07/22  0748 03/05/22  0750 01/08/22  0758   HGB 14.1 12.5* 14.4 16.1   WBC 5.3 6.0 7.4 9.9   RBC 4.48 3.98* 4.44 4.98   HCT 39.7* 35.3* 40.2 46.2   MCV 89 89 91 93   MCH 31.5 31.4 32.4 32.3   MCHC 35.5 35.4 35.8 34.8   RDW 12.0 11.2 12.1 12.9    294 164 167     URINE STUDIES  Recent Labs   Lab Test 08/14/21  0819 12/14/20  0037 09/12/20  0841 02/28/20  1620 04/01/19  1500 03/29/19  1858   COLOR Yellow Yellow Yellow Yellow Yellow Yellow   APPEARANCE Clear Clear Clear Clear Clear Clear   URINEGLC Negative Negative Negative Negative Negative Negative   URINEBILI Negative Negative Negative Negative Negative Negative   URINEKETONE Negative Negative Negative Negative Negative Negative   SG 1.026 1.019 1.021 >1.030 1.015 >1.035*   UBLD Negative Negative Negative Moderate* Moderate* Moderate*   URINEPH 6.0 5.0 5.0 6.0 6.0 6.0   PROTEIN Negative Negative Negative >=300* >=300* >499*   UROBILINOGEN  --   --   --  0.2 0.2  --    NITRITE Negative Negative Negative Negative Negative  Negative   LEUKEST Negative Negative Negative Negative Negative Negative   RBCU <1  --   --  O - 2 25-50* 7*   WBCU <1  --   --  0 - 5 0 - 5 7*     Recent Labs   Lab Test 12/06/21  1435 04/03/20  0900 02/28/20  1619 11/26/19  1435   UTPG 3.62* 0.18 6.20* 0.06     PTH  No lab results found.  IRON STUDIES  Recent Labs   Lab Test 06/04/22  0803   IRON 144      IRONSAT 52*   REYNA 369       IMPRESSION:  1. Findings are concerning for diffuse small bowel enteritis likely  infectious or inflammatory in etiology, with associated inflammatory  changes of the mesenteric adipose tissues, moderate ascites, small  bilateral probably reactive pleural effusions and with some edema in  the subcutaneous adipose tissues.  2. No significant atherosclerosis is identified. No obvious arterial  blockage is seen in the mesenteric vessels to suggest ischemia.  3. Mild degenerative changes of the spine and hips. No acute fracture  or aggressive osseous lesion.  Jenny Scott MD

## 2022-11-21 NOTE — PATIENT INSTRUCTIONS
Start prednisone 60mg for 4 weeks, then will cut down to 40mg for 2 weeks, 20mg for 2 weeks, 10mg for 2 weeks, 5 mg for 2 weeks.    Bactrim every Monday Wednesday and Friday until you are down to 10mg of prednisone.

## 2022-11-22 ENCOUNTER — TELEPHONE (OUTPATIENT)
Dept: NEPHROLOGY | Facility: CLINIC | Age: 47
End: 2022-11-22

## 2022-11-22 NOTE — TELEPHONE ENCOUNTER
"Patient contacted regarding Dr Scott's recommendations for patient to get COVID vaccine, booster, and flu shot due to increase in immunosuppressant medication. Patient has not had flu shot since 2009 and only one COVID vaccine injection. Patient responded with a moan. Patient informed it is Dr Scott's recommendation but it is his choice to get vaccines. Patient didn't responded. Ended conversation with a \"have a pleasant day.\"  NICKI Dosuza    "

## 2022-11-30 ENCOUNTER — TELEPHONE (OUTPATIENT)
Dept: NEPHROLOGY | Facility: CLINIC | Age: 47
End: 2022-11-30

## 2022-11-30 NOTE — TELEPHONE ENCOUNTER
Patient left message stating that he was told to call if he is having complications or feels worse and patient stated the water weight is not getting better and is not going down.  Will send message to Lianna OLIVO from Jonah Simmons LPN  Nephrology  [phone]

## 2022-12-01 ENCOUNTER — TELEPHONE (OUTPATIENT)
Dept: NEPHROLOGY | Facility: CLINIC | Age: 47
End: 2022-12-01

## 2022-12-01 NOTE — TELEPHONE ENCOUNTER
Patient contacted regarding report of excess water weight. Per patient he is gaining weight everyday and notices his feet are swollen enough to make it hard to get shoes on. Patient states his hands also feel swollen and find it hard to make a fist. Patient states when he lays down at night he feels the excess fluid moves up to abdomen and makes it difficult for his to breathe. He has been taking an old prescription of Bumetanide that is over a year old and states it's not making him urinate like it used to when first prescribed. Patient does not have capabilities as of yet to take an at home BP.  Patient denies, nausea, vomiting, and diarrhea. Patient reports no other symptoms noticed.  Routing to dr Scott for review.  NICKI Dsouza

## 2022-12-03 ENCOUNTER — LAB (OUTPATIENT)
Dept: LAB | Facility: CLINIC | Age: 47
End: 2022-12-03
Payer: COMMERCIAL

## 2022-12-03 DIAGNOSIS — N05.0 MINIMAL CHANGE DISEASE: ICD-10-CM

## 2022-12-03 DIAGNOSIS — D84.9 IMMUNOSUPPRESSED STATUS (H): ICD-10-CM

## 2022-12-03 LAB
ALBUMIN SERPL-MCNC: 2.2 G/DL (ref 3.4–5)
ANION GAP SERPL CALCULATED.3IONS-SCNC: 2 MMOL/L (ref 3–14)
BUN SERPL-MCNC: 28 MG/DL (ref 7–30)
CALCIUM SERPL-MCNC: 8.1 MG/DL (ref 8.5–10.1)
CHLORIDE BLD-SCNC: 110 MMOL/L (ref 94–109)
CO2 SERPL-SCNC: 32 MMOL/L (ref 20–32)
CREAT SERPL-MCNC: 1.07 MG/DL (ref 0.66–1.25)
CREAT UR-MCNC: 104 MG/DL
CYCLOSPORINE BLD LC/MS/MS-MCNC: 32 UG/L (ref 50–400)
ERYTHROCYTE [DISTWIDTH] IN BLOOD BY AUTOMATED COUNT: 11.5 % (ref 10–15)
GFR SERPL CREATININE-BSD FRML MDRD: 86 ML/MIN/1.73M2
GLUCOSE BLD-MCNC: 86 MG/DL (ref 70–99)
HCT VFR BLD AUTO: 39.7 % (ref 40–53)
HGB BLD-MCNC: 13.6 G/DL (ref 13.3–17.7)
MCH RBC QN AUTO: 30.7 PG (ref 26.5–33)
MCHC RBC AUTO-ENTMCNC: 34.3 G/DL (ref 31.5–36.5)
MCV RBC AUTO: 90 FL (ref 78–100)
MICROALBUMIN UR-MCNC: 291 MG/L
MICROALBUMIN/CREAT UR: 279.81 MG/G CR (ref 0–17)
PHOSPHATE SERPL-MCNC: 2.4 MG/DL (ref 2.5–4.5)
PLATELET # BLD AUTO: 185 10E3/UL (ref 150–450)
POTASSIUM BLD-SCNC: 3.6 MMOL/L (ref 3.4–5.3)
RBC # BLD AUTO: 4.43 10E6/UL (ref 4.4–5.9)
SODIUM SERPL-SCNC: 144 MMOL/L (ref 133–144)
TME LAST DOSE: ABNORMAL H
TME LAST DOSE: ABNORMAL H
WBC # BLD AUTO: 11.8 10E3/UL (ref 4–11)

## 2022-12-03 PROCEDURE — 82043 UR ALBUMIN QUANTITATIVE: CPT

## 2022-12-03 PROCEDURE — 36415 COLL VENOUS BLD VENIPUNCTURE: CPT

## 2022-12-03 PROCEDURE — 80069 RENAL FUNCTION PANEL: CPT

## 2022-12-03 PROCEDURE — 85027 COMPLETE CBC AUTOMATED: CPT

## 2022-12-03 PROCEDURE — 80158 DRUG ASSAY CYCLOSPORINE: CPT

## 2022-12-06 DIAGNOSIS — N05.0 MINIMAL CHANGE DISEASE: ICD-10-CM

## 2022-12-06 DIAGNOSIS — N04.9 NEPHROTIC SYNDROME: ICD-10-CM

## 2022-12-08 NOTE — TELEPHONE ENCOUNTER
"Requested Prescriptions   Pending Prescriptions Disp Refills    lisinopril (ZESTRIL) 20 MG tablet 90 tablet 3     Sig: Take 1 tablet (20 mg) by mouth daily       ACE Inhibitors (Including Combos) Protocol Failed - 12/6/2022 11:54 AM        Failed - Blood pressure under 140/90 in past 12 months     BP Readings from Last 3 Encounters:   11/21/22 (!) 146/86   11/11/22 (!) 141/80   02/11/22 112/76                 Passed - Recent (12 mo) or future (30 days) visit within the authorizing provider's specialty     Patient has had an office visit with the authorizing provider or a provider within the authorizing providers department within the previous 12 mos or has a future within next 30 days. See \"Patient Info\" tab in inbasket, or \"Choose Columns\" in Meds & Orders section of the refill encounter.              Passed - Medication is active on med list        Passed - Patient is age 18 or older        Passed - Normal serum creatinine on file in past 12 months     Recent Labs   Lab Test 12/03/22  0759   CR 1.07       Ok to refill medication if creatinine is low          Passed - Normal serum potassium on file in past 12 months     Recent Labs   Lab Test 12/03/22  0759   POTASSIUM 3.6                  "

## 2022-12-09 RX ORDER — LISINOPRIL 20 MG/1
20 TABLET ORAL DAILY
Qty: 90 TABLET | Refills: 3 | Status: SHIPPED | OUTPATIENT
Start: 2022-12-09 | End: 2023-12-15

## 2022-12-12 DIAGNOSIS — J45.40 MODERATE PERSISTENT ASTHMA WITHOUT COMPLICATION: ICD-10-CM

## 2022-12-13 DIAGNOSIS — N05.0 MINIMAL CHANGE DISEASE: ICD-10-CM

## 2022-12-14 NOTE — TELEPHONE ENCOUNTER
Requested Prescriptions   Pending Prescriptions Disp Refills    predniSONE (DELTASONE) 20 MG tablet 90 tablet 0     Sig: Take 3 tablets (60 mg) by mouth daily       There is no refill protocol information for this order          HAZEL De LeonN, RN

## 2022-12-15 RX ORDER — ALBUTEROL SULFATE 90 UG/1
AEROSOL, METERED RESPIRATORY (INHALATION)
Qty: 18 G | Refills: 3 | Status: SHIPPED | OUTPATIENT
Start: 2022-12-15 | End: 2023-07-03

## 2022-12-15 RX ORDER — PREDNISONE 20 MG/1
60 TABLET ORAL DAILY
Qty: 90 TABLET | Refills: 0 | Status: SHIPPED | OUTPATIENT
Start: 2022-12-15 | End: 2023-01-23

## 2022-12-15 NOTE — TELEPHONE ENCOUNTER
"Requested Prescriptions   Pending Prescriptions Disp Refills    albuterol (VENTOLIN HFA) 108 (90 Base) MCG/ACT inhaler 18 g 3     Sig: INHALE ONE TO TWO PUFFS BY MOUTH EVERY 6 HOURS AS NEEDED FOR SHORTNESS OF BREATH, DIFFICULTY BREATHING OR WHEEZING (NEED TO BE SEEN IN CLINIC FOR FURTHER REFILLS)       Asthma Maintenance Inhalers - Anticholinergics Failed - 12/12/2022 11:22 AM        Failed - Asthma control assessment score within normal limits in last 6 months     Please review ACT score.           Passed - Patient is age 12 years or older        Passed - Medication is active on med list        Passed - Recent (6 mo) or future (30 days) visit within the authorizing provider's specialty     Patient had office visit in the last 6 months or has a visit in the next 30 days with authorizing provider or within the authorizing provider's specialty.  See \"Patient Info\" tab in inbasket, or \"Choose Columns\" in Meds & Orders section of the refill encounter.           Short-Acting Beta Agonist Inhalers Protocol  Failed - 12/12/2022 11:22 AM        Failed - Asthma control assessment score within normal limits in last 6 months     Please review ACT score.           Passed - Patient is age 12 or older        Passed - Medication is active on med list        Passed - Recent (6 mo) or future (30 days) visit within the authorizing provider's specialty     Patient had office visit in the last 6 months or has a visit in the next 30 days with authorizing provider or within the authorizing provider's specialty.  See \"Patient Info\" tab in inbasket, or \"Choose Columns\" in Meds & Orders section of the refill encounter.                   "

## 2023-01-07 ENCOUNTER — LAB (OUTPATIENT)
Dept: LAB | Facility: CLINIC | Age: 48
End: 2023-01-07
Payer: COMMERCIAL

## 2023-01-07 DIAGNOSIS — D84.9 IMMUNOSUPPRESSED STATUS (H): ICD-10-CM

## 2023-01-07 DIAGNOSIS — N04.9 NEPHROTIC SYNDROME: ICD-10-CM

## 2023-01-07 DIAGNOSIS — N05.0 MINIMAL CHANGE DISEASE: ICD-10-CM

## 2023-01-07 LAB
ALBUMIN SERPL-MCNC: 3.4 G/DL (ref 3.4–5)
ANION GAP SERPL CALCULATED.3IONS-SCNC: 3 MMOL/L (ref 3–14)
BUN SERPL-MCNC: 26 MG/DL (ref 7–30)
CALCIUM SERPL-MCNC: 9.1 MG/DL (ref 8.5–10.1)
CHLORIDE BLD-SCNC: 108 MMOL/L (ref 94–109)
CO2 SERPL-SCNC: 32 MMOL/L (ref 20–32)
CREAT SERPL-MCNC: 1.18 MG/DL (ref 0.66–1.25)
CREAT UR-MCNC: 198 MG/DL
CYCLOSPORINE BLD LC/MS/MS-MCNC: 42 UG/L (ref 50–400)
ERYTHROCYTE [DISTWIDTH] IN BLOOD BY AUTOMATED COUNT: 12.3 % (ref 10–15)
GFR SERPL CREATININE-BSD FRML MDRD: 76 ML/MIN/1.73M2
GLUCOSE BLD-MCNC: 89 MG/DL (ref 70–99)
HCT VFR BLD AUTO: 45 % (ref 40–53)
HGB BLD-MCNC: 15.1 G/DL (ref 13.3–17.7)
MCH RBC QN AUTO: 31 PG (ref 26.5–33)
MCHC RBC AUTO-ENTMCNC: 33.6 G/DL (ref 31.5–36.5)
MCV RBC AUTO: 92 FL (ref 78–100)
MICROALBUMIN UR-MCNC: 8 MG/L
MICROALBUMIN/CREAT UR: 4.04 MG/G CR (ref 0–17)
PHOSPHATE SERPL-MCNC: 2.7 MG/DL (ref 2.5–4.5)
PLATELET # BLD AUTO: 164 10E3/UL (ref 150–450)
POTASSIUM BLD-SCNC: 3.9 MMOL/L (ref 3.4–5.3)
RBC # BLD AUTO: 4.87 10E6/UL (ref 4.4–5.9)
SODIUM SERPL-SCNC: 143 MMOL/L (ref 133–144)
TME LAST DOSE: ABNORMAL H
TME LAST DOSE: ABNORMAL H
WBC # BLD AUTO: 8.5 10E3/UL (ref 4–11)

## 2023-01-07 PROCEDURE — 82043 UR ALBUMIN QUANTITATIVE: CPT

## 2023-01-07 PROCEDURE — 85027 COMPLETE CBC AUTOMATED: CPT

## 2023-01-07 PROCEDURE — 36415 COLL VENOUS BLD VENIPUNCTURE: CPT

## 2023-01-07 PROCEDURE — 80069 RENAL FUNCTION PANEL: CPT

## 2023-01-07 PROCEDURE — 80158 DRUG ASSAY CYCLOSPORINE: CPT

## 2023-01-07 PROCEDURE — 82570 ASSAY OF URINE CREATININE: CPT

## 2023-01-11 ENCOUNTER — TELEPHONE (OUTPATIENT)
Dept: NEPHROLOGY | Facility: CLINIC | Age: 48
End: 2023-01-11

## 2023-01-11 NOTE — TELEPHONE ENCOUNTER
Patient contacted to pass on message from Dr Scott that his lab results look alot better. Patient did verify he is still taking the 75 mg cyclosporine. Patient did not have any questions or concerns.  NICKI Dsouza

## 2023-01-23 ENCOUNTER — OFFICE VISIT (OUTPATIENT)
Dept: NEPHROLOGY | Facility: CLINIC | Age: 48
End: 2023-01-23
Payer: COMMERCIAL

## 2023-01-23 VITALS
WEIGHT: 162 LBS | DIASTOLIC BLOOD PRESSURE: 70 MMHG | BODY MASS INDEX: 23.92 KG/M2 | HEART RATE: 80 BPM | SYSTOLIC BLOOD PRESSURE: 110 MMHG

## 2023-01-23 DIAGNOSIS — D84.9 IMMUNOSUPPRESSED STATUS (H): ICD-10-CM

## 2023-01-23 DIAGNOSIS — N18.31 CHRONIC KIDNEY DISEASE, STAGE 3A (H): ICD-10-CM

## 2023-01-23 DIAGNOSIS — N05.0 MINIMAL CHANGE DISEASE: ICD-10-CM

## 2023-01-23 DIAGNOSIS — D58.2 ELEVATED HEMOGLOBIN (H): ICD-10-CM

## 2023-01-23 DIAGNOSIS — K50.00 CROHN'S DISEASE OF SMALL INTESTINE WITHOUT COMPLICATION (H): ICD-10-CM

## 2023-01-23 PROCEDURE — 99214 OFFICE O/P EST MOD 30 MIN: CPT | Performed by: INTERNAL MEDICINE

## 2023-01-23 RX ORDER — CYCLOSPORINE 100 MG/1
100 CAPSULE, LIQUID FILLED ORAL 2 TIMES DAILY
Qty: 60 CAPSULE | Refills: 11 | Status: SHIPPED | OUTPATIENT
Start: 2023-01-23 | End: 2023-02-07

## 2023-01-23 RX ORDER — PREDNISONE 10 MG/1
10 TABLET ORAL DAILY
Qty: 30 TABLET | COMMUNITY
Start: 2023-01-23 | End: 2023-02-08 | Stop reason: DRUGHIGH

## 2023-01-23 NOTE — LETTER
1/23/2023       RE: Deshawn Flood  [address] [place] [id number]     Dear Colleague,    Thank you for referring your patient, Deshawn Flood, to the [facility] [place] at [facility]. Please see a copy of my visit note below.    Assessment and Plan:  47 year old male who presents for followup of minimal change disease. He presented for evaluation after presenting to ER where CT scan noted ascites and nonspecific small bowel enteritis, and UA showed proteinuria. He had biopsy which showed minimal change on 4/9/2019 and treated with prednisone 70mg. He was in remission and down to prednsione 10mg then stopped it, and had relapse within a couple months.  His mother noted swelling and he was up 20-25 lbs and proteinuria up to 6 grams/g cr. He was on maintenance cyclosporine since last relapse in November 2021, and now presents with another relapse in November 2022  # Minimal change disease/ nephrotic syndrome, another relapse noted with proteinuria up to 6 grams, despite being on cyclosporine, trough levels have been near 35-40 on cyclosporine 50mg twice a day.  - Prednisone round #2 started March 2020, tried to do a longer wean once on lower doses.  - Prednisone round #3 started April 20,2021.    - Thus restarted prednisone, and started  cyclosporine 3-4mg/kg, started at 100mg twice a day- he was only taking it once a day  Curently on 75mg bid but his levels are 40 or less  - increase to 100mg bid and aim for levels    - he is down, back to 162 lbs, he is typically 160-165 lbs  - continue labs monthly for now including cyclosporine- will increase cyclosporine to 100mg bid- he is down to 10mg prednisone, thus will stop bactrim. Continue omeprazole while on prednisone  - monitor carefully given immunosuppression  - restart bactrim.  - update vaccination    # Renal function- elevated creatinine / ?CKD or due to cyclosporine and hemodynamic factors-  Scr was 1.1 in the past and was1.27 in ER. It was up to 1.6-1.8 at time of biopsy after diuretics were started.  - scr back to 1-1.2 range, but up to 1.4 a few months ago, ? CSA hemodynamic effect  - monitor and encouraged hydration- stable on recent labs.     - proteinuria improved very quickly down to normal once prednisone restarted , down from 13 grams to normal    Labs on Saturdays if he is working   - discussed that his is an immunosuppressant, that it increases risk of infection and malignancy. He was agreeable to start it.  - stop bactrim now    # Hyperlipidemia- LDL very elevated,was on low dose statin but stopped in setting of CNI and LFT elevated- it was restarted by PCP  - recheck lipid panel and LFTs - can stop lipitor if lipids back to normal    # Hypertension: BP is good at 110/70   - continue lisinopril, if BP lower may need to lower dose.    # Not anemic- hgb has been high at times, recheck    # Electrolytes: normal low potassium      # Mineral Bone Disorder:   Check baseline PTH if creatinine remains elevated.     - RTC in 2-3months and labs monthly for now    Patient instructions  predisone 10mg for 2 weeks, then 5mg for 2 weeks, 2.5mg for 2 weeks , 1mg for 2 weeks then stop  Can stop bactrim (sulfamethaxozole/trimethoprim) three times a week  Can stop omeprazole once you are off prednisone completely  Increase cyclosporine to 100mg twice a day (can take two 50mg tablets, or 50 and 2 tabs of 25mg ) twice a day.      Assessment and plan was discussed with patient and he voiced his understanding and agreement.    Reason for Visit:  Deshawn Flood is a 47 year old male with nephrotic range proteinuria noted and biopsy 4/9/19 shows minimal change disease    HPI:  He is a 47 year old male with minimal change disease who follows up. He initially presented with nephrotic syndrome and underwent kidney biopsy [id number] and it showed minimal change disease. He was admitted to the hospital briefly after biopsy  as he was having poor appetite due to bloating, and elevated creatinine.    Since then he went into remission, we tapered down on prednisone and unfortunately he relapsed within a couple months (6g/g cr February 2020),  thus we restarted prednisone in March 2020 and by early April he was down to normal range proteinuria and he was feeling better.    His weight is usually closer to 150-160  but was up to 184 lbs with nephrotic syndrome.  He was started on cyclosporine after his last relapse in November 2021.  He had labs last week that showed relapse with proteinuria up to 6 g/g cr.  He had noticed some swelling and was hoping it would settle down.  His last cyclosporine level was 37  He does not have a BP cuff  He is eating fairly.  He is now off for the winter from work.  His weight is up about 5 lbs.    We discussed restarting a short steroid course, and increase cyclosporine dose to aim for level of 40-70- he is on low side of this thus will increase cyclosporine especially since we are weaning prednisone.  He recently had back pain and was taking muscle relaxant and oxycodone. He denies taking ibuprofen    Renal History:   None prior to minimal change disease Spring 2019    ROS:   A comprehensive review of systems was obtained and negative, except as noted in the HPI or PMH.    Active Medical Problems:  Patient Active Problem List   Diagnosis     Loose body of left knee     Left knee pain     Tobacco abuse     History of pneumothorax     CARDIOVASCULAR SCREENING; LDL GOAL LESS THAN 160     Nephrotic syndrome     Anasarca     Minimal change disease     Immunosuppression (H)     Crohn's disease of small intestine without complication (H)     Elevated hemoglobin (H)     Chronic kidney disease, stage 3a (H)     PMH:   Medical record was reviewed and PMH was discussed with patient and noted below.  Past Medical History:   Diagnosis Date     Anemia      Other spontaneous pneumothorax      SPONTANEOUS PNEUMOTHORAX NEC  3/25/2005     Unspecified asthma(493.90)      PSH:   Past Surgical History:   Procedure Laterality Date     ARTHROSCOPY KNEE  8/3/2012    Procedure: ARTHROSCOPY KNEE;  Left knee Arthroscopy with removal of loose bodies;  Surgeon: Bibi Roberts MD;  Location: [facility]     IR RENAL BIOPSY LEFT  4/9/2019     TUBE THORACOSTOMY,ABSC/EMPYEMA/HEMO  3/20/2005    Small chest tube with Heimlich valve.     ZZHC REPAIR UMBILICAL GODFREY,<6Y/O,REDUC  1978       Family Hx:   Family History   Problem Relation Age of Onset     Autoimmune Disease No family hx of      Personal Hx:   Social History     Tobacco Use     Smoking status: Light Smoker     Packs/day: 0.10     Years: 15.00     Pack years: 1.50     Types: Cigarettes     Smokeless tobacco: Former     Types: Chew     Tobacco comments:     1 pack per day   Substance Use Topics     Alcohol use: Yes     Alcohol/week: 17.5 standard drinks     Types: 21 Cans of beer per week     Comment: occ       Allergies:  Allergies   Allergen Reactions     Bee Venom      swells up     No Known Drug Allergies      Seasonal Allergies        Medications:  Current Outpatient Medications   Medication Sig     albuterol (VENTOLIN HFA) 108 (90 Base) MCG/ACT inhaler INHALE ONE TO TWO PUFFS BY MOUTH EVERY 6 HOURS AS NEEDED FOR SHORTNESS OF BREATH, DIFFICULTY BREATHING OR WHEEZING (NEED TO BE SEEN IN CLINIC FOR FURTHER REFILLS)     atorvastatin (LIPITOR) 20 MG tablet TAKE ONE TABLET BY MOUTH ONCE DAILY     cycloSPORINE modified (GENERIC EQUIVALENT) 25 MG capsule Take 1 capsule (25 mg) by mouth 2 times daily Take one 50mg and one 25mg tablet for total of 75mg twice a day, check a cyclosporine level every month for now     cycloSPORINE modified (GENERIC EQUIVALENT) 50 MG capsule Take 1 capsule (50 mg) by mouth 2 times daily     lisinopril (ZESTRIL) 20 MG tablet Take 1 tablet (20 mg) by mouth daily     omeprazole (PRILOSEC) 20 MG DR capsule Take 1 capsule (20 mg) by mouth daily      sulfamethoxazole-trimethoprim (BACTRIM DS) 800-160 MG tablet Take 1 tablet by mouth Every Mon, Wed, Fri Morning     predniSONE (DELTASONE) 20 MG tablet Take 3 tablets (60 mg) by mouth daily     No current facility-administered medications for this visit.      Vitals:  /70   Pulse 80   Wt 73.5 kg (162 lb)   BMI 23.92 kg/m    154-155 lbs  Exam:  GENERAL : alert and no distress  Speaks in full sentences without dyspnea  Normal speech and thought pattern  Ext: no signficant edema    LABS:   CMP  Recent Labs   Lab Test 01/07/23  0756 12/03/22  0759 11/05/22  0755 10/01/22  0750 08/14/21  0807 07/10/21  0829 06/05/21  0743 05/15/21  0757 04/10/21  0745    144 141 141   < > 139 138 138 141   POTASSIUM 3.9 3.6 4.1 4.2   < > 4.1 3.4 3.8 3.6   CHLORIDE 108 110* 109 109   < > 110* 105 103 109   CO2 32 32 29 29   < > 26 29 32 28   ANIONGAP 3 2* 3 3   < > 3 4 3 4   GLC 89 86 96 91   < > 84 89 95 126*   BUN 26 28 19 17   < > 19 17 14 14   CR 1.18 1.07 0.95 1.04   < > 1.06 1.11 1.11 0.98   GFRESTIMATED 76 86 >90 89   < > 83 79 79 >90   GFRESTBLACK  --   --   --   --   --  >90 >90 >90 >90   BASIA 9.1 8.1* 8.5 8.9   < > 9.2 8.5 8.6 8.7    < > = values in this interval not displayed.     Recent Labs   Lab Test 05/07/22  0748 04/09/22  0749 03/05/22  0750 01/08/22  0758   BILITOTAL 0.5 1.3 2.0* 1.1   ALKPHOS 55 40 33* 47   ALT 20 24 36 49   AST 12 21 16 14     CBC  Recent Labs   Lab Test 01/07/23  0756 12/03/22  0759 06/04/22  0803 05/07/22  0748   HGB 15.1 13.6 14.1 12.5*   WBC 8.5 11.8* 5.3 6.0   RBC 4.87 4.43 4.48 3.98*   HCT 45.0 39.7* 39.7* 35.3*   MCV 92 90 89 89   MCH 31.0 30.7 31.5 31.4   MCHC 33.6 34.3 35.5 35.4   RDW 12.3 11.5 12.0 11.2    185 207 294     URINE STUDIES  Recent Labs   Lab Test 08/14/21  0819 12/14/20  0037 09/12/20  0841 02/28/20  1620 04/01/19  1500 03/29/19  1858   COLOR Yellow Yellow Yellow Yellow Yellow Yellow   APPEARANCE Clear Clear Clear Clear Clear Clear   URINEGLC Negative  Negative Negative Negative Negative Negative   URINEBILI Negative Negative Negative Negative Negative Negative   URINEKETONE Negative Negative Negative Negative Negative Negative   SG 1.026 1.019 1.021 >1.030 1.015 >1.035*   UBLD Negative Negative Negative Moderate* Moderate* Moderate*   URINEPH 6.0 5.0 5.0 6.0 6.0 6.0   PROTEIN Negative Negative Negative >=300* >=300* >499*   UROBILINOGEN  --   --   --  0.2 0.2  --    NITRITE Negative Negative Negative Negative Negative Negative   LEUKEST Negative Negative Negative Negative Negative Negative   RBCU <1  --   --  O - 2 25-50* 7*   WBCU <1  --   --  0 - 5 0 - 5 7*     Recent Labs   Lab Test 12/06/21  1435 04/03/20  0900 02/28/20  1619 11/26/19  1435   UTPG 3.62* 0.18 6.20* 0.06     PTH  No lab results found.  IRON STUDIES  Recent Labs   Lab Test 06/04/22  0803   IRON 144      IRONSAT 52*   REYNA 369       IMPRESSION:  1. Findings are concerning for diffuse small bowel enteritis likely  infectious or inflammatory in etiology, with associated inflammatory  changes of the mesenteric adipose tissues, moderate ascites, small  bilateral probably reactive pleural effusions and with some edema in  the subcutaneous adipose tissues.  2. No significant atherosclerosis is identified. No obvious arterial  blockage is seen in the mesenteric vessels to suggest ischemia.  3. Mild degenerative changes of the spine and hips. No acute fracture  or aggressive osseous lesion.  Jenny Aram Scott MD

## 2023-01-23 NOTE — PROGRESS NOTES
Assessment and Plan:  47 year old male who presents for followup of minimal change disease. He presented for evaluation after presenting to ER where CT scan noted ascites and nonspecific small bowel enteritis, and UA showed proteinuria. He had biopsy which showed minimal change on 4/9/2019 and treated with prednisone 70mg. He was in remission and down to prednsione 10mg then stopped it, and had relapse within a couple months.  His mother noted swelling and he was up 20-25 lbs and proteinuria up to 6 grams/g cr. He was on maintenance cyclosporine since last relapse in November 2021, and now presents with another relapse in November 2022  # Minimal change disease/ nephrotic syndrome, another relapse noted with proteinuria up to 6 grams, despite being on cyclosporine, trough levels have been near 35-40 on cyclosporine 50mg twice a day.  - Prednisone round #2 started March 2020, tried to do a longer wean once on lower doses.  - Prednisone round #3 started April 20,2021.    - Thus restarted prednisone, and started  cyclosporine 3-4mg/kg, started at 100mg twice a day- he was only taking it once a day  Curently on 75mg bid but his levels are 40 or less  - increase to 100mg bid and aim for levels    - he is down, back to 162 lbs, he is typically 160-165 lbs  - continue labs monthly for now including cyclosporine- will increase cyclosporine to 100mg bid- he is down to 10mg prednisone, thus will stop bactrim. Continue omeprazole while on prednisone  - monitor carefully given immunosuppression  - restart bactrim.  - update vaccination    # Renal function- elevated creatinine / ?CKD or due to cyclosporine and hemodynamic factors- Scr was 1.1 in the past and was1.27 in ER. It was up to 1.6-1.8 at time of biopsy after diuretics were started.  - scr back to 1-1.2 range, but up to 1.4 a few months ago, ? CSA hemodynamic effect  - monitor and encouraged hydration- stable on recent labs.     - proteinuria improved very quickly  down to normal once prednisone restarted , down from 13 grams to normal    Labs on Saturdays if he is working   - discussed that his is an immunosuppressant, that it increases risk of infection and malignancy. He was agreeable to start it.  - stop bactrim now    # Hyperlipidemia- LDL very elevated,was on low dose statin but stopped in setting of CNI and LFT elevated- it was restarted by PCP  - recheck lipid panel and LFTs - can stop lipitor if lipids back to normal    # Hypertension: BP is good at 110/70   - continue lisinopril, if BP lower may need to lower dose.    # Not anemic- hgb has been high at times, recheck    # Electrolytes: normal low potassium      # Mineral Bone Disorder:   Check baseline PTH if creatinine remains elevated.     - RTC in 2-3months and labs monthly for now    Patient instructions  predisone 10mg for 2 weeks, then 5mg for 2 weeks, 2.5mg for 2 weeks , 1mg for 2 weeks then stop  Can stop bactrim (sulfamethaxozole/trimethoprim) three times a week  Can stop omeprazole once you are off prednisone completely  Increase cyclosporine to 100mg twice a day (can take two 50mg tablets, or 50 and 2 tabs of 25mg ) twice a day.      Assessment and plan was discussed with patient and he voiced his understanding and agreement.    Reason for Visit:  Deshawn Flood is a 47 year old male with nephrotic range proteinuria noted and biopsy 4/9/19 shows minimal change disease    HPI:  He is a 47 year old male with minimal change disease who follows up. He initially presented with nephrotic syndrome and underwent kidney biopsy [id number] and it showed minimal change disease. He was admitted to the hospital briefly after biopsy as he was having poor appetite due to bloating, and elevated creatinine.    Since then he went into remission, we tapered down on prednisone and unfortunately he relapsed within a couple months (6g/g cr February 2020),  thus we restarted prednisone in March 2020 and by early April he was down to  normal range proteinuria and he was feeling better.    His weight is usually closer to 150-160  but was up to 184 lbs with nephrotic syndrome.  He was started on cyclosporine after his last relapse in November 2021.  He had labs last week that showed relapse with proteinuria up to 6 g/g cr.  He had noticed some swelling and was hoping it would settle down.  His last cyclosporine level was 37  He does not have a BP cuff  He is eating fairly.  He is now off for the winter from work.  His weight is up about 5 lbs.    We discussed restarting a short steroid course, and increase cyclosporine dose to aim for level of 40-70- he is on low side of this thus will increase cyclosporine especially since we are weaning prednisone.  He recently had back pain and was taking muscle relaxant and oxycodone. He denies taking ibuprofen    Renal History:   None prior to minimal change disease Spring 2019    ROS:   A comprehensive review of systems was obtained and negative, except as noted in the HPI or PMH.    Active Medical Problems:  Patient Active Problem List   Diagnosis     Loose body of left knee     Left knee pain     Tobacco abuse     History of pneumothorax     CARDIOVASCULAR SCREENING; LDL GOAL LESS THAN 160     Nephrotic syndrome     Anasarca     Minimal change disease     Immunosuppression (H)     Crohn's disease of small intestine without complication (H)     Elevated hemoglobin (H)     Chronic kidney disease, stage 3a (H)     PMH:   Medical record was reviewed and PMH was discussed with patient and noted below.  Past Medical History:   Diagnosis Date     Anemia      Other spontaneous pneumothorax      SPONTANEOUS PNEUMOTHORAX NEC 3/25/2005     Unspecified asthma(493.90)      PSH:   Past Surgical History:   Procedure Laterality Date     ARTHROSCOPY KNEE  8/3/2012    Procedure: ARTHROSCOPY KNEE;  Left knee Arthroscopy with removal of loose bodies;  Surgeon: Bibi Roberts MD;  Location: [facility] OR     IR RENAL BIOPSY LEFT   4/9/2019     TUBE THORACOSTOMY,ABSC/EMPYEMA/HEMO  3/20/2005    Small chest tube with Heimlich valve.     ZZHC REPAIR UMBILICAL GODFREY,<4Y/O,REDUC  1978       Family Hx:   Family History   Problem Relation Age of Onset     Autoimmune Disease No family hx of      Personal Hx:   Social History     Tobacco Use     Smoking status: Light Smoker     Packs/day: 0.10     Years: 15.00     Pack years: 1.50     Types: Cigarettes     Smokeless tobacco: Former     Types: Chew     Tobacco comments:     1 pack per day   Substance Use Topics     Alcohol use: Yes     Alcohol/week: 17.5 standard drinks     Types: 21 Cans of beer per week     Comment: occ       Allergies:  Allergies   Allergen Reactions     Bee Venom      swells up     No Known Drug Allergies      Seasonal Allergies        Medications:  Current Outpatient Medications   Medication Sig     albuterol (VENTOLIN HFA) 108 (90 Base) MCG/ACT inhaler INHALE ONE TO TWO PUFFS BY MOUTH EVERY 6 HOURS AS NEEDED FOR SHORTNESS OF BREATH, DIFFICULTY BREATHING OR WHEEZING (NEED TO BE SEEN IN CLINIC FOR FURTHER REFILLS)     atorvastatin (LIPITOR) 20 MG tablet TAKE ONE TABLET BY MOUTH ONCE DAILY     cycloSPORINE modified (GENERIC EQUIVALENT) 25 MG capsule Take 1 capsule (25 mg) by mouth 2 times daily Take one 50mg and one 25mg tablet for total of 75mg twice a day, check a cyclosporine level every month for now     cycloSPORINE modified (GENERIC EQUIVALENT) 50 MG capsule Take 1 capsule (50 mg) by mouth 2 times daily     lisinopril (ZESTRIL) 20 MG tablet Take 1 tablet (20 mg) by mouth daily     omeprazole (PRILOSEC) 20 MG DR capsule Take 1 capsule (20 mg) by mouth daily     sulfamethoxazole-trimethoprim (BACTRIM DS) 800-160 MG tablet Take 1 tablet by mouth Every Mon, Wed, Fri Morning     predniSONE (DELTASONE) 20 MG tablet Take 3 tablets (60 mg) by mouth daily     No current facility-administered medications for this visit.      Vitals:  /70   Pulse 80   Wt 73.5 kg (162 lb)   BMI  23.92 kg/m    154-155 lbs  Exam:  GENERAL : alert and no distress  Speaks in full sentences without dyspnea  Normal speech and thought pattern  Ext: no signficant edema    LABS:   CMP  Recent Labs   Lab Test 01/07/23  0756 12/03/22  0759 11/05/22  0755 10/01/22  0750 08/14/21  0807 07/10/21  0829 06/05/21  0743 05/15/21  0757 04/10/21  0745    144 141 141   < > 139 138 138 141   POTASSIUM 3.9 3.6 4.1 4.2   < > 4.1 3.4 3.8 3.6   CHLORIDE 108 110* 109 109   < > 110* 105 103 109   CO2 32 32 29 29   < > 26 29 32 28   ANIONGAP 3 2* 3 3   < > 3 4 3 4   GLC 89 86 96 91   < > 84 89 95 126*   BUN 26 28 19 17   < > 19 17 14 14   CR 1.18 1.07 0.95 1.04   < > 1.06 1.11 1.11 0.98   GFRESTIMATED 76 86 >90 89   < > 83 79 79 >90   GFRESTBLACK  --   --   --   --   --  >90 >90 >90 >90   BASIA 9.1 8.1* 8.5 8.9   < > 9.2 8.5 8.6 8.7    < > = values in this interval not displayed.     Recent Labs   Lab Test 05/07/22  0748 04/09/22  0749 03/05/22  0750 01/08/22  0758   BILITOTAL 0.5 1.3 2.0* 1.1   ALKPHOS 55 40 33* 47   ALT 20 24 36 49   AST 12 21 16 14     CBC  Recent Labs   Lab Test 01/07/23  0756 12/03/22  0759 06/04/22  0803 05/07/22  0748   HGB 15.1 13.6 14.1 12.5*   WBC 8.5 11.8* 5.3 6.0   RBC 4.87 4.43 4.48 3.98*   HCT 45.0 39.7* 39.7* 35.3*   MCV 92 90 89 89   MCH 31.0 30.7 31.5 31.4   MCHC 33.6 34.3 35.5 35.4   RDW 12.3 11.5 12.0 11.2    185 207 294     URINE STUDIES  Recent Labs   Lab Test 08/14/21  0819 12/14/20  0037 09/12/20  0841 02/28/20  1620 04/01/19  1500 03/29/19  1858   COLOR Yellow Yellow Yellow Yellow Yellow Yellow   APPEARANCE Clear Clear Clear Clear Clear Clear   URINEGLC Negative Negative Negative Negative Negative Negative   URINEBILI Negative Negative Negative Negative Negative Negative   URINEKETONE Negative Negative Negative Negative Negative Negative   SG 1.026 1.019 1.021 >1.030 1.015 >1.035*   UBLD Negative Negative Negative Moderate* Moderate* Moderate*   URINEPH 6.0 5.0 5.0 6.0 6.0 6.0    PROTEIN Negative Negative Negative >=300* >=300* >499*   UROBILINOGEN  --   --   --  0.2 0.2  --    NITRITE Negative Negative Negative Negative Negative Negative   LEUKEST Negative Negative Negative Negative Negative Negative   RBCU <1  --   --  O - 2 25-50* 7*   WBCU <1  --   --  0 - 5 0 - 5 7*     Recent Labs   Lab Test 12/06/21  1435 04/03/20  0900 02/28/20  1619 11/26/19  1435   UTPG 3.62* 0.18 6.20* 0.06     PTH  No lab results found.  IRON STUDIES  Recent Labs   Lab Test 06/04/22  0803   IRON 144      IRONSAT 52*   REYNA 369       IMPRESSION:  1. Findings are concerning for diffuse small bowel enteritis likely  infectious or inflammatory in etiology, with associated inflammatory  changes of the mesenteric adipose tissues, moderate ascites, small  bilateral probably reactive pleural effusions and with some edema in  the subcutaneous adipose tissues.  2. No significant atherosclerosis is identified. No obvious arterial  blockage is seen in the mesenteric vessels to suggest ischemia.  3. Mild degenerative changes of the spine and hips. No acute fracture  or aggressive osseous lesion.  Jenny Aram Scott MD

## 2023-01-23 NOTE — PATIENT INSTRUCTIONS
Patient Instructions:    Prednisone 10mg for 2 weeks, then 5mg for 2 weeks, 2.5mg for 2 weeks , 1mg for 2 weeks then stop  Can stop bactrim (sulfamethaxozole/trimethoprim) three times a week  Can stop omeprazole once you are off prednisone completely  Increase cyclosporine to 100mg twice a day (can take two 50mg tablets, or 50 and 2 tabs of 25mg ) twice a day.

## 2023-02-04 ENCOUNTER — LAB (OUTPATIENT)
Dept: LAB | Facility: CLINIC | Age: 48
End: 2023-02-04
Payer: COMMERCIAL

## 2023-02-04 DIAGNOSIS — N05.0 MINIMAL CHANGE DISEASE: ICD-10-CM

## 2023-02-04 DIAGNOSIS — D84.9 IMMUNOSUPPRESSED STATUS (H): ICD-10-CM

## 2023-02-04 LAB
ALBUMIN SERPL BCG-MCNC: 4.1 G/DL (ref 3.5–5.2)
ANION GAP SERPL CALCULATED.3IONS-SCNC: 7 MMOL/L (ref 7–15)
BUN SERPL-MCNC: 16.6 MG/DL (ref 6–20)
CALCIUM SERPL-MCNC: 9.6 MG/DL (ref 8.6–10)
CHLORIDE SERPL-SCNC: 105 MMOL/L (ref 98–107)
CHOLEST SERPL-MCNC: 126 MG/DL
CREAT SERPL-MCNC: 1.46 MG/DL (ref 0.67–1.17)
CREAT UR-MCNC: 514.7 MG/DL
CYCLOSPORINE BLD LC/MS/MS-MCNC: 131 UG/L (ref 50–400)
DEPRECATED HCO3 PLAS-SCNC: 29 MMOL/L (ref 22–29)
ERYTHROCYTE [DISTWIDTH] IN BLOOD BY AUTOMATED COUNT: 12.1 % (ref 10–15)
GFR SERPL CREATININE-BSD FRML MDRD: 59 ML/MIN/1.73M2
GLUCOSE SERPL-MCNC: 88 MG/DL (ref 70–99)
HCT VFR BLD AUTO: 40.8 % (ref 40–53)
HDLC SERPL-MCNC: 51 MG/DL
HGB BLD-MCNC: 14.4 G/DL (ref 13.3–17.7)
LDLC SERPL CALC-MCNC: 61 MG/DL
MCH RBC QN AUTO: 30.8 PG (ref 26.5–33)
MCHC RBC AUTO-ENTMCNC: 35.3 G/DL (ref 31.5–36.5)
MCV RBC AUTO: 87 FL (ref 78–100)
MICROALBUMIN UR-MCNC: 13.2 MG/L
MICROALBUMIN/CREAT UR: 2.56 MG/G CR (ref 0–17)
NONHDLC SERPL-MCNC: 75 MG/DL
PHOSPHATE SERPL-MCNC: 3.1 MG/DL (ref 2.5–4.5)
PLATELET # BLD AUTO: 182 10E3/UL (ref 150–450)
POTASSIUM SERPL-SCNC: 4 MMOL/L (ref 3.4–5.3)
RBC # BLD AUTO: 4.68 10E6/UL (ref 4.4–5.9)
SODIUM SERPL-SCNC: 141 MMOL/L (ref 136–145)
TME LAST DOSE: NORMAL H
TME LAST DOSE: NORMAL H
TRIGL SERPL-MCNC: 72 MG/DL
WBC # BLD AUTO: 7.5 10E3/UL (ref 4–11)

## 2023-02-04 PROCEDURE — 80061 LIPID PANEL: CPT

## 2023-02-04 PROCEDURE — 36415 COLL VENOUS BLD VENIPUNCTURE: CPT

## 2023-02-04 PROCEDURE — 82570 ASSAY OF URINE CREATININE: CPT

## 2023-02-04 PROCEDURE — 85027 COMPLETE CBC AUTOMATED: CPT

## 2023-02-04 PROCEDURE — 80158 DRUG ASSAY CYCLOSPORINE: CPT

## 2023-02-04 PROCEDURE — 82043 UR ALBUMIN QUANTITATIVE: CPT

## 2023-02-04 PROCEDURE — 80069 RENAL FUNCTION PANEL: CPT

## 2023-02-07 NOTE — PROGRESS NOTES
Cyclosporine level higher than goal, dose decrease from 100mg to 75mg twice a day and repeat level in one month

## 2023-02-08 ENCOUNTER — PATIENT OUTREACH (OUTPATIENT)
Dept: NEPHROLOGY | Facility: CLINIC | Age: 48
End: 2023-02-08
Payer: COMMERCIAL

## 2023-02-08 DIAGNOSIS — N05.0 MINIMAL CHANGE DISEASE: ICD-10-CM

## 2023-02-08 DIAGNOSIS — N18.31 CHRONIC KIDNEY DISEASE, STAGE 3A (H): Primary | ICD-10-CM

## 2023-02-08 DIAGNOSIS — D84.9 IMMUNOSUPPRESSED STATUS (H): ICD-10-CM

## 2023-02-08 RX ORDER — PREDNISONE 5 MG/1
2.5 TABLET ORAL DAILY
Qty: 7 TABLET | Refills: 0 | Status: SHIPPED | OUTPATIENT
Start: 2023-02-08 | End: 2023-04-24

## 2023-02-08 RX ORDER — CYCLOSPORINE 25 MG/1
50 CAPSULE, LIQUID FILLED ORAL 2 TIMES DAILY
Qty: 60 CAPSULE | Refills: 11 | Status: SHIPPED | OUTPATIENT
Start: 2023-02-08 | End: 2023-04-24

## 2023-02-08 RX ORDER — PREDNISONE 10 MG/1
5 TABLET ORAL DAILY
Qty: 7 TABLET | Refills: 0 | Status: CANCELLED | OUTPATIENT
Start: 2023-02-08

## 2023-02-08 RX ORDER — PREDNISONE 5 MG/1
5 TABLET ORAL DAILY
Qty: 7 TABLET | Refills: 0 | Status: SHIPPED | OUTPATIENT
Start: 2023-02-08 | End: 2023-02-08

## 2023-02-08 RX ORDER — CYCLOSPORINE 25 MG/1
75 CAPSULE, LIQUID FILLED ORAL 2 TIMES DAILY
Qty: 60 CAPSULE | Refills: 11 | Status: SHIPPED | OUTPATIENT
Start: 2023-02-08 | End: 2023-02-08

## 2023-02-08 NOTE — PROGRESS NOTES
Patient contacted regarding Dr Scott's result note. Per Dr Scott:  The cyclosporine level increased significantly.  Please decrease cyclosporine back 75mg twice a day and repeat level next month.  Also can stop atorvastatin (lipitor) for now   Dose refill sent to patient's pharmacy as well as new 2 week dose of prednisone, 2.5 mg for 14 days then 1 mg for 14 days per Dr Scott.  Start prednisone 60mg for 4 weeks, then will cut down to 40mg for 2 weeks, 20mg for 2 weeks, 10mg for 2 weeks, 5 mg for 2 weeks.    Per patient he has not been taking atorvastin. Per dr Scott's revised order, patient to take 50 mg cyclosporin 2 times daily. Patient states an understanding.  NICKI Dsouza

## 2023-03-04 ENCOUNTER — LAB (OUTPATIENT)
Dept: LAB | Facility: CLINIC | Age: 48
End: 2023-03-04
Payer: COMMERCIAL

## 2023-03-04 DIAGNOSIS — D84.9 IMMUNOSUPPRESSED STATUS (H): ICD-10-CM

## 2023-03-04 DIAGNOSIS — N05.0 MINIMAL CHANGE DISEASE: ICD-10-CM

## 2023-03-04 LAB
ALBUMIN SERPL BCG-MCNC: 4.1 G/DL (ref 3.5–5.2)
ANION GAP SERPL CALCULATED.3IONS-SCNC: 8 MMOL/L (ref 7–15)
BUN SERPL-MCNC: 17.2 MG/DL (ref 6–20)
CALCIUM SERPL-MCNC: 9.2 MG/DL (ref 8.6–10)
CHLORIDE SERPL-SCNC: 103 MMOL/L (ref 98–107)
CREAT SERPL-MCNC: 1.26 MG/DL (ref 0.67–1.17)
CREAT UR-MCNC: 324.4 MG/DL
CYCLOSPORINE BLD LC/MS/MS-MCNC: 34 UG/L (ref 50–400)
DEPRECATED HCO3 PLAS-SCNC: 29 MMOL/L (ref 22–29)
ERYTHROCYTE [DISTWIDTH] IN BLOOD BY AUTOMATED COUNT: 12.2 % (ref 10–15)
GFR SERPL CREATININE-BSD FRML MDRD: 70 ML/MIN/1.73M2
GLUCOSE SERPL-MCNC: 97 MG/DL (ref 70–99)
HCT VFR BLD AUTO: 38.5 % (ref 40–53)
HGB BLD-MCNC: 13.4 G/DL (ref 13.3–17.7)
MCH RBC QN AUTO: 31.2 PG (ref 26.5–33)
MCHC RBC AUTO-ENTMCNC: 34.8 G/DL (ref 31.5–36.5)
MCV RBC AUTO: 90 FL (ref 78–100)
MICROALBUMIN UR-MCNC: <12 MG/L
MICROALBUMIN/CREAT UR: NORMAL MG/G{CREAT}
PHOSPHATE SERPL-MCNC: 2.7 MG/DL (ref 2.5–4.5)
PLATELET # BLD AUTO: 195 10E3/UL (ref 150–450)
POTASSIUM SERPL-SCNC: 4.1 MMOL/L (ref 3.4–5.3)
RBC # BLD AUTO: 4.29 10E6/UL (ref 4.4–5.9)
SODIUM SERPL-SCNC: 140 MMOL/L (ref 136–145)
TME LAST DOSE: ABNORMAL H
TME LAST DOSE: ABNORMAL H
WBC # BLD AUTO: 5.4 10E3/UL (ref 4–11)

## 2023-03-04 PROCEDURE — 85027 COMPLETE CBC AUTOMATED: CPT

## 2023-03-04 PROCEDURE — 82570 ASSAY OF URINE CREATININE: CPT

## 2023-03-04 PROCEDURE — 80158 DRUG ASSAY CYCLOSPORINE: CPT

## 2023-03-04 PROCEDURE — 82043 UR ALBUMIN QUANTITATIVE: CPT

## 2023-03-04 PROCEDURE — 36415 COLL VENOUS BLD VENIPUNCTURE: CPT

## 2023-03-04 PROCEDURE — 80069 RENAL FUNCTION PANEL: CPT

## 2023-03-25 ENCOUNTER — HEALTH MAINTENANCE LETTER (OUTPATIENT)
Age: 48
End: 2023-03-25

## 2023-04-01 ENCOUNTER — LAB (OUTPATIENT)
Dept: LAB | Facility: CLINIC | Age: 48
End: 2023-04-01
Payer: COMMERCIAL

## 2023-04-01 DIAGNOSIS — D84.9 IMMUNOSUPPRESSED STATUS (H): ICD-10-CM

## 2023-04-01 DIAGNOSIS — N05.0 MINIMAL CHANGE DISEASE: ICD-10-CM

## 2023-04-01 LAB
ALBUMIN SERPL BCG-MCNC: 4.3 G/DL (ref 3.5–5.2)
ANION GAP SERPL CALCULATED.3IONS-SCNC: 8 MMOL/L (ref 7–15)
BUN SERPL-MCNC: 14 MG/DL (ref 6–20)
CALCIUM SERPL-MCNC: 9.9 MG/DL (ref 8.6–10)
CHLORIDE SERPL-SCNC: 102 MMOL/L (ref 98–107)
CREAT SERPL-MCNC: 1.19 MG/DL (ref 0.67–1.17)
CREAT UR-MCNC: 325.8 MG/DL
DEPRECATED HCO3 PLAS-SCNC: 28 MMOL/L (ref 22–29)
ERYTHROCYTE [DISTWIDTH] IN BLOOD BY AUTOMATED COUNT: 11.4 % (ref 10–15)
GFR SERPL CREATININE-BSD FRML MDRD: 75 ML/MIN/1.73M2
GLUCOSE SERPL-MCNC: 93 MG/DL (ref 70–99)
HCT VFR BLD AUTO: 42.2 % (ref 40–53)
HGB BLD-MCNC: 15 G/DL (ref 13.3–17.7)
MCH RBC QN AUTO: 31.3 PG (ref 26.5–33)
MCHC RBC AUTO-ENTMCNC: 35.5 G/DL (ref 31.5–36.5)
MCV RBC AUTO: 88 FL (ref 78–100)
MICROALBUMIN UR-MCNC: <12 MG/L
MICROALBUMIN/CREAT UR: NORMAL MG/G{CREAT}
PHOSPHATE SERPL-MCNC: 2.2 MG/DL (ref 2.5–4.5)
PLATELET # BLD AUTO: 194 10E3/UL (ref 150–450)
POTASSIUM SERPL-SCNC: 4.1 MMOL/L (ref 3.4–5.3)
RBC # BLD AUTO: 4.79 10E6/UL (ref 4.4–5.9)
SODIUM SERPL-SCNC: 138 MMOL/L (ref 136–145)
WBC # BLD AUTO: 5.6 10E3/UL (ref 4–11)

## 2023-04-01 PROCEDURE — 80069 RENAL FUNCTION PANEL: CPT

## 2023-04-01 PROCEDURE — 80158 DRUG ASSAY CYCLOSPORINE: CPT

## 2023-04-01 PROCEDURE — 36415 COLL VENOUS BLD VENIPUNCTURE: CPT

## 2023-04-01 PROCEDURE — 82570 ASSAY OF URINE CREATININE: CPT

## 2023-04-01 PROCEDURE — 82043 UR ALBUMIN QUANTITATIVE: CPT

## 2023-04-01 PROCEDURE — 85027 COMPLETE CBC AUTOMATED: CPT

## 2023-04-02 LAB
CYCLOSPORINE BLD LC/MS/MS-MCNC: 39 UG/L (ref 50–400)
TME LAST DOSE: ABNORMAL H
TME LAST DOSE: ABNORMAL H

## 2023-04-24 ENCOUNTER — OFFICE VISIT (OUTPATIENT)
Dept: NEPHROLOGY | Facility: CLINIC | Age: 48
End: 2023-04-24
Payer: COMMERCIAL

## 2023-04-24 VITALS
WEIGHT: 160 LBS | BODY MASS INDEX: 23.63 KG/M2 | DIASTOLIC BLOOD PRESSURE: 76 MMHG | SYSTOLIC BLOOD PRESSURE: 118 MMHG | HEART RATE: 64 BPM

## 2023-04-24 DIAGNOSIS — N18.31 CHRONIC KIDNEY DISEASE, STAGE 3A (H): ICD-10-CM

## 2023-04-24 DIAGNOSIS — N18.2 CKD (CHRONIC KIDNEY DISEASE) STAGE 2, GFR 60-89 ML/MIN: ICD-10-CM

## 2023-04-24 DIAGNOSIS — N05.0 MINIMAL CHANGE DISEASE: Primary | ICD-10-CM

## 2023-04-24 DIAGNOSIS — R79.89 ELEVATED SERUM CREATININE: ICD-10-CM

## 2023-04-24 PROCEDURE — 99215 OFFICE O/P EST HI 40 MIN: CPT | Performed by: INTERNAL MEDICINE

## 2023-04-24 RX ORDER — CYCLOSPORINE 50 MG/1
50 CAPSULE, LIQUID FILLED ORAL 2 TIMES DAILY
Qty: 180 CAPSULE | Refills: 3 | Status: SHIPPED | OUTPATIENT
Start: 2023-04-24 | End: 2023-07-11

## 2023-04-24 NOTE — PROGRESS NOTES
Assessment and Plan:  48 year old male who presents for followup of minimal change disease. He presented for evaluation after presenting to ER where CT scan noted ascites and nonspecific small bowel enteritis, and UA showed proteinuria. He had biopsy which showed minimal change on 4/9/2019 and treated with prednisone 70mg. He was in remission and down to prednsione 10mg then stopped it, and had relapse within a couple months.  His mother noted swelling and he was up 20-25 lbs and proteinuria up to 6 grams/g cr. He was on maintenance cyclosporine since last relapse in November 2021, and another relapse November 2022  # Minimal change disease/ nephrotic syndrome, another relapse noted with proteinuria up to 6 grams, despite being on cyclosporine, trough levels had been near 35-40 on cyclosporine 50mg twice a day.- we increased cyclosporine but he was confused and only taking 50mg twice a day  - Prednisone round #2 started March 2020, tried to do a longer wean once on lower doses.  - Prednisone round #3 started April 20,2021.  - Prednisone round #4 November 2022    - weight is stable he is typically 160-165 lbs  - continue labs monthly for now including cyclosporine- willl continue cyclosporine at 50mg bid, consider increase to 75mg if level <40-   He is off bactrim and prednisone  - monitor carefully given immunosuppression  - update vaccination  - discussed importance of seeing PCP regularly, age appropriate screenign (he is against cscope but may be ok to do cologuard or other options)  # Renal function- CKD with baseline Scr now 1-1.1- CKD or due to cyclosporine and hemodynamic factors-. It was up to 1.6-1.8 at time of biopsy after diuretics were started.  - scr back to 1-1.2 range, up to 1.4 a few months ago, likelhy CSA hemodynamic effect  - monitor and encouraged hydration- stable on recent labs.   - proteinuria improved very quickly down to normal once prednisone restarted , down from 13 grams to normal    Labs  on Saturdays monthly   - discussed that his is an immunosuppressant, that it increases risk of infection and malignancy.  - discussed smoking cessation, he also chews tobacco  # Hyperlipidemia- LDL very elevated,was on low dose statin but stopped in setting of CNI and LFT elevated- it was restarted by PCP  - recheck lipid panel and LFTs - can stop lipitor if lipids back to normal    # Hypertension: BP is good at 110/70s, was higher initially   - continue lisinopril, increase to 40mg if BP >125/80    # Not anemic- hgb has been high at times, recheck    # Electrolytes: normal low potassium      # Mineral Bone Disorder:   Check baseline PTH if creatinine remains elevated.     - RTC in 3-4 months and labs monthly for now  - >40 minutes spent on day of service in counseling and coordination of care.       Assessment and plan was discussed with patient and he voiced his understanding and agreement.    Reason for Visit:  Deshawn Flood is a 48 year old male with nephrotic range proteinuria noted and biopsy 4/9/19 shows minimal change disease    HPI:  He is a 47 year old male with minimal change disease who follows up. He initially presented with nephrotic syndrome and underwent kidney biopsy 42019 and it showed minimal change disease. He was admitted to the hospital briefly after biopsy as he was having poor appetite due to bloating, and elevated creatinine.    Since then he went into remission, we tapered down on prednisone and unfortunately he relapsed within a couple months (6g/g cr February 2020),  thus we restarted prednisone in March 2020 and by early April he was down to normal range proteinuria and he was feeling better.    His weight is usually closer to 150-160  but was up to 184 lbs with nephrotic syndrome.  He has been well since last relapse in November 2022.  Weight stable at 160-165 lbs  Creatinine stable around 1-1.1 and no proteinuria  His BP is 118/76 on second check.  He has had right thumb pain, seems  tendon related, for a few weeks, has not been seen yet. Recommended PCP or walk in ortho clinic    Renal History:   None prior to minimal change disease Spring 2019    ROS:   A comprehensive review of systems was obtained and negative, except as noted in the HPI or PMH.    Active Medical Problems:  Patient Active Problem List   Diagnosis     Loose body of left knee     Left knee pain     Tobacco abuse     History of pneumothorax     CARDIOVASCULAR SCREENING; LDL GOAL LESS THAN 160     Nephrotic syndrome     Anasarca     Minimal change disease     Immunosuppression (H)     Crohn's disease of small intestine without complication (H)     Elevated hemoglobin (H)     Chronic kidney disease, stage 3a (H)     PMH:   Medical record was reviewed and PMH was discussed with patient and noted below.  Past Medical History:   Diagnosis Date     Anemia      Other spontaneous pneumothorax      SPONTANEOUS PNEUMOTHORAX NEC 3/25/2005     Unspecified asthma(493.90)      PSH:   Past Surgical History:   Procedure Laterality Date     ARTHROSCOPY KNEE  8/3/2012    Procedure: ARTHROSCOPY KNEE;  Left knee Arthroscopy with removal of loose bodies;  Surgeon: Bibi Roberts MD;  Location: [facility]     IR RENAL BIOPSY LEFT  4/9/2019     TUBE THORACOSTOMY,ABSC/EMPYEMA/HEMO  3/20/2005    Small chest tube with Heimlich valve.     ZZHC REPAIR UMBILICAL GODFREY,<6Y/O,REDUC  1978       Family Hx:   Family History   Problem Relation Age of Onset     Autoimmune Disease No family hx of      Personal Hx:   Social History     Tobacco Use     Smoking status: Light Smoker     Packs/day: 0.10     Years: 15.00     Pack years: 1.50     Types: Cigarettes     Smokeless tobacco: Former     Types: Chew     Tobacco comments:     1 pack per day   Vaping Use     Vaping status: Never Used   Substance Use Topics     Alcohol use: Yes     Alcohol/week: 17.5 standard drinks of alcohol     Types: 21 Cans of beer per week     Comment: occ       Allergies:  Allergies    Allergen Reactions     Bee Venom      swells up     No Known Drug Allergies      Seasonal Allergies        Medications:  Current Outpatient Medications   Medication Sig     albuterol (VENTOLIN HFA) 108 (90 Base) MCG/ACT inhaler INHALE ONE TO TWO PUFFS BY MOUTH EVERY 6 HOURS AS NEEDED FOR SHORTNESS OF BREATH, DIFFICULTY BREATHING OR WHEEZING (NEED TO BE SEEN IN CLINIC FOR FURTHER REFILLS)     cycloSPORINE modified (GENERIC EQUIVALENT) 50 MG capsule Take 1 capsule (50 mg) by mouth 2 times daily     lisinopril (ZESTRIL) 20 MG tablet Take 1 tablet (20 mg) by mouth daily     No current facility-administered medications for this visit.      Vitals:  There were no vitals taken for this visit.  154-155 lbs  Exam:  GENERAL : alert and no distress  Speaks in full sentences without dyspnea  Lungs- some coarse exp rhonchi  cv- regular rate  Normal speech and thought pattern  Ext: no signficant edema    LABS:   CMP  Recent Labs   Lab Test 04/01/23  0753 03/04/23  0755 02/04/23  0752 01/07/23  0756 08/14/21  0807 07/10/21  0829 06/05/21  0743 05/15/21  0757 04/10/21  0745    140 141 143   < > 139 138 138 141   POTASSIUM 4.1 4.1 4.0 3.9   < > 4.1 3.4 3.8 3.6   CHLORIDE 102 103 105 108   < > 110* 105 103 109   CO2 28 29 29 32   < > 26 29 32 28   ANIONGAP 8 8 7 3   < > 3 4 3 4   GLC 93 97 88 89   < > 84 89 95 126*   BUN 14.0 17.2 16.6 26   < > 19 17 14 14   CR 1.19* 1.26* 1.46* 1.18   < > 1.06 1.11 1.11 0.98   GFRESTIMATED 75 70 59* 76   < > 83 79 79 >90   GFRESTBLACK  --   --   --   --   --  >90 >90 >90 >90   BASIA 9.9 9.2 9.6 9.1   < > 9.2 8.5 8.6 8.7    < > = values in this interval not displayed.     Recent Labs   Lab Test 05/07/22  0748 04/09/22  0749 03/05/22  0750 01/08/22  0758   BILITOTAL 0.5 1.3 2.0* 1.1   ALKPHOS 55 40 33* 47   ALT 20 24 36 49   AST 12 21 16 14     CBC  Recent Labs   Lab Test 04/01/23  0753 03/04/23  0755 02/04/23  0752 01/07/23  0756   HGB 15.0 13.4 14.4 15.1   WBC 5.6 5.4 7.5 8.5   RBC 4.79 4.29*  4.68 4.87   HCT 42.2 38.5* 40.8 45.0   MCV 88 90 87 92   MCH 31.3 31.2 30.8 31.0   MCHC 35.5 34.8 35.3 33.6   RDW 11.4 12.2 12.1 12.3    195 182 164     URINE STUDIES  Recent Labs   Lab Test 08/14/21  0819 12/14/20  0037 09/12/20  0841 02/28/20  1620 04/01/19  1500 03/29/19  1858   COLOR Yellow Yellow Yellow Yellow Yellow Yellow   APPEARANCE Clear Clear Clear Clear Clear Clear   URINEGLC Negative Negative Negative Negative Negative Negative   URINEBILI Negative Negative Negative Negative Negative Negative   URINEKETONE Negative Negative Negative Negative Negative Negative   SG 1.026 1.019 1.021 >1.030 1.015 >1.035*   UBLD Negative Negative Negative Moderate* Moderate* Moderate*   URINEPH 6.0 5.0 5.0 6.0 6.0 6.0   PROTEIN Negative Negative Negative >=300* >=300* >499*   UROBILINOGEN  --   --   --  0.2 0.2  --    NITRITE Negative Negative Negative Negative Negative Negative   LEUKEST Negative Negative Negative Negative Negative Negative   RBCU <1  --   --  O - 2 25-50* 7*   WBCU <1  --   --  0 - 5 0 - 5 7*     Recent Labs   Lab Test 12/06/21  1435 04/03/20  0900 02/28/20  1619 11/26/19  1435   UTPG 3.62* 0.18 6.20* 0.06     PTH  No lab results found.  IRON STUDIES  Recent Labs   Lab Test 06/04/22  0803   IRON 144      IRONSAT 52*   REYNA 369       IMPRESSION:  1. Findings are concerning for diffuse small bowel enteritis likely  infectious or inflammatory in etiology, with associated inflammatory  changes of the mesenteric adipose tissues, moderate ascites, small  bilateral probably reactive pleural effusions and with some edema in  the subcutaneous adipose tissues.  2. No significant atherosclerosis is identified. No obvious arterial  blockage is seen in the mesenteric vessels to suggest ischemia.  3. Mild degenerative changes of the spine and hips. No acute fracture  or aggressive osseous lesion.  Jenny Scott MD

## 2023-04-24 NOTE — LETTER
4/24/2023       RE: Deshawn Flood  [address] [address]     Dear Colleague,    Thank you for referring your patient, Deshawn Flood, to the [facility] [place]. Please see a copy of my visit note below.    Assessment and Plan:  48 year old male who presents for followup of minimal change disease. He presented for evaluation after presenting to ER where CT scan noted ascites and nonspecific small bowel enteritis, and UA showed proteinuria. He had biopsy which showed minimal change on 4/9/2019 and treated with prednisone 70mg. He was in remission and down to prednsione 10mg then stopped it, and had relapse within a couple months.  His mother noted swelling and he was up 20-25 lbs and proteinuria up to 6 grams/g cr. He was on maintenance cyclosporine since last relapse in November 2021, and another relapse November 2022  # Minimal change disease/ nephrotic syndrome, another relapse noted with proteinuria up to 6 grams, despite being on cyclosporine, trough levels had been near 35-40 on cyclosporine 50mg twice a day.- we increased cyclosporine but he was confused and only taking 50mg twice a day  - Prednisone round #2 started March 2020, tried to do a longer wean once on lower doses.  - Prednisone round #3 started April 20,2021.  - Prednisone round #4 November 2022    - weight is stable he is typically 160-165 lbs  - continue labs monthly for now including cyclosporine- willl continue cyclosporine at 50mg bid, consider increase to 75mg if level <40-   He is off bactrim and prednisone  - monitor carefully given immunosuppression  - update vaccination  - discussed importance of seeing PCP regularly, age appropriate screenign (he is against cscope but may be ok to do cologuard or other options)  # Renal function- CKD with baseline Scr now 1-1.1- CKD or due to cyclosporine and hemodynamic factors-. It was up to 1.6-1.8 at time of  biopsy after diuretics were started.  - scr back to 1-1.2 range, up to 1.4 a few months ago, likelhy CSA hemodynamic effect  - monitor and encouraged hydration- stable on recent labs.   - proteinuria improved very quickly down to normal once prednisone restarted , down from 13 grams to normal    Labs on Saturdays monthly   - discussed that his is an immunosuppressant, that it increases risk of infection and malignancy.  - discussed smoking cessation, he also chews tobacco  # Hyperlipidemia- LDL very elevated,was on low dose statin but stopped in setting of CNI and LFT elevated- it was restarted by PCP  - recheck lipid panel and LFTs - can stop lipitor if lipids back to normal    # Hypertension: BP is good at 110/70s, was higher initially   - continue lisinopril, increase to 40mg if BP >125/80    # Not anemic- hgb has been high at times, recheck    # Electrolytes: normal low potassium      # Mineral Bone Disorder:   Check baseline PTH if creatinine remains elevated.     - RTC in 3-4 months and labs monthly for now  - >40 minutes spent on day of service in counseling and coordination of care.       Assessment and plan was discussed with patient and he voiced his understanding and agreement.    Reason for Visit:  Deshawn Flood is a 48 year old male with nephrotic range proteinuria noted and biopsy 4/9/19 shows minimal change disease    HPI:  He is a 47 year old male with minimal change disease who follows up. He initially presented with nephrotic syndrome and underwent kidney biopsy 42019 and it showed minimal change disease. He was admitted to the hospital briefly after biopsy as he was having poor appetite due to bloating, and elevated creatinine.    Since then he went into remission, we tapered down on prednisone and unfortunately he relapsed within a couple months (6g/g cr February 2020),  thus we restarted prednisone in March 2020 and by early April he was down to normal range proteinuria and he was feeling  better.    His weight is usually closer to 150-160  but was up to 184 lbs with nephrotic syndrome.  He has been well since last relapse in November 2022.  Weight stable at 160-165 lbs  Creatinine stable around 1-1.1 and no proteinuria  His BP is 118/76 on second check.  He has had right thumb pain, seems tendon related, for a few weeks, has not been seen yet. Recommended PCP or walk in ortho clinic    Renal History:   None prior to minimal change disease Spring 2019    ROS:   A comprehensive review of systems was obtained and negative, except as noted in the HPI or PMH.    Active Medical Problems:  Patient Active Problem List   Diagnosis     Loose body of left knee     Left knee pain     Tobacco abuse     History of pneumothorax     CARDIOVASCULAR SCREENING; LDL GOAL LESS THAN 160     Nephrotic syndrome     Anasarca     Minimal change disease     Immunosuppression (H)     Crohn's disease of small intestine without complication (H)     Elevated hemoglobin (H)     Chronic kidney disease, stage 3a (H)     PMH:   Medical record was reviewed and PMH was discussed with patient and noted below.  Past Medical History:   Diagnosis Date     Anemia      Other spontaneous pneumothorax      SPONTANEOUS PNEUMOTHORAX NEC 3/25/2005     Unspecified asthma(493.90)      PSH:   Past Surgical History:   Procedure Laterality Date     ARTHROSCOPY KNEE  8/3/2012    Procedure: ARTHROSCOPY KNEE;  Left knee Arthroscopy with removal of loose bodies;  Surgeon: Bibi Roberts MD;  Location: [facility]     IR RENAL BIOPSY LEFT  4/9/2019     TUBE THORACOSTOMY,ABSC/EMPYEMA/HEMO  3/20/2005    Small chest tube with Heimlich valve.     ZZHC REPAIR UMBILICAL GODFREY,<4Y/O,REDUC  1978       Family Hx:   Family History   Problem Relation Age of Onset     Autoimmune Disease No family hx of      Personal Hx:   Social History     Tobacco Use     Smoking status: Light Smoker     Packs/day: 0.10     Years: 15.00     Pack years: 1.50     Types: Cigarettes      Smokeless tobacco: Former     Types: Chew     Tobacco comments:     1 pack per day   Vaping Use     Vaping status: Never Used   Substance Use Topics     Alcohol use: Yes     Alcohol/week: 17.5 standard drinks of alcohol     Types: 21 Cans of beer per week     Comment: occ       Allergies:  Allergies   Allergen Reactions     Bee Venom      swells up     No Known Drug Allergies      Seasonal Allergies        Medications:  Current Outpatient Medications   Medication Sig     albuterol (VENTOLIN HFA) 108 (90 Base) MCG/ACT inhaler INHALE ONE TO TWO PUFFS BY MOUTH EVERY 6 HOURS AS NEEDED FOR SHORTNESS OF BREATH, DIFFICULTY BREATHING OR WHEEZING (NEED TO BE SEEN IN CLINIC FOR FURTHER REFILLS)     cycloSPORINE modified (GENERIC EQUIVALENT) 50 MG capsule Take 1 capsule (50 mg) by mouth 2 times daily     lisinopril (ZESTRIL) 20 MG tablet Take 1 tablet (20 mg) by mouth daily     No current facility-administered medications for this visit.      Vitals:  There were no vitals taken for this visit.  154-155 lbs  Exam:  GENERAL : alert and no distress  Speaks in full sentences without dyspnea  Lungs- some coarse exp rhonchi  cv- regular rate  Normal speech and thought pattern  Ext: no signficant edema    LABS:   CMP  Recent Labs   Lab Test 04/01/23  0753 03/04/23  0755 02/04/23  0752 01/07/23  0756 08/14/21  0807 07/10/21  0829 06/05/21  0743 05/15/21  0757 04/10/21  0745    140 141 143   < > 139 138 138 141   POTASSIUM 4.1 4.1 4.0 3.9   < > 4.1 3.4 3.8 3.6   CHLORIDE 102 103 105 108   < > 110* 105 103 109   CO2 28 29 29 32   < > 26 29 32 28   ANIONGAP 8 8 7 3   < > 3 4 3 4   GLC 93 97 88 89   < > 84 89 95 126*   BUN 14.0 17.2 16.6 26   < > 19 17 14 14   CR 1.19* 1.26* 1.46* 1.18   < > 1.06 1.11 1.11 0.98   GFRESTIMATED 75 70 59* 76   < > 83 79 79 >90   GFRESTBLACK  --   --   --   --   --  >90 >90 >90 >90   BASIA 9.9 9.2 9.6 9.1   < > 9.2 8.5 8.6 8.7    < > = values in this interval not displayed.     Recent Labs   Lab Test  05/07/22  0748 04/09/22  0749 03/05/22  0750 01/08/22  0758   BILITOTAL 0.5 1.3 2.0* 1.1   ALKPHOS 55 40 33* 47   ALT 20 24 36 49   AST 12 21 16 14     CBC  Recent Labs   Lab Test 04/01/23  0753 03/04/23  0755 02/04/23  0752 01/07/23  0756   HGB 15.0 13.4 14.4 15.1   WBC 5.6 5.4 7.5 8.5   RBC 4.79 4.29* 4.68 4.87   HCT 42.2 38.5* 40.8 45.0   MCV 88 90 87 92   MCH 31.3 31.2 30.8 31.0   MCHC 35.5 34.8 35.3 33.6   RDW 11.4 12.2 12.1 12.3    195 182 164     URINE STUDIES  Recent Labs   Lab Test 08/14/21  0819 12/14/20  0037 09/12/20  0841 02/28/20  1620 04/01/19  1500 03/29/19  1858   COLOR Yellow Yellow Yellow Yellow Yellow Yellow   APPEARANCE Clear Clear Clear Clear Clear Clear   URINEGLC Negative Negative Negative Negative Negative Negative   URINEBILI Negative Negative Negative Negative Negative Negative   URINEKETONE Negative Negative Negative Negative Negative Negative   SG 1.026 1.019 1.021 >1.030 1.015 >1.035*   UBLD Negative Negative Negative Moderate* Moderate* Moderate*   URINEPH 6.0 5.0 5.0 6.0 6.0 6.0   PROTEIN Negative Negative Negative >=300* >=300* >499*   UROBILINOGEN  --   --   --  0.2 0.2  --    NITRITE Negative Negative Negative Negative Negative Negative   LEUKEST Negative Negative Negative Negative Negative Negative   RBCU <1  --   --  O - 2 25-50* 7*   WBCU <1  --   --  0 - 5 0 - 5 7*     Recent Labs   Lab Test 12/06/21  1435 04/03/20  0900 02/28/20  1619 11/26/19  1435   UTPG 3.62* 0.18 6.20* 0.06     PTH  No lab results found.  IRON STUDIES  Recent Labs   Lab Test 06/04/22  0803   IRON 144      IRONSAT 52*   REYNA 369       IMPRESSION:  1. Findings are concerning for diffuse small bowel enteritis likely  infectious or inflammatory in etiology, with associated inflammatory  changes of the mesenteric adipose tissues, moderate ascites, small  bilateral probably reactive pleural effusions and with some edema in  the subcutaneous adipose tissues.  2. No significant atherosclerosis is  identified. No obvious arterial  blockage is seen in the mesenteric vessels to suggest ischemia.  3. Mild degenerative changes of the spine and hips. No acute fracture  or aggressive osseous lesion.  Jenny Scott MD        Again, thank you for allowing me to participate in the care of your patient.      Sincerely,    Jenny Scott MD

## 2023-05-06 ENCOUNTER — LAB (OUTPATIENT)
Dept: LAB | Facility: CLINIC | Age: 48
End: 2023-05-06
Payer: COMMERCIAL

## 2023-05-06 DIAGNOSIS — N05.0 MINIMAL CHANGE DISEASE: ICD-10-CM

## 2023-05-06 DIAGNOSIS — D84.9 IMMUNOSUPPRESSED STATUS (H): ICD-10-CM

## 2023-05-06 LAB
ALBUMIN SERPL BCG-MCNC: 4.1 G/DL (ref 3.5–5.2)
ANION GAP SERPL CALCULATED.3IONS-SCNC: 8 MMOL/L (ref 7–15)
BUN SERPL-MCNC: 12.6 MG/DL (ref 6–20)
CALCIUM SERPL-MCNC: 9.9 MG/DL (ref 8.6–10)
CHLORIDE SERPL-SCNC: 102 MMOL/L (ref 98–107)
CREAT SERPL-MCNC: 1.22 MG/DL (ref 0.67–1.17)
CREAT UR-MCNC: 130.6 MG/DL
CYCLOSPORINE BLD LC/MS/MS-MCNC: 41 UG/L (ref 50–400)
DEPRECATED HCO3 PLAS-SCNC: 28 MMOL/L (ref 22–29)
ERYTHROCYTE [DISTWIDTH] IN BLOOD BY AUTOMATED COUNT: 10.8 % (ref 10–15)
GFR SERPL CREATININE-BSD FRML MDRD: 73 ML/MIN/1.73M2
GLUCOSE SERPL-MCNC: 85 MG/DL (ref 70–99)
HCT VFR BLD AUTO: 44.6 % (ref 40–53)
HGB BLD-MCNC: 15.7 G/DL (ref 13.3–17.7)
MCH RBC QN AUTO: 30.5 PG (ref 26.5–33)
MCHC RBC AUTO-ENTMCNC: 35.2 G/DL (ref 31.5–36.5)
MCV RBC AUTO: 87 FL (ref 78–100)
MICROALBUMIN UR-MCNC: <12 MG/L
MICROALBUMIN/CREAT UR: NORMAL MG/G{CREAT}
PHOSPHATE SERPL-MCNC: 3.2 MG/DL (ref 2.5–4.5)
PLATELET # BLD AUTO: 198 10E3/UL (ref 150–450)
POTASSIUM SERPL-SCNC: 4.8 MMOL/L (ref 3.4–5.3)
RBC # BLD AUTO: 5.14 10E6/UL (ref 4.4–5.9)
SODIUM SERPL-SCNC: 138 MMOL/L (ref 136–145)
TME LAST DOSE: ABNORMAL H
TME LAST DOSE: ABNORMAL H
WBC # BLD AUTO: 4.9 10E3/UL (ref 4–11)

## 2023-05-06 PROCEDURE — 85027 COMPLETE CBC AUTOMATED: CPT

## 2023-05-06 PROCEDURE — 80069 RENAL FUNCTION PANEL: CPT

## 2023-05-06 PROCEDURE — 80158 DRUG ASSAY CYCLOSPORINE: CPT

## 2023-05-06 PROCEDURE — 82043 UR ALBUMIN QUANTITATIVE: CPT

## 2023-05-06 PROCEDURE — 82570 ASSAY OF URINE CREATININE: CPT

## 2023-05-06 PROCEDURE — 36415 COLL VENOUS BLD VENIPUNCTURE: CPT

## 2023-06-03 ENCOUNTER — LAB (OUTPATIENT)
Dept: LAB | Facility: CLINIC | Age: 48
End: 2023-06-03
Payer: COMMERCIAL

## 2023-06-03 DIAGNOSIS — D84.9 IMMUNOSUPPRESSED STATUS (H): ICD-10-CM

## 2023-06-03 DIAGNOSIS — N05.0 MINIMAL CHANGE DISEASE: ICD-10-CM

## 2023-06-03 LAB
ALBUMIN SERPL BCG-MCNC: 3.8 G/DL (ref 3.5–5.2)
ANION GAP SERPL CALCULATED.3IONS-SCNC: 7 MMOL/L (ref 7–15)
BUN SERPL-MCNC: 25.9 MG/DL (ref 6–20)
CALCIUM SERPL-MCNC: 8.9 MG/DL (ref 8.6–10)
CHLORIDE SERPL-SCNC: 105 MMOL/L (ref 98–107)
CREAT SERPL-MCNC: 1.06 MG/DL (ref 0.67–1.17)
CREAT UR-MCNC: 232.2 MG/DL
CYCLOSPORINE BLD LC/MS/MS-MCNC: <25 UG/L (ref 50–400)
DEPRECATED HCO3 PLAS-SCNC: 25 MMOL/L (ref 22–29)
ERYTHROCYTE [DISTWIDTH] IN BLOOD BY AUTOMATED COUNT: 11 % (ref 10–15)
GFR SERPL CREATININE-BSD FRML MDRD: 86 ML/MIN/1.73M2
GLUCOSE SERPL-MCNC: 97 MG/DL (ref 70–99)
HCT VFR BLD AUTO: 39.8 % (ref 40–53)
HGB BLD-MCNC: 14.1 G/DL (ref 13.3–17.7)
MCH RBC QN AUTO: 30.3 PG (ref 26.5–33)
MCHC RBC AUTO-ENTMCNC: 35.4 G/DL (ref 31.5–36.5)
MCV RBC AUTO: 86 FL (ref 78–100)
MICROALBUMIN UR-MCNC: 3139.2 MG/L
MICROALBUMIN/CREAT UR: 1351.94 MG/G CR (ref 0–17)
PHOSPHATE SERPL-MCNC: 2.5 MG/DL (ref 2.5–4.5)
PLATELET # BLD AUTO: 202 10E3/UL (ref 150–450)
POTASSIUM SERPL-SCNC: 4.3 MMOL/L (ref 3.4–5.3)
RBC # BLD AUTO: 4.65 10E6/UL (ref 4.4–5.9)
SODIUM SERPL-SCNC: 137 MMOL/L (ref 136–145)
TME LAST DOSE: ABNORMAL H
TME LAST DOSE: ABNORMAL H
WBC # BLD AUTO: 5.1 10E3/UL (ref 4–11)

## 2023-06-03 PROCEDURE — 85027 COMPLETE CBC AUTOMATED: CPT

## 2023-06-03 PROCEDURE — 82570 ASSAY OF URINE CREATININE: CPT

## 2023-06-03 PROCEDURE — 82043 UR ALBUMIN QUANTITATIVE: CPT

## 2023-06-03 PROCEDURE — 80158 DRUG ASSAY CYCLOSPORINE: CPT

## 2023-06-03 PROCEDURE — 36415 COLL VENOUS BLD VENIPUNCTURE: CPT

## 2023-06-03 PROCEDURE — 80069 RENAL FUNCTION PANEL: CPT

## 2023-06-05 DIAGNOSIS — N05.0 MINIMAL CHANGE DISEASE: Primary | ICD-10-CM

## 2023-06-05 RX ORDER — CYCLOSPORINE 100 MG/1
100 CAPSULE, LIQUID FILLED ORAL 2 TIMES DAILY
Qty: 60 CAPSULE | Refills: 3 | Status: SHIPPED | OUTPATIENT
Start: 2023-06-05 | End: 2023-08-07

## 2023-06-05 RX ORDER — PREDNISONE 20 MG/1
40 TABLET ORAL DAILY
Qty: 60 TABLET | Refills: 1 | Status: SHIPPED | OUTPATIENT
Start: 2023-06-05 | End: 2023-07-11

## 2023-06-05 RX ORDER — SULFAMETHOXAZOLE/TRIMETHOPRIM 800-160 MG
1 TABLET ORAL
Qty: 30 TABLET | Refills: 3 | Status: SHIPPED | OUTPATIENT
Start: 2023-06-05 | End: 2023-08-07

## 2023-06-05 NOTE — PROGRESS NOTES
Called patient, not having swelling. Discussed new albuminuria and will restart prednisone and increase cyclosporine to 100mg BID given low level.  Restart prednisone 40mg for short burst  Restart bactrim.  Repeat labs in 7-10 days  Jenny Aram Scott MD

## 2023-06-10 ENCOUNTER — LAB (OUTPATIENT)
Dept: LAB | Facility: CLINIC | Age: 48
End: 2023-06-10
Payer: COMMERCIAL

## 2023-06-10 DIAGNOSIS — N05.0 MINIMAL CHANGE DISEASE: ICD-10-CM

## 2023-06-10 DIAGNOSIS — N04.9 NEPHROTIC SYNDROME: ICD-10-CM

## 2023-06-10 DIAGNOSIS — D84.9 IMMUNOSUPPRESSED STATUS (H): ICD-10-CM

## 2023-06-10 LAB
ALBUMIN MFR UR ELPH: 175.7 MG/DL (ref 1–14)
ALBUMIN SERPL BCG-MCNC: 3.8 G/DL (ref 3.5–5.2)
ANION GAP SERPL CALCULATED.3IONS-SCNC: 8 MMOL/L (ref 7–15)
BUN SERPL-MCNC: 24.1 MG/DL (ref 6–20)
CALCIUM SERPL-MCNC: 9 MG/DL (ref 8.6–10)
CHLORIDE SERPL-SCNC: 105 MMOL/L (ref 98–107)
CREAT SERPL-MCNC: 1.17 MG/DL (ref 0.67–1.17)
CREAT UR-MCNC: 225.9 MG/DL
CREAT UR-MCNC: 225.9 MG/DL
DEPRECATED HCO3 PLAS-SCNC: 26 MMOL/L (ref 22–29)
ERYTHROCYTE [DISTWIDTH] IN BLOOD BY AUTOMATED COUNT: 11.4 % (ref 10–15)
GFR SERPL CREATININE-BSD FRML MDRD: 76 ML/MIN/1.73M2
GLUCOSE SERPL-MCNC: 104 MG/DL (ref 70–99)
HCT VFR BLD AUTO: 42 % (ref 40–53)
HGB BLD-MCNC: 14.8 G/DL (ref 13.3–17.7)
MCH RBC QN AUTO: 30.4 PG (ref 26.5–33)
MCHC RBC AUTO-ENTMCNC: 35.2 G/DL (ref 31.5–36.5)
MCV RBC AUTO: 86 FL (ref 78–100)
MICROALBUMIN UR-MCNC: 1262.6 MG/L
MICROALBUMIN/CREAT UR: 558.92 MG/G CR (ref 0–17)
PHOSPHATE SERPL-MCNC: 2.8 MG/DL (ref 2.5–4.5)
PLATELET # BLD AUTO: 197 10E3/UL (ref 150–450)
POTASSIUM SERPL-SCNC: 4.2 MMOL/L (ref 3.4–5.3)
PROT/CREAT 24H UR: 0.78 MG/MG CR (ref 0–0.2)
RBC # BLD AUTO: 4.87 10E6/UL (ref 4.4–5.9)
SODIUM SERPL-SCNC: 139 MMOL/L (ref 136–145)
WBC # BLD AUTO: 5.7 10E3/UL (ref 4–11)

## 2023-06-10 PROCEDURE — 36415 COLL VENOUS BLD VENIPUNCTURE: CPT

## 2023-06-10 PROCEDURE — 80158 DRUG ASSAY CYCLOSPORINE: CPT

## 2023-06-10 PROCEDURE — 85027 COMPLETE CBC AUTOMATED: CPT

## 2023-06-10 PROCEDURE — 82043 UR ALBUMIN QUANTITATIVE: CPT

## 2023-06-10 PROCEDURE — 84156 ASSAY OF PROTEIN URINE: CPT

## 2023-06-10 PROCEDURE — 80069 RENAL FUNCTION PANEL: CPT

## 2023-06-10 PROCEDURE — 82570 ASSAY OF URINE CREATININE: CPT

## 2023-06-11 LAB
CYCLOSPORINE BLD LC/MS/MS-MCNC: 53 UG/L (ref 50–400)
TME LAST DOSE: NORMAL H
TME LAST DOSE: NORMAL H

## 2023-07-01 ENCOUNTER — LAB (OUTPATIENT)
Dept: LAB | Facility: CLINIC | Age: 48
End: 2023-07-01
Payer: COMMERCIAL

## 2023-07-01 DIAGNOSIS — J45.40 MODERATE PERSISTENT ASTHMA WITHOUT COMPLICATION: ICD-10-CM

## 2023-07-01 DIAGNOSIS — D84.9 IMMUNOSUPPRESSED STATUS (H): ICD-10-CM

## 2023-07-01 DIAGNOSIS — N04.9 NEPHROTIC SYNDROME: ICD-10-CM

## 2023-07-01 DIAGNOSIS — N05.0 MINIMAL CHANGE DISEASE: ICD-10-CM

## 2023-07-01 LAB
ALBUMIN SERPL BCG-MCNC: 3.8 G/DL (ref 3.5–5.2)
ANION GAP SERPL CALCULATED.3IONS-SCNC: 10 MMOL/L (ref 7–15)
BUN SERPL-MCNC: 21.1 MG/DL (ref 6–20)
CALCIUM SERPL-MCNC: 8.9 MG/DL (ref 8.6–10)
CHLORIDE SERPL-SCNC: 103 MMOL/L (ref 98–107)
CREAT SERPL-MCNC: 1.12 MG/DL (ref 0.67–1.17)
CREAT UR-MCNC: 189.2 MG/DL
CYCLOSPORINE BLD LC/MS/MS-MCNC: 57 UG/L (ref 50–400)
DEPRECATED HCO3 PLAS-SCNC: 27 MMOL/L (ref 22–29)
ERYTHROCYTE [DISTWIDTH] IN BLOOD BY AUTOMATED COUNT: 12.2 % (ref 10–15)
GFR SERPL CREATININE-BSD FRML MDRD: 81 ML/MIN/1.73M2
GLUCOSE SERPL-MCNC: 96 MG/DL (ref 70–99)
HCT VFR BLD AUTO: 43.3 % (ref 40–53)
HGB BLD-MCNC: 14.7 G/DL (ref 13.3–17.7)
MCH RBC QN AUTO: 30.6 PG (ref 26.5–33)
MCHC RBC AUTO-ENTMCNC: 33.9 G/DL (ref 31.5–36.5)
MCV RBC AUTO: 90 FL (ref 78–100)
MICROALBUMIN UR-MCNC: <12 MG/L
MICROALBUMIN/CREAT UR: NORMAL MG/G{CREAT}
PHOSPHATE SERPL-MCNC: 2.7 MG/DL (ref 2.5–4.5)
PLATELET # BLD AUTO: 192 10E3/UL (ref 150–450)
POTASSIUM SERPL-SCNC: 4.1 MMOL/L (ref 3.4–5.3)
RBC # BLD AUTO: 4.8 10E6/UL (ref 4.4–5.9)
SODIUM SERPL-SCNC: 140 MMOL/L (ref 136–145)
TME LAST DOSE: NORMAL H
TME LAST DOSE: NORMAL H
WBC # BLD AUTO: 7.2 10E3/UL (ref 4–11)

## 2023-07-01 PROCEDURE — 80158 DRUG ASSAY CYCLOSPORINE: CPT

## 2023-07-01 PROCEDURE — 82570 ASSAY OF URINE CREATININE: CPT

## 2023-07-01 PROCEDURE — 80069 RENAL FUNCTION PANEL: CPT

## 2023-07-01 PROCEDURE — 85027 COMPLETE CBC AUTOMATED: CPT

## 2023-07-01 PROCEDURE — 82043 UR ALBUMIN QUANTITATIVE: CPT

## 2023-07-01 PROCEDURE — 36415 COLL VENOUS BLD VENIPUNCTURE: CPT

## 2023-07-03 RX ORDER — ALBUTEROL SULFATE 90 UG/1
AEROSOL, METERED RESPIRATORY (INHALATION)
Qty: 18 G | Refills: 0 | Status: SHIPPED | OUTPATIENT
Start: 2023-07-03 | End: 2023-08-07

## 2023-07-03 NOTE — TELEPHONE ENCOUNTER
Pending Prescriptions:                       Disp   Refills    VENTOLIN  (90 Base) MCG/ACT inhaler*18 g   3        Sig: INHALE ONE TO TWO PUFFS BY MOUTH EVERY 6 HOURS AS           NEEDED FOR SHORTNESS OF BREATH, DIFFICULTY           BREATHING OR WHEEZING (NEED TO BE SEEN IN CLINIC           FOR FURTHER REFILLS)      Routing refill request to provider for review/approval because:  Patient needs to be seen because it has been more than 1 year since last office visit.    Caro Dominguez RN on 7/3/2023 at 10:30 AM

## 2023-07-11 DIAGNOSIS — N05.0 MINIMAL CHANGE DISEASE: ICD-10-CM

## 2023-07-11 RX ORDER — PREDNISONE 20 MG/1
TABLET ORAL
Qty: 60 TABLET | Refills: 3 | Status: SHIPPED | OUTPATIENT
Start: 2023-07-11 | End: 2023-08-07

## 2023-07-11 RX ORDER — CYCLOSPORINE 25 MG/1
25 CAPSULE, LIQUID FILLED ORAL 2 TIMES DAILY
Qty: 60 CAPSULE | Refills: 11 | Status: SHIPPED | OUTPATIENT
Start: 2023-07-11 | End: 2023-08-07

## 2023-07-19 DIAGNOSIS — N05.0 MINIMAL CHANGE DISEASE: Primary | ICD-10-CM

## 2023-07-19 DIAGNOSIS — N05.0 STEROID-DEPENDENT MINIMAL CHANGE GLOMERULOPATHY: ICD-10-CM

## 2023-08-01 ENCOUNTER — TELEPHONE (OUTPATIENT)
Dept: RHEUMATOLOGY | Facility: CLINIC | Age: 48
End: 2023-08-01

## 2023-08-01 DIAGNOSIS — N04.9 NEPHROTIC SYNDROME: ICD-10-CM

## 2023-08-01 DIAGNOSIS — N05.0 MINIMAL CHANGE DISEASE: ICD-10-CM

## 2023-08-01 DIAGNOSIS — N05.0 STEROID-DEPENDENT MINIMAL CHANGE GLOMERULOPATHY: Primary | ICD-10-CM

## 2023-08-01 RX ORDER — EPINEPHRINE 1 MG/ML
0.3 INJECTION, SOLUTION, CONCENTRATE INTRAVENOUS EVERY 5 MIN PRN
Status: CANCELLED | OUTPATIENT
Start: 2023-08-02

## 2023-08-01 RX ORDER — ALBUTEROL SULFATE 0.83 MG/ML
2.5 SOLUTION RESPIRATORY (INHALATION)
Status: CANCELLED | OUTPATIENT
Start: 2023-08-02

## 2023-08-01 RX ORDER — DIPHENHYDRAMINE HCL 25 MG
50 CAPSULE ORAL ONCE
Status: CANCELLED
Start: 2023-08-02

## 2023-08-01 RX ORDER — ALBUTEROL SULFATE 90 UG/1
1-2 AEROSOL, METERED RESPIRATORY (INHALATION)
Status: CANCELLED
Start: 2023-08-02

## 2023-08-01 RX ORDER — METHYLPREDNISOLONE SODIUM SUCCINATE 125 MG/2ML
100 INJECTION, POWDER, LYOPHILIZED, FOR SOLUTION INTRAMUSCULAR; INTRAVENOUS ONCE
Status: CANCELLED | OUTPATIENT
Start: 2023-08-02

## 2023-08-01 RX ORDER — HEPARIN SODIUM,PORCINE 10 UNIT/ML
5-20 VIAL (ML) INTRAVENOUS DAILY PRN
Status: CANCELLED | OUTPATIENT
Start: 2023-08-02

## 2023-08-01 RX ORDER — MEPERIDINE HYDROCHLORIDE 25 MG/ML
25 INJECTION INTRAMUSCULAR; INTRAVENOUS; SUBCUTANEOUS EVERY 30 MIN PRN
Status: CANCELLED | OUTPATIENT
Start: 2023-08-02

## 2023-08-01 RX ORDER — ACETAMINOPHEN 325 MG/1
650 TABLET ORAL ONCE
Status: CANCELLED
Start: 2023-08-02

## 2023-08-01 RX ORDER — HEPARIN SODIUM (PORCINE) LOCK FLUSH IV SOLN 100 UNIT/ML 100 UNIT/ML
5 SOLUTION INTRAVENOUS
Status: CANCELLED | OUTPATIENT
Start: 2023-08-02

## 2023-08-01 RX ORDER — DIPHENHYDRAMINE HYDROCHLORIDE 50 MG/ML
50 INJECTION INTRAMUSCULAR; INTRAVENOUS
Status: CANCELLED
Start: 2023-08-02

## 2023-08-01 NOTE — TELEPHONE ENCOUNTER
Prior Authorization Infusion/Clinic Administered Request    Location: [facility]  Diagnosis and ICD:Nephrotic Syndrome, CKD IIIa, Minimal Change disease diagnosed by Kidney Biopsy 4/9/2019, Nephrotic Range Proteinuria up to 6gm/g  Drug/Therapy: Rituximab 1gm IV x 2 doses, 14days apart    Previously Tried and Failed Therapies: Prednisone High Dose, Cyclosporine,     Date of provider note with supporting information: 4/24/23, although seeing again Monday 8/7/23    Urgency (When is the patient scheduled?): Not scheduled until PA obtained    Would you like to include any research articles? Will if appeal is needed.    Leigh Lilly RN, BSN, PHN  [facility] Nurse Navigator  [phone]

## 2023-08-07 ENCOUNTER — OFFICE VISIT (OUTPATIENT)
Dept: NEPHROLOGY | Facility: CLINIC | Age: 48
End: 2023-08-07
Payer: COMMERCIAL

## 2023-08-07 VITALS
WEIGHT: 160 LBS | SYSTOLIC BLOOD PRESSURE: 120 MMHG | HEART RATE: 57 BPM | BODY MASS INDEX: 23.63 KG/M2 | DIASTOLIC BLOOD PRESSURE: 64 MMHG

## 2023-08-07 DIAGNOSIS — J45.40 MODERATE PERSISTENT ASTHMA WITHOUT COMPLICATION: ICD-10-CM

## 2023-08-07 DIAGNOSIS — N05.0 MINIMAL CHANGE DISEASE: ICD-10-CM

## 2023-08-07 DIAGNOSIS — R80.1 PERSISTENT PROTEINURIA: Primary | ICD-10-CM

## 2023-08-07 PROCEDURE — 99214 OFFICE O/P EST MOD 30 MIN: CPT | Performed by: INTERNAL MEDICINE

## 2023-08-07 RX ORDER — PREDNISONE 5 MG/1
5 TABLET ORAL DAILY
Qty: 30 TABLET | Refills: 4 | Status: SHIPPED | OUTPATIENT
Start: 2023-08-07 | End: 2023-12-11

## 2023-08-07 RX ORDER — CYCLOSPORINE 100 MG/1
100 CAPSULE, LIQUID FILLED ORAL 2 TIMES DAILY
Qty: 60 CAPSULE | Refills: 4 | Status: SHIPPED | OUTPATIENT
Start: 2023-08-07 | End: 2023-12-11

## 2023-08-07 RX ORDER — ALBUTEROL SULFATE 90 UG/1
AEROSOL, METERED RESPIRATORY (INHALATION)
Qty: 18 G | Refills: 0 | Status: SHIPPED | OUTPATIENT
Start: 2023-08-07 | End: 2024-06-24

## 2023-08-07 NOTE — LETTER
8/7/2023       RE: Deshawn Flood  [address] [place] [id number]     Dear Colleague,    Thank you for referring your patient, Deshawn Flood, to the [facility] [place] at [facility]. Please see a copy of my visit note below.    Assessment and Plan:  48 year old male who presents for followup of minimal change disease. He presented for evaluation after presenting to ER where CT scan noted ascites and nonspecific small bowel enteritis, and UA showed proteinuria. He had biopsy which showed minimal change on 4/9/2019 and treated with prednisone 70mg. He was in remission and down to prednsione 10mg then stopped it, and had relapse within a couple months.  His mother noted swelling and he was up 20-25 lbs and proteinuria up to 6 grams/g cr. He was on maintenance cyclosporine since last relapse in November 2021, and another relapse November 2022 and another in June 2023.  # Minimal change disease/ nephrotic syndrome, steroid dependent/ relapsing-  another relapse noted with proteinuria up to 6 grams, despite being on cyclosporine, trough levels had been near 35-40 on cyclosporine 50mg twice a day.- we increased cyclosporine but he was confused and only taking 50mg twice a day, now will be on 100mg twice a day  - Prednisone round #2 started March 2020, tried to do a longer wean once on lower doses.  - Prednisone round #3 started April 20,2021.  - Prednisone round #4 November 2022  -Prednisone round #5 June 2023  - weight is stable he is typically 160-165 lbs  - will remain on 5mg prednisone as he does not want to risk flaring during work season.  - continue labs monthly for now including cyclosporine- willl continue cyclosporine at 100mg bid, level was 94 on 125mg bid, thus will lower and hope to stop after rituximab infusions given recurrent flares.  He can stop bactrim as he is now down to 10mg of prednisone  - monitor carefully given  immunosuppression  - update vaccination  - discussed importance of seeing PCP regularly, age appropriate screenign (he is against cscope but may be ok to do cologuard or other options)  # Renal function- CKD with baseline Scr now 1-1.1- CKD or due to cyclosporine and hemodynamic factors-. It was up to 1.6-1.8 at time of biopsy after diuretics were started.  - scr back to 1-1.2 range, up to 1.4 a few months ago, likelhy CSA hemodynamic effect  - monitor and encouraged hydration- stable on recent labs.   - proteinuria improved very quickly down to normal once prednisone restarted , down from 13 grams to normal    Labs on Saturdays monthly   - discussed that his is an immunosuppressant, that it increases risk of infection and malignancy.  - discussed smoking cessation, he also chews tobacco  # Hyperlipidemia- LDL very elevated,was on low dose statin but stopped in setting of CNI and LFT elevated- it was restarted by PCP  - recheck lipid panel and LFTs - can stop lipitor if lipids back to normal    # Hypertension: BP is good at 110/70s, was higher initially   - continue lisinopril, increase to 40mg if BP >125/80    # Not anemic- hgb has been high at times, recheck    # Electrolytes: normal low potassium      # Mineral Bone Disorder:   Check baseline PTH if creatinine remains elevated.     - RTC in 3-4 months and labs monthly for now  - >40 minutes spent on day of service in counseling and coordination of care.       Assessment and plan was discussed with patient and he voiced his understanding and agreement.    Reason for Visit:  Deshawn Flood is a 48 year old male with nephrotic range proteinuria noted and biopsy 4/9/19 shows minimal change disease    HPI:  He is a 47 year old male with minimal change disease who follows up. He initially presented with nephrotic syndrome and underwent kidney biopsy [id number] and it showed minimal change disease. He was admitted to the hospital briefly after biopsy as he was having poor  appetite due to bloating, and elevated creatinine.    Since then he went into remission, we tapered down on prednisone and unfortunately he relapsed within a couple months (6g/g cr February 2020),  thus we restarted prednisone in March 2020 and by early April he was down to normal range proteinuria and he was feeling better.    His weight is usually closer to 150-160  but was up to 184 lbs with nephrotic syndrome, now back to 160lbs. He is on prednisone wean and may have dropped faster than instructed, now on 10mg daily  Weight stable at 160-165 lbs  Creatinine is a little higher at 1.3 today but also has higher level of cyclosporine noted, as he is taking 125mg bid     Renal History:   None prior to minimal change disease Spring 2019    ROS:   A comprehensive review of systems was obtained and negative, except as noted in the HPI or PMH.    Active Medical Problems:  Patient Active Problem List   Diagnosis    Loose body of left knee    Left knee pain    Tobacco abuse    History of pneumothorax    CARDIOVASCULAR SCREENING; LDL GOAL LESS THAN 160    Nephrotic syndrome    Anasarca    Minimal change disease    Immunosuppression (H)    Crohn's disease of small intestine without complication (H)    Elevated hemoglobin (H)    Chronic kidney disease, stage 3a (H)    Steroid-dependent minimal change glomerulopathy     PMH:   Medical record was reviewed and PMH was discussed with patient and noted below.  Past Medical History:   Diagnosis Date    Anemia     Other spontaneous pneumothorax     SPONTANEOUS PNEUMOTHORAX NEC 3/25/2005    Unspecified asthma(493.90)      PSH:   Past Surgical History:   Procedure Laterality Date    ARTHROSCOPY KNEE  8/3/2012    Procedure: ARTHROSCOPY KNEE;  Left knee Arthroscopy with removal of loose bodies;  Surgeon: Bibi Roberts MD;  Location: [facility]    IR RENAL BIOPSY LEFT  4/9/2019    TUBE THORACOSTOMY,ABSC/EMPYEMA/HEMO  3/20/2005    Small chest tube with Heimlich valve.    ZZHC REPAIR  UMBILICAL GODFREY,<6Y/O,REDUC  1978       Family Hx:   Family History   Problem Relation Age of Onset    Autoimmune Disease No family hx of      Personal Hx:   Social History     Tobacco Use    Smoking status: Light Smoker     Packs/day: 0.10     Years: 15.00     Pack years: 1.50     Types: Cigarettes    Smokeless tobacco: Former     Types: Chew    Tobacco comments:     1 pack per day   Substance Use Topics    Alcohol use: Yes     Alcohol/week: 17.5 standard drinks of alcohol     Types: 21 Cans of beer per week     Comment: occ       Allergies:  Allergies   Allergen Reactions    Bee Venom      swells up    No Known Drug Allergy     Seasonal Allergies        Medications:  Current Outpatient Medications   Medication Sig    cycloSPORINE modified (GENERIC EQUIVALENT) 100 MG capsule Take 1 capsule (100 mg) by mouth 2 times daily    predniSONE (DELTASONE) 5 MG tablet Take 1 tablet (5 mg) by mouth daily Take 10mg for one week, and then stay on 5mg until we discuss rituximab    albuterol (VENTOLIN HFA) 108 (90 Base) MCG/ACT inhaler INHALE ONE TO TWO PUFFS BY MOUTH EVERY 6 HOURS AS NEEDED FOR SHORTNESS OF BREATH, DIFFICULTY BREATHING OR WHEEZING (NEED TO BE SEEN IN CLINIC FOR FURTHER REFILLS)    lisinopril (ZESTRIL) 20 MG tablet Take 1 tablet (20 mg) by mouth daily     No current facility-administered medications for this visit.      Vitals:  /64   Pulse 57   Wt 72.6 kg (160 lb)   BMI 23.63 kg/m    154-155 lbs  Exam:  GENERAL : alert and no distress  Speaks in full sentences without dyspnea  Lungs- some coarse exp rhonchi  cv- regular rate  Normal speech and thought pattern  Ext: no signficant edema    LABS:   CMP  Recent Labs   Lab Test 08/05/23  0755 07/01/23  0747 06/10/23  0939 06/03/23  0748 08/14/21  0807 07/10/21  0829 06/05/21  0743 05/15/21  0757 04/10/21  0745    140 139 137   < > 139 138 138 141   POTASSIUM 4.2 4.1 4.2 4.3   < > 4.1 3.4 3.8 3.6   CHLORIDE 103 103 105 105   < > 110* 105 103 109   CO2 28  27 26 25   < > 26 29 32 28   ANIONGAP 9 10 8 7   < > 3 4 3 4   GLC 90 96 104* 97   < > 84 89 95 126*   BUN 27.8* 21.1* 24.1* 25.9*   < > 19 17 14 14   CR 1.33* 1.12 1.17 1.06   < > 1.06 1.11 1.11 0.98   GFRESTIMATED 66 81 76 86   < > 83 79 79 >90   GFRESTBLACK  --   --   --   --   --  >90 >90 >90 >90   BASIA 9.1 8.9 9.0 8.9   < > 9.2 8.5 8.6 8.7    < > = values in this interval not displayed.     Recent Labs   Lab Test 05/07/22  0748 04/09/22  0749 03/05/22  0750 01/08/22  0758   BILITOTAL 0.5 1.3 2.0* 1.1   ALKPHOS 55 40 33* 47   ALT 20 24 36 49   AST 12 21 16 14     CBC  Recent Labs   Lab Test 07/01/23  0747 06/10/23  0939 06/03/23  0748 05/06/23  0753   HGB 14.7 14.8 14.1 15.7   WBC 7.2 5.7 5.1 4.9   RBC 4.80 4.87 4.65 5.14   HCT 43.3 42.0 39.8* 44.6   MCV 90 86 86 87   MCH 30.6 30.4 30.3 30.5   MCHC 33.9 35.2 35.4 35.2   RDW 12.2 11.4 11.0 10.8    197 202 198     URINE STUDIES  Recent Labs   Lab Test 08/14/21  0819 12/14/20  0037 09/12/20  0841 02/28/20  1620 04/01/19  1500 03/29/19  1858   COLOR Yellow Yellow Yellow Yellow Yellow Yellow   APPEARANCE Clear Clear Clear Clear Clear Clear   URINEGLC Negative Negative Negative Negative Negative Negative   URINEBILI Negative Negative Negative Negative Negative Negative   URINEKETONE Negative Negative Negative Negative Negative Negative   SG 1.026 1.019 1.021 >1.030 1.015 >1.035*   UBLD Negative Negative Negative Moderate* Moderate* Moderate*   URINEPH 6.0 5.0 5.0 6.0 6.0 6.0   PROTEIN Negative Negative Negative >=300* >=300* >499*   UROBILINOGEN  --   --   --  0.2 0.2  --    NITRITE Negative Negative Negative Negative Negative Negative   LEUKEST Negative Negative Negative Negative Negative Negative   RBCU <1  --   --  O - 2 25-50* 7*   WBCU <1  --   --  0 - 5 0 - 5 7*     Recent Labs   Lab Test 12/06/21  1435 04/03/20  0900 02/28/20  1619 11/26/19  1435   UTPG 3.62* 0.18 6.20* 0.06     PTH  No lab results found.  IRON STUDIES  Recent Labs   Lab Test  06/04/22  0803   IRON 144      IRONSAT 52*   REYNA 369       IMPRESSION:  1. Findings are concerning for diffuse small bowel enteritis likely  infectious or inflammatory in etiology, with associated inflammatory  changes of the mesenteric adipose tissues, moderate ascites, small  bilateral probably reactive pleural effusions and with some edema in  the subcutaneous adipose tissues.  2. No significant atherosclerosis is identified. No obvious arterial  blockage is seen in the mesenteric vessels to suggest ischemia.  3. Mild degenerative changes of the spine and hips. No acute fracture  or aggressive osseous lesion.  Jenny Scott MD

## 2023-08-08 NOTE — PROGRESS NOTES
Assessment and Plan:  48 year old male who presents for followup of minimal change disease. He presented for evaluation after presenting to ER where CT scan noted ascites and nonspecific small bowel enteritis, and UA showed proteinuria. He had biopsy which showed minimal change on 4/9/2019 and treated with prednisone 70mg. He was in remission and down to prednsione 10mg then stopped it, and had relapse within a couple months.  His mother noted swelling and he was up 20-25 lbs and proteinuria up to 6 grams/g cr. He was on maintenance cyclosporine since last relapse in November 2021, and another relapse November 2022 and another in June 2023.  # Minimal change disease/ nephrotic syndrome, steroid dependent/ relapsing-  another relapse noted with proteinuria up to 6 grams, despite being on cyclosporine, trough levels had been near 35-40 on cyclosporine 50mg twice a day.- we increased cyclosporine but he was confused and only taking 50mg twice a day, now will be on 100mg twice a day  - Prednisone round #2 started March 2020, tried to do a longer wean once on lower doses.  - Prednisone round #3 started April 20,2021.  - Prednisone round #4 November 2022  -Prednisone round #5 June 2023  - weight is stable he is typically 160-165 lbs  - will remain on 5mg prednisone as he does not want to risk flaring during work season.  - continue labs monthly for now including cyclosporine- willl continue cyclosporine at 100mg bid, level was 94 on 125mg bid, thus will lower and hope to stop after rituximab infusions given recurrent flares.  He can stop bactrim as he is now down to 10mg of prednisone  - monitor carefully given immunosuppression  - update vaccination  - discussed importance of seeing PCP regularly, age appropriate screenign (he is against cscope but may be ok to do cologuard or other options)  # Renal function- CKD with baseline Scr now 1-1.1- CKD or due to cyclosporine and hemodynamic factors-. It was up to 1.6-1.8 at  time of biopsy after diuretics were started.  - scr back to 1-1.2 range, up to 1.4 a few months ago, likelhy CSA hemodynamic effect  - monitor and encouraged hydration- stable on recent labs.   - proteinuria improved very quickly down to normal once prednisone restarted , down from 13 grams to normal    Labs on Saturdays monthly   - discussed that his is an immunosuppressant, that it increases risk of infection and malignancy.  - discussed smoking cessation, he also chews tobacco  # Hyperlipidemia- LDL very elevated,was on low dose statin but stopped in setting of CNI and LFT elevated- it was restarted by PCP  - recheck lipid panel and LFTs - can stop lipitor if lipids back to normal    # Hypertension: BP is good at 110/70s, was higher initially   - continue lisinopril, increase to 40mg if BP >125/80    # Not anemic- hgb has been high at times, recheck    # Electrolytes: normal low potassium      # Mineral Bone Disorder:   Check baseline PTH if creatinine remains elevated.     - RTC in 3-4 months and labs monthly for now  - >40 minutes spent on day of service in counseling and coordination of care.       Assessment and plan was discussed with patient and he voiced his understanding and agreement.    Reason for Visit:  Deshawn Flood is a 48 year old male with nephrotic range proteinuria noted and biopsy 4/9/19 shows minimal change disease    HPI:  He is a 47 year old male with minimal change disease who follows up. He initially presented with nephrotic syndrome and underwent kidney biopsy [id number] and it showed minimal change disease. He was admitted to the hospital briefly after biopsy as he was having poor appetite due to bloating, and elevated creatinine.    Since then he went into remission, we tapered down on prednisone and unfortunately he relapsed within a couple months (6g/g cr February 2020),  thus we restarted prednisone in March 2020 and by early April he was down to normal range proteinuria and he was  feeling better.    His weight is usually closer to 150-160  but was up to 184 lbs with nephrotic syndrome, now back to 160lbs. He is on prednisone wean and may have dropped faster than instructed, now on 10mg daily  Weight stable at 160-165 lbs  Creatinine is a little higher at 1.3 today but also has higher level of cyclosporine noted, as he is taking 125mg bid     Renal History:   None prior to minimal change disease Spring 2019    ROS:   A comprehensive review of systems was obtained and negative, except as noted in the HPI or PMH.    Active Medical Problems:  Patient Active Problem List   Diagnosis    Loose body of left knee    Left knee pain    Tobacco abuse    History of pneumothorax    CARDIOVASCULAR SCREENING; LDL GOAL LESS THAN 160    Nephrotic syndrome    Anasarca    Minimal change disease    Immunosuppression (H)    Crohn's disease of small intestine without complication (H)    Elevated hemoglobin (H)    Chronic kidney disease, stage 3a (H)    Steroid-dependent minimal change glomerulopathy     PMH:   Medical record was reviewed and PMH was discussed with patient and noted below.  Past Medical History:   Diagnosis Date    Anemia     Other spontaneous pneumothorax     SPONTANEOUS PNEUMOTHORAX NEC 3/25/2005    Unspecified asthma(493.90)      PSH:   Past Surgical History:   Procedure Laterality Date    ARTHROSCOPY KNEE  8/3/2012    Procedure: ARTHROSCOPY KNEE;  Left knee Arthroscopy with removal of loose bodies;  Surgeon: Bibi Roberts MD;  Location: [facility] OR    IR RENAL BIOPSY LEFT  4/9/2019    TUBE THORACOSTOMY,ABSC/EMPYEMA/HEMO  3/20/2005    Small chest tube with Heimlich valve.    ZZHC REPAIR UMBILICAL GODFREY,<4Y/O,REDUC  1978       Family Hx:   Family History   Problem Relation Age of Onset    Autoimmune Disease No family hx of      Personal Hx:   Social History     Tobacco Use    Smoking status: Light Smoker     Packs/day: 0.10     Years: 15.00     Pack years: 1.50     Types: Cigarettes    Smokeless  tobacco: Former     Types: Chew    Tobacco comments:     1 pack per day   Substance Use Topics    Alcohol use: Yes     Alcohol/week: 17.5 standard drinks of alcohol     Types: 21 Cans of beer per week     Comment: occ       Allergies:  Allergies   Allergen Reactions    Bee Venom      swells up    No Known Drug Allergy     Seasonal Allergies        Medications:  Current Outpatient Medications   Medication Sig    cycloSPORINE modified (GENERIC EQUIVALENT) 100 MG capsule Take 1 capsule (100 mg) by mouth 2 times daily    predniSONE (DELTASONE) 5 MG tablet Take 1 tablet (5 mg) by mouth daily Take 10mg for one week, and then stay on 5mg until we discuss rituximab    albuterol (VENTOLIN HFA) 108 (90 Base) MCG/ACT inhaler INHALE ONE TO TWO PUFFS BY MOUTH EVERY 6 HOURS AS NEEDED FOR SHORTNESS OF BREATH, DIFFICULTY BREATHING OR WHEEZING (NEED TO BE SEEN IN CLINIC FOR FURTHER REFILLS)    lisinopril (ZESTRIL) 20 MG tablet Take 1 tablet (20 mg) by mouth daily     No current facility-administered medications for this visit.      Vitals:  /64   Pulse 57   Wt 72.6 kg (160 lb)   BMI 23.63 kg/m    154-155 lbs  Exam:  GENERAL : alert and no distress  Speaks in full sentences without dyspnea  Lungs- some coarse exp rhonchi  cv- regular rate  Normal speech and thought pattern  Ext: no signficant edema    LABS:   CMP  Recent Labs   Lab Test 08/05/23  0755 07/01/23  0747 06/10/23  0939 06/03/23  0748 08/14/21  0807 07/10/21  0829 06/05/21  0743 05/15/21  0757 04/10/21  0745    140 139 137   < > 139 138 138 141   POTASSIUM 4.2 4.1 4.2 4.3   < > 4.1 3.4 3.8 3.6   CHLORIDE 103 103 105 105   < > 110* 105 103 109   CO2 28 27 26 25   < > 26 29 32 28   ANIONGAP 9 10 8 7   < > 3 4 3 4   GLC 90 96 104* 97   < > 84 89 95 126*   BUN 27.8* 21.1* 24.1* 25.9*   < > 19 17 14 14   CR 1.33* 1.12 1.17 1.06   < > 1.06 1.11 1.11 0.98   GFRESTIMATED 66 81 76 86   < > 83 79 79 >90   GFRESTBLACK  --   --   --   --   --  >90 >90 >90 >90   BASIA 9.1  8.9 9.0 8.9   < > 9.2 8.5 8.6 8.7    < > = values in this interval not displayed.     Recent Labs   Lab Test 05/07/22  0748 04/09/22  0749 03/05/22  0750 01/08/22  0758   BILITOTAL 0.5 1.3 2.0* 1.1   ALKPHOS 55 40 33* 47   ALT 20 24 36 49   AST 12 21 16 14     CBC  Recent Labs   Lab Test 07/01/23  0747 06/10/23  0939 06/03/23  0748 05/06/23  0753   HGB 14.7 14.8 14.1 15.7   WBC 7.2 5.7 5.1 4.9   RBC 4.80 4.87 4.65 5.14   HCT 43.3 42.0 39.8* 44.6   MCV 90 86 86 87   MCH 30.6 30.4 30.3 30.5   MCHC 33.9 35.2 35.4 35.2   RDW 12.2 11.4 11.0 10.8    197 202 198     URINE STUDIES  Recent Labs   Lab Test 08/14/21  0819 12/14/20  0037 09/12/20  0841 02/28/20  1620 04/01/19  1500 03/29/19  1858   COLOR Yellow Yellow Yellow Yellow Yellow Yellow   APPEARANCE Clear Clear Clear Clear Clear Clear   URINEGLC Negative Negative Negative Negative Negative Negative   URINEBILI Negative Negative Negative Negative Negative Negative   URINEKETONE Negative Negative Negative Negative Negative Negative   SG 1.026 1.019 1.021 >1.030 1.015 >1.035*   UBLD Negative Negative Negative Moderate* Moderate* Moderate*   URINEPH 6.0 5.0 5.0 6.0 6.0 6.0   PROTEIN Negative Negative Negative >=300* >=300* >499*   UROBILINOGEN  --   --   --  0.2 0.2  --    NITRITE Negative Negative Negative Negative Negative Negative   LEUKEST Negative Negative Negative Negative Negative Negative   RBCU <1  --   --  O - 2 25-50* 7*   WBCU <1  --   --  0 - 5 0 - 5 7*     Recent Labs   Lab Test 12/06/21  1435 04/03/20  0900 02/28/20  1619 11/26/19  1435   UTPG 3.62* 0.18 6.20* 0.06     PTH  No lab results found.  IRON STUDIES  Recent Labs   Lab Test 06/04/22  0803   IRON 144      IRONSAT 52*   REYNA 369       IMPRESSION:  1. Findings are concerning for diffuse small bowel enteritis likely  infectious or inflammatory in etiology, with associated inflammatory  changes of the mesenteric adipose tissues, moderate ascites, small  bilateral probably reactive pleural  effusions and with some edema in  the subcutaneous adipose tissues.  2. No significant atherosclerosis is identified. No obvious arterial  blockage is seen in the mesenteric vessels to suggest ischemia.  3. Mild degenerative changes of the spine and hips. No acute fracture  or aggressive osseous lesion.  Jenny Scott MD

## 2023-08-15 ENCOUNTER — VIRTUAL VISIT (OUTPATIENT)
Dept: PHARMACY | Facility: CLINIC | Age: 48
End: 2023-08-15
Attending: INTERNAL MEDICINE
Payer: COMMERCIAL

## 2023-08-15 DIAGNOSIS — N05.0 STEROID-DEPENDENT MINIMAL CHANGE GLOMERULOPATHY: Primary | ICD-10-CM

## 2023-08-15 PROCEDURE — 99207 PR NO CHARGE LOS: CPT | Performed by: PHARMACIST

## 2023-08-15 NOTE — PROGRESS NOTES
Medication Therapy Management (MTM) Encounter    ASSESSMENT:                            Medication Adherence/Access: No issues identified    Minimal change disease/ Nephrotic syndrome : Discussed Rituximab treatment and possible side effects, sent patient information on medication.    PLAN:                            See AVS    Follow-up: if needed    SUBJECTIVE/OBJECTIVE:                          Deshawn Flood is a 48 year old male called for an initial visit. He was referred to me from Sonia Raymond.     Reason for visit: Rituximab education.     Allergies/ADRs: Reviewed in chart  Past Medical History: Reviewed in chart  Tobacco: He reports that he has been smoking cigarettes. He has a 1.50 pack-year smoking history. He has quit using smokeless tobacco.  His smokeless tobacco use included chew.Nicotine/Tobacco Cessation Plan:   Information offered: Patient not interested at this time  Smoking and chewing tobacco.  1 PPD.   Alcohol: 1-3 beverages / week    Medication Adherence/Access: no issues reported    Minimal change disease/ Nephrotic syndrome :   Cyclosporine 100mg twice daily  Prednisone 5mg daily.   Lisinopril 20mg daily.   Rituximab IV has not started yet, waiting on PA per order    Dr. Scott's hx per note on 8/7:     Expand All Collapse All    Assessment and Plan:  48 year old male who presents for followup of minimal change disease. He presented for evaluation after presenting to ER where CT scan noted ascites and nonspecific small bowel enteritis, and UA showed proteinuria. He had biopsy which showed minimal change on 4/9/2019 and treated with prednisone 70mg. He was in remission and down to prednsione 10mg then stopped it, and had relapse within a couple months.  His mother noted swelling and he was up 20-25 lbs and proteinuria up to 6 grams/g cr. He was on maintenance cyclosporine since last relapse in November 2021, and another relapse November 2022 and another in June 2023.  # Minimal change  disease/ nephrotic syndrome, steroid dependent/ relapsing-  another relapse noted with proteinuria up to 6 grams, despite being on cyclosporine, trough levels had been near 35-40 on cyclosporine 50mg twice a day.- we increased cyclosporine but he was confused and only taking 50mg twice a day, now will be on 100mg twice a day  - Prednisone round #2 started March 2020, tried to do a longer wean once on lower doses.  - Prednisone round #3 started April 20,2021.  - Prednisone round #4 November 2022  -Prednisone round #5 June 2023  - weight is stable he is typically 160-165 lbs  - will remain on 5mg prednisone as he does not want to risk flaring during work season.  - continue labs monthly for now including cyclosporine- willl continue cyclosporine at 100mg bid, level was 94 on 125mg bid, thus will lower and hope to stop after rituximab infusions given recurrent flares.  He can stop bactrim as he is now down to 10mg of prednisone  - monitor carefully given immunosuppression  - update vaccination  - discussed importance of seeing PCP regularly, age appropriate screenign (he is against cscope but may be ok to do cologuard or other options)  # Renal function- CKD with baseline Scr now 1-1.1- CKD or due to cyclosporine and hemodynamic factors-. It was up to 1.6-1.8 at time of biopsy after diuretics were started.  - scr back to 1-1.2 range, up to 1.4 a few months ago, likelhy CSA hemodynamic effect  - monitor and encouraged hydration- stable on recent labs.   - proteinuria improved very quickly down to normal once prednisone restarted , down from 13 grams to normal           Today's Vitals: There were no vitals taken for this visit.  ----------------      I spent 12 minutes with this patient today.  A copy of the visit note was provided to the patient's provider(s).    A summary of these recommendations was mailed to the patient.    Justin Acevedo PharmD  [facility] Pharmacist    Phone: [phone]     Telemedicine Visit  Details  Type of service:  Telephone visit  Start Time: 11:43 AM  End Time: 11:55 AM     Medication Therapy Recommendations  Steroid-dependent minimal change glomerulopathy    Rationale: Does not understand instructions - Adherence - Adherence   Recommendation: riTUXimab 1 mg/0.1 mL 1 mg/0.1 ml intravitreal injection (PF)   Status: Patient Agreed - Adherence/Education

## 2023-08-15 NOTE — PATIENT INSTRUCTIONS
"Recommendations from today's MTM visit:                                                      See attached documents for information on Rituximab    Follow-up: if needed    It was great speaking with you today.  I value your experience and would be very thankful for your time in providing feedback in our clinic survey. In the next few days, you may receive an email or text message from [facility] [employer] with a link to a survey related to your  clinical pharmacist.\"     To schedule another MTM appointment, please call the clinic directly or you may call the MTM scheduling line at [phone] or toll-free at [phone].     My Clinical Pharmacist's contact information:                                                      Please feel free to contact me with any questions or concerns you have.      Justin Acevedo, PharmD  MTM Pharmacist    Phone: [phone]     "

## 2023-08-22 NOTE — TELEPHONE ENCOUNTER
Update to POC sent to Dr. Scott to see if letter of medical necessity is needed or discontinue Rituximab infusion order.  Team Work makes the [employer] Work!    Leigh Lilly RN, BSN, PHN  [facility] Nurse Navigator  [phone]

## 2023-09-01 ENCOUNTER — OFFICE VISIT (OUTPATIENT)
Dept: INTERNAL MEDICINE | Facility: CLINIC | Age: 48
End: 2023-09-01
Payer: COMMERCIAL

## 2023-09-01 VITALS
HEIGHT: 70 IN | OXYGEN SATURATION: 98 % | HEART RATE: 56 BPM | WEIGHT: 162.5 LBS | SYSTOLIC BLOOD PRESSURE: 118 MMHG | TEMPERATURE: 97.5 F | DIASTOLIC BLOOD PRESSURE: 70 MMHG | RESPIRATION RATE: 18 BRPM | BODY MASS INDEX: 23.26 KG/M2

## 2023-09-01 DIAGNOSIS — D84.9 IMMUNOSUPPRESSION (H): ICD-10-CM

## 2023-09-01 DIAGNOSIS — K50.00 CROHN'S DISEASE OF SMALL INTESTINE WITHOUT COMPLICATION (H): ICD-10-CM

## 2023-09-01 DIAGNOSIS — N04.9 NEPHROTIC SYNDROME: ICD-10-CM

## 2023-09-01 DIAGNOSIS — K02.9 DENTAL CARIES: ICD-10-CM

## 2023-09-01 DIAGNOSIS — J45.40 MODERATE PERSISTENT ASTHMA WITHOUT COMPLICATION: ICD-10-CM

## 2023-09-01 DIAGNOSIS — Z12.11 SCREEN FOR COLON CANCER: ICD-10-CM

## 2023-09-01 DIAGNOSIS — Z00.00 ROUTINE GENERAL MEDICAL EXAMINATION AT A HEALTH CARE FACILITY: Primary | ICD-10-CM

## 2023-09-01 DIAGNOSIS — Z12.5 SCREENING FOR PROSTATE CANCER: ICD-10-CM

## 2023-09-01 DIAGNOSIS — Z72.0 TOBACCO ABUSE: ICD-10-CM

## 2023-09-01 DIAGNOSIS — E78.5 HYPERLIPIDEMIA LDL GOAL <130: ICD-10-CM

## 2023-09-01 PROCEDURE — 99396 PREV VISIT EST AGE 40-64: CPT | Performed by: INTERNAL MEDICINE

## 2023-09-01 RX ORDER — ALBUTEROL SULFATE 90 UG/1
2 AEROSOL, METERED RESPIRATORY (INHALATION) EVERY 6 HOURS PRN
Qty: 18 G | Refills: 4 | Status: SHIPPED | OUTPATIENT
Start: 2023-09-01 | End: 2024-05-13

## 2023-09-01 ASSESSMENT — PAIN SCALES - GENERAL: PAINLEVEL: NO PAIN (0)

## 2023-09-01 ASSESSMENT — ASTHMA QUESTIONNAIRES
QUESTION_3 LAST FOUR WEEKS HOW OFTEN DID YOUR ASTHMA SYMPTOMS (WHEEZING, COUGHING, SHORTNESS OF BREATH, CHEST TIGHTNESS OR PAIN) WAKE YOU UP AT NIGHT OR EARLIER THAN USUAL IN THE MORNING: NOT AT ALL
QUESTION_5 LAST FOUR WEEKS HOW WOULD YOU RATE YOUR ASTHMA CONTROL: COMPLETELY CONTROLLED
QUESTION_2 LAST FOUR WEEKS HOW OFTEN HAVE YOU HAD SHORTNESS OF BREATH: THREE TO SIX TIMES A WEEK
QUESTION_1 LAST FOUR WEEKS HOW MUCH OF THE TIME DID YOUR ASTHMA KEEP YOU FROM GETTING AS MUCH DONE AT WORK, SCHOOL OR AT HOME: NONE OF THE TIME
ACT_TOTALSCORE: 21
QUESTION_4 LAST FOUR WEEKS HOW OFTEN HAVE YOU USED YOUR RESCUE INHALER OR NEBULIZER MEDICATION (SUCH AS ALBUTEROL): TWO OR THREE TIMES PER WEEK
ACT_TOTALSCORE: 21

## 2023-09-01 NOTE — PROGRESS NOTES
Assessment & Plan     Routine general medical examination at a health care facility      Moderate persistent asthma without complication    - albuterol (PROAIR HFA/PROVENTIL HFA/VENTOLIN HFA) 108 (90 Base) MCG/ACT inhaler; Inhale 2 puffs into the lungs every 6 hours as needed for shortness of breath, wheezing or cough    Nephrotic syndrome  Follow with nephrology tomorrow    Immunosuppression (H)  Recommended immunizations.  Discussed in detail    Crohn's disease of small intestine without complication (H)  Patient denies prior colonoscopy and refuses future colonoscopy.  Question concept of diagnosis    Hyperlipidemia LDL goal <130    - Lipid panel reflex to direct LDL Fasting; Future    Dental caries  Recommend dental care    Tobacco abuse  Discussed smoking cessation    Screening for prostate cancer  Screening  - PSA, screen; Future    Screen for colon cancer  Patient clearly and aggressively refuses                                          FOLLOW UP   I have asked the patient to make an appointment for followup with:  1) Me  2) the patient's preferred provider  3) Any available provider  In 1 year for physical examination.  He will follow-up with his nephrologist to schedule    [facility] KARIME Hess is a 48 year old, presenting for the following health issues:  Asthma      9/1/2023     6:56 AM   Additional Questions   Roomed by Rosy FRANCO       HPI             Review of Systems   CONSTITUTIONAL: NEGATIVE for fever, chills, change in weight  INTEGUMENTARY/SKIN: NEGATIVE for worrisome rashes, moles or lesions  EYES: NEGATIVE for vision changes or irritation  ENT/MOUTH: NEGATIVE for ear, mouth and throat problems  RESP: Patient occasionally has shortness of breath associated with his baseline asthma.  He is good relief with albuterol.  BREAST: NEGATIVE for masses, tenderness or discharge  CV: NEGATIVE for chest pain, palpitations or peripheral  "edema  GI: NEGATIVE for nausea, abdominal pain, heartburn, or change in bowel habits  : NEGATIVE for frequency, dysuria, or hematuria  MUSCULOSKELETAL: NEGATIVE for significant arthralgias or myalgia  NEURO: NEGATIVE for weakness, dizziness or paresthesias  ENDOCRINE: NEGATIVE for temperature intolerance, skin/hair changes  HEME: NEGATIVE for bleeding problems  PSYCHIATRIC: NEGATIVE for changes in mood or affect      Objective    /70 (BP Location: Right arm, Patient Position: Chair)   Pulse 56   Temp 97.5  F (36.4  C) (Temporal)   Resp 18   Ht 1.772 m (5' 9.75\")   Wt 73.7 kg (162 lb 8 oz)   SpO2 98%   BMI 23.48 kg/m    Body mass index is 23.48 kg/m .  Physical Exam   Morning use    Patient will get lab work done when he follows up with his nephrologist tomorrow                I have carefully explained the diagnosis and treatment options to the patient.  The patient has displayed an understanding of the above, and all subsequent questions were answered.      DO CARISSA Jones    Portions of this note were produced using [employer]  Although every attempt at real-time proof reading has been made, occasional grammar/syntax errors may have been missed.               "

## 2023-09-01 NOTE — PROGRESS NOTES
"  Assessment & Plan     Moderate persistent asthma without complication  ***    Screen for colon cancer  ***      {[facility] 2021 Documentation (Optional):101949}  {2021 E&M time (Optional):145048}  {Provider  Link to [facility] Help Grid :986793}     {FOLLOW UP PLANS (Optional) Includes COVID19 Treatment Plan:872296}    Alvarez Yu, DO  [facility] KARIME Hess is a 48 year old, presenting for the following health issues:  Asthma      9/1/2023     6:56 AM   Additional Questions   Roomed by Rosy FRANCO       History of Present Illness     Asthma:  He presents for follow up of asthma.  He has no cough, no wheezing, and no shortness of breath.  He is using a relief medication daily. He does not miss any doses of his controller medication throughout the week. Patient is aware of the following triggers: cold air, pollens and smoke. The patient has not had a visit to the Emergency Room, Urgent Care or Hospital due to asthma since the last clinic visit.     He eats 0-1 servings of fruits and vegetables daily.He consumes 2 sweetened beverage(s) daily.He exercises with enough effort to increase his heart rate 9 or less minutes per day.  He exercises with enough effort to increase his heart rate 7 days per week.   He is taking medications regularly.       {SUPERLIST (Optional):219731}  {additonal problems for provider to add (Optional):933610}      Review of Systems   Constitutional, HEENT, cardiovascular, pulmonary, GI, , musculoskeletal, neuro, skin, endocrine and psych systems are negative, except as otherwise noted.      Objective    /70 (BP Location: Right arm, Patient Position: Chair)   Pulse 56   Temp 97.5  F (36.4  C) (Temporal)   Resp 18   Ht 1.772 m (5' 9.75\")   Wt 73.7 kg (162 lb 8 oz)   SpO2 98%   BMI 23.48 kg/m    Body mass index is 23.48 kg/m .  Physical Exam   GENERAL: healthy, alert and no distress  EYES: Eyes grossly normal to inspection, PERRL and conjunctivae " and sclerae normal  HENT: ear canals and TM's normal, nose and mouth without ulcers or lesions  NECK: no adenopathy, no asymmetry, masses, or scars and thyroid normal to palpation  RESP: lungs clear to auscultation - no rales, rhonchi or wheezes  CV: regular rate and rhythm, normal S1 S2, no S3 or S4, no murmur, click or rub, no peripheral edema and peripheral pulses strong  ABDOMEN: soft, nontender, no hepatosplenomegaly, no masses and bowel sounds normal  MS: no gross musculoskeletal defects noted, no edema  SKIN: no suspicious lesions or rashes  NEURO: Normal strength and tone, mentation intact and speech normal  PSYCH: mentation appears normal, affect normal/bright    {Diagnostic Test Results (Optional):055835}    {AMBULATORY ATTESTATION (Optional):430321}

## 2023-09-02 ENCOUNTER — LAB (OUTPATIENT)
Dept: LAB | Facility: CLINIC | Age: 48
End: 2023-09-02
Payer: COMMERCIAL

## 2023-09-02 DIAGNOSIS — D84.9 IMMUNOSUPPRESSED STATUS (H): ICD-10-CM

## 2023-09-02 DIAGNOSIS — Z12.5 SCREENING FOR PROSTATE CANCER: ICD-10-CM

## 2023-09-02 DIAGNOSIS — N05.0 MINIMAL CHANGE DISEASE: ICD-10-CM

## 2023-09-02 DIAGNOSIS — E78.5 HYPERLIPIDEMIA LDL GOAL <130: ICD-10-CM

## 2023-09-02 LAB
ALBUMIN SERPL BCG-MCNC: 4.3 G/DL (ref 3.5–5.2)
ANION GAP SERPL CALCULATED.3IONS-SCNC: 11 MMOL/L (ref 7–15)
BUN SERPL-MCNC: 23.9 MG/DL (ref 6–20)
CALCIUM SERPL-MCNC: 9.6 MG/DL (ref 8.6–10)
CHLORIDE SERPL-SCNC: 101 MMOL/L (ref 98–107)
CHOLEST SERPL-MCNC: 146 MG/DL
CREAT SERPL-MCNC: 1.29 MG/DL (ref 0.67–1.17)
CREAT UR-MCNC: 285.6 MG/DL
CYCLOSPORINE BLD LC/MS/MS-MCNC: 88 UG/L (ref 50–400)
DEPRECATED HCO3 PLAS-SCNC: 27 MMOL/L (ref 22–29)
ERYTHROCYTE [DISTWIDTH] IN BLOOD BY AUTOMATED COUNT: 11.8 % (ref 10–15)
GFR SERPL CREATININE-BSD FRML MDRD: 68 ML/MIN/1.73M2
GLUCOSE SERPL-MCNC: 127 MG/DL (ref 70–99)
HCT VFR BLD AUTO: 39.9 % (ref 40–53)
HDLC SERPL-MCNC: 50 MG/DL
HGB BLD-MCNC: 14.4 G/DL (ref 13.3–17.7)
LDLC SERPL CALC-MCNC: 83 MG/DL
MCH RBC QN AUTO: 31.5 PG (ref 26.5–33)
MCHC RBC AUTO-ENTMCNC: 36.1 G/DL (ref 31.5–36.5)
MCV RBC AUTO: 87 FL (ref 78–100)
MICROALBUMIN UR-MCNC: <12 MG/L
MICROALBUMIN/CREAT UR: NORMAL MG/G{CREAT}
NONHDLC SERPL-MCNC: 96 MG/DL
PHOSPHATE SERPL-MCNC: 2.2 MG/DL (ref 2.5–4.5)
PLATELET # BLD AUTO: 198 10E3/UL (ref 150–450)
POTASSIUM SERPL-SCNC: 4.2 MMOL/L (ref 3.4–5.3)
PSA SERPL DL<=0.01 NG/ML-MCNC: 0.35 NG/ML (ref 0–2.5)
RBC # BLD AUTO: 4.57 10E6/UL (ref 4.4–5.9)
SODIUM SERPL-SCNC: 139 MMOL/L (ref 136–145)
TME LAST DOSE: NORMAL H
TME LAST DOSE: NORMAL H
TRIGL SERPL-MCNC: 64 MG/DL
WBC # BLD AUTO: 6.8 10E3/UL (ref 4–11)

## 2023-09-02 PROCEDURE — G0103 PSA SCREENING: HCPCS

## 2023-09-02 PROCEDURE — 85027 COMPLETE CBC AUTOMATED: CPT

## 2023-09-02 PROCEDURE — 80158 DRUG ASSAY CYCLOSPORINE: CPT

## 2023-09-02 PROCEDURE — 82043 UR ALBUMIN QUANTITATIVE: CPT

## 2023-09-02 PROCEDURE — 80069 RENAL FUNCTION PANEL: CPT

## 2023-09-02 PROCEDURE — 82570 ASSAY OF URINE CREATININE: CPT

## 2023-09-02 PROCEDURE — 36415 COLL VENOUS BLD VENIPUNCTURE: CPT

## 2023-09-02 PROCEDURE — 80061 LIPID PANEL: CPT

## 2023-09-22 ENCOUNTER — DOCUMENTATION ONLY (OUTPATIENT)
Dept: NEPHROLOGY | Facility: CLINIC | Age: 48
End: 2023-09-22
Payer: COMMERCIAL

## 2023-09-22 DIAGNOSIS — N18.31 CHRONIC KIDNEY DISEASE, STAGE 3A (H): Primary | ICD-10-CM

## 2023-10-05 NOTE — PROGRESS NOTES
PA received :  We have received authorization for Rituxan from insurance. This is ok to proceed at [place].     Thank you,   Dinorah     [place] [facility] [facility] called and informed them this patient was approved for Rituximab infusion. [place] infusion nurse stated they would reach out to patient to schedule an appointment.  NICKI Dsouza Care Coordinator  Nephrology    Thank you so much Dinorah! I have contacted [place] and they are reaching out to the patient now.   NICKI Dsouza Care Coordinator   Nephrology

## 2023-10-07 ENCOUNTER — LAB (OUTPATIENT)
Dept: LAB | Facility: CLINIC | Age: 48
End: 2023-10-07
Payer: COMMERCIAL

## 2023-10-07 DIAGNOSIS — N18.31 CHRONIC KIDNEY DISEASE, STAGE 3A (H): ICD-10-CM

## 2023-10-07 DIAGNOSIS — D84.9 IMMUNOSUPPRESSED STATUS (H): ICD-10-CM

## 2023-10-07 DIAGNOSIS — N05.0 MINIMAL CHANGE DISEASE: ICD-10-CM

## 2023-10-07 LAB
ALBUMIN SERPL BCG-MCNC: 4.2 G/DL (ref 3.5–5.2)
ANION GAP SERPL CALCULATED.3IONS-SCNC: 13 MMOL/L (ref 7–15)
BUN SERPL-MCNC: 22.7 MG/DL (ref 6–20)
CALCIUM SERPL-MCNC: 9.4 MG/DL (ref 8.6–10)
CHLORIDE SERPL-SCNC: 104 MMOL/L (ref 98–107)
CREAT SERPL-MCNC: 1.15 MG/DL (ref 0.67–1.17)
CREAT UR-MCNC: 195.8 MG/DL
CYCLOSPORINE BLD LC/MS/MS-MCNC: 81 UG/L (ref 50–400)
DEPRECATED HCO3 PLAS-SCNC: 23 MMOL/L (ref 22–29)
EGFRCR SERPLBLD CKD-EPI 2021: 78 ML/MIN/1.73M2
ERYTHROCYTE [DISTWIDTH] IN BLOOD BY AUTOMATED COUNT: 11.7 % (ref 10–15)
GLUCOSE SERPL-MCNC: 102 MG/DL (ref 70–99)
HCT VFR BLD AUTO: 36.4 % (ref 40–53)
HGB BLD-MCNC: 12.7 G/DL (ref 13.3–17.7)
MCH RBC QN AUTO: 31.4 PG (ref 26.5–33)
MCHC RBC AUTO-ENTMCNC: 34.9 G/DL (ref 31.5–36.5)
MCV RBC AUTO: 90 FL (ref 78–100)
MICROALBUMIN UR-MCNC: <12 MG/L
MICROALBUMIN/CREAT UR: NORMAL MG/G{CREAT}
PHOSPHATE SERPL-MCNC: 2.5 MG/DL (ref 2.5–4.5)
PLATELET # BLD AUTO: 182 10E3/UL (ref 150–450)
POTASSIUM SERPL-SCNC: 4 MMOL/L (ref 3.4–5.3)
RBC # BLD AUTO: 4.05 10E6/UL (ref 4.4–5.9)
SODIUM SERPL-SCNC: 140 MMOL/L (ref 135–145)
TME LAST DOSE: NORMAL H
TME LAST DOSE: NORMAL H
WBC # BLD AUTO: 5.6 10E3/UL (ref 4–11)

## 2023-10-07 PROCEDURE — 36415 COLL VENOUS BLD VENIPUNCTURE: CPT

## 2023-10-07 PROCEDURE — 86481 TB AG RESPONSE T-CELL SUSP: CPT

## 2023-10-07 PROCEDURE — 80158 DRUG ASSAY CYCLOSPORINE: CPT

## 2023-10-07 PROCEDURE — 87340 HEPATITIS B SURFACE AG IA: CPT

## 2023-10-07 PROCEDURE — 80069 RENAL FUNCTION PANEL: CPT

## 2023-10-07 PROCEDURE — 82570 ASSAY OF URINE CREATININE: CPT

## 2023-10-07 PROCEDURE — 86704 HEP B CORE ANTIBODY TOTAL: CPT

## 2023-10-07 PROCEDURE — 82043 UR ALBUMIN QUANTITATIVE: CPT

## 2023-10-07 PROCEDURE — 85027 COMPLETE CBC AUTOMATED: CPT

## 2023-10-08 LAB
GAMMA INTERFERON BACKGROUND BLD IA-ACNC: 0 IU/ML
M TB IFN-G BLD-IMP: NEGATIVE
M TB IFN-G CD4+ BCKGRND COR BLD-ACNC: 10 IU/ML
MITOGEN IGNF BCKGRD COR BLD-ACNC: 0.02 IU/ML
MITOGEN IGNF BCKGRD COR BLD-ACNC: 0.04 IU/ML
QUANTIFERON MITOGEN: 10 IU/ML
QUANTIFERON NIL TUBE: 0 IU/ML
QUANTIFERON TB1 TUBE: 0.02 IU/ML
QUANTIFERON TB2 TUBE: 0.04

## 2023-10-09 LAB
HBV CORE AB SERPL QL IA: NONREACTIVE
HBV SURFACE AG SERPL QL IA: NONREACTIVE

## 2023-11-04 ENCOUNTER — LAB (OUTPATIENT)
Dept: LAB | Facility: CLINIC | Age: 48
End: 2023-11-04
Payer: COMMERCIAL

## 2023-11-04 DIAGNOSIS — D84.9 IMMUNOSUPPRESSED STATUS (H): ICD-10-CM

## 2023-11-04 DIAGNOSIS — N04.9 NEPHROTIC SYNDROME: ICD-10-CM

## 2023-11-04 DIAGNOSIS — N05.0 MINIMAL CHANGE DISEASE: ICD-10-CM

## 2023-11-04 LAB
ALBUMIN SERPL BCG-MCNC: 4.2 G/DL (ref 3.5–5.2)
ANION GAP SERPL CALCULATED.3IONS-SCNC: 8 MMOL/L (ref 7–15)
BUN SERPL-MCNC: 23.4 MG/DL (ref 6–20)
CALCIUM SERPL-MCNC: 9.1 MG/DL (ref 8.6–10)
CHLORIDE SERPL-SCNC: 104 MMOL/L (ref 98–107)
CREAT SERPL-MCNC: 1.22 MG/DL (ref 0.67–1.17)
CREAT UR-MCNC: 240.9 MG/DL
DEPRECATED HCO3 PLAS-SCNC: 26 MMOL/L (ref 22–29)
EGFRCR SERPLBLD CKD-EPI 2021: 73 ML/MIN/1.73M2
ERYTHROCYTE [DISTWIDTH] IN BLOOD BY AUTOMATED COUNT: 12 % (ref 10–15)
GLUCOSE SERPL-MCNC: 93 MG/DL (ref 70–99)
HCT VFR BLD AUTO: 37.2 % (ref 40–53)
HGB BLD-MCNC: 13 G/DL (ref 13.3–17.7)
MCH RBC QN AUTO: 31.5 PG (ref 26.5–33)
MCHC RBC AUTO-ENTMCNC: 34.9 G/DL (ref 31.5–36.5)
MCV RBC AUTO: 90 FL (ref 78–100)
MICROALBUMIN UR-MCNC: <12 MG/L
MICROALBUMIN/CREAT UR: NORMAL MG/G{CREAT}
PHOSPHATE SERPL-MCNC: 2.5 MG/DL (ref 2.5–4.5)
PLATELET # BLD AUTO: 184 10E3/UL (ref 150–450)
POTASSIUM SERPL-SCNC: 4 MMOL/L (ref 3.4–5.3)
RBC # BLD AUTO: 4.13 10E6/UL (ref 4.4–5.9)
SODIUM SERPL-SCNC: 138 MMOL/L (ref 135–145)
WBC # BLD AUTO: 4.7 10E3/UL (ref 4–11)

## 2023-11-04 PROCEDURE — 80069 RENAL FUNCTION PANEL: CPT

## 2023-11-04 PROCEDURE — 82570 ASSAY OF URINE CREATININE: CPT

## 2023-11-04 PROCEDURE — 85027 COMPLETE CBC AUTOMATED: CPT

## 2023-11-04 PROCEDURE — 36415 COLL VENOUS BLD VENIPUNCTURE: CPT

## 2023-11-04 PROCEDURE — 82043 UR ALBUMIN QUANTITATIVE: CPT

## 2023-11-15 NOTE — TELEPHONE ENCOUNTER
Patient called to answer questions and update him on rituximab infusion authorization. Left vm informing him that the infusion center in [place] will be reaching out to him to schedule and appointment and return call or my chart with any questions or concerns. Will try to call again.  NICKI Dsouza Care Coordinator  Nephrology

## 2023-11-20 NOTE — PROGRESS NOTES
Assessment & Plan   (M25.50) Arthralgia, unspecified joint  (primary encounter diagnosis)  Comment: Significant arthralgias in all major joints and his spine.  Etiology is unclear.  Plan: ESR: Erythrocyte sedimentation rate, CRP,         inflammation, Anti Nuclear [place] IgG by IFA with         Reflex, Lupus Anticoagulant Panel, WBC with         Diff, Comprehensive metabolic panel (BMP + Alb,        Alk Phos, ALT, AST, Total. Bili, TP), CK total,        Lyme Disease [place] with reflex to WB Serum,         Anaplasma phagocytoph antibody IgG IgM,         Streptolysin O Antibody (ASO), predniSONE         (DELTASONE) 20 MG tablet, DISCONTINUED:         predniSONE (DELTASONE) 20 MG tablet        Organ to do a significant number of labs today and see if we can come up with anything.  This might fit with an acute Lyme's disease but he does not have a history of any tick bites in the last week or 2.  Work note put him on some prednisone 60 mg daily for the next 10 days to see if we can alleviate some of his symptoms and hopefully will get some answers with his lab work.       25 minutes spent on the date of the encounter doing chart review, history and exam, documentation and further activities per the note       MEDICATIONS:        - Trial of prednisone for 10 days to see if we can get his symptoms resolved.        - Continue other medications without change    No follow-ups on file.    Jefferson Gupta MD, MD  [facility]    Tri Hess is a 45 year old who presents for the following health issues     HPI     Musculoskeletal problem/pain  Onset/Duration: 1.5 weeks  Description  Location: everywhere -   Joint Swelling: no  Redness: no  Pain: YES  Warmth: no  Intensity:  6/10  Progression of Symptoms:  same and constant  Accompanying signs and symptoms:   Fevers: no  Numbness/tingling/weakness: no  History  Trauma to the area: no  Recent illness:  no  Previous similar problem: no  Previous  Minocycline Pregnancy And Lactation Text: This medication is Pregnancy Category D and not consider safe during pregnancy. It is also excreted in breast milk. Dapsone Counseling: I discussed with the patient the risks of dapsone including but not limited to hemolytic anemia, agranulocytosis, rashes, methemoglobinemia, kidney failure, peripheral neuropathy, headaches, GI upset, and liver toxicity.  Patients who start dapsone require monitoring including baseline LFTs and weekly CBCs for the first month, then every month thereafter.  The patient verbalized understanding of the proper use and possible adverse effects of dapsone.  All of the patient's questions and concerns were addressed. evaluation:  no  Precipitating or alleviating factors:  Aggravating factors include: walking, lifting and getting up from sitting  Therapies tried and outcome: rest/inactivity, stretching and exercises    Symptoms started just about a week and 1/2 to 2 weeks ago.  He had had an episode of what he thought was sciatica few weeks prior to that and that resolved without this started.  Joint achiness throughout large joints hands feet and his hips.  Even laying down in bed at night he gets pain over the left hip area if he tries to lay on his side.  He does not have any specific swelling of the joints or any redness or warmth to them.  There is no history of arthritis in him or other family members.  He cannot recall any tick bites or other bug bites in the last 2 to 4 weeks.  He denies any fevers, cough, runny nose, rash or other symptoms like that.  There is getting to the point now where he is developing weakness in his hands.  He was able to hold a bowl of cereal this morning but sometimes he feels like he is not even able to hold things in his hands without the fear of dropping them.  He has been laid off for the winter.  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  He is set to go back within the next few weeks to month.   he has had Netflix and watching scary programs but he does not feel like his inactivity has had anything to do with this.  A lot of time off so has been doing a lot of he has had some alcohol use but not overabundance of it.  He has not been taking any ibuprofen or Tylenol to see if there is any benefit to that.  He feels like this is getting worse instead of better.    Review of Systems   Constitutional, HEENT, cardiovascular, pulmonary, gi and gu systems are negative, except as otherwise noted.      Objective    /80   Pulse 81   Temp 98.1  F (36.7  C) (Temporal)   Resp 14   Wt 76.2 kg (168 lb)   SpO2 97%   BMI 24.63 kg/m    Body mass index is 24.63 kg/m .  Physical Exam    GENERAL: Patient looks comfortable sitting in the chair, but trying to get up out of the chair he moves like he is about 90 years old.  He appears very uncomfortable with movement and takes way longer to extend fully and stand up straight.  Even getting up onto the table he had to use his arms to help very some on the table to step up onto it.  He has not had any falls according to himself but he looks unstable today.  RESP: lungs clear to auscultation - no rales, rhonchi or wheezes  CV: regular rate and rhythm, normal S1 S2, no S3 or S4, no murmur, click or rub, no peripheral edema and peripheral pulses strong  MS: He does not have any synovial thickening in the finger joints no swelling of any of the major joints.  Squeezing the major muscle groups of the arms forearms thighs and calves he is got no tenderness but just movement of the joints causes him discomfort and pain.  He does not have any significant paraspinal muscle tenderness along the lumbar spine.  There is no tenderness over the greater trochanteric regions bilaterally.  His strength is significantly diminished bilaterally with hand grasps, resistance to extension and flexion at the elbow and abduction or abduction of the upper arms.  He also has a little bit more strength in the lower extremity with extension at the knee and flexion at the knee or flexion at the hip.    Labs are pending.               Topical Retinoid counseling:  Patient advised to apply a pea-sized amount only at bedtime and wait 30 minutes after washing their face before applying.  If too drying, patient may add a non-comedogenic moisturizer. The patient verbalized understanding of the proper use and possible adverse effects of retinoids.  All of the patient's questions and concerns were addressed. Doxycycline Counseling:  Patient counseled regarding possible photosensitivity and increased risk for sunburn.  Patient instructed to avoid sunlight, if possible.  When exposed to sunlight, patients should wear protective clothing, sunglasses, and sunscreen.  The patient was instructed to call the office immediately if the following severe adverse effects occur:  hearing changes, easy bruising/bleeding, severe headache, or vision changes.  The patient verbalized understanding of the proper use and possible adverse effects of doxycycline.  All of the patient's questions and concerns were addressed. Topical Sulfur Applications Pregnancy And Lactation Text: This medication is Pregnancy Category C and has an unknown safety profile during pregnancy. It is unknown if this topical medication is excreted in breast milk. Azelaic Acid Counseling: Patient counseled that medicine may cause skin irritation and to avoid applying near the eyes.  In the event of skin irritation, the patient was advised to reduce the amount of the drug applied or use it less frequently.   The patient verbalized understanding of the proper use and possible adverse effects of azelaic acid.  All of the patient's questions and concerns were addressed. High Dose Vitamin A Pregnancy And Lactation Text: High dose vitamin A therapy is contraindicated during pregnancy and breast feeding. Birth Control Pills Counseling: Birth Control Pill Counseling: I discussed with the patient the potential side effects of OCPs including but not limited to increased risk of stroke, heart attack, thrombophlebitis, deep venous thrombosis, hepatic adenomas, breast changes, GI upset, headaches, and depression.  The patient verbalized understanding of the proper use and possible adverse effects of OCPs. All of the patient's questions and concerns were addressed. Winlevi Pregnancy And Lactation Text: This medication is considered safe during pregnancy and breastfeeding. Benzoyl Peroxide Counseling: Patient counseled that medicine may cause skin irritation and bleach clothing.  In the event of skin irritation, the patient was advised to reduce the amount of the drug applied or use it less frequently.   The patient verbalized understanding of the proper use and possible adverse effects of benzoyl peroxide.  All of the patient's questions and concerns were addressed. Azithromycin Counseling:  I discussed with the patient the risks of azithromycin including but not limited to GI upset, allergic reaction, drug rash, diarrhea, and yeast infections. Use Enhanced Medication Counseling?: No Topical Clindamycin Pregnancy And Lactation Text: This medication is Pregnancy Category B and is considered safe during pregnancy. It is unknown if it is excreted in breast milk. Tetracycline Counseling: Patient counseled regarding possible photosensitivity and increased risk for sunburn.  Patient instructed to avoid sunlight, if possible.  When exposed to sunlight, patients should wear protective clothing, sunglasses, and sunscreen.  The patient was instructed to call the office immediately if the following severe adverse effects occur:  hearing changes, easy bruising/bleeding, severe headache, or vision changes.  The patient verbalized understanding of the proper use and possible adverse effects of tetracycline.  All of the patient's questions and concerns were addressed. Patient understands to avoid pregnancy while on therapy due to potential birth defects. Aklief counseling:  Patient advised to apply a pea-sized amount only at bedtime and wait 30 minutes after washing their face before applying.  If too drying, patient may add a non-comedogenic moisturizer.  The most commonly reported side effects including irritation, redness, scaling, dryness, stinging, burning, itching, and increased risk of sunburn.  The patient verbalized understanding of the proper use and possible adverse effects of retinoids.  All of the patient's questions and concerns were addressed. Bactrim Counseling:  I discussed with the patient the risks of sulfa antibiotics including but not limited to GI upset, allergic reaction, drug rash, diarrhea, dizziness, photosensitivity, and yeast infections.  Rarely, more serious reactions can occur including but not limited to aplastic anemia, agranulocytosis, methemoglobinemia, blood dyscrasias, liver or kidney failure, lung infiltrates or desquamative/blistering drug rashes. Isotretinoin Pregnancy And Lactation Text: This medication is Pregnancy Category X and is considered extremely dangerous during pregnancy. It is unknown if it is excreted in breast milk. Azithromycin Pregnancy And Lactation Text: This medication is considered safe during pregnancy and is also secreted in breast milk. Detail Level: Zone Topical Retinoid Pregnancy And Lactation Text: This medication is Pregnancy Category C. It is unknown if this medication is excreted in breast milk. Tazorac Pregnancy And Lactation Text: This medication is not safe during pregnancy. It is unknown if this medication is excreted in breast milk. Spironolactone Counseling: Patient advised regarding risks of diarrhea, abdominal pain, hyperkalemia, birth defects (for female patients), liver toxicity and renal toxicity. The patient may need blood work to monitor liver and kidney function and potassium levels while on therapy. The patient verbalized understanding of the proper use and possible adverse effects of spironolactone.  All of the patient's questions and concerns were addressed. Erythromycin Pregnancy And Lactation Text: This medication is Pregnancy Category B and is considered safe during pregnancy. It is also excreted in breast milk. Minocycline Counseling: Patient advised regarding possible photosensitivity and discoloration of the teeth, skin, lips, tongue and gums.  Patient instructed to avoid sunlight, if possible.  When exposed to sunlight, patients should wear protective clothing, sunglasses, and sunscreen.  The patient was instructed to call the office immediately if the following severe adverse effects occur:  hearing changes, easy bruising/bleeding, severe headache, or vision changes.  The patient verbalized understanding of the proper use and possible adverse effects of minocycline.  All of the patient's questions and concerns were addressed. Birth Control Pills Pregnancy And Lactation Text: This medication should be avoided if pregnant and for the first 30 days post-partum. Benzoyl Peroxide Pregnancy And Lactation Text: This medication is Pregnancy Category C. It is unknown if benzoyl peroxide is excreted in breast milk. Sarecycline Counseling: Patient advised regarding possible photosensitivity and discoloration of the teeth, skin, lips, tongue and gums.  Patient instructed to avoid sunlight, if possible.  When exposed to sunlight, patients should wear protective clothing, sunglasses, and sunscreen.  The patient was instructed to call the office immediately if the following severe adverse effects occur:  hearing changes, easy bruising/bleeding, severe headache, or vision changes.  The patient verbalized understanding of the proper use and possible adverse effects of sarecycline.  All of the patient's questions and concerns were addressed. Dapsone Pregnancy And Lactation Text: This medication is Pregnancy Category C and is not considered safe during pregnancy or breast feeding. Doxycycline Pregnancy And Lactation Text: This medication is Pregnancy Category D and not consider safe during pregnancy. It is also excreted in breast milk but is considered safe for shorter treatment courses. Topical Sulfur Applications Counseling: Topical Sulfur Counseling: Patient counseled that this medication may cause skin irritation or allergic reactions.  In the event of skin irritation, the patient was advised to reduce the amount of the drug applied or use it less frequently.   The patient verbalized understanding of the proper use and possible adverse effects of topical sulfur application.  All of the patient's questions and concerns were addressed. Aklief Pregnancy And Lactation Text: It is unknown if this medication is safe to use during pregnancy.  It is unknown if this medication is excreted in breast milk.  Breastfeeding women should use the topical cream on the smallest area of the skin for the shortest time needed while breastfeeding.  Do not apply to nipple and areola. High Dose Vitamin A Counseling: Side effects reviewed, pt to contact office should one occur. Bactrim Pregnancy And Lactation Text: This medication is Pregnancy Category D and is known to cause fetal risk.  It is also excreted in breast milk. Winlevi Counseling:  I discussed with the patient the risks of topical clascoterone including but not limited to erythema, scaling, itching, and stinging. Patient voiced their understanding. Azelaic Acid Pregnancy And Lactation Text: This medication is considered safe during pregnancy and breast feeding. Topical Clindamycin Counseling: Patient counseled that this medication may cause skin irritation or allergic reactions.  In the event of skin irritation, the patient was advised to reduce the amount of the drug applied or use it less frequently.   The patient verbalized understanding of the proper use and possible adverse effects of clindamycin.  All of the patient's questions and concerns were addressed. Isotretinoin Counseling: Patient should get monthly blood tests, not donate blood, not drive at night if vision affected, not share medication, and not undergo elective surgery for 6 months after tx completed. Side effects reviewed, pt to contact office should one occur. Tazorac Counseling:  Patient advised that medication is irritating and drying.  Patient may need to apply sparingly and wash off after an hour before eventually leaving it on overnight.  The patient verbalized understanding of the proper use and possible adverse effects of tazorac.  All of the patient's questions and concerns were addressed. Spironolactone Pregnancy And Lactation Text: This medication can cause feminization of the male fetus and should be avoided during pregnancy. The active metabolite is also found in breast milk. Erythromycin Counseling:  I discussed with the patient the risks of erythromycin including but not limited to GI upset, allergic reaction, drug rash, diarrhea, increase in liver enzymes, and yeast infections.

## 2023-11-30 ENCOUNTER — APPOINTMENT (OUTPATIENT)
Dept: LAB | Facility: CLINIC | Age: 48
End: 2023-11-30
Payer: COMMERCIAL

## 2023-11-30 ENCOUNTER — INFUSION THERAPY VISIT (OUTPATIENT)
Dept: INFUSION THERAPY | Facility: CLINIC | Age: 48
End: 2023-11-30
Attending: INTERNAL MEDICINE
Payer: COMMERCIAL

## 2023-11-30 VITALS
OXYGEN SATURATION: 99 % | WEIGHT: 156.7 LBS | RESPIRATION RATE: 18 BRPM | HEART RATE: 48 BPM | DIASTOLIC BLOOD PRESSURE: 83 MMHG | TEMPERATURE: 97.8 F | BODY MASS INDEX: 22.65 KG/M2 | SYSTOLIC BLOOD PRESSURE: 144 MMHG

## 2023-11-30 DIAGNOSIS — N05.0 STEROID-DEPENDENT MINIMAL CHANGE GLOMERULOPATHY: ICD-10-CM

## 2023-11-30 DIAGNOSIS — N05.0 MINIMAL CHANGE DISEASE: ICD-10-CM

## 2023-11-30 DIAGNOSIS — N04.9 NEPHROTIC SYNDROME: Primary | ICD-10-CM

## 2023-11-30 LAB
ALBUMIN SERPL BCG-MCNC: 4.4 G/DL (ref 3.5–5.2)
ANION GAP SERPL CALCULATED.3IONS-SCNC: 13 MMOL/L (ref 7–15)
BASOPHILS # BLD AUTO: 0.1 10E3/UL (ref 0–0.2)
BASOPHILS NFR BLD AUTO: 1 %
BUN SERPL-MCNC: 29.2 MG/DL (ref 6–20)
CALCIUM SERPL-MCNC: 9.4 MG/DL (ref 8.6–10)
CHLORIDE SERPL-SCNC: 101 MMOL/L (ref 98–107)
CREAT SERPL-MCNC: 1.37 MG/DL (ref 0.67–1.17)
DEPRECATED HCO3 PLAS-SCNC: 23 MMOL/L (ref 22–29)
EGFRCR SERPLBLD CKD-EPI 2021: 63 ML/MIN/1.73M2
EOSINOPHIL # BLD AUTO: 0.1 10E3/UL (ref 0–0.7)
EOSINOPHIL NFR BLD AUTO: 2 %
ERYTHROCYTE [DISTWIDTH] IN BLOOD BY AUTOMATED COUNT: 12 % (ref 10–15)
GLUCOSE SERPL-MCNC: 90 MG/DL (ref 70–99)
HCT VFR BLD AUTO: 40.9 % (ref 40–53)
HGB BLD-MCNC: 14.2 G/DL (ref 13.3–17.7)
IMM GRANULOCYTES # BLD: 0 10E3/UL
IMM GRANULOCYTES NFR BLD: 0 %
LYMPHOCYTES # BLD AUTO: 1.7 10E3/UL (ref 0.8–5.3)
LYMPHOCYTES NFR BLD AUTO: 27 %
MCH RBC QN AUTO: 30.9 PG (ref 26.5–33)
MCHC RBC AUTO-ENTMCNC: 34.7 G/DL (ref 31.5–36.5)
MCV RBC AUTO: 89 FL (ref 78–100)
MONOCYTES # BLD AUTO: 0.5 10E3/UL (ref 0–1.3)
MONOCYTES NFR BLD AUTO: 8 %
NEUTROPHILS # BLD AUTO: 4.1 10E3/UL (ref 1.6–8.3)
NEUTROPHILS NFR BLD AUTO: 62 %
NRBC # BLD AUTO: 0 10E3/UL
NRBC BLD AUTO-RTO: 0 /100
PHOSPHATE SERPL-MCNC: 3.1 MG/DL (ref 2.5–4.5)
PLATELET # BLD AUTO: 184 10E3/UL (ref 150–450)
POTASSIUM SERPL-SCNC: 4 MMOL/L (ref 3.4–5.3)
RBC # BLD AUTO: 4.6 10E6/UL (ref 4.4–5.9)
SODIUM SERPL-SCNC: 137 MMOL/L (ref 135–145)
WBC # BLD AUTO: 6.5 10E3/UL (ref 4–11)

## 2023-11-30 PROCEDURE — 250N000013 HC RX MED GY IP 250 OP 250 PS 637: Performed by: INTERNAL MEDICINE

## 2023-11-30 PROCEDURE — 258N000003 HC RX IP 258 OP 636: Performed by: INTERNAL MEDICINE

## 2023-11-30 PROCEDURE — 96376 TX/PRO/DX INJ SAME DRUG ADON: CPT

## 2023-11-30 PROCEDURE — 96413 CHEMO IV INFUSION 1 HR: CPT

## 2023-11-30 PROCEDURE — 85014 HEMATOCRIT: CPT | Performed by: INTERNAL MEDICINE

## 2023-11-30 PROCEDURE — 96375 TX/PRO/DX INJ NEW DRUG ADDON: CPT

## 2023-11-30 PROCEDURE — 86037 ANCA TITER EACH ANTIBODY: CPT | Performed by: INTERNAL MEDICINE

## 2023-11-30 PROCEDURE — 86036 ANCA SCREEN EACH ANTIBODY: CPT | Performed by: INTERNAL MEDICINE

## 2023-11-30 PROCEDURE — 80069 RENAL FUNCTION PANEL: CPT | Performed by: INTERNAL MEDICINE

## 2023-11-30 PROCEDURE — 96415 CHEMO IV INFUSION ADDL HR: CPT

## 2023-11-30 PROCEDURE — 250N000011 HC RX IP 250 OP 636: Mod: JZ | Performed by: INTERNAL MEDICINE

## 2023-11-30 PROCEDURE — 36415 COLL VENOUS BLD VENIPUNCTURE: CPT | Performed by: INTERNAL MEDICINE

## 2023-11-30 RX ORDER — METHYLPREDNISOLONE SODIUM SUCCINATE 125 MG/2ML
125 INJECTION, POWDER, LYOPHILIZED, FOR SOLUTION INTRAMUSCULAR; INTRAVENOUS
Status: DISCONTINUED | OUTPATIENT
Start: 2023-11-30 | End: 2023-11-30 | Stop reason: HOSPADM

## 2023-11-30 RX ORDER — DIPHENHYDRAMINE HYDROCHLORIDE 50 MG/ML
50 INJECTION INTRAMUSCULAR; INTRAVENOUS
Status: CANCELLED
Start: 2023-12-14

## 2023-11-30 RX ORDER — METHYLPREDNISOLONE SODIUM SUCCINATE 125 MG/2ML
100 INJECTION, POWDER, LYOPHILIZED, FOR SOLUTION INTRAMUSCULAR; INTRAVENOUS ONCE
Status: CANCELLED | OUTPATIENT
Start: 2023-12-14

## 2023-11-30 RX ORDER — METHYLPREDNISOLONE SODIUM SUCCINATE 125 MG/2ML
100 INJECTION, POWDER, LYOPHILIZED, FOR SOLUTION INTRAMUSCULAR; INTRAVENOUS ONCE
Status: COMPLETED | OUTPATIENT
Start: 2023-11-30 | End: 2023-11-30

## 2023-11-30 RX ORDER — ALBUTEROL SULFATE 90 UG/1
1-2 AEROSOL, METERED RESPIRATORY (INHALATION)
Status: DISCONTINUED | OUTPATIENT
Start: 2023-11-30 | End: 2023-11-30 | Stop reason: HOSPADM

## 2023-11-30 RX ORDER — ALBUTEROL SULFATE 90 UG/1
1-2 AEROSOL, METERED RESPIRATORY (INHALATION)
Status: CANCELLED
Start: 2023-12-14

## 2023-11-30 RX ORDER — EPINEPHRINE 1 MG/ML
0.3 INJECTION, SOLUTION INTRAMUSCULAR; SUBCUTANEOUS EVERY 5 MIN PRN
Status: DISCONTINUED | OUTPATIENT
Start: 2023-11-30 | End: 2023-11-30 | Stop reason: HOSPADM

## 2023-11-30 RX ORDER — DIPHENHYDRAMINE HCL 25 MG
50 CAPSULE ORAL ONCE
Status: COMPLETED | OUTPATIENT
Start: 2023-11-30 | End: 2023-11-30

## 2023-11-30 RX ORDER — ACETAMINOPHEN 325 MG/1
650 TABLET ORAL ONCE
Status: COMPLETED | OUTPATIENT
Start: 2023-11-30 | End: 2023-11-30

## 2023-11-30 RX ORDER — HEPARIN SODIUM (PORCINE) LOCK FLUSH IV SOLN 100 UNIT/ML 100 UNIT/ML
5 SOLUTION INTRAVENOUS
Status: CANCELLED | OUTPATIENT
Start: 2023-12-14

## 2023-11-30 RX ORDER — DIPHENHYDRAMINE HCL 25 MG
50 CAPSULE ORAL ONCE
Status: CANCELLED
Start: 2023-12-14

## 2023-11-30 RX ORDER — EPINEPHRINE 1 MG/ML
0.3 INJECTION, SOLUTION INTRAMUSCULAR; SUBCUTANEOUS EVERY 5 MIN PRN
Status: CANCELLED | OUTPATIENT
Start: 2023-12-14

## 2023-11-30 RX ORDER — ALBUTEROL SULFATE 0.83 MG/ML
2.5 SOLUTION RESPIRATORY (INHALATION)
Status: DISCONTINUED | OUTPATIENT
Start: 2023-11-30 | End: 2023-11-30 | Stop reason: HOSPADM

## 2023-11-30 RX ORDER — HEPARIN SODIUM,PORCINE 10 UNIT/ML
5-20 VIAL (ML) INTRAVENOUS DAILY PRN
Status: CANCELLED | OUTPATIENT
Start: 2023-12-14

## 2023-11-30 RX ORDER — MEPERIDINE HYDROCHLORIDE 25 MG/ML
25 INJECTION INTRAMUSCULAR; INTRAVENOUS; SUBCUTANEOUS EVERY 30 MIN PRN
Status: DISCONTINUED | OUTPATIENT
Start: 2023-11-30 | End: 2023-11-30 | Stop reason: HOSPADM

## 2023-11-30 RX ORDER — METHYLPREDNISOLONE SODIUM SUCCINATE 125 MG/2ML
125 INJECTION, POWDER, LYOPHILIZED, FOR SOLUTION INTRAMUSCULAR; INTRAVENOUS
Status: CANCELLED
Start: 2023-12-14

## 2023-11-30 RX ORDER — ALBUTEROL SULFATE 0.83 MG/ML
2.5 SOLUTION RESPIRATORY (INHALATION)
Status: CANCELLED | OUTPATIENT
Start: 2023-12-14

## 2023-11-30 RX ORDER — DIPHENHYDRAMINE HYDROCHLORIDE 50 MG/ML
50 INJECTION INTRAMUSCULAR; INTRAVENOUS
Status: COMPLETED | OUTPATIENT
Start: 2023-11-30 | End: 2023-11-30

## 2023-11-30 RX ORDER — MEPERIDINE HYDROCHLORIDE 25 MG/ML
25 INJECTION INTRAMUSCULAR; INTRAVENOUS; SUBCUTANEOUS EVERY 30 MIN PRN
Status: CANCELLED | OUTPATIENT
Start: 2023-12-14

## 2023-11-30 RX ORDER — ACETAMINOPHEN 325 MG/1
650 TABLET ORAL ONCE
Status: CANCELLED
Start: 2023-12-14

## 2023-11-30 RX ADMIN — METHYLPREDNISOLONE SODIUM SUCCINATE 100 MG: 125 INJECTION, POWDER, FOR SOLUTION INTRAMUSCULAR; INTRAVENOUS at 10:05

## 2023-11-30 RX ADMIN — RITUXIMAB 1000 MG: 10 INJECTION, SOLUTION INTRAVENOUS at 10:34

## 2023-11-30 RX ADMIN — ACETAMINOPHEN 650 MG: 325 TABLET, FILM COATED ORAL at 09:45

## 2023-11-30 RX ADMIN — METHYLPREDNISOLONE SODIUM SUCCINATE 125 MG: 125 INJECTION, POWDER, FOR SOLUTION INTRAMUSCULAR; INTRAVENOUS at 11:51

## 2023-11-30 RX ADMIN — FAMOTIDINE 20 MG: 10 INJECTION, SOLUTION INTRAVENOUS at 11:57

## 2023-11-30 RX ADMIN — DIPHENHYDRAMINE HYDROCHLORIDE 50 MG: 50 INJECTION INTRAMUSCULAR; INTRAVENOUS at 11:52

## 2023-11-30 RX ADMIN — SODIUM CHLORIDE 250 ML: 9 INJECTION, SOLUTION INTRAVENOUS at 10:06

## 2023-11-30 RX ADMIN — DIPHENHYDRAMINE HYDROCHLORIDE 50 MG: 25 CAPSULE ORAL at 09:45

## 2023-11-30 NOTE — PROGRESS NOTES
Infusion Nursing Note:  Deshawn Flood presents today for first dose of rituximab.    Patient seen by provider today: No   present during visit today: Not Applicable.    Note: Patient alert and oriented. Vital signs stable when he arrived. When placing PIV patient became clammy and pale, cool cloth and ice packs applied to patient. Rituximab started to 50mg/hour. Patient began to to feel tightness in his chest and wheezy. Blood pressure went from 135/79 to 99/69. Paged Dr. Scott, she wanted us to give IV benadryl, pepcid and solumedrol before restarting the infusion. Before restart of infusion blood pressure increased back to baseline, 134/71. Infusion was restarted at 50mg/hour. Patient tolerated infusion up to 400ml/hr.       Intravenous Access:  Peripheral IV placed.    Treatment Conditions:  Biological Infusion Checklist:  ~~~ NOTE: If the patient answers yes to any of the questions below, hold the infusion and contact ordering provider or on-call provider.    Have you recently had an elevated temperature, fever, chills, productive cough, coughing for 3 weeks or longer or hemoptysis,  abnormal vital signs, night sweats,  chest pain or have you noticed a decrease in your appetite, unexplained weight loss or fatigue? No  Do you have any open wounds or new incisions? No  Do you have any upcoming hospitalizations or surgeries? Does not include esophagogastroduodenoscopy, colonoscopy, endoscopic retrograde cholangiopancreatography (ERCP), endoscopic ultrasound (EUS), dental procedures or joint aspiration/steroid injections No  Do you currently have any signs of illness or infection or are you on any antibiotics? No  Have you had any new, sudden or worsening abdominal pain? No  Have you or anyone in your household received a live vaccination in the past 4 weeks? Please note: No live vaccines while on biologic/chemotherapy until 6 months after the last treatment. Patient can receive the flu vaccine (shot  only), pneumovax and the Covid vaccine. It is optimal for the patient to get these vaccines mid cycle, but they can be given at any time as long as it is not on the day of the infusion. No  Have you recently been diagnosed with any new nervous system diseases (ie. Multiple sclerosis, Guillain [place], seizures, neurological changes) or cancer diagnosis? Are you on any form of radiation or chemotherapy? No  Are you pregnant or breast feeding or do you have plans of pregnancy in the future? N/A  Have you been having any signs of worsening depression or suicidal ideations?  (benlysta only) N/A  Have there been any other new onset medical symptoms? No  Have you had any new blood clots? (IVIG only) N/A      Post Infusion Assessment:  Patient tolerated infusion poorly due to : Hypersensitivity: Did patient have a hypersensitivity reaction? : Yes  Drug or Product name: Rituximab  Were pre-meds administered?: Yes  What pre-meds were administered?: Acetaminophen (Tylenol);Diphenhydramine (Benadryl);Methylprednisolone  First or Subsequent treatment: First time receiving  Rate of infusion when patient had hypersensitivity reaction: 50ml/hr  Time the hypersensitivity reaction was first recognized: 1056  Symptoms observed or reported (select all that apply): Chest tightness;Hypotension;Other: (Comment) (patient noticed slight wheezes, not audible. Used own albuterol inhaler)  Interventions/treatment following reaction: Infusion stopped  What hypersensitivity medications were administered?: DiphendydrAMINE (benadryl);Methylprednisolone;NaCl 0.9% Bolus;Famotidine(Pepcid) (Medications given after doctor notifed)  Name of provider notified: Sonia  Time provider notified: 1130  Type of notification (select all that apply): Paged/Phone  Was the patient re-challenged today?: Yes - tolerated well  No evidence of extravasations.  Access discontinued per protocol.       Discharge Plan:   Patient discharged in stable condition accompanied  by: self.  Departure Mode: Ambulatory.      Celia Jimenez RN

## 2023-12-01 LAB
ANCA AB PATTERN SER IF-IMP: NORMAL
C-ANCA TITR SER IF: NORMAL {TITER}

## 2023-12-02 ENCOUNTER — LAB (OUTPATIENT)
Dept: LAB | Facility: CLINIC | Age: 48
End: 2023-12-02
Payer: COMMERCIAL

## 2023-12-02 DIAGNOSIS — N05.0 MINIMAL CHANGE DISEASE: ICD-10-CM

## 2023-12-02 DIAGNOSIS — N04.9 NEPHROTIC SYNDROME: ICD-10-CM

## 2023-12-02 LAB
ALBUMIN SERPL BCG-MCNC: 4 G/DL (ref 3.5–5.2)
ANION GAP SERPL CALCULATED.3IONS-SCNC: 12 MMOL/L (ref 7–15)
BUN SERPL-MCNC: 28.7 MG/DL (ref 6–20)
CALCIUM SERPL-MCNC: 8.8 MG/DL (ref 8.6–10)
CHLORIDE SERPL-SCNC: 105 MMOL/L (ref 98–107)
CREAT SERPL-MCNC: 1.28 MG/DL (ref 0.67–1.17)
DEPRECATED HCO3 PLAS-SCNC: 24 MMOL/L (ref 22–29)
EGFRCR SERPLBLD CKD-EPI 2021: 69 ML/MIN/1.73M2
GLUCOSE SERPL-MCNC: 98 MG/DL (ref 70–99)
PHOSPHATE SERPL-MCNC: 3 MG/DL (ref 2.5–4.5)
POTASSIUM SERPL-SCNC: 4.1 MMOL/L (ref 3.4–5.3)
SODIUM SERPL-SCNC: 141 MMOL/L (ref 135–145)

## 2023-12-02 PROCEDURE — 36415 COLL VENOUS BLD VENIPUNCTURE: CPT

## 2023-12-02 PROCEDURE — 80069 RENAL FUNCTION PANEL: CPT

## 2023-12-11 ENCOUNTER — OFFICE VISIT (OUTPATIENT)
Dept: NEPHROLOGY | Facility: CLINIC | Age: 48
End: 2023-12-11
Payer: COMMERCIAL

## 2023-12-11 VITALS
HEART RATE: 70 BPM | SYSTOLIC BLOOD PRESSURE: 143 MMHG | DIASTOLIC BLOOD PRESSURE: 86 MMHG | WEIGHT: 158 LBS | BODY MASS INDEX: 22.83 KG/M2

## 2023-12-11 DIAGNOSIS — R80.1 PERSISTENT PROTEINURIA: ICD-10-CM

## 2023-12-11 DIAGNOSIS — N05.0 MINIMAL CHANGE DISEASE: Primary | ICD-10-CM

## 2023-12-11 PROCEDURE — 99214 OFFICE O/P EST MOD 30 MIN: CPT | Performed by: INTERNAL MEDICINE

## 2023-12-11 NOTE — PROGRESS NOTES
Assessment and Plan:  48 year old male who presents for followup of minimal change disease. He presented for evaluation after presenting to ER where CT scan noted ascites and nonspecific small bowel enteritis, and UA showed proteinuria. He had biopsy which showed minimal change on 4/9/2019 and treated with prednisone 70mg. He was in remission and down to prednsione 10mg then stopped it, and had relapse within a couple months.  His mother noted swelling and he was up 20-25 lbs and proteinuria up to 6 grams/g cr. He was on maintenance cyclosporine since last relapse in November 2021, and another relapse November 2022 and another in June 2023.  # Minimal change disease/ nephrotic syndrome, steroid dependent/ relapsing-  another relapse noted with proteinuria up to 6 grams, despite being on cyclosporine, trough levels had been near 35-40 on cyclosporine 50mg twice a day.- we increased cyclosporine but he was confused and only taking 50mg twice a day, now will be on 100mg twice a day  - Prednisone round #2 started March 2020, tried to do a longer wean once on lower doses.  - Prednisone round #3 started April 20,2021.  - Prednisone round #4 November 2022  -Prednisone round #5 June 2023 - weaning in December 2023  - weight is stable he is typically 160-165 lbs, he is down to 158lbs  - will remain on 5mg prednisone as he does not want to risk flaring during work season.  - continue labs monthly for now including cyclosporine- willl continue cyclosporine at 100mg bid, level was 94 on 125mg bid, thus will lower and hope to stop after rituximab infusions given recurrent flares.  - Rituximab infusion done on 12/1 and 12/14 - he had mild reaction (low heart rate, infusion held, re-medicated and finished infusion)    He can stop bactrim as he is now down to 10mg of prednisone  - monitor carefully given immunosuppression  - update vaccination  - discussed importance of seeing PCP regularly, age appropriate screening (he is against  cscope but may be ok to do cologuard or other options)  -labs every 2 months, rtc in 3 months  # Renal function- CKD with baseline Scr now 1-1.1- CKD or due to cyclosporine and hemodynamic factors-. It was up to 1.6-1.8 at time of biopsy after diuretics were started.  - scr back to 1-1.2 range, up to 1.4 a few months ago, likely CSA hemodynamic effect  - monitor and encouraged hydration- stable on recent labs.   - proteinuria improved very quickly down to normal once prednisone restarted , down from 13 grams to normal  Labs now can be every other month, will stop cyclosporine, Rituximab infusion #1 on 12/1, infusion #2 on 12/14, and will stop cyclosporine  - has still been taking prednisone, will wean to 2.5mg then off     - discussed that his is an immunosuppressant, that it increases risk of infection and malignancy.  - discussed smoking cessation, he also chews tobacco and drinks mountain dew  # Hyperlipidemia- LDL very elevated,was on low dose statin but stopped in setting of CNI and LFT elevated- it was restarted by PCP  - recheck lipid panel and LFTs - can stop lipitor if lipids back to normal    # Hypertension: BP is good at 110/70s, was higher initially   - continue lisinopril, increase to 40mg if BP >125/80  - will recheck BP once off prednisone and cyclosporine    # Not anemic- hgb has been high at times, monitor    # Electrolytes: normal low potassium      # Mineral Bone Disorder:   Check baseline PTH if creatinine remains elevated.     - RTC in 3-4 months and labs monthly for now  - >40 minutes spent on day of service in counseling and coordination of care.       Assessment and plan was discussed with patient and he voiced his understanding and agreement.    Reason for Visit:  Deshawn Flood is a 48 year old male with nephrotic range proteinuria noted and biopsy 4/9/19 shows minimal change disease    HPI:  He is a 47 year old male with minimal change disease who follows up. He initially presented with  nephrotic syndrome and underwent kidney biopsy [id number] and it showed minimal change disease. He was admitted to the hospital briefly after biopsy as he was having poor appetite due to bloating, and elevated creatinine.    Since then he went into remission, we tapered down on prednisone and unfortunately he relapsed within a couple months (6g/g cr February 2020),  thus we restarted prednisone in March 2020 and by early April he was down to normal range proteinuria and he was feeling better.    His weight is usually closer to 150-160  but was up to 184 lbs with nephrotic syndrome, now back to 160lbs. He is on prednisone wean and may have dropped faster than instructed, now on 10mg daily  Weight stable at 160-165 lbs  Creatinine is a little higher at 1.3 today but also has higher level of cyclosporine noted, as he is taking 125mg bid     Renal History:   None prior to minimal change disease Spring 2019    ROS:   A comprehensive review of systems was obtained and negative, except as noted in the HPI or PMH.    Active Medical Problems:  Patient Active Problem List   Diagnosis    Loose body of left knee    Left knee pain    Tobacco abuse    History of pneumothorax    CARDIOVASCULAR SCREENING; LDL GOAL LESS THAN 160    Nephrotic syndrome    Anasarca    Minimal change disease    Immunosuppression (H24)    Crohn's disease of small intestine without complication (H)    Elevated hemoglobin (H24)    Chronic kidney disease, stage 3a (H)    Steroid-dependent minimal change glomerulopathy     PMH:   Medical record was reviewed and PMH was discussed with patient and noted below.  Past Medical History:   Diagnosis Date    Anemia     Other spontaneous pneumothorax     SPONTANEOUS PNEUMOTHORAX NEC 3/25/2005    Unspecified asthma(493.90)      PSH:   Past Surgical History:   Procedure Laterality Date    ARTHROSCOPY KNEE  8/3/2012    Procedure: ARTHROSCOPY KNEE;  Left knee Arthroscopy with removal of loose bodies;  Surgeon: Bibi Roberts  MD Janie;  Location: [facility] OR    IR RENAL BIOPSY LEFT  4/9/2019    TUBE THORACOSTOMY,ABSC/EMPYEMA/HEMO  3/20/2005    Small chest tube with Heimlich valve.    ZZHC REPAIR UMBILICAL GODFREY,<6Y/O,REDUC  1978       Family Hx:   Family History   Problem Relation Age of Onset    Autoimmune Disease No family hx of      Personal Hx:   Social History     Tobacco Use    Smoking status: Light Smoker     Packs/day: 0.10     Years: 15.00     Additional pack years: 0.00     Total pack years: 1.50     Types: Cigarettes    Smokeless tobacco: Former     Types: Chew    Tobacco comments:     1 pack per day   Substance Use Topics    Alcohol use: Yes     Alcohol/week: 17.5 standard drinks of alcohol     Types: 21 Cans of beer per week     Comment: occ       Allergies:  Allergies   Allergen Reactions    Bee Venom      swells up    Seasonal Allergies        Medications:  Current Outpatient Medications   Medication Sig    albuterol (PROAIR HFA/PROVENTIL HFA/VENTOLIN HFA) 108 (90 Base) MCG/ACT inhaler Inhale 2 puffs into the lungs every 6 hours as needed for shortness of breath, wheezing or cough    albuterol (VENTOLIN HFA) 108 (90 Base) MCG/ACT inhaler INHALE ONE TO TWO PUFFS BY MOUTH EVERY 6 HOURS AS NEEDED FOR SHORTNESS OF BREATH, DIFFICULTY BREATHING OR WHEEZING (NEED TO BE SEEN IN CLINIC FOR FURTHER REFILLS)    cycloSPORINE modified (GENERIC EQUIVALENT) 100 MG capsule Take 1 capsule (100 mg) by mouth 2 times daily    lisinopril (ZESTRIL) 20 MG tablet Take 1 tablet (20 mg) by mouth daily    predniSONE (DELTASONE) 5 MG tablet Take 1 tablet (5 mg) by mouth daily Take 10mg for one week, and then stay on 5mg until we discuss rituximab     No current facility-administered medications for this visit.      Vitals:  BP (!) 143/86   Pulse 70   Wt 71.7 kg (158 lb)   BMI 22.83 kg/m    154-155 lbs  Exam:  GENERAL : alert and no distress  Speaks in full sentences without dyspnea  Lungs- some coarse exp rhonchi  cv- regular rate  Normal speech and  thought pattern  Ext: no signficant edema    LABS:   CMP  Recent Labs   Lab Test 12/02/23  0753 11/30/23  0912 11/04/23  0747 10/07/23  0756 08/14/21  0807 07/10/21  0829 06/05/21  0743 05/15/21  0757 04/10/21  0745    137 138 140   < > 139 138 138 141   POTASSIUM 4.1 4.0 4.0 4.0   < > 4.1 3.4 3.8 3.6   CHLORIDE 105 101 104 104   < > 110* 105 103 109   CO2 24 23 26 23   < > 26 29 32 28   ANIONGAP 12 13 8 13   < > 3 4 3 4   GLC 98 90 93 102*   < > 84 89 95 126*   BUN 28.7* 29.2* 23.4* 22.7*   < > 19 17 14 14   CR 1.28* 1.37* 1.22* 1.15   < > 1.06 1.11 1.11 0.98   GFRESTIMATED 69 63 73 78   < > 83 79 79 >90   GFRESTBLACK  --   --   --   --   --  >90 >90 >90 >90   BASIA 8.8 9.4 9.1 9.4   < > 9.2 8.5 8.6 8.7    < > = values in this interval not displayed.     Recent Labs   Lab Test 05/07/22  0748 04/09/22  0749 03/05/22  0750 01/08/22  0758   BILITOTAL 0.5 1.3 2.0* 1.1   ALKPHOS 55 40 33* 47   ALT 20 24 36 49   AST 12 21 16 14     CBC  Recent Labs   Lab Test 11/30/23  0912 11/04/23  0747 10/07/23  0756 09/02/23  0754   HGB 14.2 13.0* 12.7* 14.4   WBC 6.5 4.7 5.6 6.8   RBC 4.60 4.13* 4.05* 4.57   HCT 40.9 37.2* 36.4* 39.9*   MCV 89 90 90 87   MCH 30.9 31.5 31.4 31.5   MCHC 34.7 34.9 34.9 36.1   RDW 12.0 12.0 11.7 11.8    184 182 198     URINE STUDIES  Recent Labs   Lab Test 08/14/21  0819 12/14/20  0037 09/12/20  0841 02/28/20  1620 04/01/19  1500 03/29/19  1858   COLOR Yellow Yellow Yellow Yellow Yellow Yellow   APPEARANCE Clear Clear Clear Clear Clear Clear   URINEGLC Negative Negative Negative Negative Negative Negative   URINEBILI Negative Negative Negative Negative Negative Negative   URINEKETONE Negative Negative Negative Negative Negative Negative   SG 1.026 1.019 1.021 >1.030 1.015 >1.035*   UBLD Negative Negative Negative Moderate* Moderate* Moderate*   URINEPH 6.0 5.0 5.0 6.0 6.0 6.0   PROTEIN Negative Negative Negative >=300* >=300* >499*   UROBILINOGEN  --   --   --  0.2 0.2  --    NITRITE Negative  Negative Negative Negative Negative Negative   LEUKEST Negative Negative Negative Negative Negative Negative   RBCU <1  --   --  O - 2 25-50* 7*   WBCU <1  --   --  0 - 5 0 - 5 7*     Recent Labs   Lab Test 12/06/21  1435 04/03/20  0900 02/28/20  1619 11/26/19  1435   UTPG 3.62* 0.18 6.20* 0.06     PTH  No lab results found.  IRON STUDIES  Recent Labs   Lab Test 06/04/22  0803   IRON 144      IRONSAT 52*   REYNA 369       IMPRESSION:  1. Findings are concerning for diffuse small bowel enteritis likely  infectious or inflammatory in etiology, with associated inflammatory  changes of the mesenteric adipose tissues, moderate ascites, small  bilateral probably reactive pleural effusions and with some edema in  the subcutaneous adipose tissues.  2. No significant atherosclerosis is identified. No obvious arterial  blockage is seen in the mesenteric vessels to suggest ischemia.  3. Mild degenerative changes of the spine and hips. No acute fracture  or aggressive osseous lesion.  Jenny Scott MD

## 2023-12-11 NOTE — PATIENT INSTRUCTIONS
Prednisone, cut them in half  Take half a pill for 3 days, then every other day until done with the pills  Stop cyclosporine on the day of rituximab.    Labs every 2 months starting early February    Appointment June 24 - 15 min slot

## 2023-12-14 ENCOUNTER — APPOINTMENT (OUTPATIENT)
Dept: LAB | Facility: CLINIC | Age: 48
End: 2023-12-14
Payer: COMMERCIAL

## 2023-12-14 ENCOUNTER — INFUSION THERAPY VISIT (OUTPATIENT)
Dept: INFUSION THERAPY | Facility: CLINIC | Age: 48
End: 2023-12-14
Attending: INTERNAL MEDICINE
Payer: COMMERCIAL

## 2023-12-14 VITALS
BODY MASS INDEX: 21.98 KG/M2 | DIASTOLIC BLOOD PRESSURE: 65 MMHG | TEMPERATURE: 98.1 F | OXYGEN SATURATION: 98 % | HEART RATE: 53 BPM | WEIGHT: 152.1 LBS | RESPIRATION RATE: 16 BRPM | SYSTOLIC BLOOD PRESSURE: 110 MMHG

## 2023-12-14 DIAGNOSIS — N05.0 STEROID-DEPENDENT MINIMAL CHANGE GLOMERULOPATHY: ICD-10-CM

## 2023-12-14 DIAGNOSIS — N04.9 NEPHROTIC SYNDROME: Primary | ICD-10-CM

## 2023-12-14 DIAGNOSIS — N05.0 MINIMAL CHANGE DISEASE: ICD-10-CM

## 2023-12-14 DIAGNOSIS — N04.9 NEPHROTIC SYNDROME: ICD-10-CM

## 2023-12-14 DIAGNOSIS — R80.1 PERSISTENT PROTEINURIA: ICD-10-CM

## 2023-12-14 LAB
ALBUMIN MFR UR ELPH: 17.2 MG/DL
ALBUMIN SERPL BCG-MCNC: 4.3 G/DL (ref 3.5–5.2)
ALBUMIN UR-MCNC: NEGATIVE MG/DL
ANION GAP SERPL CALCULATED.3IONS-SCNC: 12 MMOL/L (ref 7–15)
APPEARANCE UR: CLEAR
BASOPHILS # BLD AUTO: 0.1 10E3/UL (ref 0–0.2)
BASOPHILS NFR BLD AUTO: 1 %
BILIRUB UR QL STRIP: NEGATIVE
BUN SERPL-MCNC: 18.4 MG/DL (ref 6–20)
CALCIUM SERPL-MCNC: 9.6 MG/DL (ref 8.6–10)
CD19 B CELL COMMENT: ABNORMAL
CD19 CELLS # BLD: 1 CELLS/UL (ref 107–698)
CD19 CELLS NFR BLD: <1 % (ref 6–27)
CHLORIDE SERPL-SCNC: 103 MMOL/L (ref 98–107)
COLOR UR AUTO: ABNORMAL
CREAT SERPL-MCNC: 1.4 MG/DL (ref 0.67–1.17)
CREAT UR-MCNC: 461.9 MG/DL
DEPRECATED HCO3 PLAS-SCNC: 25 MMOL/L (ref 22–29)
EGFRCR SERPLBLD CKD-EPI 2021: 62 ML/MIN/1.73M2
EOSINOPHIL # BLD AUTO: 0.1 10E3/UL (ref 0–0.7)
EOSINOPHIL NFR BLD AUTO: 1 %
ERYTHROCYTE [DISTWIDTH] IN BLOOD BY AUTOMATED COUNT: 11.7 % (ref 10–15)
GLUCOSE SERPL-MCNC: 104 MG/DL (ref 70–99)
GLUCOSE UR STRIP-MCNC: NEGATIVE MG/DL
HCT VFR BLD AUTO: 45.2 % (ref 40–53)
HGB BLD-MCNC: 15.8 G/DL (ref 13.3–17.7)
HGB UR QL STRIP: NEGATIVE
IMM GRANULOCYTES # BLD: 0 10E3/UL
IMM GRANULOCYTES NFR BLD: 0 %
KETONES UR STRIP-MCNC: NEGATIVE MG/DL
LEUKOCYTE ESTERASE UR QL STRIP: NEGATIVE
LYMPHOCYTES # BLD AUTO: 1.3 10E3/UL (ref 0.8–5.3)
LYMPHOCYTES NFR BLD AUTO: 17 %
MCH RBC QN AUTO: 31.3 PG (ref 26.5–33)
MCHC RBC AUTO-ENTMCNC: 35 G/DL (ref 31.5–36.5)
MCV RBC AUTO: 90 FL (ref 78–100)
MONOCYTES # BLD AUTO: 0.6 10E3/UL (ref 0–1.3)
MONOCYTES NFR BLD AUTO: 8 %
MUCOUS THREADS #/AREA URNS LPF: PRESENT /LPF
NEUTROPHILS # BLD AUTO: 5.4 10E3/UL (ref 1.6–8.3)
NEUTROPHILS NFR BLD AUTO: 73 %
NITRATE UR QL: NEGATIVE
NRBC # BLD AUTO: 0 10E3/UL
NRBC BLD AUTO-RTO: 0 /100
PH UR STRIP: 5 [PH] (ref 5–7)
PHOSPHATE SERPL-MCNC: 2.8 MG/DL (ref 2.5–4.5)
PLATELET # BLD AUTO: 202 10E3/UL (ref 150–450)
POTASSIUM SERPL-SCNC: 4.5 MMOL/L (ref 3.4–5.3)
PROT/CREAT 24H UR: 0.04 MG/MG CR (ref 0–0.2)
RBC # BLD AUTO: 5.05 10E6/UL (ref 4.4–5.9)
RBC URINE: 1 /HPF
SODIUM SERPL-SCNC: 140 MMOL/L (ref 135–145)
SP GR UR STRIP: 1.03 (ref 1–1.03)
SQUAMOUS EPITHELIAL: 1 /HPF
UROBILINOGEN UR STRIP-MCNC: NORMAL MG/DL
WBC # BLD AUTO: 7.5 10E3/UL (ref 4–11)
WBC URINE: 2 /HPF

## 2023-12-14 PROCEDURE — 96375 TX/PRO/DX INJ NEW DRUG ADDON: CPT

## 2023-12-14 PROCEDURE — 96413 CHEMO IV INFUSION 1 HR: CPT

## 2023-12-14 PROCEDURE — 81001 URINALYSIS AUTO W/SCOPE: CPT

## 2023-12-14 PROCEDURE — 250N000013 HC RX MED GY IP 250 OP 250 PS 637: Performed by: INTERNAL MEDICINE

## 2023-12-14 PROCEDURE — 96415 CHEMO IV INFUSION ADDL HR: CPT

## 2023-12-14 PROCEDURE — 258N000003 HC RX IP 258 OP 636: Performed by: INTERNAL MEDICINE

## 2023-12-14 PROCEDURE — 86355 B CELLS TOTAL COUNT: CPT

## 2023-12-14 PROCEDURE — 250N000011 HC RX IP 250 OP 636: Mod: JZ | Performed by: INTERNAL MEDICINE

## 2023-12-14 PROCEDURE — 36415 COLL VENOUS BLD VENIPUNCTURE: CPT | Performed by: INTERNAL MEDICINE

## 2023-12-14 PROCEDURE — 82040 ASSAY OF SERUM ALBUMIN: CPT | Performed by: INTERNAL MEDICINE

## 2023-12-14 PROCEDURE — 86037 ANCA TITER EACH ANTIBODY: CPT | Performed by: INTERNAL MEDICINE

## 2023-12-14 PROCEDURE — 84156 ASSAY OF PROTEIN URINE: CPT

## 2023-12-14 PROCEDURE — 85025 COMPLETE CBC W/AUTO DIFF WBC: CPT | Performed by: INTERNAL MEDICINE

## 2023-12-14 PROCEDURE — 86036 ANCA SCREEN EACH ANTIBODY: CPT | Performed by: INTERNAL MEDICINE

## 2023-12-14 RX ORDER — DIPHENHYDRAMINE HCL 25 MG
50 CAPSULE ORAL ONCE
Status: COMPLETED | OUTPATIENT
Start: 2023-12-14 | End: 2023-12-14

## 2023-12-14 RX ORDER — ACETAMINOPHEN 325 MG/1
650 TABLET ORAL ONCE
Status: CANCELLED
Start: 2023-12-14

## 2023-12-14 RX ORDER — EPINEPHRINE 1 MG/ML
0.3 INJECTION, SOLUTION, CONCENTRATE INTRAVENOUS EVERY 5 MIN PRN
Status: CANCELLED | OUTPATIENT
Start: 2023-12-14

## 2023-12-14 RX ORDER — ACETAMINOPHEN 325 MG/1
650 TABLET ORAL ONCE
Status: COMPLETED | OUTPATIENT
Start: 2023-12-14 | End: 2023-12-14

## 2023-12-14 RX ORDER — METHYLPREDNISOLONE SODIUM SUCCINATE 125 MG/2ML
125 INJECTION, POWDER, LYOPHILIZED, FOR SOLUTION INTRAMUSCULAR; INTRAVENOUS
Status: CANCELLED
Start: 2023-12-14

## 2023-12-14 RX ORDER — DIPHENHYDRAMINE HYDROCHLORIDE 50 MG/ML
50 INJECTION INTRAMUSCULAR; INTRAVENOUS
Status: CANCELLED
Start: 2023-12-14

## 2023-12-14 RX ORDER — ALBUTEROL SULFATE 0.83 MG/ML
2.5 SOLUTION RESPIRATORY (INHALATION)
Status: CANCELLED | OUTPATIENT
Start: 2023-12-14

## 2023-12-14 RX ORDER — METHYLPREDNISOLONE SODIUM SUCCINATE 125 MG/2ML
100 INJECTION, POWDER, LYOPHILIZED, FOR SOLUTION INTRAMUSCULAR; INTRAVENOUS ONCE
Status: COMPLETED | OUTPATIENT
Start: 2023-12-14 | End: 2023-12-14

## 2023-12-14 RX ORDER — HEPARIN SODIUM (PORCINE) LOCK FLUSH IV SOLN 100 UNIT/ML 100 UNIT/ML
5 SOLUTION INTRAVENOUS
Status: CANCELLED | OUTPATIENT
Start: 2023-12-14

## 2023-12-14 RX ORDER — DIPHENHYDRAMINE HCL 25 MG
50 CAPSULE ORAL ONCE
Status: CANCELLED
Start: 2023-12-14

## 2023-12-14 RX ORDER — METHYLPREDNISOLONE SODIUM SUCCINATE 125 MG/2ML
100 INJECTION, POWDER, LYOPHILIZED, FOR SOLUTION INTRAMUSCULAR; INTRAVENOUS ONCE
Status: CANCELLED | OUTPATIENT
Start: 2023-12-14

## 2023-12-14 RX ORDER — MEPERIDINE HYDROCHLORIDE 25 MG/ML
25 INJECTION INTRAMUSCULAR; INTRAVENOUS; SUBCUTANEOUS EVERY 30 MIN PRN
Status: CANCELLED | OUTPATIENT
Start: 2023-12-14

## 2023-12-14 RX ORDER — HEPARIN SODIUM,PORCINE 10 UNIT/ML
5-20 VIAL (ML) INTRAVENOUS DAILY PRN
Status: CANCELLED | OUTPATIENT
Start: 2023-12-14

## 2023-12-14 RX ORDER — ALBUTEROL SULFATE 90 UG/1
1-2 AEROSOL, METERED RESPIRATORY (INHALATION)
Status: CANCELLED
Start: 2023-12-14

## 2023-12-14 RX ADMIN — METHYLPREDNISOLONE SODIUM SUCCINATE 100 MG: 125 INJECTION, POWDER, FOR SOLUTION INTRAMUSCULAR; INTRAVENOUS at 09:39

## 2023-12-14 RX ADMIN — ACETAMINOPHEN 650 MG: 325 TABLET, FILM COATED ORAL at 09:37

## 2023-12-14 RX ADMIN — SODIUM CHLORIDE 250 ML: 9 INJECTION, SOLUTION INTRAVENOUS at 09:30

## 2023-12-14 RX ADMIN — RITUXIMAB 1000 MG: 10 INJECTION, SOLUTION INTRAVENOUS at 10:10

## 2023-12-14 RX ADMIN — FAMOTIDINE 20 MG: 10 INJECTION, SOLUTION INTRAVENOUS at 09:44

## 2023-12-14 RX ADMIN — DIPHENHYDRAMINE HYDROCHLORIDE 50 MG: 25 CAPSULE ORAL at 09:37

## 2023-12-14 ASSESSMENT — PAIN SCALES - GENERAL: PAINLEVEL: NO PAIN (0)

## 2023-12-14 NOTE — PROGRESS NOTES
Infusion Nursing Note:  Deshawn VENKAT Flood presents today for Rituximab 2 of 2..    Patient seen by provider today: No   present during visit today: Not Applicable.    Note: Pt had vasovagal response with IV placement. Pt said the same thing happened in lab (as well as last visit with IV placement). Placed pt in supine position with cool washcloth. Gradually recovered.      Intravenous Access:  Peripheral IV placed.    Treatment Conditions:  Biological Infusion Checklist:  ~~~ NOTE: If the patient answers yes to any of the questions below, hold the infusion and contact ordering provider or on-call provider.    Have you recently had an elevated temperature, fever, chills, productive cough, coughing for 3 weeks or longer or hemoptysis,  abnormal vital signs, night sweats,  chest pain or have you noticed a decrease in your appetite, unexplained weight loss or fatigue? No  Do you have any open wounds or new incisions? No  Do you have any upcoming hospitalizations or surgeries? Does not include esophagogastroduodenoscopy, colonoscopy, endoscopic retrograde cholangiopancreatography (ERCP), endoscopic ultrasound (EUS), dental procedures or joint aspiration/steroid injections No  Do you currently have any signs of illness or infection or are you on any antibiotics? No  Have you had any new, sudden or worsening abdominal pain? No  Have you or anyone in your household received a live vaccination in the past 4 weeks? Please note: No live vaccines while on biologic/chemotherapy until 6 months after the last treatment. Patient can receive the flu vaccine (shot only), pneumovax and the Covid vaccine. It is optimal for the patient to get these vaccines mid cycle, but they can be given at any time as long as it is not on the day of the infusion. No  Have you recently been diagnosed with any new nervous system diseases (ie. Multiple sclerosis, Guillain Leon, seizures, neurological changes) or cancer diagnosis? Are you on any  form of radiation or chemotherapy? No  Are you pregnant or breast feeding or do you have plans of pregnancy in the future? No  Have you been having any signs of worsening depression or suicidal ideations?  (benlysta only) No  Have there been any other new onset medical symptoms? No  Have you had any new blood clots? (IVIG only) No      Post Infusion Assessment:  Patient tolerated infusion without incident.       Discharge Plan:   Patient discharged in stable condition accompanied by: self.  Departure Mode: Ambulatory.      Alisha Rutherford RN

## 2023-12-15 LAB
ANCA AB PATTERN SER IF-IMP: NORMAL
C-ANCA TITR SER IF: NORMAL {TITER}

## 2023-12-15 RX ORDER — LISINOPRIL 20 MG/1
20 TABLET ORAL DAILY
Qty: 90 TABLET | Refills: 3 | Status: SHIPPED | OUTPATIENT
Start: 2023-12-15

## 2023-12-18 ENCOUNTER — PATIENT OUTREACH (OUTPATIENT)
Dept: NEPHROLOGY | Facility: CLINIC | Age: 48
End: 2023-12-18
Payer: COMMERCIAL

## 2023-12-18 NOTE — PROGRESS NOTES
Spoke with patient regarding Dr Scott's result note:    As dicussed , you will stop the cyclosporine now after this infusion and we will monitor .     Patient stated he is aware and did read her result note. BP last Rituxumab infusion was 110/65 1/14/23.  NIKCI Dsouza Care Coordinator  Nephrology

## 2024-02-03 ENCOUNTER — LAB (OUTPATIENT)
Dept: LAB | Facility: CLINIC | Age: 49
End: 2024-02-03
Payer: COMMERCIAL

## 2024-02-03 DIAGNOSIS — R80.1 PERSISTENT PROTEINURIA: ICD-10-CM

## 2024-02-03 DIAGNOSIS — N05.0 MINIMAL CHANGE DISEASE: ICD-10-CM

## 2024-02-03 LAB
ALBUMIN MFR UR ELPH: 8.8 MG/DL
ALBUMIN SERPL BCG-MCNC: 4.4 G/DL (ref 3.5–5.2)
ALBUMIN UR-MCNC: NEGATIVE MG/DL
ANION GAP SERPL CALCULATED.3IONS-SCNC: 12 MMOL/L (ref 7–15)
APPEARANCE UR: CLEAR
BILIRUB UR QL STRIP: NEGATIVE
BUN SERPL-MCNC: 10.9 MG/DL (ref 6–20)
CALCIUM SERPL-MCNC: 9.4 MG/DL (ref 8.6–10)
CD19 B CELL COMMENT: ABNORMAL
CD19 CELLS # BLD: <1 CELLS/UL (ref 107–698)
CD19 CELLS NFR BLD: <1 % (ref 6–27)
CHLORIDE SERPL-SCNC: 102 MMOL/L (ref 98–107)
COLOR UR AUTO: YELLOW
CREAT SERPL-MCNC: 1.19 MG/DL (ref 0.67–1.17)
CREAT UR-MCNC: 260.5 MG/DL
DEPRECATED HCO3 PLAS-SCNC: 24 MMOL/L (ref 22–29)
EGFRCR SERPLBLD CKD-EPI 2021: 75 ML/MIN/1.73M2
ERYTHROCYTE [DISTWIDTH] IN BLOOD BY AUTOMATED COUNT: 11.5 % (ref 10–15)
GLUCOSE SERPL-MCNC: 102 MG/DL (ref 70–99)
GLUCOSE UR STRIP-MCNC: NEGATIVE MG/DL
HCT VFR BLD AUTO: 43.2 % (ref 40–53)
HGB BLD-MCNC: 15.3 G/DL (ref 13.3–17.7)
HGB UR QL STRIP: NEGATIVE
KETONES UR STRIP-MCNC: NEGATIVE MG/DL
LEUKOCYTE ESTERASE UR QL STRIP: NEGATIVE
MCH RBC QN AUTO: 31 PG (ref 26.5–33)
MCHC RBC AUTO-ENTMCNC: 35.4 G/DL (ref 31.5–36.5)
MCV RBC AUTO: 88 FL (ref 78–100)
MUCOUS THREADS #/AREA URNS LPF: PRESENT /LPF
NITRATE UR QL: NEGATIVE
PH UR STRIP: 5 [PH] (ref 5–7)
PHOSPHATE SERPL-MCNC: 2.5 MG/DL (ref 2.5–4.5)
PLATELET # BLD AUTO: 208 10E3/UL (ref 150–450)
POTASSIUM SERPL-SCNC: 3.7 MMOL/L (ref 3.4–5.3)
PROT/CREAT 24H UR: 0.03 MG/MG CR (ref 0–0.2)
RBC # BLD AUTO: 4.93 10E6/UL (ref 4.4–5.9)
RBC URINE: 0 /HPF
SODIUM SERPL-SCNC: 138 MMOL/L (ref 135–145)
SP GR UR STRIP: 1.02 (ref 1–1.03)
SQUAMOUS EPITHELIAL: <1 /HPF
UROBILINOGEN UR STRIP-MCNC: NORMAL MG/DL
WBC # BLD AUTO: 6.4 10E3/UL (ref 4–11)
WBC URINE: 0 /HPF

## 2024-02-03 PROCEDURE — 85027 COMPLETE CBC AUTOMATED: CPT

## 2024-02-03 PROCEDURE — 84156 ASSAY OF PROTEIN URINE: CPT

## 2024-02-03 PROCEDURE — 36415 COLL VENOUS BLD VENIPUNCTURE: CPT

## 2024-02-03 PROCEDURE — 86355 B CELLS TOTAL COUNT: CPT

## 2024-02-03 PROCEDURE — 80069 RENAL FUNCTION PANEL: CPT

## 2024-02-03 PROCEDURE — 81001 URINALYSIS AUTO W/SCOPE: CPT

## 2024-03-04 ENCOUNTER — OFFICE VISIT (OUTPATIENT)
Dept: NEPHROLOGY | Facility: CLINIC | Age: 49
End: 2024-03-04
Payer: COMMERCIAL

## 2024-03-04 VITALS
TEMPERATURE: 97.8 F | OXYGEN SATURATION: 99 % | DIASTOLIC BLOOD PRESSURE: 68 MMHG | HEIGHT: 69 IN | HEART RATE: 45 BPM | SYSTOLIC BLOOD PRESSURE: 104 MMHG | BODY MASS INDEX: 22.78 KG/M2 | WEIGHT: 153.8 LBS

## 2024-03-04 DIAGNOSIS — N18.2 CKD (CHRONIC KIDNEY DISEASE) STAGE 2, GFR 60-89 ML/MIN: ICD-10-CM

## 2024-03-04 DIAGNOSIS — N05.0 MINIMAL CHANGE DISEASE: Primary | ICD-10-CM

## 2024-03-04 DIAGNOSIS — R00.1 HEART RATE SLOW: ICD-10-CM

## 2024-03-04 PROCEDURE — 93000 ELECTROCARDIOGRAM COMPLETE: CPT | Performed by: INTERNAL MEDICINE

## 2024-03-04 PROCEDURE — 99214 OFFICE O/P EST MOD 30 MIN: CPT | Performed by: INTERNAL MEDICINE

## 2024-03-04 ASSESSMENT — PAIN SCALES - GENERAL: PAINLEVEL: NO PAIN (0)

## 2024-03-04 NOTE — NURSING NOTE
"Chief Complaint   Patient presents with    RECHECK     RETURN NEPHROLOGY     /68 (BP Location: Right arm, Patient Position: Sitting, Cuff Size: Adult Regular)   Pulse (!) 45   Temp 97.8  F (36.6  C) (Oral)   Ht 1.753 m (5' 9\")   Wt 69.8 kg (153 lb 12.8 oz)   SpO2 99%   BMI 22.71 kg/m      Tomas Herron CMA on 3/4/2024 at 3:13 PM    "

## 2024-03-04 NOTE — PROGRESS NOTES
Assessment and Plan:  48 year old male who presents for followup of minimal change disease. He presented for evaluation after presenting to ER where CT scan noted ascites and nonspecific small bowel enteritis, and UA showed proteinuria. He had biopsy which showed minimal change on 4/9/2019 and treated with prednisone 70mg. He was in remission and down to prednsione 10mg then stopped it, and had relapse within a couple months.  His mother noted swelling and he was up 20-25 lbs and proteinuria up to 6 grams/g cr. He was on maintenance cyclosporine since last relapse in November 2021, and another relapse November 2022 and another in June 2023.  # Minimal change disease/ nephrotic syndrome, steroid dependent/ relapsing-  another relapse noted with proteinuria up to 6 grams, despite being on cyclosporine, trough levels had been near 35-40 on cyclosporine 50mg twice a day.- we increased cyclosporine but he was confused and only taking 50mg twice a day, now will be on 100mg twice a day  - Prednisone round #2 started March 2020, tried to do a longer wean once on lower doses.  - Prednisone round #3 started April 20,2021.  - Prednisone round #4 November 2022  -Prednisone round #5 June 2023 - weaning in December 2023  - weight is stable he is typically 160-165 lbs, he is down to 158lbs  - will remain on 5mg prednisone as he does not want to risk flaring during work season.  - continue labs monthly for now including cyclosporine- willl continue cyclosporine at 100mg bid, level was 94 on 125mg bid, thus will lower and hope to stop after rituximab infusions given recurrent flares.  - Rituximab infusion done on 12/1 and 12/14/2023 - he had mild reaction (low heart rate, infusion held, re-medicated and finished infusion)   - Doing OK so far, CD 19 counts low in February- repeat in June  He can stop bactrim as he is now down to 10mg of prednisone  - monitor carefully given immunosuppression  - update vaccination  - discussed  importance of seeing PCP regularly, age appropriate screening (he is against cscope but may be ok to do cologuard or other options)  -labs every 2 months, rtc in 3 months    # Renal function- CKD with baseline Scr now 1-1.1- CKD or due to cyclosporine and hemodynamic factors-. It was up to 1.6-1.8 at time of biopsy after diuretics were started.  - scr back to 1-1.2 range, up to 1.4 a few months ago, likely CSA hemodynamic effect  - monitor and encouraged hydration- stable on recent labs.   - proteinuria improved very quickly down to normal once prednisone restarted , down from 13 grams to normal  Labs now can be every other month, off cyclosporine, s/p Rituximab infusion #1 on 12/1, infusion #2 on 12/14  - redose rituximab at 6 month (mid June 2024 for maintenance, discussed)   - Repeat CD 19 Bcell count in June 2024       - discussed that his is an immunosuppressant, that it increases risk of infection and malignancy.  - discussed smoking cessation, he also chews tobacco and drinks mountain dew- his mother is being evaluated for lung transplant- reiterated smoking cessation  # Hyperlipidemia- LDL very elevated,was on low dose statin but stopped in setting of CNI and LFT elevated- it was restarted by PCP  - recheck lipid panel and LFTs - can stop lipitor if lipids back to normal    # Hypertension: BP is good at 110/70s, was higher initially   - continue lisinopril, increase to 40mg if BP >125/80  - will recheck BP once off prednisone and cyclosporine    # Not anemic- hgb has been high at times, monitor    # Electrolytes: normal low potassium      # Mineral Bone Disorder:   Check baseline PTH if creatinine remains elevated.     - RTC in 3-4 months and labs bimonthly for now  - >40 minutes spent on day of service in counseling and coordination of care.       Assessment and plan was discussed with patient and he voiced his understanding and agreement.    Reason for Visit:  Deshawn Flood is a 48 year old male with  nephrotic range proteinuria noted and biopsy 4/9/19 shows minimal change disease    HPI:  He is a 47 year old male with minimal change disease who follows up. He initially presented with nephrotic syndrome and underwent kidney biopsy [id number] and it showed minimal change disease. He was admitted to the hospital briefly after biopsy as he was having poor appetite due to bloating, and elevated creatinine.    Since then he went into remission, we tapered down on prednisone and unfortunately he relapsed within a couple months (6g/g cr February 2020),  thus we restarted prednisone in March 2020 and by early April he was down to normal range proteinuria and he was feeling better.    His weight is usually closer to 150-160  but was up to 184 lbs with nephrotic syndrome, now back to 160lbs. He is on prednisone wean and may have dropped faster than instructed, now on 10mg daily  Weight stable at 160-165 lbs  Creatinine is stable at 1.1 recently off cyclosporine  His proteinuria is in normal range.  He has had a flare yearly so hoping that we can achieve long term remission  His heart rate is slow but EKG done and was normal, continue to monitor      Renal History:   None prior to minimal change disease Spring 2019    ROS:   A comprehensive review of systems was obtained and negative, except as noted in the HPI or PMH.    Active Medical Problems:  Patient Active Problem List   Diagnosis    Loose body of left knee    Left knee pain    Tobacco abuse    History of pneumothorax    CARDIOVASCULAR SCREENING; LDL GOAL LESS THAN 160    Nephrotic syndrome    Anasarca    Minimal change disease    Immunosuppression (H24)    Crohn's disease of small intestine without complication (H)    Elevated hemoglobin (H24)    Chronic kidney disease, stage 3a (H)    Steroid-dependent minimal change glomerulopathy     PMH:   Medical record was reviewed and PMH was discussed with patient and noted below.  Past Medical History:   Diagnosis Date    Anemia      Other spontaneous pneumothorax     SPONTANEOUS PNEUMOTHORAX NEC 3/25/2005    Unspecified asthma(493.90)      PSH:   Past Surgical History:   Procedure Laterality Date    ARTHROSCOPY KNEE  8/3/2012    Procedure: ARTHROSCOPY KNEE;  Left knee Arthroscopy with removal of loose bodies;  Surgeon: Bibi Roberts MD;  Location: [facility]    IR RENAL BIOPSY LEFT  4/9/2019    TUBE THORACOSTOMY,ABSC/EMPYEMA/HEMO  3/20/2005    Small chest tube with Heimlich valve.    ZZHC REPAIR UMBILICAL GODFREY,<6Y/O,REDUC  1978       Family Hx:   Family History   Problem Relation Age of Onset    Autoimmune Disease No family hx of      Personal Hx:   Social History     Tobacco Use    Smoking status: Light Smoker     Packs/day: 0.10     Years: 15.00     Additional pack years: 0.00     Total pack years: 1.50     Types: Cigarettes    Smokeless tobacco: Current     Types: Chew    Tobacco comments:     1 pack every four days   Substance Use Topics    Alcohol use: Not Currently     Alcohol/week: 2.0 standard drinks of alcohol     Types: 2 Cans of beer per week     Comment: occasional use.       Allergies:  Allergies   Allergen Reactions    Bee Venom      swells up    Seasonal Allergies        Medications:  Current Outpatient Medications   Medication Sig    albuterol (PROAIR HFA/PROVENTIL HFA/VENTOLIN HFA) 108 (90 Base) MCG/ACT inhaler Inhale 2 puffs into the lungs every 6 hours as needed for shortness of breath, wheezing or cough    lisinopril (ZESTRIL) 20 MG tablet TAKE ONE TABLET BY MOUTH ONCE DAILY    albuterol (VENTOLIN HFA) 108 (90 Base) MCG/ACT inhaler INHALE ONE TO TWO PUFFS BY MOUTH EVERY 6 HOURS AS NEEDED FOR SHORTNESS OF BREATH, DIFFICULTY BREATHING OR WHEEZING (NEED TO BE SEEN IN CLINIC FOR FURTHER REFILLS) (Patient not taking: Reported on 3/4/2024)     No current facility-administered medications for this visit.      Vitals:  /68 (BP Location: Right arm, Patient Position: Sitting, Cuff Size: Adult Regular)   Pulse (!) 45   Temp  "97.8  F (36.6  C) (Oral)   Ht 1.753 m (5' 9\")   Wt 69.8 kg (153 lb 12.8 oz)   SpO2 99%   BMI 22.71 kg/m    154-155 lbs  Exam:  GENERAL : alert and no distress  Speaks in full sentences without dyspnea  Lungs- some coarse exp rhonchi  cv- regular rate  Normal speech and thought pattern  Ext: no signficant edema    LABS:   CMP  Recent Labs   Lab Test 02/03/24  0751 12/14/23  0858 12/02/23  0753 11/30/23  0912 08/14/21  0807 07/10/21  0829 06/05/21  0743 05/15/21  0757 04/10/21  0745    140 141 137   < > 139 138 138 141   POTASSIUM 3.7 4.5 4.1 4.0   < > 4.1 3.4 3.8 3.6   CHLORIDE 102 103 105 101   < > 110* 105 103 109   CO2 24 25 24 23   < > 26 29 32 28   ANIONGAP 12 12 12 13   < > 3 4 3 4   * 104* 98 90   < > 84 89 95 126*   BUN 10.9 18.4 28.7* 29.2*   < > 19 17 14 14   CR 1.19* 1.40* 1.28* 1.37*   < > 1.06 1.11 1.11 0.98   GFRESTIMATED 75 62 69 63   < > 83 79 79 >90   GFRESTBLACK  --   --   --   --   --  >90 >90 >90 >90   BASIA 9.4 9.6 8.8 9.4   < > 9.2 8.5 8.6 8.7    < > = values in this interval not displayed.     Recent Labs   Lab Test 05/07/22  0748 04/09/22  0749 03/05/22  0750 01/08/22  0758   BILITOTAL 0.5 1.3 2.0* 1.1   ALKPHOS 55 40 33* 47   ALT 20 24 36 49   AST 12 21 16 14     CBC  Recent Labs   Lab Test 02/03/24  0751 12/14/23  0858 11/30/23  0912 11/04/23  0747   HGB 15.3 15.8 14.2 13.0*   WBC 6.4 7.5 6.5 4.7   RBC 4.93 5.05 4.60 4.13*   HCT 43.2 45.2 40.9 37.2*   MCV 88 90 89 90   MCH 31.0 31.3 30.9 31.5   MCHC 35.4 35.0 34.7 34.9   RDW 11.5 11.7 12.0 12.0    202 184 184     URINE STUDIES  Recent Labs   Lab Test 02/03/24  0800 12/14/23  0915 08/14/21  0819 12/14/20  0037 09/12/20  0841 02/28/20  1620 04/01/19  1500   COLOR Yellow Odette* Yellow Yellow   < > Yellow Yellow   APPEARANCE Clear Clear Clear Clear   < > Clear Clear   URINEGLC Negative Negative Negative Negative   < > Negative Negative   URINEBILI Negative Negative Negative Negative   < > Negative Negative   URINEKETONE " Negative Negative Negative Negative   < > Negative Negative   SG 1.021 1.026 1.026 1.019   < > >1.030 1.015   UBLD Negative Negative Negative Negative   < > Moderate* Moderate*   URINEPH 5.0 5.0 6.0 5.0   < > 6.0 6.0   PROTEIN Negative Negative Negative Negative   < > >=300* >=300*   UROBILINOGEN  --   --   --   --   --  0.2 0.2   NITRITE Negative Negative Negative Negative   < > Negative Negative   LEUKEST Negative Negative Negative Negative   < > Negative Negative   RBCU 0 1 <1  --   --  O - 2 25-50*   WBCU 0 2 <1  --   --  0 - 5 0 - 5    < > = values in this interval not displayed.     Recent Labs   Lab Test 12/06/21  1435 04/03/20  0900 02/28/20  1619 11/26/19  1435   UTPG 3.62* 0.18 6.20* 0.06     PTH  No lab results found.  IRON STUDIES  Recent Labs   Lab Test 06/04/22  0803   IRON 144      IRONSAT 52*   REYNA 369       IMPRESSION:  1. Findings are concerning for diffuse small bowel enteritis likely  infectious or inflammatory in etiology, with associated inflammatory  changes of the mesenteric adipose tissues, moderate ascites, small  bilateral probably reactive pleural effusions and with some edema in  the subcutaneous adipose tissues.  2. No significant atherosclerosis is identified. No obvious arterial  blockage is seen in the mesenteric vessels to suggest ischemia.  3. Mild degenerative changes of the spine and hips. No acute fracture  or aggressive osseous lesion.  Jenny Aram Scott MD

## 2024-03-05 LAB
ATRIAL RATE - MUSE: 64 BPM
DIASTOLIC BLOOD PRESSURE - MUSE: NORMAL MMHG
INTERPRETATION ECG - MUSE: NORMAL
P AXIS - MUSE: 71 DEGREES
PR INTERVAL - MUSE: 158 MS
QRS DURATION - MUSE: 88 MS
QT - MUSE: 402 MS
QTC - MUSE: 414 MS
R AXIS - MUSE: 83 DEGREES
SYSTOLIC BLOOD PRESSURE - MUSE: NORMAL MMHG
T AXIS - MUSE: 83 DEGREES
VENTRICULAR RATE- MUSE: 64 BPM

## 2024-04-27 ENCOUNTER — LAB (OUTPATIENT)
Dept: LAB | Facility: CLINIC | Age: 49
End: 2024-04-27
Payer: COMMERCIAL

## 2024-04-27 DIAGNOSIS — R80.1 PERSISTENT PROTEINURIA: ICD-10-CM

## 2024-04-27 DIAGNOSIS — N05.0 MINIMAL CHANGE DISEASE: ICD-10-CM

## 2024-04-27 LAB
ALBUMIN MFR UR ELPH: <6 MG/DL
ALBUMIN SERPL BCG-MCNC: 4.4 G/DL (ref 3.5–5.2)
ALBUMIN UR-MCNC: NEGATIVE MG/DL
ANION GAP SERPL CALCULATED.3IONS-SCNC: 9 MMOL/L (ref 7–15)
APPEARANCE UR: CLEAR
BILIRUB UR QL STRIP: NEGATIVE
BUN SERPL-MCNC: 14.1 MG/DL (ref 6–20)
CALCIUM SERPL-MCNC: 9.7 MG/DL (ref 8.6–10)
CHLORIDE SERPL-SCNC: 101 MMOL/L (ref 98–107)
COLOR UR AUTO: YELLOW
CREAT SERPL-MCNC: 1.2 MG/DL (ref 0.67–1.17)
CREAT UR-MCNC: 168.8 MG/DL
DEPRECATED HCO3 PLAS-SCNC: 29 MMOL/L (ref 22–29)
EGFRCR SERPLBLD CKD-EPI 2021: 74 ML/MIN/1.73M2
ERYTHROCYTE [DISTWIDTH] IN BLOOD BY AUTOMATED COUNT: 11.4 % (ref 10–15)
GLUCOSE SERPL-MCNC: 110 MG/DL (ref 70–99)
GLUCOSE UR STRIP-MCNC: NEGATIVE MG/DL
HCT VFR BLD AUTO: 44.7 % (ref 40–53)
HGB BLD-MCNC: 15.8 G/DL (ref 13.3–17.7)
HGB UR QL STRIP: NEGATIVE
KETONES UR STRIP-MCNC: NEGATIVE MG/DL
LEUKOCYTE ESTERASE UR QL STRIP: NEGATIVE
MCH RBC QN AUTO: 30.6 PG (ref 26.5–33)
MCHC RBC AUTO-ENTMCNC: 35.3 G/DL (ref 31.5–36.5)
MCV RBC AUTO: 87 FL (ref 78–100)
MUCOUS THREADS #/AREA URNS LPF: PRESENT /LPF
NITRATE UR QL: NEGATIVE
PH UR STRIP: 5 [PH] (ref 5–7)
PHOSPHATE SERPL-MCNC: 3 MG/DL (ref 2.5–4.5)
PLATELET # BLD AUTO: 192 10E3/UL (ref 150–450)
POTASSIUM SERPL-SCNC: 4.1 MMOL/L (ref 3.4–5.3)
PROT/CREAT 24H UR: NORMAL MG/G{CREAT}
RBC # BLD AUTO: 5.16 10E6/UL (ref 4.4–5.9)
RBC URINE: <1 /HPF
SODIUM SERPL-SCNC: 139 MMOL/L (ref 135–145)
SP GR UR STRIP: 1.02 (ref 1–1.03)
SQUAMOUS EPITHELIAL: <1 /HPF
UROBILINOGEN UR STRIP-MCNC: NORMAL MG/DL
WBC # BLD AUTO: 5.5 10E3/UL (ref 4–11)
WBC URINE: <1 /HPF

## 2024-04-27 PROCEDURE — 84156 ASSAY OF PROTEIN URINE: CPT

## 2024-04-27 PROCEDURE — 85027 COMPLETE CBC AUTOMATED: CPT

## 2024-04-27 PROCEDURE — 81001 URINALYSIS AUTO W/SCOPE: CPT

## 2024-04-27 PROCEDURE — 80069 RENAL FUNCTION PANEL: CPT

## 2024-04-27 PROCEDURE — 36415 COLL VENOUS BLD VENIPUNCTURE: CPT

## 2024-05-12 DIAGNOSIS — J45.40 MODERATE PERSISTENT ASTHMA WITHOUT COMPLICATION: ICD-10-CM

## 2024-05-13 RX ORDER — ALBUTEROL SULFATE 90 UG/1
AEROSOL, METERED RESPIRATORY (INHALATION)
Qty: 18 G | Refills: 4 | Status: SHIPPED | OUTPATIENT
Start: 2024-05-13

## 2024-05-21 NOTE — TELEPHONE ENCOUNTER
Needs labs, renal panel, CBC and UA with protein random urine   ? Blood pressure and how many pounds up is he?   He can take bumex 0.5mg once a day, watch his sodium intake.   Should make an appt to be seen in next couple weeks at [facility] ( I may need to over book) or with Maryann   thanks     Reviewed recommendations per Dr. Scott above with patient. Lab orders placed. Patient to get done in next few days. encouraged patient to monitor BP and weight and to watch salt intake. Bumex prescription sent to pharmacy. Patient able to come in OB appointment with Dr. Scott next Monday 330.  Kami Simmons LPN  Nephrology  [phone]     PAST MEDICAL HISTORY:  Asthma     Chronic osteomyelitis of spine     Dialysis patient, noncompliant     ESRD on dialysis     H/O pulmonary hypertension     HIV (human immunodeficiency virus infection)     Pulmonary embolism     Right atrial thrombus

## 2024-06-01 ENCOUNTER — LAB (OUTPATIENT)
Dept: LAB | Facility: CLINIC | Age: 49
End: 2024-06-01
Payer: COMMERCIAL

## 2024-06-01 DIAGNOSIS — N05.0 MINIMAL CHANGE DISEASE: ICD-10-CM

## 2024-06-01 DIAGNOSIS — R80.1 PERSISTENT PROTEINURIA: ICD-10-CM

## 2024-06-01 DIAGNOSIS — R00.1 HEART RATE SLOW: ICD-10-CM

## 2024-06-01 LAB
ALBUMIN MFR UR ELPH: 6 MG/DL
ALBUMIN SERPL BCG-MCNC: 4.3 G/DL (ref 3.5–5.2)
ALBUMIN UR-MCNC: NEGATIVE MG/DL
ANION GAP SERPL CALCULATED.3IONS-SCNC: 7 MMOL/L (ref 7–15)
APPEARANCE UR: CLEAR
BILIRUB UR QL STRIP: NEGATIVE
BUN SERPL-MCNC: 16 MG/DL (ref 6–20)
CALCIUM SERPL-MCNC: 9.6 MG/DL (ref 8.6–10)
CD19 B CELL COMMENT: ABNORMAL
CD19 CELLS # BLD: <1 CELLS/UL (ref 107–698)
CD19 CELLS NFR BLD: <1 % (ref 6–27)
CHLORIDE SERPL-SCNC: 102 MMOL/L (ref 98–107)
COLOR UR AUTO: YELLOW
CREAT SERPL-MCNC: 1.16 MG/DL (ref 0.67–1.17)
CREAT UR-MCNC: 177.3 MG/DL
DEPRECATED HCO3 PLAS-SCNC: 27 MMOL/L (ref 22–29)
EGFRCR SERPLBLD CKD-EPI 2021: 77 ML/MIN/1.73M2
ERYTHROCYTE [DISTWIDTH] IN BLOOD BY AUTOMATED COUNT: 11.5 % (ref 10–15)
GLUCOSE SERPL-MCNC: 92 MG/DL (ref 70–99)
GLUCOSE UR STRIP-MCNC: NEGATIVE MG/DL
HCT VFR BLD AUTO: 43.8 % (ref 40–53)
HGB BLD-MCNC: 15.2 G/DL (ref 13.3–17.7)
HGB UR QL STRIP: NEGATIVE
KETONES UR STRIP-MCNC: NEGATIVE MG/DL
LEUKOCYTE ESTERASE UR QL STRIP: NEGATIVE
MCH RBC QN AUTO: 30.8 PG (ref 26.5–33)
MCHC RBC AUTO-ENTMCNC: 34.7 G/DL (ref 31.5–36.5)
MCV RBC AUTO: 89 FL (ref 78–100)
MUCOUS THREADS #/AREA URNS LPF: PRESENT /LPF
NITRATE UR QL: NEGATIVE
PH UR STRIP: 6 [PH] (ref 5–7)
PHOSPHATE SERPL-MCNC: 2.7 MG/DL (ref 2.5–4.5)
PLATELET # BLD AUTO: 185 10E3/UL (ref 150–450)
POTASSIUM SERPL-SCNC: 4 MMOL/L (ref 3.4–5.3)
PROT/CREAT 24H UR: 0.03 MG/MG CR (ref 0–0.2)
RBC # BLD AUTO: 4.94 10E6/UL (ref 4.4–5.9)
RBC URINE: 0 /HPF
SODIUM SERPL-SCNC: 136 MMOL/L (ref 135–145)
SP GR UR STRIP: 1.02 (ref 1–1.03)
SQUAMOUS EPITHELIAL: <1 /HPF
UROBILINOGEN UR STRIP-MCNC: NORMAL MG/DL
WBC # BLD AUTO: 5.4 10E3/UL (ref 4–11)
WBC URINE: 0 /HPF

## 2024-06-01 PROCEDURE — 84156 ASSAY OF PROTEIN URINE: CPT

## 2024-06-01 PROCEDURE — 81001 URINALYSIS AUTO W/SCOPE: CPT

## 2024-06-01 PROCEDURE — 85027 COMPLETE CBC AUTOMATED: CPT

## 2024-06-01 PROCEDURE — 36415 COLL VENOUS BLD VENIPUNCTURE: CPT

## 2024-06-01 PROCEDURE — 86355 B CELLS TOTAL COUNT: CPT

## 2024-06-01 PROCEDURE — 80069 RENAL FUNCTION PANEL: CPT

## 2024-06-24 ENCOUNTER — TELEPHONE (OUTPATIENT)
Dept: NEPHROLOGY | Facility: CLINIC | Age: 49
End: 2024-06-24

## 2024-06-24 ENCOUNTER — OFFICE VISIT (OUTPATIENT)
Dept: NEPHROLOGY | Facility: CLINIC | Age: 49
End: 2024-06-24
Payer: COMMERCIAL

## 2024-06-24 VITALS
DIASTOLIC BLOOD PRESSURE: 72 MMHG | HEART RATE: 58 BPM | SYSTOLIC BLOOD PRESSURE: 114 MMHG | BODY MASS INDEX: 23.04 KG/M2 | WEIGHT: 156 LBS

## 2024-06-24 DIAGNOSIS — N05.0 MINIMAL CHANGE DISEASE: Primary | ICD-10-CM

## 2024-06-24 DIAGNOSIS — N05.0 STEROID-DEPENDENT MINIMAL CHANGE GLOMERULOPATHY: ICD-10-CM

## 2024-06-24 DIAGNOSIS — N04.9 NEPHROTIC SYNDROME: ICD-10-CM

## 2024-06-24 PROCEDURE — 99214 OFFICE O/P EST MOD 30 MIN: CPT | Performed by: INTERNAL MEDICINE

## 2024-06-24 RX ORDER — DIPHENHYDRAMINE HCL 25 MG
50 CAPSULE ORAL ONCE
Status: CANCELLED
Start: 2024-06-25

## 2024-06-24 RX ORDER — METHYLPREDNISOLONE SODIUM SUCCINATE 125 MG/2ML
125 INJECTION, POWDER, LYOPHILIZED, FOR SOLUTION INTRAMUSCULAR; INTRAVENOUS
Status: CANCELLED
Start: 2024-06-25

## 2024-06-24 RX ORDER — ALBUTEROL SULFATE 0.83 MG/ML
2.5 SOLUTION RESPIRATORY (INHALATION)
Status: CANCELLED | OUTPATIENT
Start: 2024-06-25

## 2024-06-24 RX ORDER — EPINEPHRINE 1 MG/ML
0.3 INJECTION, SOLUTION, CONCENTRATE INTRAVENOUS EVERY 5 MIN PRN
Status: CANCELLED | OUTPATIENT
Start: 2024-06-25

## 2024-06-24 RX ORDER — ACETAMINOPHEN 325 MG/1
650 TABLET ORAL ONCE
Status: CANCELLED
Start: 2024-06-25

## 2024-06-24 RX ORDER — METHYLPREDNISOLONE SODIUM SUCCINATE 125 MG/2ML
100 INJECTION, POWDER, LYOPHILIZED, FOR SOLUTION INTRAMUSCULAR; INTRAVENOUS ONCE
Status: CANCELLED | OUTPATIENT
Start: 2024-06-25

## 2024-06-24 RX ORDER — HEPARIN SODIUM (PORCINE) LOCK FLUSH IV SOLN 100 UNIT/ML 100 UNIT/ML
5 SOLUTION INTRAVENOUS
Status: CANCELLED | OUTPATIENT
Start: 2024-06-25

## 2024-06-24 RX ORDER — ALBUTEROL SULFATE 90 UG/1
1-2 AEROSOL, METERED RESPIRATORY (INHALATION)
Status: CANCELLED
Start: 2024-06-25

## 2024-06-24 RX ORDER — HEPARIN SODIUM,PORCINE 10 UNIT/ML
5-20 VIAL (ML) INTRAVENOUS DAILY PRN
Status: CANCELLED | OUTPATIENT
Start: 2024-06-25

## 2024-06-24 RX ORDER — DIPHENHYDRAMINE HYDROCHLORIDE 50 MG/ML
50 INJECTION INTRAMUSCULAR; INTRAVENOUS
Status: CANCELLED
Start: 2024-06-25

## 2024-06-24 RX ORDER — MEPERIDINE HYDROCHLORIDE 25 MG/ML
25 INJECTION INTRAMUSCULAR; INTRAVENOUS; SUBCUTANEOUS EVERY 30 MIN PRN
Status: CANCELLED | OUTPATIENT
Start: 2024-06-25

## 2024-06-24 NOTE — PROGRESS NOTES
Assessment and Plan:  49 year old male who presents for followup of minimal change disease. He presented for evaluation after presenting to ER where CT scan noted ascites and nonspecific small bowel enteritis, and UA showed proteinuria. He had biopsy which showed minimal change on 4/9/2019 and treated with prednisone 70mg. He was in remission and down to prednsione 10mg then stopped it, and had relapse within a couple months.  His mother noted swelling and he was up 20-25 lbs and proteinuria up to 6 grams/g cr. He was on maintenance cyclosporine since last relapse in November 2021, and another relapse November 2022 and another in June 2023.  # Minimal change disease/ nephrotic syndrome, steroid dependent/ relapsing-  another relapse noted with proteinuria up to 6 grams, despite being on cyclosporine, trough levels had been near 35-40 on cyclosporine 50mg twice a day.- we increased cyclosporine but he was confused and only taking 50mg twice a day, now will be on 100mg twice a day  - Prednisone round #2 started March 2020, tried to do a longer wean once on lower doses.  - Prednisone round #3 started April 20,2021.  - Prednisone round #4 November 2022  -Prednisone round #5 June 2023 - weaning in December 2023  - weight is stable he is typically 160-165 lbs, he is down to 158lbs  - will remain on 5mg prednisone as he does not want to risk flaring during work season.  - continue labs monthly for now including cyclosporine- willl continue cyclosporine at 100mg bid, level was 94 on 125mg bid, thus will lower and hope to stop after rituximab infusions given recurrent flares.  - Rituximab infusion done on 12/1 and 12/14/2023 - he had mild reaction (low heart rate, infusion held, re-medicated and finished infusion)   - Doing OK so far, CD 19 counts low in February- repeat in June still shows low counts  - will plan 1g every 6 months for 2 years and re-evaluate  He can stop bactrim as he is now down to 10mg of prednisone  -  monitor carefully given immunosuppression  - update vaccination  - discussed importance of seeing PCP regularly, age appropriate screening (he is against cscope but may be ok to do cologuard or other options)  -labs every 2 months, rtc in 3 months    # Renal function- CKD with baseline Scr now 1-1.1- CKD or due to cyclosporine and hemodynamic factors-. It was up to 1.6-1.8 at time of biopsy after diuretics were started.  - scr back to 1-1.2 range, up to 1.4 a few months ago, likely CSA hemodynamic effect  - monitor and encouraged hydration- stable on recent labs.   - proteinuria improved very quickly down to normal once prednisone restarted , down from 13 grams to normal  Labs now can be every other month, off cyclosporine, s/p Rituximab infusion #1 on 12/1, infusion #2 on 12/14  - redose rituximab at 6 month (mid June 2024 for maintenance, discussed)   - Repeat CD 19 Bcell count in June 2024       - discussed that his is an immunosuppressant, that it increases risk of infection and malignancy.  - discussed smoking cessation, he also chews tobacco and drinks mountain dew- his mother had a double lung transplant a month ago at [facility].  # Hyperlipidemia- LDL very elevated,was on low dose statin but stopped in setting of CNI and LFT elevated- it was restarted by PCP  - recheck lipid panel and LFTs - can stop lipitor if lipids back to normal    # Hypertension: BP is good at 110/70s, was higher initially   - continue lisinopril, increase to 40mg if BP >125/80  - will recheck BP once off prednisone and cyclosporine    # Not anemic- hgb has been high at times, monitor    # Electrolytes: normal low potassium      # Mineral Bone Disorder:   Check baseline PTH if creatinine remains elevated.     - RTC in 3-4 months and labs bimonthly for now  - >30 minutes spent on day of service in counseling and coordination of care.       Assessment and plan was discussed with patient and he voiced his understanding and  agreement.    Reason for Visit:  Deshawn Flood is a 48 year old male with nephrotic range proteinuria noted and biopsy 4/9/19 shows minimal change disease    HPI:  He is a 49 year old male with minimal change disease who follows up. He initially presented with nephrotic syndrome and underwent kidney biopsy [id number] and it showed minimal change disease. He was admitted to the hospital briefly after biopsy as he was having poor appetite due to bloating, and elevated creatinine.    Since then he went into remission, we tapered down on prednisone and unfortunately he relapsed within a couple months (6g/g cr February 2020),  thus we restarted prednisone in March 2020 and by early April he was down to normal range proteinuria and he was feeling better.    His weight is usually closer to 150-160  but was up to 184 lbs with nephrotic syndrome, now back to 160lbs. He is on prednisone wean and may have dropped faster than instructed, now on 10mg daily  Weight stable at 160-165 lbs  Creatinine is stable at 1.1 recently off cyclosporine  His proteinuria is in normal range.  He has had a flare yearly so hoping that we can achieve long term remission  His heart rate is slow but EKG done and was normal, continue to monitor      Renal History:   None prior to minimal change disease Spring 2019    ROS:   A comprehensive review of systems was obtained and negative, except as noted in the HPI or PMH.    Active Medical Problems:  Patient Active Problem List   Diagnosis    Loose body of left knee    Left knee pain    Tobacco abuse    History of pneumothorax    CARDIOVASCULAR SCREENING; LDL GOAL LESS THAN 160    Nephrotic syndrome    Anasarca    Minimal change disease    Immunosuppression (H24)    Crohn's disease of small intestine without complication (H)    Elevated hemoglobin (H24)    Chronic kidney disease, stage 3a (H)    Steroid-dependent minimal change glomerulopathy     PMH:   Medical record was reviewed and PMH was discussed with  patient and noted below.  Past Medical History:   Diagnosis Date    Anemia     Other spontaneous pneumothorax     SPONTANEOUS PNEUMOTHORAX NEC 3/25/2005    Unspecified asthma(493.90)      PSH:   Past Surgical History:   Procedure Laterality Date    ARTHROSCOPY KNEE  8/3/2012    Procedure: ARTHROSCOPY KNEE;  Left knee Arthroscopy with removal of loose bodies;  Surgeon: Bibi Roberts MD;  Location: [facility]    IR RENAL BIOPSY LEFT  4/9/2019    TUBE THORACOSTOMY,ABSC/EMPYEMA/HEMO  3/20/2005    Small chest tube with Heimlich valve.    ZZHC REPAIR UMBILICAL GODFREY,<4Y/O,REDUC  1978       Family Hx:   Family History   Problem Relation Age of Onset    Autoimmune Disease No family hx of      Personal Hx:   Social History     Tobacco Use    Smoking status: Light Smoker     Current packs/day: 0.10     Average packs/day: 0.1 packs/day for 15.0 years (1.5 ttl pk-yrs)     Types: Cigarettes    Smokeless tobacco: Current     Types: Chew    Tobacco comments:     1 pack every four days   Substance Use Topics    Alcohol use: Not Currently     Alcohol/week: 2.0 standard drinks of alcohol     Types: 2 Cans of beer per week     Comment: occasional use.       Allergies:  Allergies   Allergen Reactions    Bee Venom      swells up    Seasonal Allergies        Medications:  Current Outpatient Medications   Medication Sig Dispense Refill    lisinopril (ZESTRIL) 20 MG tablet TAKE ONE TABLET BY MOUTH ONCE DAILY 90 tablet 3    VENTOLIN  (90 Base) MCG/ACT inhaler INHALE 2 PUFFS INTO THE LUNGS EVERY 6 HOURS AS NEEDED FOR SHORTNESS OF BREATH, WHEEZING OR COUGH 18 g 4     No current facility-administered medications for this visit.      Vitals:  /72   Pulse 58   Wt 70.8 kg (156 lb)   BMI 23.04 kg/m    154-155 lbs  Exam:  GENERAL : alert and no distress  Speaks in full sentences without dyspnea  Lungs- some coarse exp rhonchi  cv- regular rate  Normal speech and thought pattern  Ext: no signficant edema    LABS:   CMP  Recent Labs    Lab Test 06/01/24  0758 04/27/24  1041 02/03/24  0751 12/14/23  0858 08/14/21  0807 07/10/21  0829 06/05/21  0743 05/15/21  0757 04/10/21  0745    139 138 140   < > 139 138 138 141   POTASSIUM 4.0 4.1 3.7 4.5   < > 4.1 3.4 3.8 3.6   CHLORIDE 102 101 102 103   < > 110* 105 103 109   CO2 27 29 24 25   < > 26 29 32 28   ANIONGAP 7 9 12 12   < > 3 4 3 4   GLC 92 110* 102* 104*   < > 84 89 95 126*   BUN 16.0 14.1 10.9 18.4   < > 19 17 14 14   CR 1.16 1.20* 1.19* 1.40*   < > 1.06 1.11 1.11 0.98   GFRESTIMATED 77 74 75 62   < > 83 79 79 >90   GFRESTBLACK  --   --   --   --   --  >90 >90 >90 >90   BASIA 9.6 9.7 9.4 9.6   < > 9.2 8.5 8.6 8.7    < > = values in this interval not displayed.     Recent Labs   Lab Test 05/07/22  0748 04/09/22  0749 03/05/22  0750 01/08/22  0758   BILITOTAL 0.5 1.3 2.0* 1.1   ALKPHOS 55 40 33* 47   ALT 20 24 36 49   AST 12 21 16 14     CBC  Recent Labs   Lab Test 06/01/24 0758 04/27/24  1041 02/03/24  0751 12/14/23  0858   HGB 15.2 15.8 15.3 15.8   WBC 5.4 5.5 6.4 7.5   RBC 4.94 5.16 4.93 5.05   HCT 43.8 44.7 43.2 45.2   MCV 89 87 88 90   MCH 30.8 30.6 31.0 31.3   MCHC 34.7 35.3 35.4 35.0   RDW 11.5 11.4 11.5 11.7    192 208 202     URINE STUDIES  Recent Labs   Lab Test 06/01/24  0824 04/27/24  1046 02/03/24  0800 12/14/23  0915 09/12/20  0841 02/28/20  1620 04/01/19  1500   COLOR Yellow Yellow Yellow Odette*   < > Yellow Yellow   APPEARANCE Clear Clear Clear Clear   < > Clear Clear   URINEGLC Negative Negative Negative Negative   < > Negative Negative   URINEBILI Negative Negative Negative Negative   < > Negative Negative   URINEKETONE Negative Negative Negative Negative   < > Negative Negative   SG 1.020 1.018 1.021 1.026   < > >1.030 1.015   UBLD Negative Negative Negative Negative   < > Moderate* Moderate*   URINEPH 6.0 5.0 5.0 5.0   < > 6.0 6.0   PROTEIN Negative Negative Negative Negative   < > >=300* >=300*   UROBILINOGEN  --   --   --   --   --  0.2 0.2   NITRITE Negative  Negative Negative Negative   < > Negative Negative   LEUKEST Negative Negative Negative Negative   < > Negative Negative   RBCU 0 <1 0 1   < > O - 2 25-50*   WBCU 0 <1 0 2   < > 0 - 5 0 - 5    < > = values in this interval not displayed.     Recent Labs   Lab Test 12/06/21  1435 04/03/20  0900 02/28/20  1619 11/26/19  1435   UTPG 3.62* 0.18 6.20* 0.06     PTH  No lab results found.  IRON STUDIES  Recent Labs   Lab Test 06/04/22  0803   IRON 144      IRONSAT 52*   REYNA 369       IMPRESSION:  1. Findings are concerning for diffuse small bowel enteritis likely  infectious or inflammatory in etiology, with associated inflammatory  changes of the mesenteric adipose tissues, moderate ascites, small  bilateral probably reactive pleural effusions and with some edema in  the subcutaneous adipose tissues.  2. No significant atherosclerosis is identified. No obvious arterial  blockage is seen in the mesenteric vessels to suggest ischemia.  3. Mild degenerative changes of the spine and hips. No acute fracture  or aggressive osseous lesion.      Jenny Scott MD

## 2024-06-27 NOTE — TELEPHONE ENCOUNTER
Let entry:  [facility] center called to inquire if they are still able to administer to non oncology patient's. Their protocols have changed and they no longer administer infusions on non oncology patients.   Patient called and informed him. He stated he will go to [facility] then.   NICKI Dsouza Care Coordinator  Nephrology

## 2024-07-01 ENCOUNTER — TELEPHONE (OUTPATIENT)
Dept: NEPHROLOGY | Facility: CLINIC | Age: 49
End: 2024-07-01
Payer: COMMERCIAL

## 2024-07-01 NOTE — TELEPHONE ENCOUNTER
Called  infusion center to ask them to call patient to set up rituximab infusion. They stated they are booked out a month but they will try to get him in sooner. Patient will not go to the [facility] as it is too far. They will call to schedule him today. Informing Dr Scott of the time frame.  NICKI Dsouza Care Coordinator  Nephrology

## 2024-07-31 ENCOUNTER — INFUSION THERAPY VISIT (OUTPATIENT)
Dept: INFUSION THERAPY | Facility: CLINIC | Age: 49
End: 2024-07-31
Attending: INTERNAL MEDICINE
Payer: COMMERCIAL

## 2024-07-31 VITALS
RESPIRATION RATE: 18 BRPM | OXYGEN SATURATION: 98 % | HEART RATE: 62 BPM | TEMPERATURE: 98.1 F | DIASTOLIC BLOOD PRESSURE: 81 MMHG | BODY MASS INDEX: 22.76 KG/M2 | SYSTOLIC BLOOD PRESSURE: 117 MMHG | WEIGHT: 154.1 LBS

## 2024-07-31 DIAGNOSIS — N05.0 STEROID-DEPENDENT MINIMAL CHANGE GLOMERULOPATHY: Primary | ICD-10-CM

## 2024-07-31 DIAGNOSIS — N04.9 NEPHROTIC SYNDROME: ICD-10-CM

## 2024-07-31 DIAGNOSIS — N05.0 STEROID-DEPENDENT MINIMAL CHANGE GLOMERULOPATHY: ICD-10-CM

## 2024-07-31 DIAGNOSIS — N05.0 MINIMAL CHANGE DISEASE: Primary | ICD-10-CM

## 2024-07-31 DIAGNOSIS — N05.0 MINIMAL CHANGE DISEASE: ICD-10-CM

## 2024-07-31 LAB
ALBUMIN SERPL BCG-MCNC: 4.3 G/DL (ref 3.5–5.2)
ANION GAP SERPL CALCULATED.3IONS-SCNC: 10 MMOL/L (ref 7–15)
BASOPHILS # BLD AUTO: 0 10E3/UL (ref 0–0.2)
BASOPHILS NFR BLD AUTO: 1 %
BUN SERPL-MCNC: 13.7 MG/DL (ref 6–20)
CALCIUM SERPL-MCNC: 9.8 MG/DL (ref 8.8–10.4)
CHLORIDE SERPL-SCNC: 103 MMOL/L (ref 98–107)
CREAT SERPL-MCNC: 1.19 MG/DL (ref 0.67–1.17)
EGFRCR SERPLBLD CKD-EPI 2021: 74 ML/MIN/1.73M2
EOSINOPHIL # BLD AUTO: 0.1 10E3/UL (ref 0–0.7)
EOSINOPHIL NFR BLD AUTO: 2 %
ERYTHROCYTE [DISTWIDTH] IN BLOOD BY AUTOMATED COUNT: 11.5 % (ref 10–15)
GLUCOSE SERPL-MCNC: 102 MG/DL (ref 70–99)
HCO3 SERPL-SCNC: 26 MMOL/L (ref 22–29)
HCT VFR BLD AUTO: 43.5 % (ref 40–53)
HGB BLD-MCNC: 15.5 G/DL (ref 13.3–17.7)
HOLD SPECIMEN: NORMAL
IMM GRANULOCYTES # BLD: 0 10E3/UL
IMM GRANULOCYTES NFR BLD: 0 %
LYMPHOCYTES # BLD AUTO: 1.2 10E3/UL (ref 0.8–5.3)
LYMPHOCYTES NFR BLD AUTO: 22 %
MCH RBC QN AUTO: 30.8 PG (ref 26.5–33)
MCHC RBC AUTO-ENTMCNC: 35.6 G/DL (ref 31.5–36.5)
MCV RBC AUTO: 86 FL (ref 78–100)
MONOCYTES # BLD AUTO: 0.4 10E3/UL (ref 0–1.3)
MONOCYTES NFR BLD AUTO: 7 %
NEUTROPHILS # BLD AUTO: 3.8 10E3/UL (ref 1.6–8.3)
NEUTROPHILS NFR BLD AUTO: 68 %
NRBC # BLD AUTO: 0 10E3/UL
NRBC BLD AUTO-RTO: 0 /100
PHOSPHATE SERPL-MCNC: 2.7 MG/DL (ref 2.5–4.5)
PLATELET # BLD AUTO: 206 10E3/UL (ref 150–450)
POTASSIUM SERPL-SCNC: 4.2 MMOL/L (ref 3.4–5.3)
RBC # BLD AUTO: 5.04 10E6/UL (ref 4.4–5.9)
SODIUM SERPL-SCNC: 139 MMOL/L (ref 135–145)
WBC # BLD AUTO: 5.5 10E3/UL (ref 4–11)

## 2024-07-31 PROCEDURE — 258N000003 HC RX IP 258 OP 636: Performed by: INTERNAL MEDICINE

## 2024-07-31 PROCEDURE — 96415 CHEMO IV INFUSION ADDL HR: CPT

## 2024-07-31 PROCEDURE — 85025 COMPLETE CBC W/AUTO DIFF WBC: CPT | Performed by: INTERNAL MEDICINE

## 2024-07-31 PROCEDURE — 250N000013 HC RX MED GY IP 250 OP 250 PS 637: Performed by: INTERNAL MEDICINE

## 2024-07-31 PROCEDURE — 96375 TX/PRO/DX INJ NEW DRUG ADDON: CPT

## 2024-07-31 PROCEDURE — 96413 CHEMO IV INFUSION 1 HR: CPT

## 2024-07-31 PROCEDURE — 36415 COLL VENOUS BLD VENIPUNCTURE: CPT | Performed by: INTERNAL MEDICINE

## 2024-07-31 PROCEDURE — 96367 TX/PROPH/DG ADDL SEQ IV INF: CPT

## 2024-07-31 PROCEDURE — 99207 PR NO CHARGE LOS: CPT

## 2024-07-31 PROCEDURE — 80069 RENAL FUNCTION PANEL: CPT | Performed by: INTERNAL MEDICINE

## 2024-07-31 PROCEDURE — 250N000011 HC RX IP 250 OP 636: Performed by: INTERNAL MEDICINE

## 2024-07-31 RX ORDER — DIPHENHYDRAMINE HYDROCHLORIDE 50 MG/ML
50 INJECTION INTRAMUSCULAR; INTRAVENOUS
Status: CANCELLED
Start: 2024-09-05

## 2024-07-31 RX ORDER — ALBUTEROL SULFATE 0.83 MG/ML
2.5 SOLUTION RESPIRATORY (INHALATION)
OUTPATIENT
Start: 2024-09-05

## 2024-07-31 RX ORDER — HEPARIN SODIUM,PORCINE 10 UNIT/ML
5-20 VIAL (ML) INTRAVENOUS DAILY PRN
OUTPATIENT
Start: 2024-09-05

## 2024-07-31 RX ORDER — DIPHENHYDRAMINE HCL 25 MG
50 CAPSULE ORAL ONCE
Status: CANCELLED
Start: 2024-09-05

## 2024-07-31 RX ORDER — METHYLPREDNISOLONE SODIUM SUCCINATE 125 MG/2ML
100 INJECTION, POWDER, LYOPHILIZED, FOR SOLUTION INTRAMUSCULAR; INTRAVENOUS ONCE
Status: COMPLETED | OUTPATIENT
Start: 2024-07-31 | End: 2024-07-31

## 2024-07-31 RX ORDER — METHYLPREDNISOLONE SODIUM SUCCINATE 125 MG/2ML
125 INJECTION, POWDER, LYOPHILIZED, FOR SOLUTION INTRAMUSCULAR; INTRAVENOUS
Start: 2024-09-05

## 2024-07-31 RX ORDER — METHYLPREDNISOLONE SODIUM SUCCINATE 125 MG/2ML
125 INJECTION, POWDER, LYOPHILIZED, FOR SOLUTION INTRAMUSCULAR; INTRAVENOUS
Status: CANCELLED
Start: 2024-09-05

## 2024-07-31 RX ORDER — HEPARIN SODIUM (PORCINE) LOCK FLUSH IV SOLN 100 UNIT/ML 100 UNIT/ML
5 SOLUTION INTRAVENOUS
OUTPATIENT
Start: 2024-09-05

## 2024-07-31 RX ORDER — METHYLPREDNISOLONE SODIUM SUCCINATE 125 MG/2ML
100 INJECTION, POWDER, LYOPHILIZED, FOR SOLUTION INTRAMUSCULAR; INTRAVENOUS ONCE
OUTPATIENT
Start: 2024-09-05

## 2024-07-31 RX ORDER — MEPERIDINE HYDROCHLORIDE 25 MG/ML
25 INJECTION INTRAMUSCULAR; INTRAVENOUS; SUBCUTANEOUS EVERY 30 MIN PRN
Status: CANCELLED | OUTPATIENT
Start: 2024-09-05

## 2024-07-31 RX ORDER — EPINEPHRINE 1 MG/ML
0.3 INJECTION, SOLUTION INTRAMUSCULAR; SUBCUTANEOUS EVERY 5 MIN PRN
OUTPATIENT
Start: 2024-09-05

## 2024-07-31 RX ORDER — HEPARIN SODIUM,PORCINE 10 UNIT/ML
5-20 VIAL (ML) INTRAVENOUS DAILY PRN
Status: CANCELLED | OUTPATIENT
Start: 2024-09-05

## 2024-07-31 RX ORDER — ACETAMINOPHEN 325 MG/1
650 TABLET ORAL ONCE
Start: 2024-09-05

## 2024-07-31 RX ORDER — METHYLPREDNISOLONE SODIUM SUCCINATE 125 MG/2ML
100 INJECTION, POWDER, LYOPHILIZED, FOR SOLUTION INTRAMUSCULAR; INTRAVENOUS ONCE
Status: CANCELLED | OUTPATIENT
Start: 2024-09-05

## 2024-07-31 RX ORDER — HEPARIN SODIUM (PORCINE) LOCK FLUSH IV SOLN 100 UNIT/ML 100 UNIT/ML
5 SOLUTION INTRAVENOUS
Status: CANCELLED | OUTPATIENT
Start: 2024-09-05

## 2024-07-31 RX ORDER — ALBUTEROL SULFATE 0.83 MG/ML
2.5 SOLUTION RESPIRATORY (INHALATION)
Status: CANCELLED | OUTPATIENT
Start: 2024-09-05

## 2024-07-31 RX ORDER — DIPHENHYDRAMINE HCL 25 MG
50 CAPSULE ORAL ONCE
Start: 2024-09-05

## 2024-07-31 RX ORDER — ALBUTEROL SULFATE 90 UG/1
1-2 AEROSOL, METERED RESPIRATORY (INHALATION)
Status: CANCELLED
Start: 2024-09-05

## 2024-07-31 RX ORDER — ALBUTEROL SULFATE 90 UG/1
1-2 AEROSOL, METERED RESPIRATORY (INHALATION)
Start: 2024-09-05

## 2024-07-31 RX ORDER — MEPERIDINE HYDROCHLORIDE 25 MG/ML
25 INJECTION INTRAMUSCULAR; INTRAVENOUS; SUBCUTANEOUS EVERY 30 MIN PRN
OUTPATIENT
Start: 2024-09-05

## 2024-07-31 RX ORDER — ACETAMINOPHEN 325 MG/1
650 TABLET ORAL ONCE
Status: CANCELLED
Start: 2024-09-05

## 2024-07-31 RX ORDER — DIPHENHYDRAMINE HYDROCHLORIDE 50 MG/ML
50 INJECTION INTRAMUSCULAR; INTRAVENOUS
Start: 2024-09-05

## 2024-07-31 RX ORDER — EPINEPHRINE 1 MG/ML
0.3 INJECTION, SOLUTION INTRAMUSCULAR; SUBCUTANEOUS EVERY 5 MIN PRN
Status: CANCELLED | OUTPATIENT
Start: 2024-09-05

## 2024-07-31 RX ORDER — ACETAMINOPHEN 325 MG/1
650 TABLET ORAL ONCE
Status: COMPLETED | OUTPATIENT
Start: 2024-07-31 | End: 2024-07-31

## 2024-07-31 RX ADMIN — RITUXIMAB-ABBS 1000 MG: 10 INJECTION, SOLUTION INTRAVENOUS at 11:30

## 2024-07-31 RX ADMIN — ACETAMINOPHEN 650 MG: 325 TABLET ORAL at 11:06

## 2024-07-31 RX ADMIN — DIPHENHYDRAMINE HYDROCHLORIDE 50 MG: 50 INJECTION, SOLUTION INTRAMUSCULAR; INTRAVENOUS at 11:06

## 2024-07-31 RX ADMIN — SODIUM CHLORIDE 250 ML: 9 INJECTION, SOLUTION INTRAVENOUS at 11:03

## 2024-07-31 RX ADMIN — METHYLPREDNISOLONE SODIUM SUCCINATE 100 MG: 125 INJECTION, POWDER, FOR SOLUTION INTRAMUSCULAR; INTRAVENOUS at 11:24

## 2024-07-31 NOTE — PROGRESS NOTES
Infusion Nursing Note:  Deshawn Flood presents today for 6 month non-rapid Truxima.    Patient seen by provider today: No   present during visit today: Not Applicable.    Note:   Due to patient's history of chest tightness with first Rituximab infusion, premeds were given today and infusion rates were:  50 ml/hr x 30 minutes  100 ml/hr x 30 minutes  150 ml/hr x 30 minutes  200 ml/hr x 30 minutes  250 ml/hr x 30 minutes  300 ml/hr x 30 minutes  350 ml/hr x 30 minutes  400 ml/hr x remainder    Since patient tolerated today's infusion without any difficulties, would recommend next infusion be started at 100 ml/hr, and increased by 100 ml/hr every 30 minutes to a max rate of 400 ml/hr.      Intravenous Access:  Peripheral IV placed without difficulty.    Treatment Conditions:  Biological Infusion Checklist:  ~~~ NOTE: If the patient answers yes to any of the questions below, hold the infusion and contact ordering provider or on-call provider.    Have you recently had an elevated temperature, fever, chills, productive cough, coughing for 3 weeks or longer or hemoptysis,  abnormal vital signs, night sweats,  chest pain or have you noticed a decrease in your appetite, unexplained weight loss or fatigue? No  Do you have any open wounds or new incisions? No  Do you have any upcoming hospitalizations or surgeries? Does not include esophagogastroduodenoscopy, colonoscopy, endoscopic retrograde cholangiopancreatography (ERCP), endoscopic ultrasound (EUS), dental procedures or joint aspiration/steroid injections No  Do you currently have any signs of illness or infection or are you on any antibiotics? No  Have you had any new, sudden or worsening abdominal pain? No  Have you or anyone in your household received a live vaccination in the past 4 weeks? Please note: No live vaccines while on biologic/chemotherapy until 6 months after the last treatment. Patient can receive the flu vaccine (shot only), pneumovax and the  Covid vaccine. It is optimal for the patient to get these vaccines mid cycle, but they can be given at any time as long as it is not on the day of the infusion. No  Have you recently been diagnosed with any new nervous system diseases (ie. Multiple sclerosis, Guillain Wray, seizures, neurological changes) or cancer diagnosis? Are you on any form of radiation or chemotherapy? No  Have there been any other new onset medical symptoms? No    Post Infusion Assessment:  Patient tolerated infusion without incident.  Blood return noted pre and post infusion.  Site patent and intact, free from redness, edema or discomfort.  No evidence of extravasations.  Access discontinued per protocol.       Discharge Plan:   AVS to patient via MYCHART.  Patient will return in 6 months for next appointment.   Patient discharged in stable condition accompanied by: self.  Departure Mode: Ambulatory.      Meghan Herrmann RN

## 2024-08-02 ENCOUNTER — PATIENT OUTREACH (OUTPATIENT)
Dept: CARE COORDINATION | Facility: CLINIC | Age: 49
End: 2024-08-02
Payer: COMMERCIAL

## 2024-08-03 ENCOUNTER — LAB (OUTPATIENT)
Dept: LAB | Facility: CLINIC | Age: 49
End: 2024-08-03
Payer: COMMERCIAL

## 2024-08-03 DIAGNOSIS — E78.5 HYPERLIPIDEMIA LDL GOAL <130: Primary | ICD-10-CM

## 2024-08-03 DIAGNOSIS — R80.1 PERSISTENT PROTEINURIA: ICD-10-CM

## 2024-08-03 DIAGNOSIS — N05.0 MINIMAL CHANGE DISEASE: ICD-10-CM

## 2024-08-03 LAB
ALBUMIN MFR UR ELPH: 13.1 MG/DL
ALBUMIN SERPL BCG-MCNC: 4 G/DL (ref 3.5–5.2)
ALBUMIN UR-MCNC: NEGATIVE MG/DL
ANION GAP SERPL CALCULATED.3IONS-SCNC: 8 MMOL/L (ref 7–15)
APPEARANCE UR: CLEAR
BILIRUB UR QL STRIP: NEGATIVE
BUN SERPL-MCNC: 12.8 MG/DL (ref 6–20)
CALCIUM SERPL-MCNC: 9.4 MG/DL (ref 8.8–10.4)
CHLORIDE SERPL-SCNC: 101 MMOL/L (ref 98–107)
CHOLEST SERPL-MCNC: 119 MG/DL
COLOR UR AUTO: YELLOW
CREAT SERPL-MCNC: 1.06 MG/DL (ref 0.67–1.17)
CREAT UR-MCNC: 305.6 MG/DL
EGFRCR SERPLBLD CKD-EPI 2021: 85 ML/MIN/1.73M2
ERYTHROCYTE [DISTWIDTH] IN BLOOD BY AUTOMATED COUNT: 11.6 % (ref 10–15)
FASTING STATUS PATIENT QL REPORTED: YES
GLUCOSE SERPL-MCNC: 102 MG/DL (ref 70–99)
GLUCOSE UR STRIP-MCNC: NEGATIVE MG/DL
HCO3 SERPL-SCNC: 27 MMOL/L (ref 22–29)
HCT VFR BLD AUTO: 41.2 % (ref 40–53)
HDLC SERPL-MCNC: 43 MG/DL
HGB BLD-MCNC: 14.7 G/DL (ref 13.3–17.7)
HGB UR QL STRIP: NEGATIVE
KETONES UR STRIP-MCNC: NEGATIVE MG/DL
LDLC SERPL CALC-MCNC: 66 MG/DL
LEUKOCYTE ESTERASE UR QL STRIP: NEGATIVE
MCH RBC QN AUTO: 30.9 PG (ref 26.5–33)
MCHC RBC AUTO-ENTMCNC: 35.7 G/DL (ref 31.5–36.5)
MCV RBC AUTO: 87 FL (ref 78–100)
MUCOUS THREADS #/AREA URNS LPF: PRESENT /LPF
NITRATE UR QL: NEGATIVE
NONHDLC SERPL-MCNC: 76 MG/DL
PH UR STRIP: 6 [PH] (ref 5–7)
PHOSPHATE SERPL-MCNC: 3.3 MG/DL (ref 2.5–4.5)
PLATELET # BLD AUTO: 177 10E3/UL (ref 150–450)
POTASSIUM SERPL-SCNC: 4.3 MMOL/L (ref 3.4–5.3)
PROT/CREAT 24H UR: 0.04 MG/MG CR (ref 0–0.2)
RBC # BLD AUTO: 4.75 10E6/UL (ref 4.4–5.9)
RBC URINE: <1 /HPF
SODIUM SERPL-SCNC: 136 MMOL/L (ref 135–145)
SP GR UR STRIP: 1.03 (ref 1–1.03)
SQUAMOUS EPITHELIAL: 1 /HPF
TRIGL SERPL-MCNC: 50 MG/DL
UROBILINOGEN UR STRIP-MCNC: NORMAL MG/DL
WBC # BLD AUTO: 5.9 10E3/UL (ref 4–11)
WBC URINE: <1 /HPF

## 2024-08-03 PROCEDURE — 80069 RENAL FUNCTION PANEL: CPT

## 2024-08-03 PROCEDURE — 84156 ASSAY OF PROTEIN URINE: CPT

## 2024-08-03 PROCEDURE — 81001 URINALYSIS AUTO W/SCOPE: CPT

## 2024-08-03 PROCEDURE — 36415 COLL VENOUS BLD VENIPUNCTURE: CPT

## 2024-08-03 PROCEDURE — 85027 COMPLETE CBC AUTOMATED: CPT

## 2024-08-03 PROCEDURE — 80061 LIPID PANEL: CPT

## 2024-08-16 ENCOUNTER — PATIENT OUTREACH (OUTPATIENT)
Dept: CARE COORDINATION | Facility: CLINIC | Age: 49
End: 2024-08-16
Payer: COMMERCIAL

## 2024-10-05 ENCOUNTER — LAB (OUTPATIENT)
Dept: LAB | Facility: CLINIC | Age: 49
End: 2024-10-05
Payer: COMMERCIAL

## 2024-10-05 DIAGNOSIS — R80.1 PERSISTENT PROTEINURIA: ICD-10-CM

## 2024-10-05 DIAGNOSIS — N05.0 MINIMAL CHANGE DISEASE: ICD-10-CM

## 2024-10-05 LAB
ALBUMIN MFR UR ELPH: 8.4 MG/DL
ALBUMIN SERPL BCG-MCNC: 4.1 G/DL (ref 3.5–5.2)
ALBUMIN UR-MCNC: NEGATIVE MG/DL
ANION GAP SERPL CALCULATED.3IONS-SCNC: 10 MMOL/L (ref 7–15)
APPEARANCE UR: CLEAR
BILIRUB UR QL STRIP: NEGATIVE
BUN SERPL-MCNC: 15.9 MG/DL (ref 6–20)
CALCIUM SERPL-MCNC: 9.5 MG/DL (ref 8.8–10.4)
CHLORIDE SERPL-SCNC: 102 MMOL/L (ref 98–107)
COLOR UR AUTO: YELLOW
CREAT SERPL-MCNC: 1.19 MG/DL (ref 0.67–1.17)
CREAT UR-MCNC: 150.9 MG/DL
EGFRCR SERPLBLD CKD-EPI 2021: 74 ML/MIN/1.73M2
ERYTHROCYTE [DISTWIDTH] IN BLOOD BY AUTOMATED COUNT: 11.5 % (ref 10–15)
GLUCOSE SERPL-MCNC: 94 MG/DL (ref 70–99)
GLUCOSE UR STRIP-MCNC: NEGATIVE MG/DL
HCO3 SERPL-SCNC: 24 MMOL/L (ref 22–29)
HCT VFR BLD AUTO: 41.5 % (ref 40–53)
HGB BLD-MCNC: 14.6 G/DL (ref 13.3–17.7)
HGB UR QL STRIP: NEGATIVE
KETONES UR STRIP-MCNC: NEGATIVE MG/DL
LEUKOCYTE ESTERASE UR QL STRIP: NEGATIVE
MCH RBC QN AUTO: 30.8 PG (ref 26.5–33)
MCHC RBC AUTO-ENTMCNC: 35.2 G/DL (ref 31.5–36.5)
MCV RBC AUTO: 88 FL (ref 78–100)
MUCOUS THREADS #/AREA URNS LPF: PRESENT /LPF
NITRATE UR QL: NEGATIVE
PH UR STRIP: 6 [PH] (ref 5–7)
PHOSPHATE SERPL-MCNC: 3 MG/DL (ref 2.5–4.5)
PLATELET # BLD AUTO: 180 10E3/UL (ref 150–450)
POTASSIUM SERPL-SCNC: 5 MMOL/L (ref 3.4–5.3)
PROT/CREAT 24H UR: 0.06 MG/MG CR (ref 0–0.2)
RBC # BLD AUTO: 4.74 10E6/UL (ref 4.4–5.9)
RBC URINE: 0 /HPF
SODIUM SERPL-SCNC: 136 MMOL/L (ref 135–145)
SP GR UR STRIP: 1.02 (ref 1–1.03)
UROBILINOGEN UR STRIP-MCNC: NORMAL MG/DL
WBC # BLD AUTO: 5.3 10E3/UL (ref 4–11)
WBC URINE: <1 /HPF

## 2024-10-05 PROCEDURE — 84156 ASSAY OF PROTEIN URINE: CPT

## 2024-10-05 PROCEDURE — 85027 COMPLETE CBC AUTOMATED: CPT

## 2024-10-05 PROCEDURE — 36415 COLL VENOUS BLD VENIPUNCTURE: CPT

## 2024-10-05 PROCEDURE — 81001 URINALYSIS AUTO W/SCOPE: CPT

## 2024-10-05 PROCEDURE — 80069 RENAL FUNCTION PANEL: CPT

## 2024-10-07 ENCOUNTER — OFFICE VISIT (OUTPATIENT)
Dept: NEPHROLOGY | Facility: CLINIC | Age: 49
End: 2024-10-07
Payer: COMMERCIAL

## 2024-10-07 VITALS
HEART RATE: 78 BPM | DIASTOLIC BLOOD PRESSURE: 70 MMHG | SYSTOLIC BLOOD PRESSURE: 108 MMHG | WEIGHT: 154 LBS | BODY MASS INDEX: 22.74 KG/M2

## 2024-10-07 DIAGNOSIS — N05.0 MINIMAL CHANGE DISEASE: Primary | ICD-10-CM

## 2024-10-07 DIAGNOSIS — K50.00 CROHN'S DISEASE OF SMALL INTESTINE WITHOUT COMPLICATION (H): ICD-10-CM

## 2024-10-07 DIAGNOSIS — N04.9 NEPHROTIC SYNDROME: ICD-10-CM

## 2024-10-07 DIAGNOSIS — D84.9 IMMUNOSUPPRESSION (H): ICD-10-CM

## 2024-10-07 DIAGNOSIS — D58.2 ELEVATED HEMOGLOBIN (H): ICD-10-CM

## 2024-10-07 DIAGNOSIS — N18.31 CHRONIC KIDNEY DISEASE, STAGE 3A (H): ICD-10-CM

## 2024-10-07 PROCEDURE — 99214 OFFICE O/P EST MOD 30 MIN: CPT | Performed by: INTERNAL MEDICINE

## 2024-10-07 RX ORDER — LISINOPRIL 20 MG/1
20 TABLET ORAL DAILY
Qty: 90 TABLET | Refills: 3 | Status: SHIPPED | OUTPATIENT
Start: 2024-10-07

## 2024-10-07 NOTE — PROGRESS NOTES
Assessment and Plan:  49 year old male who presents for followup of minimal change disease. He presented for evaluation after presenting to ER where CT scan noted ascites and nonspecific small bowel enteritis, and UA showed proteinuria. He had biopsy which showed minimal change on 4/9/2019 and treated with prednisone 70mg. He was in remission and down to prednsione 10mg then stopped it, and had relapse within a couple months.  His mother noted swelling and he was up 20-25 lbs and proteinuria up to 6 grams/g cr. He was on maintenance cyclosporine since last relapse in November 2021, and another relapse November 2022 and another in June 2023.  # Minimal change disease/ nephrotic syndrome, steroid dependent/ relapsing-  another relapse noted with proteinuria up to 6 grams, despite being on cyclosporine, trough levels had been near 35-40 on cyclosporine 50mg twice a day.- we increased cyclosporine but he was confused and only taking 50mg twice a day, now will be on 100mg twice a day  - Prednisone round #2 started March 2020, tried to do a longer wean once on lower doses.  - Prednisone round #3 started April 20,2021.  - Prednisone round #4 November 2022  -Prednisone round #5 June 2023 - weaning in December 2023  - weight is stable he is typically 160-165 lbs, he is down to 158lbs  - will remain on 5mg prednisone as he does not want to risk flaring during work season.  - continue labs monthly for now including cyclosporine- willl continue cyclosporine at 100mg bid, level was 94 on 125mg bid, thus will lower and hope to stop after rituximab infusions given recurrent flares.  - Rituximab infusion done on 12/1 and 12/14/2023 - he had mild reaction (low heart rate, infusion held, re-medicated and finished infusion)   - Doing OK so far, CD 19 counts low, continue checking every 6 months  - will plan 1g every 6 months for 2 years and re-evaluate- next dose late February/ early March  - monitor carefully given  immunosuppression  - update vaccination  - discussed importance of seeing PCP regularly, age appropriate screening (he is against cscope but may be ok to do cologuard or other options)  -labs every 2-3 months    # Renal function- CKD with baseline Scr now 1-1.1- CKD or due to cyclosporine and hemodynamic factors-. It was up to 1.6-1.8 at time of biopsy after diuretics were started.  - scr back to 1-1.2 range, up to 1.4 a few months ago, likely CSA hemodynamic effect  - monitor and encouraged hydration- stable on recent labs. On lisinopril 20m,,g, may need to cut down  - proteinuria improved very quickly down to normal once prednisone restarted , down from 13 grams to normal  Labs now can be every other month, off cyclosporine, s/p Rituximab infusion #1 on 12/1, infusion #2 on 12/14  - last dose July 2024, next dose February 2025   - Repeat CD 19 Bcell count in June 2024       - discussed that his is an immunosuppressant, that it increases risk of infection and malignancy.  - discussed smoking cessation, he also chews tobacco and drinks mountain dew- his mother had a double lung transplant a month ago at [facility].  # Hyperlipidemia- LDL very elevated,was on low dose statin but stopped in setting of CNI and LFT elevated- it was restarted by PCP  - recheck lipid panel and LFTs - can stop lipitor if lipids back to normal    # Hypertension: BP is good at 110/70s, was higher initially   - continue lisinopril, increase to 40mg if BP >125/80    # Not anemic- hgb has been high at times, monitor    # Electrolytes: normal- to high normal potassium, encouraged more water       # Mineral Bone Disorder:   Check baseline PTH if creatinine remains elevated.     - RTC in 6 months and labs bimonthly for now  -25 minutes spent on day of service in counseling and coordination of care.       Assessment and plan was discussed with patient and he voiced his understanding and agreement.    Reason for Visit:  Deshawn Flood is a 49  year old male with nephrotic range proteinuria noted and biopsy 4/9/19 shows minimal change disease    HPI:  He is a 49 year old male with minimal change disease who follows up. He initially presented with nephrotic syndrome and underwent kidney biopsy [id number] and it showed minimal change disease. He was admitted to the hospital briefly after biopsy as he was having poor appetite due to bloating, and elevated creatinine.    Since then he went into remission, we tapered down on prednisone and unfortunately he relapsed within a couple months (6g/g cr February 2020),  thus we restarted prednisone in March 2020 and by early April he was down to normal range proteinuria and he was feeling better.    His weight is usually closer to 150-160  but was up to 184 lbs with nephrotic syndrome, now back to 160lbs. He is on prednisone wean and may have dropped faster than instructed, now on 10mg daily  Weight stable at 160-165 lbs  Creatinine is stable at 1.1 recently off cyclosporine  His proteinuria is in normal range.  He has had a flare yearly so hoping that we can achieve long term remission  His mother had lung transplant and has kidney failure on dialysis currently. He is still working/ late in season given good weather.    He feels well, drinks 2 bottles of water and 1.5 of mountain dew  His BP is low normal at 108/70      Renal History:   None prior to minimal change disease Spring 2019    ROS:   A comprehensive review of systems was obtained and negative, except as noted in the HPI or PMH.    Active Medical Problems:  Patient Active Problem List   Diagnosis    Loose body of left knee    Left knee pain    Tobacco abuse    History of pneumothorax    CARDIOVASCULAR SCREENING; LDL GOAL LESS THAN 160    Nephrotic syndrome    Anasarca    Minimal change disease    Immunosuppression (H)    Crohn's disease of small intestine without complication (H)    Elevated hemoglobin (H)    Chronic kidney disease, stage 3a (H)     Steroid-dependent minimal change glomerulopathy     PMH:   Medical record was reviewed and PMH was discussed with patient and noted below.  Past Medical History:   Diagnosis Date    Anemia     Other spontaneous pneumothorax     SPONTANEOUS PNEUMOTHORAX NEC 3/25/2005    Unspecified asthma(493.90)      PSH:   Past Surgical History:   Procedure Laterality Date    ARTHROSCOPY KNEE  8/3/2012    Procedure: ARTHROSCOPY KNEE;  Left knee Arthroscopy with removal of loose bodies;  Surgeon: Bibi Roberts MD;  Location: [facility] OR    IR RENAL BIOPSY LEFT  4/9/2019    TUBE THORACOSTOMY,ABSC/EMPYEMA/HEMO  3/20/2005    Small chest tube with Heimlich valve.    ZZHC REPAIR UMBILICAL GODFREY,<6Y/O,REDUC  1978       Family Hx:   Family History   Problem Relation Age of Onset    Autoimmune Disease No family hx of      Personal Hx:   Social History     Tobacco Use    Smoking status: Light Smoker     Current packs/day: 0.10     Average packs/day: 0.1 packs/day for 15.0 years (1.5 ttl pk-yrs)     Types: Cigarettes    Smokeless tobacco: Current     Types: Chew    Tobacco comments:     1 pack every four days   Substance Use Topics    Alcohol use: Not Currently     Alcohol/week: 2.0 standard drinks of alcohol     Types: 2 Cans of beer per week     Comment: occasional use.       Allergies:  Allergies   Allergen Reactions    Bee Venom      swells up    Seasonal Allergies        Medications:  Current Outpatient Medications   Medication Sig Dispense Refill    lisinopril (ZESTRIL) 20 MG tablet TAKE ONE TABLET BY MOUTH ONCE DAILY 90 tablet 3    VENTOLIN  (90 Base) MCG/ACT inhaler INHALE 2 PUFFS INTO THE LUNGS EVERY 6 HOURS AS NEEDED FOR SHORTNESS OF BREATH, WHEEZING OR COUGH 18 g 4     No current facility-administered medications for this visit.      Vitals:  /70   Pulse 78   Wt 69.9 kg (154 lb)   BMI 22.74 kg/m    154-155 lbs  Exam:  GENERAL : alert and no distress  Speaks in full sentences without dyspnea  Lungs- some coarse exp  rhonchi  cv- regular rate  Normal speech and thought pattern  Ext: no signficant edema    LABS:   CMP  Recent Labs   Lab Test 10/05/24  0748 08/03/24  0824 07/31/24  1026 06/01/24  0758 08/14/21  0807 07/10/21  0829 06/05/21  0743 05/15/21  0757 04/10/21  0745    136 139 136   < > 139 138 138 141   POTASSIUM 5.0 4.3 4.2 4.0   < > 4.1 3.4 3.8 3.6   CHLORIDE 102 101 103 102   < > 110* 105 103 109   CO2 24 27 26 27   < > 26 29 32 28   ANIONGAP 10 8 10 7   < > 3 4 3 4   GLC 94 102* 102* 92   < > 84 89 95 126*   BUN 15.9 12.8 13.7 16.0   < > 19 17 14 14   CR 1.19* 1.06 1.19* 1.16   < > 1.06 1.11 1.11 0.98   GFRESTIMATED 74 85 74 77   < > 83 79 79 >90   GFRESTBLACK  --   --   --   --   --  >90 >90 >90 >90   BASIA 9.5 9.4 9.8 9.6   < > 9.2 8.5 8.6 8.7    < > = values in this interval not displayed.     Recent Labs   Lab Test 05/07/22  0748 04/09/22  0749 03/05/22  0750 01/08/22  0758   BILITOTAL 0.5 1.3 2.0* 1.1   ALKPHOS 55 40 33* 47   ALT 20 24 36 49   AST 12 21 16 14     CBC  Recent Labs   Lab Test 10/05/24  0748 08/03/24  0824 07/31/24  1026 06/01/24  0758   HGB 14.6 14.7 15.5 15.2   WBC 5.3 5.9 5.5 5.4   RBC 4.74 4.75 5.04 4.94   HCT 41.5 41.2 43.5 43.8   MCV 88 87 86 89   MCH 30.8 30.9 30.8 30.8   MCHC 35.2 35.7 35.6 34.7   RDW 11.5 11.6 11.5 11.5    177 206 185     URINE STUDIES  Recent Labs   Lab Test 10/05/24  0751 08/03/24  0830 06/01/24  0824 04/27/24  1046 09/12/20  0841 02/28/20  1620 04/01/19  1500   COLOR Yellow Yellow Yellow Yellow   < > Yellow Yellow   APPEARANCE Clear Clear Clear Clear   < > Clear Clear   URINEGLC Negative Negative Negative Negative   < > Negative Negative   URINEBILI Negative Negative Negative Negative   < > Negative Negative   URINEKETONE Negative Negative Negative Negative   < > Negative Negative   SG 1.019 1.029 1.020 1.018   < > >1.030 1.015   UBLD Negative Negative Negative Negative   < > Moderate* Moderate*   URINEPH 6.0 6.0 6.0 5.0   < > 6.0 6.0   PROTEIN Negative  Negative Negative Negative   < > >=300* >=300*   UROBILINOGEN  --   --   --   --   --  0.2 0.2   NITRITE Negative Negative Negative Negative   < > Negative Negative   LEUKEST Negative Negative Negative Negative   < > Negative Negative   RBCU 0 <1 0 <1   < > O - 2 25-50*   WBCU <1 <1 0 <1   < > 0 - 5 0 - 5    < > = values in this interval not displayed.     Recent Labs   Lab Test 12/06/21  1435 04/03/20  0900 02/28/20  1619 11/26/19  1435   UTPG 3.62* 0.18 6.20* 0.06     PTH  No lab results found.  IRON STUDIES  Recent Labs   Lab Test 06/04/22  0803   IRON 144      IRONSAT 52*   REYNA 369       IMPRESSION:  1. Findings are concerning for diffuse small bowel enteritis likely  infectious or inflammatory in etiology, with associated inflammatory  changes of the mesenteric adipose tissues, moderate ascites, small  bilateral probably reactive pleural effusions and with some edema in  the subcutaneous adipose tissues.  2. No significant atherosclerosis is identified. No obvious arterial  blockage is seen in the mesenteric vessels to suggest ischemia.  3. Mild degenerative changes of the spine and hips. No acute fracture  or aggressive osseous lesion.      Jenny Aram Scott MD

## 2024-10-12 ENCOUNTER — HEALTH MAINTENANCE LETTER (OUTPATIENT)
Age: 49
End: 2024-10-12

## 2024-10-24 ENCOUNTER — PATIENT OUTREACH (OUTPATIENT)
Dept: NEPHROLOGY | Facility: CLINIC | Age: 49
End: 2024-10-24
Payer: COMMERCIAL

## 2024-10-24 DIAGNOSIS — N05.0 MINIMAL CHANGE DISEASE: ICD-10-CM

## 2024-10-24 DIAGNOSIS — N18.31 CHRONIC KIDNEY DISEASE, STAGE 3A (H): Primary | ICD-10-CM

## 2024-10-24 NOTE — TELEPHONE ENCOUNTER
Nephrology Note: RN CC Chart Review    REASON FOR ENCOUNTER:     Chart reviewed by nephrology RN CC                        Neph tracking flow sheet updated  SITUATION/BACKROUND:     Last nephrology visit: 10/7/24    Last Renal Panel:  Sodium   Date Value Ref Range Status   10/05/2024 136 135 - 145 mmol/L Final   07/10/2021 139 133 - 144 mmol/L Final     Potassium   Date Value Ref Range Status   10/05/2024 5.0 3.4 - 5.3 mmol/L Final   01/07/2023 3.9 3.4 - 5.3 mmol/L Final   07/10/2021 4.1 3.4 - 5.3 mmol/L Final     Chloride   Date Value Ref Range Status   10/05/2024 102 98 - 107 mmol/L Final   01/07/2023 108 94 - 109 mmol/L Final   07/10/2021 110 (H) 94 - 109 mmol/L Final     Carbon Dioxide   Date Value Ref Range Status   07/10/2021 26 20 - 32 mmol/L Final     Carbon Dioxide (CO2)   Date Value Ref Range Status   10/05/2024 24 22 - 29 mmol/L Final   01/07/2023 32 20 - 32 mmol/L Final     Anion Gap   Date Value Ref Range Status   10/05/2024 10 7 - 15 mmol/L Final   01/07/2023 3 3 - 14 mmol/L Final   07/10/2021 3 3 - 14 mmol/L Final     Glucose   Date Value Ref Range Status   10/05/2024 94 70 - 99 mg/dL Final   01/07/2023 89 70 - 99 mg/dL Final   07/10/2021 84 70 - 99 mg/dL Final     Urea Nitrogen   Date Value Ref Range Status   10/05/2024 15.9 6.0 - 20.0 mg/dL Final   01/07/2023 26 7 - 30 mg/dL Final   07/10/2021 19 7 - 30 mg/dL Final     Creatinine   Date Value Ref Range Status   10/05/2024 1.19 (H) 0.67 - 1.17 mg/dL Final   07/10/2021 1.06 0.66 - 1.25 mg/dL Final     GFR Estimate   Date Value Ref Range Status   10/05/2024 74 >60 mL/min/1.73m2 Final     Comment:     eGFR calculated using 2021 CKD-EPI equation.   07/10/2021 83 >60 mL/min/[1.73_m2] Final     Comment:     Non  GFR Calc  Starting 12/18/2018, serum creatinine based estimated GFR (eGFR) will be   calculated using the Chronic Kidney Disease Epidemiology Collaboration   (CKD-EPI) equation.       Calcium   Date Value Ref Range Status    10/05/2024 9.5 8.8 - 10.4 mg/dL Final     Comment:     Reference intervals for this test were updated on 7/16/2024 to reflect our healthy population more accurately. There may be differences in the flagging of prior results with similar values performed with this method. Those prior results can be interpreted in the context of the updated reference intervals.   07/10/2021 9.2 8.5 - 10.1 mg/dL Final     Phosphorus   Date Value Ref Range Status   10/05/2024 3.0 2.5 - 4.5 mg/dL Final   07/10/2021 3.0 2.5 - 4.5 mg/dL Final     Albumin   Date Value Ref Range Status   10/05/2024 4.1 3.5 - 5.2 g/dL Final   01/07/2023 3.4 3.4 - 5.0 g/dL Final   07/10/2021 3.8 3.4 - 5.0 g/dL Final         Neph Tracking Status:  Neph Tracking Flowsheet Last Filled Values       Patient's Referral Dates Auto Populate Patient's Referral Dates    MTM Referral 7/19/23    Transplant Status  Not Referred              ASSESSMENT/PLAN     Patient to follow up as scheduled at next appointment  Patient to call/MyChart message with updates    Upcoming Appointments:  Future Appts Next 180 days       Visit Type Date Time Department    RETURN NEPHROLOGY 3/17/2025  1:00 PM [facility]              TANNA SHAFFER RN

## 2024-12-07 ENCOUNTER — LAB (OUTPATIENT)
Dept: LAB | Facility: CLINIC | Age: 49
End: 2024-12-07
Payer: COMMERCIAL

## 2024-12-07 DIAGNOSIS — R80.1 PERSISTENT PROTEINURIA: ICD-10-CM

## 2024-12-07 DIAGNOSIS — N05.0 MINIMAL CHANGE DISEASE: ICD-10-CM

## 2024-12-07 LAB
ALBUMIN MFR UR ELPH: 6.4 MG/DL
ALBUMIN SERPL BCG-MCNC: 4.4 G/DL (ref 3.5–5.2)
ALBUMIN UR-MCNC: NEGATIVE MG/DL
AMORPH CRY #/AREA URNS HPF: ABNORMAL /HPF
ANION GAP SERPL CALCULATED.3IONS-SCNC: 10 MMOL/L (ref 7–15)
APPEARANCE UR: CLEAR
BILIRUB UR QL STRIP: NEGATIVE
BUN SERPL-MCNC: 17.5 MG/DL (ref 6–20)
CALCIUM SERPL-MCNC: 9.3 MG/DL (ref 8.8–10.4)
CHLORIDE SERPL-SCNC: 102 MMOL/L (ref 98–107)
COLOR UR AUTO: YELLOW
CREAT SERPL-MCNC: 1.18 MG/DL (ref 0.67–1.17)
CREAT UR-MCNC: 191.6 MG/DL
EGFRCR SERPLBLD CKD-EPI 2021: 75 ML/MIN/1.73M2
ERYTHROCYTE [DISTWIDTH] IN BLOOD BY AUTOMATED COUNT: 11.5 % (ref 10–15)
GLUCOSE SERPL-MCNC: 100 MG/DL (ref 70–99)
GLUCOSE UR STRIP-MCNC: NEGATIVE MG/DL
HCO3 SERPL-SCNC: 27 MMOL/L (ref 22–29)
HCT VFR BLD AUTO: 43.5 % (ref 40–53)
HGB BLD-MCNC: 15.5 G/DL (ref 13.3–17.7)
HGB UR QL STRIP: NEGATIVE
KETONES UR STRIP-MCNC: NEGATIVE MG/DL
LEUKOCYTE ESTERASE UR QL STRIP: NEGATIVE
MCH RBC QN AUTO: 30.9 PG (ref 26.5–33)
MCHC RBC AUTO-ENTMCNC: 35.6 G/DL (ref 31.5–36.5)
MCV RBC AUTO: 87 FL (ref 78–100)
MUCOUS THREADS #/AREA URNS LPF: PRESENT /LPF
NITRATE UR QL: NEGATIVE
PH UR STRIP: 6.5 [PH] (ref 5–7)
PHOSPHATE SERPL-MCNC: 2.9 MG/DL (ref 2.5–4.5)
PLATELET # BLD AUTO: 186 10E3/UL (ref 150–450)
POTASSIUM SERPL-SCNC: 3.9 MMOL/L (ref 3.4–5.3)
PROT/CREAT 24H UR: 0.03 MG/MG CR (ref 0–0.2)
RBC # BLD AUTO: 5.01 10E6/UL (ref 4.4–5.9)
RBC URINE: 0 /HPF
SODIUM SERPL-SCNC: 139 MMOL/L (ref 135–145)
SP GR UR STRIP: 1.02 (ref 1–1.03)
SQUAMOUS EPITHELIAL: <1 /HPF
UROBILINOGEN UR STRIP-MCNC: NORMAL MG/DL
WBC # BLD AUTO: 5.5 10E3/UL (ref 4–11)
WBC URINE: <1 /HPF

## 2024-12-07 PROCEDURE — 80069 RENAL FUNCTION PANEL: CPT

## 2024-12-07 PROCEDURE — 85027 COMPLETE CBC AUTOMATED: CPT

## 2024-12-07 PROCEDURE — 84156 ASSAY OF PROTEIN URINE: CPT

## 2024-12-07 PROCEDURE — 81001 URINALYSIS AUTO W/SCOPE: CPT

## 2024-12-07 PROCEDURE — 36415 COLL VENOUS BLD VENIPUNCTURE: CPT

## 2025-02-01 ENCOUNTER — LAB (OUTPATIENT)
Dept: LAB | Facility: CLINIC | Age: 50
End: 2025-02-01
Payer: COMMERCIAL

## 2025-02-01 DIAGNOSIS — R80.1 PERSISTENT PROTEINURIA: ICD-10-CM

## 2025-02-01 DIAGNOSIS — N05.0 MINIMAL CHANGE DISEASE: ICD-10-CM

## 2025-02-01 LAB
ALBUMIN MFR UR ELPH: 6.7 MG/DL
ALBUMIN SERPL BCG-MCNC: 4.3 G/DL (ref 3.5–5.2)
ALBUMIN UR-MCNC: NEGATIVE MG/DL
AMORPH CRY #/AREA URNS HPF: ABNORMAL /HPF
ANION GAP SERPL CALCULATED.3IONS-SCNC: 12 MMOL/L (ref 7–15)
APPEARANCE UR: CLEAR
BILIRUB UR QL STRIP: NEGATIVE
BUN SERPL-MCNC: 20.6 MG/DL (ref 6–20)
CALCIUM SERPL-MCNC: 9.2 MG/DL (ref 8.8–10.4)
CHLORIDE SERPL-SCNC: 103 MMOL/L (ref 98–107)
COLOR UR AUTO: ABNORMAL
CREAT SERPL-MCNC: 1.21 MG/DL (ref 0.67–1.17)
CREAT UR-MCNC: 212.2 MG/DL
EGFRCR SERPLBLD CKD-EPI 2021: 73 ML/MIN/1.73M2
ERYTHROCYTE [DISTWIDTH] IN BLOOD BY AUTOMATED COUNT: 11.5 % (ref 10–15)
GLUCOSE SERPL-MCNC: 82 MG/DL (ref 70–99)
GLUCOSE UR STRIP-MCNC: NEGATIVE MG/DL
HCO3 SERPL-SCNC: 21 MMOL/L (ref 22–29)
HCT VFR BLD AUTO: 40.9 % (ref 40–53)
HGB BLD-MCNC: 14.8 G/DL (ref 13.3–17.7)
HGB UR QL STRIP: NEGATIVE
KETONES UR STRIP-MCNC: NEGATIVE MG/DL
LEUKOCYTE ESTERASE UR QL STRIP: NEGATIVE
MCH RBC QN AUTO: 30.9 PG (ref 26.5–33)
MCHC RBC AUTO-ENTMCNC: 36.2 G/DL (ref 31.5–36.5)
MCV RBC AUTO: 85 FL (ref 78–100)
NITRATE UR QL: NEGATIVE
PH UR STRIP: 7 [PH] (ref 5–7)
PHOSPHATE SERPL-MCNC: 2.9 MG/DL (ref 2.5–4.5)
PLATELET # BLD AUTO: 183 10E3/UL (ref 150–450)
POTASSIUM SERPL-SCNC: 3.9 MMOL/L (ref 3.4–5.3)
PROT/CREAT 24H UR: 0.03 MG/MG CR (ref 0–0.2)
RBC # BLD AUTO: 4.79 10E6/UL (ref 4.4–5.9)
RBC URINE: 1 /HPF
SODIUM SERPL-SCNC: 136 MMOL/L (ref 135–145)
SP GR UR STRIP: 1.03 (ref 1–1.03)
UROBILINOGEN UR STRIP-MCNC: NORMAL MG/DL
WBC # BLD AUTO: 6 10E3/UL (ref 4–11)
WBC URINE: 1 /HPF

## 2025-02-01 PROCEDURE — 36415 COLL VENOUS BLD VENIPUNCTURE: CPT

## 2025-02-01 PROCEDURE — 84156 ASSAY OF PROTEIN URINE: CPT

## 2025-02-01 PROCEDURE — 80069 RENAL FUNCTION PANEL: CPT

## 2025-02-01 PROCEDURE — 81001 URINALYSIS AUTO W/SCOPE: CPT

## 2025-02-01 PROCEDURE — 85027 COMPLETE CBC AUTOMATED: CPT

## 2025-02-26 DIAGNOSIS — N18.31 CHRONIC KIDNEY DISEASE, STAGE 3A (H): ICD-10-CM

## 2025-02-26 DIAGNOSIS — N05.0 MINIMAL CHANGE DISEASE: Primary | ICD-10-CM

## 2025-03-17 ENCOUNTER — OFFICE VISIT (OUTPATIENT)
Dept: NEPHROLOGY | Facility: CLINIC | Age: 50
End: 2025-03-17
Payer: COMMERCIAL

## 2025-03-17 ENCOUNTER — TELEPHONE (OUTPATIENT)
Dept: NEPHROLOGY | Facility: CLINIC | Age: 50
End: 2025-03-17

## 2025-03-17 VITALS
OXYGEN SATURATION: 98 % | SYSTOLIC BLOOD PRESSURE: 130 MMHG | WEIGHT: 154 LBS | BODY MASS INDEX: 22.74 KG/M2 | DIASTOLIC BLOOD PRESSURE: 78 MMHG | HEART RATE: 60 BPM

## 2025-03-17 DIAGNOSIS — J45.40 MODERATE PERSISTENT ASTHMA WITHOUT COMPLICATION: ICD-10-CM

## 2025-03-17 DIAGNOSIS — N04.9 NEPHROTIC SYNDROME: ICD-10-CM

## 2025-03-17 DIAGNOSIS — N05.0 MINIMAL CHANGE DISEASE: Primary | ICD-10-CM

## 2025-03-17 PROCEDURE — 3078F DIAST BP <80 MM HG: CPT | Performed by: INTERNAL MEDICINE

## 2025-03-17 PROCEDURE — 3075F SYST BP GE 130 - 139MM HG: CPT | Performed by: INTERNAL MEDICINE

## 2025-03-17 PROCEDURE — 99214 OFFICE O/P EST MOD 30 MIN: CPT | Performed by: INTERNAL MEDICINE

## 2025-03-17 RX ORDER — ALBUTEROL SULFATE 90 UG/1
AEROSOL, METERED RESPIRATORY (INHALATION)
Qty: 18 G | Refills: 4 | Status: SHIPPED | OUTPATIENT
Start: 2025-03-17

## 2025-03-17 NOTE — PROGRESS NOTES
Assessment and Plan:  49 year old male who presents for followup of minimal change disease. He presented for evaluation after presenting to ER where CT scan noted ascites and nonspecific small bowel enteritis, and UA showed proteinuria. He had biopsy which showed minimal change on 4/9/2019 and treated with prednisone 70mg. He was in remission and down to prednsione 10mg then stopped it, and had relapse within a couple months.  His mother noted swelling and he was up 20-25 lbs and proteinuria up to 6 grams/g cr. He was on maintenance cyclosporine since last relapse in November 2021, and another relapse November 2022 and another in June 2023.  # Minimal change disease/ nephrotic syndrome, steroid dependent/ relapsing-  another relapse noted with proteinuria up to 6 grams, despite being on cyclosporine, trough levels had been near 35-40 on cyclosporine 50mg twice a day.- we increased cyclosporine but he was confused and only taking 50mg twice a day, now will be on 100mg twice a day  - Prednisone round #2 started March 2020, tried to do a longer wean once on lower doses.  - Prednisone round #3 started April 20,2021.  - Prednisone round #4 November 2022  -Prednisone round #5 June 2023 - weaning in December 2023  - weight is stable he is typically 160-165 lbs, he is down to 158lbs  - will remain on 5mg prednisone as he does not want to risk flaring during work season.  - continue labs monthly for now including cyclosporine- willl continue cyclosporine at 100mg bid, level was 94 on 125mg bid, thus will lower and hope to stop after rituximab infusions given recurrent flares.  - Rituximab infusion done on 12/1 and 12/14/2023, 7/31/2024, he is overdue and will aim to schedule him in next couple of weeks before he goes back to work- he had mild reaction (low heart rate, infusion held, re-medicated and finished infusion)   - Doing OK so far, CD 19 counts low, continue checking every 6 months  - will plan 1g every 6 months for 2  years and re-evaluate- next dose late February/ early March  - monitor carefully given immunosuppression  - update vaccination  - discussed importance of seeing PCP regularly, age appropriate screening (he is against cscope but may be ok to do cologuard or other options)  -labs every 2-3 months    # Renal function- CKD with baseline Scr now 1-1.1- CKD or due to cyclosporine and hemodynamic factors-. It was up to 1.6-1.8 at time of biopsy after diuretics were started.  - scr back to 1-1.2 range, up to 1.4 a few months ago, likely CSA hemodynamic effect  - monitor and encouraged hydration- stable on recent labs. On lisinopril 20m,,g, may need to cut down  - proteinuria improved very quickly down to normal once prednisone restarted , down from 13 grams to normal  Labs now can be every other month, off cyclosporine, s/p Rituximab infusion #1 on 12/1, infusion #2 on 12/14  - last dose July 2024, next dose February 2025 now overdue, will get him scheduled.     - Repeat CD 19 Bcell count        - discussed that his is an immunosuppressant, that it increases risk of infection and malignancy.  - discussed smoking cessation, he also chews tobacco and drinks mountain dew- his mother had a double lung transplant a month ago at [facility].  # Hyperlipidemia- LDL very elevated,was on low dose statin but stopped in setting of CNI and LFT elevated- it was restarted by PCP  - recheck lipid panel and LFTs - can stop lipitor if lipids back to normal    # Hypertension: BP is ok, but higher than last visit.    - continue lisinopril, increase to 40mg if BP >125/80    # Not anemic- hgb has been high at times, monitor    # Electrolytes: normal- to high normal potassium, encouraged more water       # Mineral Bone Disorder:   Check baseline PTH if creatinine remains elevated.     - RTC in 6 months and labs bimonthly for now  -25 minutes spent on day of service in counseling and coordination of care.       Assessment and plan was  discussed with patient and he voiced his understanding and agreement.    Reason for Visit:  Deshawn Flood is a 49 year old male with nephrotic range proteinuria noted and biopsy 4/9/19 shows minimal change disease    HPI:  He is a 49 year old male with minimal change disease who follows up. He initially presented with nephrotic syndrome and underwent kidney biopsy 42019 and it showed minimal change disease. He was admitted to the hospital briefly after biopsy as he was having poor appetite due to bloating, and elevated creatinine.    Since then he went into remission, we tapered down on prednisone and unfortunately he relapsed within a couple months (6g/g cr February 2020),  thus we restarted prednisone in March 2020 and by early April he was down to normal range proteinuria and he was feeling better.    His weight is usually closer to 150-160  but was up to 184 lbs with nephrotic syndrome, now back to 160lbs. He is on prednisone wean and may have dropped faster than instructed, now on 10mg daily  Weight stable at 160-165 lbs  Creatinine is stable at 1.1 recently off cyclosporine  His proteinuria is in normal range.  He has had a flare yearly so hoping that we can achieve long term remission  His mother had lung transplant and has kidney failure on dialysis currently. He is still working/ late in season given good weather.    He feels well, drinks 2 bottles of water and 1.5 of mountain dew  His BP is fair.  He is on lisinopril 20mg and toelrating  He is due for lipid panel.     Renal History:   None prior to minimal change disease Spring 2019    ROS:   A comprehensive review of systems was obtained and negative, except as noted in the HPI or PMH.    Active Medical Problems:  Patient Active Problem List   Diagnosis    Loose body of left knee    Left knee pain    Tobacco abuse    History of pneumothorax    CARDIOVASCULAR SCREENING; LDL GOAL LESS THAN 160    Nephrotic syndrome    Anasarca    Minimal change disease     Immunosuppression    Crohn's disease of small intestine without complication (H)    Elevated hemoglobin    Chronic kidney disease, stage 3a (H)    Steroid-dependent minimal change glomerulopathy     PMH:   Medical record was reviewed and PMH was discussed with patient and noted below.  Past Medical History:   Diagnosis Date    Anemia     Other spontaneous pneumothorax     SPONTANEOUS PNEUMOTHORAX NEC 3/25/2005    Unspecified asthma(493.90)      PSH:   Past Surgical History:   Procedure Laterality Date    ARTHROSCOPY KNEE  8/3/2012    Procedure: ARTHROSCOPY KNEE;  Left knee Arthroscopy with removal of loose bodies;  Surgeon: Bibi Roberts MD;  Location: [facility] OR    IR RENAL BIOPSY LEFT  4/9/2019    TUBE THORACOSTOMY,ABSC/EMPYEMA/HEMO  3/20/2005    Small chest tube with Heimlich valve.    ZZHC REPAIR UMBILICAL GODFREY,<6Y/O,REDUC  1978       Family Hx:   Family History   Problem Relation Age of Onset    Autoimmune Disease No family hx of      Personal Hx:   Social History     Tobacco Use    Smoking status: Light Smoker     Current packs/day: 0.10     Average packs/day: 0.1 packs/day for 15.0 years (1.5 ttl pk-yrs)     Types: Cigarettes    Smokeless tobacco: Current     Types: Chew    Tobacco comments:     1 pack q month   Substance Use Topics    Alcohol use: Not Currently     Alcohol/week: 2.0 standard drinks of alcohol     Types: 2 Cans of beer per week     Comment: occasional use.       Allergies:  Allergies   Allergen Reactions    Bee Venom      swells up    Seasonal Allergies        Medications:  Current Outpatient Medications   Medication Sig Dispense Refill    lisinopril (ZESTRIL) 20 MG tablet Take 1 tablet (20 mg) by mouth daily. 90 tablet 3    VENTOLIN  (90 Base) MCG/ACT inhaler INHALE 2 PUFFS INTO THE LUNGS EVERY 6 HOURS AS NEEDED FOR SHORTNESS OF BREATH, WHEEZING OR COUGH 18 g 4     No current facility-administered medications for this visit.      Vitals:  /78   Pulse 60   Wt 69.9 kg (154 lb)    SpO2 98%   BMI 22.74 kg/m    154-155 lbs  Exam:  GENERAL : alert and no distress  Speaks in full sentences without dyspnea  Lungs- some coarse exp rhonchi  cv- regular rate  Normal speech and thought pattern  Ext: no signficant edema    LABS:   CMP  Recent Labs   Lab Test 02/01/25  0758 12/07/24  0836 10/05/24  0748 08/03/24  0824 08/14/21  0807 07/10/21  0829 06/05/21  0743 05/15/21  0757 04/10/21  0745    139 136 136   < > 139 138 138 141   POTASSIUM 3.9 3.9 5.0 4.3   < > 4.1 3.4 3.8 3.6   CHLORIDE 103 102 102 101   < > 110* 105 103 109   CO2 21* 27 24 27   < > 26 29 32 28   ANIONGAP 12 10 10 8   < > 3 4 3 4   GLC 82 100* 94 102*   < > 84 89 95 126*   BUN 20.6* 17.5 15.9 12.8   < > 19 17 14 14   CR 1.21* 1.18* 1.19* 1.06   < > 1.06 1.11 1.11 0.98   GFRESTIMATED 73 75 74 85   < > 83 79 79 >90   GFRESTBLACK  --   --   --   --   --  >90 >90 >90 >90   BASIA 9.2 9.3 9.5 9.4   < > 9.2 8.5 8.6 8.7    < > = values in this interval not displayed.     Recent Labs   Lab Test 05/07/22  0748 04/09/22  0749 03/05/22  0750 01/08/22  0758   BILITOTAL 0.5 1.3 2.0* 1.1   ALKPHOS 55 40 33* 47   ALT 20 24 36 49   AST 12 21 16 14     CBC  Recent Labs   Lab Test 02/01/25  0758 12/07/24  0836 10/05/24  0748 08/03/24  0824   HGB 14.8 15.5 14.6 14.7   WBC 6.0 5.5 5.3 5.9   RBC 4.79 5.01 4.74 4.75   HCT 40.9 43.5 41.5 41.2   MCV 85 87 88 87   MCH 30.9 30.9 30.8 30.9   MCHC 36.2 35.6 35.2 35.7   RDW 11.5 11.5 11.5 11.6    186 180 177     URINE STUDIES  Recent Labs   Lab Test 02/01/25  0807 12/07/24  0842 10/05/24  0751 08/03/24  0830 09/12/20  0841 02/28/20  1620 04/01/19  1500   COLOR Light Yellow Yellow Yellow Yellow   < > Yellow Yellow   APPEARANCE Clear Clear Clear Clear   < > Clear Clear   URINEGLC Negative Negative Negative Negative   < > Negative Negative   URINEBILI Negative Negative Negative Negative   < > Negative Negative   URINEKETONE Negative Negative Negative Negative   < > Negative Negative   SG 1.027 1.024  1.019 1.029   < > >1.030 1.015   UBLD Negative Negative Negative Negative   < > Moderate* Moderate*   URINEPH 7.0 6.5 6.0 6.0   < > 6.0 6.0   PROTEIN Negative Negative Negative Negative   < > >=300* >=300*   UROBILINOGEN  --   --   --   --   --  0.2 0.2   NITRITE Negative Negative Negative Negative   < > Negative Negative   LEUKEST Negative Negative Negative Negative   < > Negative Negative   RBCU 1 0 0 <1   < > O - 2 25-50*   WBCU 1 <1 <1 <1   < > 0 - 5 0 - 5    < > = values in this interval not displayed.     Recent Labs   Lab Test 12/06/21  1435 04/03/20  0900 02/28/20  1619 11/26/19  1435   UTPG 3.62* 0.18 6.20* 0.06     PTH  No lab results found.  IRON STUDIES  Recent Labs   Lab Test 06/04/22  0803   IRON 144      IRONSAT 52*   REYNA 369       IMPRESSION:  1. Findings are concerning for diffuse small bowel enteritis likely  infectious or inflammatory in etiology, with associated inflammatory  changes of the mesenteric adipose tissues, moderate ascites, small  bilateral probably reactive pleural effusions and with some edema in  the subcutaneous adipose tissues.  2. No significant atherosclerosis is identified. No obvious arterial  blockage is seen in the mesenteric vessels to suggest ischemia.  3. Mild degenerative changes of the spine and hips. No acute fracture  or aggressive osseous lesion.      Jenny Scott MD

## 2025-03-17 NOTE — CONFIDENTIAL NOTE
Per Dr Scott patient is due for another Rituximab infusion. Routing to the [facility] infusion scheduling line to reach out to patient.  NICKI Dsouza Care Coordinator  Nephrology

## 2025-03-18 DIAGNOSIS — N04.9 NEPHROTIC SYNDROME: ICD-10-CM

## 2025-03-18 DIAGNOSIS — N05.0 MINIMAL CHANGE DISEASE: Primary | ICD-10-CM

## 2025-03-18 DIAGNOSIS — N05.0 STEROID-DEPENDENT MINIMAL CHANGE GLOMERULOPATHY: ICD-10-CM

## 2025-04-05 ENCOUNTER — LAB (OUTPATIENT)
Dept: LAB | Facility: CLINIC | Age: 50
End: 2025-04-05
Payer: COMMERCIAL

## 2025-04-05 DIAGNOSIS — N05.0 MINIMAL CHANGE DISEASE: ICD-10-CM

## 2025-04-05 DIAGNOSIS — N04.9 NEPHROTIC SYNDROME: ICD-10-CM

## 2025-04-05 DIAGNOSIS — N18.31 CHRONIC KIDNEY DISEASE, STAGE 3A (H): ICD-10-CM

## 2025-04-05 LAB
ALBUMIN MFR UR ELPH: 7.9 MG/DL
ALBUMIN SERPL BCG-MCNC: 4.4 G/DL (ref 3.5–5.2)
ALBUMIN UR-MCNC: NEGATIVE MG/DL
ANION GAP SERPL CALCULATED.3IONS-SCNC: 9 MMOL/L (ref 7–15)
APPEARANCE UR: CLEAR
BILIRUB UR QL STRIP: NEGATIVE
BUN SERPL-MCNC: 13.2 MG/DL (ref 6–20)
CALCIUM SERPL-MCNC: 9.4 MG/DL (ref 8.8–10.4)
CD19 B CELL COMMENT: ABNORMAL
CD19 CELLS # BLD: 1 CELLS/UL (ref 107–698)
CD19 CELLS NFR BLD: <1 % (ref 6–27)
CHLORIDE SERPL-SCNC: 103 MMOL/L (ref 98–107)
COLOR UR AUTO: ABNORMAL
CREAT SERPL-MCNC: 1.24 MG/DL (ref 0.67–1.17)
CREAT UR-MCNC: 215.1 MG/DL
EGFRCR SERPLBLD CKD-EPI 2021: 71 ML/MIN/1.73M2
ERYTHROCYTE [DISTWIDTH] IN BLOOD BY AUTOMATED COUNT: 11.5 % (ref 10–15)
GLUCOSE SERPL-MCNC: 91 MG/DL (ref 70–99)
GLUCOSE UR STRIP-MCNC: NEGATIVE MG/DL
HCO3 SERPL-SCNC: 26 MMOL/L (ref 22–29)
HCT VFR BLD AUTO: 42.5 % (ref 40–53)
HGB BLD-MCNC: 15.1 G/DL (ref 13.3–17.7)
HGB UR QL STRIP: NEGATIVE
KETONES UR STRIP-MCNC: NEGATIVE MG/DL
LEUKOCYTE ESTERASE UR QL STRIP: NEGATIVE
MCH RBC QN AUTO: 30.9 PG (ref 26.5–33)
MCHC RBC AUTO-ENTMCNC: 35.5 G/DL (ref 31.5–36.5)
MCV RBC AUTO: 87 FL (ref 78–100)
MUCOUS THREADS #/AREA URNS LPF: PRESENT /LPF
NITRATE UR QL: NEGATIVE
PH UR STRIP: 6 [PH] (ref 5–7)
PHOSPHATE SERPL-MCNC: 3 MG/DL (ref 2.5–4.5)
PLATELET # BLD AUTO: 187 10E3/UL (ref 150–450)
POTASSIUM SERPL-SCNC: 4.1 MMOL/L (ref 3.4–5.3)
PROT/CREAT 24H UR: 0.04 MG/MG CR (ref 0–0.2)
RBC # BLD AUTO: 4.88 10E6/UL (ref 4.4–5.9)
RBC URINE: <1 /HPF
SODIUM SERPL-SCNC: 138 MMOL/L (ref 135–145)
SP GR UR STRIP: 1.03 (ref 1–1.03)
SQUAMOUS EPITHELIAL: <1 /HPF
UROBILINOGEN UR STRIP-MCNC: NORMAL MG/DL
WBC # BLD AUTO: 5.9 10E3/UL (ref 4–11)
WBC URINE: <1 /HPF

## 2025-04-05 PROCEDURE — 86355 B CELLS TOTAL COUNT: CPT

## 2025-04-05 PROCEDURE — 80069 RENAL FUNCTION PANEL: CPT

## 2025-04-05 PROCEDURE — 84156 ASSAY OF PROTEIN URINE: CPT

## 2025-04-05 PROCEDURE — 36415 COLL VENOUS BLD VENIPUNCTURE: CPT

## 2025-04-05 PROCEDURE — 81001 URINALYSIS AUTO W/SCOPE: CPT

## 2025-04-05 PROCEDURE — 85027 COMPLETE CBC AUTOMATED: CPT

## 2025-04-10 ENCOUNTER — PATIENT OUTREACH (OUTPATIENT)
Dept: CARE COORDINATION | Facility: CLINIC | Age: 50
End: 2025-04-10
Payer: COMMERCIAL

## 2025-04-23 ENCOUNTER — INFUSION THERAPY VISIT (OUTPATIENT)
Dept: INFUSION THERAPY | Facility: CLINIC | Age: 50
End: 2025-04-23
Attending: INTERNAL MEDICINE
Payer: COMMERCIAL

## 2025-04-23 VITALS
TEMPERATURE: 97.5 F | RESPIRATION RATE: 16 BRPM | HEART RATE: 54 BPM | BODY MASS INDEX: 23.58 KG/M2 | WEIGHT: 159.7 LBS | DIASTOLIC BLOOD PRESSURE: 86 MMHG | OXYGEN SATURATION: 100 % | SYSTOLIC BLOOD PRESSURE: 130 MMHG

## 2025-04-23 DIAGNOSIS — N05.0 MINIMAL CHANGE DISEASE: Primary | ICD-10-CM

## 2025-04-23 DIAGNOSIS — N05.0 STEROID-DEPENDENT MINIMAL CHANGE GLOMERULOPATHY: Primary | ICD-10-CM

## 2025-04-23 DIAGNOSIS — N04.9 NEPHROTIC SYNDROME: ICD-10-CM

## 2025-04-23 DIAGNOSIS — N05.0 STEROID-DEPENDENT MINIMAL CHANGE GLOMERULOPATHY: ICD-10-CM

## 2025-04-23 DIAGNOSIS — N05.0 MINIMAL CHANGE DISEASE: ICD-10-CM

## 2025-04-23 LAB
ALBUMIN SERPL BCG-MCNC: 4.8 G/DL (ref 3.5–5.2)
ANION GAP SERPL CALCULATED.3IONS-SCNC: 8 MMOL/L (ref 7–15)
BASOPHILS # BLD AUTO: 0.1 10E3/UL (ref 0–0.2)
BASOPHILS NFR BLD AUTO: 1 %
BUN SERPL-MCNC: 13 MG/DL (ref 6–20)
CALCIUM SERPL-MCNC: 9.7 MG/DL (ref 8.8–10.4)
CHLORIDE SERPL-SCNC: 102 MMOL/L (ref 98–107)
CREAT SERPL-MCNC: 1.2 MG/DL (ref 0.67–1.17)
EGFRCR SERPLBLD CKD-EPI 2021: 74 ML/MIN/1.73M2
EOSINOPHIL # BLD AUTO: 0.1 10E3/UL (ref 0–0.7)
EOSINOPHIL NFR BLD AUTO: 2 %
ERYTHROCYTE [DISTWIDTH] IN BLOOD BY AUTOMATED COUNT: 11.4 % (ref 10–15)
GLUCOSE SERPL-MCNC: 93 MG/DL (ref 70–99)
HCO3 SERPL-SCNC: 28 MMOL/L (ref 22–29)
HCT VFR BLD AUTO: 46.7 % (ref 40–53)
HGB BLD-MCNC: 16.5 G/DL (ref 13.3–17.7)
HOLD SPECIMEN: NORMAL
IMM GRANULOCYTES # BLD: 0 10E3/UL
IMM GRANULOCYTES NFR BLD: 0 %
LYMPHOCYTES # BLD AUTO: 1 10E3/UL (ref 0.8–5.3)
LYMPHOCYTES NFR BLD AUTO: 20 %
MCH RBC QN AUTO: 30.8 PG (ref 26.5–33)
MCHC RBC AUTO-ENTMCNC: 35.3 G/DL (ref 31.5–36.5)
MCV RBC AUTO: 87 FL (ref 78–100)
MONOCYTES # BLD AUTO: 0.3 10E3/UL (ref 0–1.3)
MONOCYTES NFR BLD AUTO: 6 %
NEUTROPHILS # BLD AUTO: 3.5 10E3/UL (ref 1.6–8.3)
NEUTROPHILS NFR BLD AUTO: 70 %
NRBC # BLD AUTO: 0 10E3/UL
NRBC BLD AUTO-RTO: 0 /100
PHOSPHATE SERPL-MCNC: 2.2 MG/DL (ref 2.5–4.5)
PLATELET # BLD AUTO: 198 10E3/UL (ref 150–450)
POTASSIUM SERPL-SCNC: 4.7 MMOL/L (ref 3.4–5.3)
RBC # BLD AUTO: 5.35 10E6/UL (ref 4.4–5.9)
SODIUM SERPL-SCNC: 138 MMOL/L (ref 135–145)
WBC # BLD AUTO: 4.9 10E3/UL (ref 4–11)

## 2025-04-23 PROCEDURE — 258N000003 HC RX IP 258 OP 636: Performed by: INTERNAL MEDICINE

## 2025-04-23 PROCEDURE — 85004 AUTOMATED DIFF WBC COUNT: CPT | Performed by: INTERNAL MEDICINE

## 2025-04-23 PROCEDURE — 250N000013 HC RX MED GY IP 250 OP 250 PS 637: Performed by: INTERNAL MEDICINE

## 2025-04-23 PROCEDURE — 96375 TX/PRO/DX INJ NEW DRUG ADDON: CPT

## 2025-04-23 PROCEDURE — 96413 CHEMO IV INFUSION 1 HR: CPT

## 2025-04-23 PROCEDURE — 99207 PR NO CHARGE LOS: CPT

## 2025-04-23 PROCEDURE — 82310 ASSAY OF CALCIUM: CPT | Performed by: INTERNAL MEDICINE

## 2025-04-23 PROCEDURE — 82040 ASSAY OF SERUM ALBUMIN: CPT | Performed by: INTERNAL MEDICINE

## 2025-04-23 PROCEDURE — 36415 COLL VENOUS BLD VENIPUNCTURE: CPT | Performed by: INTERNAL MEDICINE

## 2025-04-23 PROCEDURE — 250N000011 HC RX IP 250 OP 636: Performed by: INTERNAL MEDICINE

## 2025-04-23 PROCEDURE — 96415 CHEMO IV INFUSION ADDL HR: CPT

## 2025-04-23 RX ORDER — ALBUTEROL SULFATE 90 UG/1
1-2 INHALANT RESPIRATORY (INHALATION)
Start: 2025-08-31

## 2025-04-23 RX ORDER — METHYLPREDNISOLONE SODIUM SUCCINATE 125 MG/2ML
100 INJECTION INTRAMUSCULAR; INTRAVENOUS ONCE
OUTPATIENT
Start: 2025-08-31

## 2025-04-23 RX ORDER — HEPARIN SODIUM (PORCINE) LOCK FLUSH IV SOLN 100 UNIT/ML 100 UNIT/ML
5 SOLUTION INTRAVENOUS
OUTPATIENT
Start: 2025-08-31

## 2025-04-23 RX ORDER — DIPHENHYDRAMINE HCL 25 MG
50 CAPSULE ORAL ONCE
Status: CANCELLED
Start: 2025-08-31

## 2025-04-23 RX ORDER — HEPARIN SODIUM,PORCINE 10 UNIT/ML
5-20 VIAL (ML) INTRAVENOUS DAILY PRN
OUTPATIENT
Start: 2025-08-31

## 2025-04-23 RX ORDER — EPINEPHRINE 1 MG/ML
0.3 INJECTION, SOLUTION INTRAMUSCULAR; SUBCUTANEOUS EVERY 5 MIN PRN
Status: CANCELLED | OUTPATIENT
Start: 2025-08-31

## 2025-04-23 RX ORDER — DIPHENHYDRAMINE HYDROCHLORIDE 50 MG/ML
50 INJECTION, SOLUTION INTRAMUSCULAR; INTRAVENOUS
Start: 2025-08-31

## 2025-04-23 RX ORDER — ALBUTEROL SULFATE 90 UG/1
1-2 INHALANT RESPIRATORY (INHALATION)
Status: CANCELLED
Start: 2025-08-31

## 2025-04-23 RX ORDER — METHYLPREDNISOLONE SODIUM SUCCINATE 125 MG/2ML
125 INJECTION INTRAMUSCULAR; INTRAVENOUS
Status: CANCELLED
Start: 2025-08-31

## 2025-04-23 RX ORDER — METHYLPREDNISOLONE SODIUM SUCCINATE 125 MG/2ML
100 INJECTION INTRAMUSCULAR; INTRAVENOUS ONCE
Status: CANCELLED | OUTPATIENT
Start: 2025-08-31

## 2025-04-23 RX ORDER — DIPHENHYDRAMINE HYDROCHLORIDE 50 MG/ML
50 INJECTION, SOLUTION INTRAMUSCULAR; INTRAVENOUS
Status: CANCELLED
Start: 2025-08-31

## 2025-04-23 RX ORDER — DIPHENHYDRAMINE HYDROCHLORIDE 50 MG/ML
50 INJECTION, SOLUTION INTRAMUSCULAR; INTRAVENOUS ONCE
Start: 2025-10-20 | End: 2025-10-20

## 2025-04-23 RX ORDER — ACETAMINOPHEN 325 MG/1
650 TABLET ORAL ONCE
Start: 2025-08-31

## 2025-04-23 RX ORDER — DIPHENHYDRAMINE HCL 25 MG
50 CAPSULE ORAL ONCE
Start: 2025-08-31

## 2025-04-23 RX ORDER — ALBUTEROL SULFATE 0.83 MG/ML
2.5 SOLUTION RESPIRATORY (INHALATION)
OUTPATIENT
Start: 2025-08-31

## 2025-04-23 RX ORDER — HEPARIN SODIUM,PORCINE 10 UNIT/ML
5-20 VIAL (ML) INTRAVENOUS DAILY PRN
Status: CANCELLED | OUTPATIENT
Start: 2025-08-31

## 2025-04-23 RX ORDER — DIPHENHYDRAMINE HYDROCHLORIDE 50 MG/ML
50 INJECTION, SOLUTION INTRAMUSCULAR; INTRAVENOUS ONCE
Status: COMPLETED | OUTPATIENT
Start: 2025-04-23 | End: 2025-04-23

## 2025-04-23 RX ORDER — EPINEPHRINE 1 MG/ML
0.3 INJECTION, SOLUTION INTRAMUSCULAR; SUBCUTANEOUS EVERY 5 MIN PRN
OUTPATIENT
Start: 2025-08-31

## 2025-04-23 RX ORDER — ACETAMINOPHEN 325 MG/1
650 TABLET ORAL ONCE
Status: COMPLETED | OUTPATIENT
Start: 2025-04-23 | End: 2025-04-23

## 2025-04-23 RX ORDER — METHYLPREDNISOLONE SODIUM SUCCINATE 125 MG/2ML
125 INJECTION INTRAMUSCULAR; INTRAVENOUS
Start: 2025-08-31

## 2025-04-23 RX ORDER — ACETAMINOPHEN 325 MG/1
650 TABLET ORAL ONCE
Status: CANCELLED
Start: 2025-08-31

## 2025-04-23 RX ORDER — MEPERIDINE HYDROCHLORIDE 25 MG/ML
25 INJECTION INTRAMUSCULAR; INTRAVENOUS; SUBCUTANEOUS EVERY 30 MIN PRN
OUTPATIENT
Start: 2025-08-31

## 2025-04-23 RX ORDER — METHYLPREDNISOLONE SODIUM SUCCINATE 125 MG/2ML
100 INJECTION INTRAMUSCULAR; INTRAVENOUS ONCE
Status: COMPLETED | OUTPATIENT
Start: 2025-04-23 | End: 2025-04-23

## 2025-04-23 RX ORDER — MEPERIDINE HYDROCHLORIDE 25 MG/ML
25 INJECTION INTRAMUSCULAR; INTRAVENOUS; SUBCUTANEOUS EVERY 30 MIN PRN
Status: CANCELLED | OUTPATIENT
Start: 2025-08-31

## 2025-04-23 RX ORDER — ALBUTEROL SULFATE 0.83 MG/ML
2.5 SOLUTION RESPIRATORY (INHALATION)
Status: CANCELLED | OUTPATIENT
Start: 2025-08-31

## 2025-04-23 RX ORDER — HEPARIN SODIUM (PORCINE) LOCK FLUSH IV SOLN 100 UNIT/ML 100 UNIT/ML
5 SOLUTION INTRAVENOUS
Status: CANCELLED | OUTPATIENT
Start: 2025-08-31

## 2025-04-23 RX ADMIN — DIPHENHYDRAMINE HYDROCHLORIDE 50 MG: 50 INJECTION INTRAMUSCULAR; INTRAVENOUS at 10:45

## 2025-04-23 RX ADMIN — ACETAMINOPHEN 650 MG: 325 TABLET ORAL at 10:37

## 2025-04-23 RX ADMIN — RITUXIMAB-ABBS 1000 MG: 10 INJECTION, SOLUTION INTRAVENOUS at 11:18

## 2025-04-23 RX ADMIN — SODIUM CHLORIDE 250 ML: 0.9 INJECTION, SOLUTION INTRAVENOUS at 10:46

## 2025-04-23 RX ADMIN — METHYLPREDNISOLONE SODIUM SUCCINATE 100 MG: 125 INJECTION, POWDER, FOR SOLUTION INTRAMUSCULAR; INTRAVENOUS at 10:51

## 2025-04-23 NOTE — PROGRESS NOTES
Infusion Nursing Note:  Deshawn VENKAT Flood presents today for Rituximab.    Patient seen by provider today: No   present during visit today: Not Applicable.    Note: Patient reports feeling well today. Agreeable to 100 rate increases for infusion today. Premedicated with IV Benadryl & solu-medrol and PO Tylenol. Patient agreeable to use PO Benadryl with next infusion if tolerates 100 ml rate increases today.    Rates as follows:   100 mls/hr x 30 minutes  200 mls/hr x 30 minutes  300 mls/hr x 30 minutes  400 mls/hr x the remainder    Intravenous Access:  Peripheral IV placed without difficulty.    Treatment Conditions:  Biological Infusion Checklist:  ~~~ NOTE: If the patient answers yes to any of the questions below, hold the infusion and contact ordering provider or on-call provider.    Have you recently had an elevated temperature, fever, chills, productive cough, coughing for 3 weeks or longer or hemoptysis,  abnormal vital signs, night sweats,  chest pain or have you noticed a decrease in your appetite, unexplained weight loss or fatigue? No  Do you have any open wounds or new incisions? No  Do you have any upcoming hospitalizations or surgeries? Does not include esophagogastroduodenoscopy, colonoscopy, endoscopic retrograde cholangiopancreatography (ERCP), endoscopic ultrasound (EUS), dental procedures or joint aspiration/steroid injections No  Do you currently have any signs of illness or infection or are you on any antibiotics? No  Have you had any new, sudden or worsening abdominal pain? No  Have you or anyone in your household received a live vaccination in the past 4 weeks? Please note: No live vaccines while on biologic/chemotherapy until 6 months after the last treatment. Patient can receive the flu vaccine (shot only), pneumovax and the Covid vaccine. It is optimal for the patient to get these vaccines mid cycle, but they can be given at any time as long as it is not on the day of the infusion.  No  Have you recently been diagnosed with any new nervous system diseases (ie. Multiple sclerosis, Guillain [place], seizures, neurological changes) or cancer diagnosis? Are you on any form of radiation or chemotherapy? No  Are you pregnant or breast feeding or do you have plans of pregnancy in the future? No  Have you been having any signs of worsening depression or suicidal ideations?  (benlysta only) No  Have there been any other new onset medical symptoms? No  Have you had any new blood clots? (IVIG only) No    Post Infusion Assessment:  Patient tolerated infusion without incident.  Site patent and intact, free from redness, edema or discomfort.  No evidence of extravasations.  Access discontinued per protocol.  Biologic Infusion Post Education: Call the triage nurse at your clinic or seek medical attention if you have chills and/or temperature greater than or equal to 100.5, uncontrolled nausea/vomiting, diarrhea, constipation, dizziness, shortness of breath, chest pain, heart palpitations, weakness or any other new or concerning symptoms, questions or concerns.  You cannot have any live virus vaccines prior to or during treatment or up to 6 months post infusion.  If you have an upcoming surgery, medical procedure or dental procedure during treatment, this should be discussed with your ordering physician and your surgeon/dentist.  If you are having any concerning symptom, if you are unsure if you should get your next infusion or wish to speak to a provider before your next infusion, please call your care coordinator or triage nurse at your clinic to notify them so we can adequately serve you.     Discharge Plan:   Future appts have been reviewed and crosschecked with appt note and plan.  AVS to patient via [employer].  Patient will return in 6 months for next appointment.   Patient discharged in stable condition accompanied by: self.  Departure Mode: Ambulatory.      Tangela Villanueva RN BSN O[facility]

## 2025-04-23 NOTE — TELEPHONE ENCOUNTER
Assessment/Plan   Assessment & Plan  Primary hypertension  HYPERTENSION:   Decrease intake of processed foods, fast foods, lunch meat, canned soups, canned veggies.  Increase intake of fresh fruits, veggies, and lean meats. Monitor blood pressure at home, keep a log and bring this with you to your next appointment. Call the office if your blood pressure runs 150/90 or higher consistently.  Blood Pressure Technique:  Sit quietly in a chair for 5 minutes with back supported and feet on the floor  Then place left arm on a table or armrest so bicep area is at the same level of heart or left breast  Check three times in a row, disregard the highest reading and average the other two    Mixed hyperlipidemia      Decrease intake of saturated fats, fast food, sweets.  Increase intake of fresh fruit fresh vegetables and lean meats.  Increase healthy fats seeds, nuts, olive oil instead of butter.  walk 150 minutes/week for heart health.   Aim for 25-30 grams of fiber in your diet daily.  May consider adding Fish Oil supplement 1,200 mg per day or Omega 3 Supplement daily.      Type 2 diabetes mellitus without complication, without long-term current use of insulin    Orders:    POCT glycosylated hemoglobin (Hb A1C) manually resulted  Diabetes Type 2  A1C is now 6.9 from 6.4   Continue current medication.  Continue work on diet - recommend lots of fruits and vegetables, lean protein like chicken, turkey, fish, beans and Greek yogurt. Try to choose healthier carbohydrate options like oatmeal, wheat bread and pasta, sweet potatoes. Limit sugary treats.  Reevaluate in 3 months.   Left wrist pain    Orders:    lidocaine (Lidoderm) 5 % patch; Place 1 patch over 12 hours on the skin once daily. Remove & discard patch within 12 hours or as directed by MD.  Wear lido patch on for 12 hours off for 12 hours as needed for pain   Breast pain, left    Orders:    BI mammo left diagnostic; Future    BI US breast complete left; Future  Please  Returned patients call. Left voice message. Patient left voice message stating that the medications are not working but did not state what symptoms he was having. Provided number for call back.  Kami Simmons LPN  Nephrology  [phone]     have mammo and US done at his time. This office will call you with results once received.   Complex regional pain syndrome type 1 of left upper extremity

## 2025-08-02 ENCOUNTER — LAB (OUTPATIENT)
Dept: LAB | Facility: CLINIC | Age: 50
End: 2025-08-02
Payer: COMMERCIAL

## 2025-08-02 DIAGNOSIS — N05.0 MINIMAL CHANGE DISEASE: ICD-10-CM

## 2025-08-02 DIAGNOSIS — N04.9 NEPHROTIC SYNDROME: ICD-10-CM

## 2025-08-02 LAB
CHOLEST SERPL-MCNC: 110 MG/DL
FASTING STATUS PATIENT QL REPORTED: YES
HDLC SERPL-MCNC: 47 MG/DL
LDLC SERPL CALC-MCNC: 56 MG/DL
NONHDLC SERPL-MCNC: 63 MG/DL
TRIGL SERPL-MCNC: 36 MG/DL

## 2025-08-02 PROCEDURE — 80061 LIPID PANEL: CPT

## 2025-08-02 PROCEDURE — 36415 COLL VENOUS BLD VENIPUNCTURE: CPT

## (undated) RX ORDER — FENTANYL CITRATE 50 UG/ML
INJECTION, SOLUTION INTRAMUSCULAR; INTRAVENOUS
Status: DISPENSED
Start: 2019-04-09

## (undated) RX ORDER — LIDOCAINE HYDROCHLORIDE 10 MG/ML
INJECTION, SOLUTION EPIDURAL; INFILTRATION; INTRACAUDAL; PERINEURAL
Status: DISPENSED
Start: 2019-04-09

## (undated) RX ORDER — SODIUM CHLORIDE 9 MG/ML
INJECTION, SOLUTION INTRAVENOUS
Status: DISPENSED
Start: 2019-04-09